# Patient Record
Sex: MALE | Race: WHITE | NOT HISPANIC OR LATINO | Employment: UNEMPLOYED | URBAN - NONMETROPOLITAN AREA
[De-identification: names, ages, dates, MRNs, and addresses within clinical notes are randomized per-mention and may not be internally consistent; named-entity substitution may affect disease eponyms.]

---

## 2017-02-13 DIAGNOSIS — R12 HEARTBURN: ICD-10-CM

## 2017-02-13 RX ORDER — PANTOPRAZOLE SODIUM 40 MG/1
TABLET, DELAYED RELEASE ORAL
Qty: 30 TABLET | Refills: 1 | Status: SHIPPED | OUTPATIENT
Start: 2017-02-13 | End: 2020-09-17 | Stop reason: HOSPADM

## 2019-09-25 ENCOUNTER — APPOINTMENT (OUTPATIENT)
Dept: CT IMAGING | Facility: HOSPITAL | Age: 44
End: 2019-09-25

## 2019-09-25 ENCOUNTER — HOSPITAL ENCOUNTER (EMERGENCY)
Facility: HOSPITAL | Age: 44
Discharge: HOME | End: 2019-09-25
Attending: EMERGENCY MEDICINE | Admitting: EMERGENCY MEDICINE

## 2019-09-25 VITALS
WEIGHT: 242.2 LBS | HEIGHT: 71 IN | RESPIRATION RATE: 18 BRPM | BODY MASS INDEX: 33.91 KG/M2 | TEMPERATURE: 98 F | OXYGEN SATURATION: 97 % | DIASTOLIC BLOOD PRESSURE: 86 MMHG | SYSTOLIC BLOOD PRESSURE: 137 MMHG | HEART RATE: 85 BPM

## 2019-09-25 DIAGNOSIS — R10.31 RIGHT LOWER QUADRANT ABDOMINAL PAIN: Primary | ICD-10-CM

## 2019-09-25 LAB
ALBUMIN SERPL-MCNC: 4.1 G/DL (ref 3.5–5.2)
ALBUMIN/GLOB SERPL: 1.5 G/DL
ALP SERPL-CCNC: 104 U/L (ref 39–117)
ALT SERPL W P-5'-P-CCNC: 27 U/L (ref 1–41)
ANION GAP SERPL CALCULATED.3IONS-SCNC: 15.6 MMOL/L (ref 5–15)
AST SERPL-CCNC: 19 U/L (ref 1–40)
BASOPHILS # BLD AUTO: 0.06 10*3/MM3 (ref 0–0.2)
BASOPHILS NFR BLD AUTO: 0.6 % (ref 0–1.5)
BILIRUB SERPL-MCNC: 0.2 MG/DL (ref 0.2–1.2)
BILIRUB UR QL STRIP: NEGATIVE
BUN BLD-MCNC: 12 MG/DL (ref 6–20)
BUN/CREAT SERPL: 12.2 (ref 7–25)
CALCIUM SPEC-SCNC: 8.9 MG/DL (ref 8.6–10.5)
CHLORIDE SERPL-SCNC: 100 MMOL/L (ref 98–107)
CLARITY UR: CLEAR
CO2 SERPL-SCNC: 22.4 MMOL/L (ref 22–29)
COLOR UR: YELLOW
CREAT BLD-MCNC: 0.98 MG/DL (ref 0.76–1.27)
DEPRECATED RDW RBC AUTO: 43.6 FL (ref 37–54)
EOSINOPHIL # BLD AUTO: 0.32 10*3/MM3 (ref 0–0.4)
EOSINOPHIL NFR BLD AUTO: 3.2 % (ref 0.3–6.2)
ERYTHROCYTE [DISTWIDTH] IN BLOOD BY AUTOMATED COUNT: 14 % (ref 12.3–15.4)
GFR SERPL CREATININE-BSD FRML MDRD: 83 ML/MIN/1.73
GLOBULIN UR ELPH-MCNC: 2.7 GM/DL
GLUCOSE BLD-MCNC: 104 MG/DL (ref 65–99)
GLUCOSE UR STRIP-MCNC: NEGATIVE MG/DL
HCT VFR BLD AUTO: 42.1 % (ref 37.5–51)
HGB BLD-MCNC: 13.8 G/DL (ref 13–17.7)
HGB UR QL STRIP.AUTO: NEGATIVE
IMM GRANULOCYTES # BLD AUTO: 0.06 10*3/MM3 (ref 0–0.05)
IMM GRANULOCYTES NFR BLD AUTO: 0.6 % (ref 0–0.5)
KETONES UR QL STRIP: NEGATIVE
LEUKOCYTE ESTERASE UR QL STRIP.AUTO: NEGATIVE
LIPASE SERPL-CCNC: 52 U/L (ref 13–60)
LYMPHOCYTES # BLD AUTO: 2.78 10*3/MM3 (ref 0.7–3.1)
LYMPHOCYTES NFR BLD AUTO: 28.2 % (ref 19.6–45.3)
MCH RBC QN AUTO: 28 PG (ref 26.6–33)
MCHC RBC AUTO-ENTMCNC: 32.8 G/DL (ref 31.5–35.7)
MCV RBC AUTO: 85.4 FL (ref 79–97)
MONOCYTES # BLD AUTO: 0.89 10*3/MM3 (ref 0.1–0.9)
MONOCYTES NFR BLD AUTO: 9 % (ref 5–12)
NEUTROPHILS # BLD AUTO: 5.74 10*3/MM3 (ref 1.7–7)
NEUTROPHILS NFR BLD AUTO: 58.4 % (ref 42.7–76)
NITRITE UR QL STRIP: NEGATIVE
NRBC BLD AUTO-RTO: 0 /100 WBC (ref 0–0.2)
PH UR STRIP.AUTO: <=5 [PH] (ref 5–8)
PLATELET # BLD AUTO: 301 10*3/MM3 (ref 140–450)
PMV BLD AUTO: 9.4 FL (ref 6–12)
POTASSIUM BLD-SCNC: 4.1 MMOL/L (ref 3.5–5.2)
PROT SERPL-MCNC: 6.8 G/DL (ref 6–8.5)
PROT UR QL STRIP: NEGATIVE
RBC # BLD AUTO: 4.93 10*6/MM3 (ref 4.14–5.8)
SODIUM BLD-SCNC: 138 MMOL/L (ref 136–145)
SP GR UR STRIP: 1.01 (ref 1–1.03)
UROBILINOGEN UR QL STRIP: NORMAL
WBC NRBC COR # BLD: 9.85 10*3/MM3 (ref 3.4–10.8)

## 2019-09-25 PROCEDURE — 85025 COMPLETE CBC W/AUTO DIFF WBC: CPT | Performed by: EMERGENCY MEDICINE

## 2019-09-25 PROCEDURE — 96375 TX/PRO/DX INJ NEW DRUG ADDON: CPT

## 2019-09-25 PROCEDURE — 96374 THER/PROPH/DIAG INJ IV PUSH: CPT

## 2019-09-25 PROCEDURE — 74177 CT ABD & PELVIS W/CONTRAST: CPT

## 2019-09-25 PROCEDURE — 25010000002 MORPHINE PER 10 MG: Performed by: EMERGENCY MEDICINE

## 2019-09-25 PROCEDURE — 25010000002 ONDANSETRON PER 1 MG: Performed by: EMERGENCY MEDICINE

## 2019-09-25 PROCEDURE — 25010000002 IOPAMIDOL 61 % SOLUTION: Performed by: EMERGENCY MEDICINE

## 2019-09-25 PROCEDURE — 83690 ASSAY OF LIPASE: CPT | Performed by: EMERGENCY MEDICINE

## 2019-09-25 PROCEDURE — 81003 URINALYSIS AUTO W/O SCOPE: CPT | Performed by: EMERGENCY MEDICINE

## 2019-09-25 PROCEDURE — 99283 EMERGENCY DEPT VISIT LOW MDM: CPT

## 2019-09-25 PROCEDURE — 80053 COMPREHEN METABOLIC PANEL: CPT | Performed by: EMERGENCY MEDICINE

## 2019-09-25 RX ORDER — MORPHINE SULFATE 4 MG/ML
4 INJECTION, SOLUTION INTRAMUSCULAR; INTRAVENOUS ONCE
Status: COMPLETED | OUTPATIENT
Start: 2019-09-25 | End: 2019-09-25

## 2019-09-25 RX ORDER — ONDANSETRON 2 MG/ML
4 INJECTION INTRAMUSCULAR; INTRAVENOUS ONCE
Status: COMPLETED | OUTPATIENT
Start: 2019-09-25 | End: 2019-09-25

## 2019-09-25 RX ADMIN — SODIUM CHLORIDE 1000 ML: 9 INJECTION, SOLUTION INTRAVENOUS at 01:02

## 2019-09-25 RX ADMIN — IOPAMIDOL 100 ML: 612 INJECTION, SOLUTION INTRAVENOUS at 01:14

## 2019-09-25 RX ADMIN — ONDANSETRON 4 MG: 2 INJECTION INTRAMUSCULAR; INTRAVENOUS at 01:02

## 2019-09-25 RX ADMIN — MORPHINE SULFATE 4 MG: 4 INJECTION INTRAVENOUS at 01:02

## 2019-09-25 NOTE — ED PROVIDER NOTES
Subjective   44-year-old male present with abdominal pain.  States her for last couple days he said a sharp right lower quadrant pain, today the pain is been more dull and more severe.  It is been associate with nausea, loss of appetite.  He denies any fevers, chills, vomiting, diarrhea, urinary complaints.  Has never had pain like this before.            Review of Systems   Constitutional: Positive for appetite change. Negative for fever.   HENT: Negative.    Eyes: Negative.    Respiratory: Negative.    Cardiovascular: Negative.    Gastrointestinal: Positive for abdominal pain and nausea. Negative for diarrhea and vomiting.   Genitourinary: Negative.    Musculoskeletal: Negative.    Skin: Negative.    Neurological: Negative.    Psychiatric/Behavioral: Negative.        Past Medical History:   Diagnosis Date   • High cholesterol    • Hypertension    • Migraine        No Known Allergies    Past Surgical History:   Procedure Laterality Date   • BUNIONECTOMY     • CHOLECYSTECTOMY     • COLONOSCOPY     • ENDOSCOPY     • HERNIA REPAIR     • KNEE OPEN LATERAL RELEASE      Right   • TONSILLECTOMY AND ADENOIDECTOMY         Family History   Problem Relation Age of Onset   • Hypertension Father    • Hypertension Mother    • Prostate cancer Paternal Uncle    • Heart disease Paternal Grandmother    • Prostate cancer Paternal Grandfather    • Prostate cancer Paternal Uncle        Social History     Socioeconomic History   • Marital status: Single     Spouse name: Not on file   • Number of children: Not on file   • Years of education: Not on file   • Highest education level: Not on file   Tobacco Use   • Smoking status: Current Every Day Smoker     Packs/day: 0.50     Types: Cigarettes   • Smokeless tobacco: Never Used   Substance and Sexual Activity   • Alcohol use: No   • Drug use: No           Objective   Physical Exam   Constitutional: He is oriented to person, place, and time. He appears well-developed and well-nourished. No  distress.   HENT:   Head: Normocephalic and atraumatic.   Right Ear: External ear normal.   Left Ear: External ear normal.   Nose: Nose normal.   Mouth/Throat: Oropharynx is clear and moist.   Eyes: Conjunctivae and EOM are normal. Pupils are equal, round, and reactive to light.   Neck: Normal range of motion. Neck supple.   Cardiovascular: Regular rhythm, normal heart sounds and intact distal pulses.   Tachycardic   Pulmonary/Chest: Effort normal and breath sounds normal. No respiratory distress.   Abdominal: Soft. Bowel sounds are normal. He exhibits no distension. There is no rebound and no guarding.   Diffuse tenderness, worse in the right lower quadrant, positive Rovsing, psoas   Musculoskeletal: Normal range of motion. He exhibits no edema, tenderness or deformity.   Neurological: He is alert and oriented to person, place, and time.   Skin: Skin is warm and dry. No rash noted.   Psychiatric: He has a normal mood and affect. His behavior is normal.   Nursing note and vitals reviewed.      Procedures           ED Course                  MDM  Number of Diagnoses or Management Options  Right lower quadrant abdominal pain:   Diagnosis management comments: 44-year-old male with abdominal pain.  Well-developed, well-nourished obese man in no distress with exam as above.  His history and exam are somewhat concerning for appendicitis.  Will check labs, urinalysis and CT scan.  We will give symptom medic treatment.  Disposition pending work-up.    DDX: Appendicitis, stone, UTI, constipation    Lab work is unremarkable.  CT scan shows small punctate right renal stone, no acute abnormality.  He states he is ready to go home.  Will discharge home with outpatient follow-up.       Amount and/or Complexity of Data Reviewed  Clinical lab tests: reviewed  Tests in the radiology section of CPT®: reviewed        Final diagnoses:   Right lower quadrant abdominal pain              Bao Paige MD  09/25/19 0353

## 2019-10-02 ENCOUNTER — OFFICE VISIT (OUTPATIENT)
Dept: SURGERY | Facility: CLINIC | Age: 44
End: 2019-10-02

## 2019-10-02 VITALS
WEIGHT: 234 LBS | BODY MASS INDEX: 32.76 KG/M2 | TEMPERATURE: 97.5 F | OXYGEN SATURATION: 97 % | SYSTOLIC BLOOD PRESSURE: 130 MMHG | DIASTOLIC BLOOD PRESSURE: 82 MMHG | HEIGHT: 71 IN | HEART RATE: 122 BPM

## 2019-10-02 DIAGNOSIS — R10.33 PERIUMBILICAL ABDOMINAL PAIN: Primary | ICD-10-CM

## 2019-10-02 DIAGNOSIS — K43.0 INCISIONAL HERNIA WITH OBSTRUCTION BUT NO GANGRENE: ICD-10-CM

## 2019-10-02 PROCEDURE — 99244 OFF/OP CNSLTJ NEW/EST MOD 40: CPT | Performed by: SURGERY

## 2019-10-02 RX ORDER — ALBUTEROL SULFATE 90 UG/1
AEROSOL, METERED RESPIRATORY (INHALATION)
COMMUNITY
Start: 2015-10-20 | End: 2020-09-17 | Stop reason: HOSPADM

## 2019-10-02 RX ORDER — IMIPRAMINE HYDROCHLORIDE 10 MG/1
TABLET, FILM COATED ORAL
COMMUNITY
Start: 2015-10-10 | End: 2020-09-17 | Stop reason: HOSPADM

## 2019-10-02 RX ORDER — SUMATRIPTAN 100 MG/1
TABLET, FILM COATED ORAL
COMMUNITY
Start: 2015-11-03 | End: 2020-09-17 | Stop reason: HOSPADM

## 2019-10-02 RX ORDER — CYCLOBENZAPRINE HCL 10 MG
10 TABLET ORAL
Refills: 0 | COMMUNITY
Start: 2019-09-26 | End: 2020-09-17 | Stop reason: HOSPADM

## 2019-10-02 RX ORDER — SULFAMETHOXAZOLE AND TRIMETHOPRIM 800; 160 MG/1; MG/1
2 TABLET ORAL 2 TIMES DAILY
Refills: 0 | COMMUNITY
Start: 2019-09-26 | End: 2019-10-22

## 2019-10-02 RX ORDER — HYDROXYZINE PAMOATE 25 MG/1
1 CAPSULE ORAL DAILY
COMMUNITY
Start: 2015-10-06 | End: 2020-09-17 | Stop reason: HOSPADM

## 2019-10-02 NOTE — PROGRESS NOTES
Patient: Gopi Johnson    YOB: 1975    Date: 10/02/2019    Primary Care Provider: Juvenal Ray PA    Reason for Consultation: Hernia    Chief Complaint   Patient presents with   • Mass     abdominal       SUBJECTIVE:    History of present illness:  I saw the patient in the office today as a consultation for evaluation and treatment of an abdominal mass on the left of his umbilicus.  It has been there for approximately 1 month.  He complains of abdominal pain.  He can't manually reduce it.  He had a ct scan that showed right inguinal and lower abdominal wall hernias containing fat.    He has had a previous laparoscopic cholecystectomy in the past.  He complains of a mass to the left side of his umbilicus, this is worse with heavy lifting, nonradiating in nature, causing sharp discomfort, present over the past several months, lying down seems to relieve it.  He does have evidence of an incisional hernia at the umbilicus on CT scan and a smaller right sided inguinal hernia.  He was seen in the emergency room recently.    The following portions of the patient's history were reviewed and updated as appropriate: allergies, current medications, past family history, past medical history, past social history, past surgical history and problem list.    Review of Systems   Constitutional: Negative for chills, fever and unexpected weight change.   HENT: Negative for trouble swallowing and voice change.    Eyes: Negative for visual disturbance.   Respiratory: Negative for apnea, cough, chest tightness, shortness of breath and wheezing.    Cardiovascular: Negative for chest pain, palpitations and leg swelling.   Gastrointestinal: Positive for abdominal pain. Negative for abdominal distention, anal bleeding, blood in stool, constipation, diarrhea, nausea, rectal pain and vomiting.   Endocrine: Negative for cold intolerance and heat intolerance.   Genitourinary: Negative for difficulty urinating, dysuria, flank  pain, scrotal swelling and testicular pain.   Musculoskeletal: Negative for back pain, gait problem and joint swelling.   Skin: Negative for color change, rash and wound.   Neurological: Negative for dizziness, syncope, speech difficulty, weakness, numbness and headaches.   Hematological: Negative for adenopathy. Does not bruise/bleed easily.   Psychiatric/Behavioral: Negative for confusion. The patient is not nervous/anxious.        History:  Past Medical History:   Diagnosis Date   • High cholesterol    • Hypertension    • Migraine        Past Surgical History:   Procedure Laterality Date   • BUNIONECTOMY     • CHOLECYSTECTOMY     • COLONOSCOPY     • ENDOSCOPY     • HERNIA REPAIR     • KNEE OPEN LATERAL RELEASE      Right   • TONSILLECTOMY AND ADENOIDECTOMY         Family History   Problem Relation Age of Onset   • Hypertension Father    • Hypertension Mother    • Prostate cancer Paternal Uncle    • Heart disease Paternal Grandmother    • Prostate cancer Paternal Grandfather    • Prostate cancer Paternal Uncle        Social History     Tobacco Use   • Smoking status: Current Every Day Smoker     Packs/day: 0.50     Types: Cigarettes   • Smokeless tobacco: Never Used   Substance Use Topics   • Alcohol use: No   • Drug use: No       Allergies:  No Known Allergies    Medications:    Current Outpatient Medications:   •  cyclobenzaprine (FLEXERIL) 10 MG tablet, Take 10 mg by mouth every night at bedtime., Disp: , Rfl: 0  •  hydrOXYzine pamoate (VISTARIL) 25 MG capsule, Take 1 capsule by mouth Daily., Disp: , Rfl:   •  pantoprazole (PROTONIX) 40 MG EC tablet, TAKE 1 TABLET IN THE MORNING 30 MINUTES BEFORE BREAKFAST, Disp: 30 tablet, Rfl: 1  •  sulfamethoxazole-trimethoprim (BACTRIM DS,SEPTRA DS) 800-160 MG per tablet, Take 2 tablets by mouth 2 (Two) Times a Day., Disp: , Rfl: 0  •  venlafaxine XR (EFFEXOR-XR) 150 MG 24 hr capsule, Take 150 mg by mouth Daily., Disp: , Rfl:   •  albuterol sulfate HFA (VENTOLIN HFA) 108  "(90 Base) MCG/ACT inhaler, Ventolin  (90 Base) MCG/ACT Inhalation Aerosol Solution; Patient Sig: Ventolin  (90 Base) MCG/ACT Inhalation Aerosol Solution ; 18; 0; 20-Oct-2015; Active, Disp: , Rfl:   •  atorvastatin (LIPITOR) 20 MG tablet, Take  by mouth., Disp: , Rfl:   •  hydrochlorothiazide (HYDRODIURIL) 25 MG tablet, Take  by mouth Daily., Disp: , Rfl:   •  imipramine (TOFRANIL) 10 MG tablet, Take  by mouth., Disp: , Rfl:   •  SUMAtriptan (IMITREX) 100 MG tablet, Take  by mouth., Disp: , Rfl:   •  topiramate (TOPAMAX) 50 MG tablet, Take  by mouth Daily., Disp: , Rfl:     OBJECTIVE:    Vital Signs:   Vitals:    10/02/19 1424   BP: 130/82   Pulse: (!) 122   Temp: 97.5 °F (36.4 °C)   TempSrc: Temporal   SpO2: 97%   Weight: 106 kg (234 lb)   Height: 180.3 cm (71\")       Physical Exam:   General Appearance:    Alert, cooperative, in no acute distress   Head:    Normocephalic, without obvious abnormality, atraumatic   Eyes:            Lids and lashes normal, conjunctivae and sclerae normal, no   icterus, no pallor, corneas clear, PERRLA   Ears:    Ears appear intact with no abnormalities noted   Throat:   No oral lesions, no thrush, oral mucosa moist   Neck:   No adenopathy, supple, trachea midline, no thyromegaly, no   carotid bruit, no JVD   Lungs:     Clear to auscultation,respirations regular, even and                  unlabored    Heart:    Regular rhythm and normal rate, normal S1 and S2, no            murmur   Abdomen:     no masses, no organomegaly, soft non-tender, non-distended, no guarding, there is evidence of a incarcerated incisional hernia located just to the left and inferior to the umbilicus, there is a smaller reducible right inguinal hernia present   Extremities:   Moves all extremities well, no edema, no cyanosis, no             redness   Pulses:   Pulses palpable and equal bilaterally   Skin:   No bleeding, bruising or rash   Lymph nodes:   No palpable adenopathy   Neurologic:   " Cranial nerves 2 - 12 grossly intact, sensation intact        Results Review:   I reviewed the patient's new clinical results.  I reviewed the patient's new imaging results and agree with the interpretation.  I reviewed the patient's other test results and agree with the interpretation    Review of Systems was reviewed and confirmed as accurate today.    ASSESSMENT/PLAN:    1. Periumbilical abdominal pain    2. Incisional hernia with obstruction but no gangrene        I had a detailed and extensive discussion with the patient in the office and they understand that they need to undergo incisional hernia repair with mesh.  Full risks and benefits of operative versus nonoperative intervention were discussed with the patient and these included things such as nonresolution of symptoms and possible worsening of symptoms without surgical intervention versus infection, bleeding, possible recurrent hernia, possible postoperative neuralgia from nerve damage or involvement with scar tissue, etc.  The patient understands, agrees, and had no questions for me at the end of the office visit.     I discussed the patients findings and my recommendations with patient.    He did have an ultrasound performed today in the office and this did show evidence of multiple defects inferior to the umbilicus corresponding to the area of previous laparoscopic cholecystectomy trocar incision.  He also had a very small right inguinal hernia.  I do not really know if this needs to be repaired at this time, I would start with the incisional hernia first.    Electronically signed by Royal Dover MD  10/02/19    Portions of this note have been scribed for Royal Dover MD by Michelle Milligan. 10/2/2019  3:33 PM

## 2019-10-17 ENCOUNTER — TELEPHONE (OUTPATIENT)
Dept: SURGERY | Facility: CLINIC | Age: 44
End: 2019-10-17

## 2019-10-18 ENCOUNTER — OUTSIDE FACILITY SERVICE (OUTPATIENT)
Dept: SURGERY | Facility: CLINIC | Age: 44
End: 2019-10-18

## 2019-10-18 PROCEDURE — 49561 PR REPAIR INCISIONAL HERNIA,STRANG: CPT | Performed by: SURGERY

## 2019-10-18 PROCEDURE — 49568 PR IMPLANT MESH HERNIA REPAIR/DEBRIDEMENT CLOSURE: CPT | Performed by: SURGERY

## 2019-10-21 ENCOUNTER — TELEPHONE (OUTPATIENT)
Dept: SURGERY | Facility: CLINIC | Age: 44
End: 2019-10-21

## 2019-10-22 ENCOUNTER — OFFICE VISIT (OUTPATIENT)
Dept: SURGERY | Facility: CLINIC | Age: 44
End: 2019-10-22

## 2019-10-22 VITALS
HEART RATE: 100 BPM | DIASTOLIC BLOOD PRESSURE: 82 MMHG | WEIGHT: 243 LBS | OXYGEN SATURATION: 97 % | SYSTOLIC BLOOD PRESSURE: 146 MMHG | TEMPERATURE: 98.4 F | HEIGHT: 71 IN | BODY MASS INDEX: 34.02 KG/M2

## 2019-10-22 DIAGNOSIS — Z48.89 POSTOPERATIVE VISIT: Primary | ICD-10-CM

## 2019-10-22 PROCEDURE — 99024 POSTOP FOLLOW-UP VISIT: CPT | Performed by: SURGERY

## 2019-10-22 RX ORDER — OXYCODONE AND ACETAMINOPHEN 7.5; 325 MG/1; MG/1
1 TABLET ORAL EVERY 4 HOURS PRN
Qty: 20 TABLET | Refills: 0 | Status: ON HOLD | OUTPATIENT
Start: 2019-10-22 | End: 2020-09-12

## 2019-10-22 NOTE — PROGRESS NOTES
"Patient: Gopi Johnson    YOB: 1975    Date: 10/22/2019    Primary Care Provider: Juvenal Ray PA    Reason for Consultation: Follow-up hernia    Chief Complaint   Patient presents with   • Post-op Hernia       History of present illness:  I saw the patient in the office today as a followup from their recent hernia repair.  He complains of severe abdominal pain, worse with movement. He states he is still having bleeding out of his incision.     The following portions of the patient's history were reviewed and updated as appropriate: allergies, current medications, past family history, past medical history, past social history, past surgical history and problem list.    Vital Signs:   Vitals:    10/22/19 1344   BP: 146/82   Pulse: 100   Temp: 98.4 °F (36.9 °C)   SpO2: 97%   Weight: 110 kg (243 lb)   Height: 180.3 cm (70.98\")       Physical Exam:   General Appearance:    Alert, cooperative, in no acute distress   Abdomen:     no masses, no organomegaly, soft non-tender, non-distended, no guarding, wounds are well healed, no evidence of recurrent hernia         Assessment / Plan:    1. Postoperative visit        I did discuss the situation with the patient today in the office and they have done well from their recent herniorraphy.  I will see the patient back in the office in 1 week for suture removal, I did give him another prescription for Percocet 7.5/325.    Electronically signed by Royal Dover MD  10/22/19    Portions of this note has been scribed for Royal Dover MD by Inga Bennett. 10/22/2019  2:57 PM            "

## 2019-10-30 ENCOUNTER — OFFICE VISIT (OUTPATIENT)
Dept: SURGERY | Facility: CLINIC | Age: 44
End: 2019-10-30

## 2019-10-30 VITALS
TEMPERATURE: 98.8 F | OXYGEN SATURATION: 98 % | SYSTOLIC BLOOD PRESSURE: 158 MMHG | HEART RATE: 90 BPM | BODY MASS INDEX: 34.13 KG/M2 | WEIGHT: 243.8 LBS | DIASTOLIC BLOOD PRESSURE: 90 MMHG | HEIGHT: 71 IN

## 2019-10-30 DIAGNOSIS — Z48.89 POSTOPERATIVE VISIT: Primary | ICD-10-CM

## 2019-10-30 PROCEDURE — 99024 POSTOP FOLLOW-UP VISIT: CPT | Performed by: SURGERY

## 2019-10-30 NOTE — PROGRESS NOTES
"Patient: Gopi Johnson    YOB: 1975    Date: 10/30/2019    Primary Care Provider: Juvenal Ray PA    Reason for Consultation: Follow-up hernia    Chief Complaint   Patient presents with   • Post-op       History of present illness:  I saw the patient in the office today as a followup from their recent hernia repair.  They state that they have done well and are having no complaints.    The following portions of the patient's history were reviewed and updated as appropriate: allergies, current medications, past family history, past medical history, past social history, past surgical history and problem list.    Vital Signs:   Vitals:    10/30/19 1327   BP: 158/90   Pulse: 90   Temp: 98.8 °F (37.1 °C)   SpO2: 98%   Weight: 111 kg (243 lb 12.8 oz)   Height: 180.3 cm (70.98\")       Physical Exam:   General Appearance:    Alert, cooperative, in no acute distress   Abdomen:     no masses, no organomegaly, soft non-tender, non-distended, no guarding, wounds are well healed, no evidence of recurrent hernia         Assessment / Plan:    1. Postoperative visit        I did discuss the situation with the patient today in the office and they have done well from their recent herniorraphy.  I have released the patient back to normal activity, they understand that they need to be careful about heavy lifting.  I need to see the patient back in the office only if they are having further problems, they know to call me if they are.    Electronically signed by Royal Dover MD  10/30/19      Portions of this note have been scribed for Royal Dover MD by Marivel Jimenez. 10/30/2019  1:50 PM          "

## 2020-09-10 ENCOUNTER — HOSPITAL ENCOUNTER (INPATIENT)
Facility: HOSPITAL | Age: 45
LOS: 7 days | Discharge: ANOTHER HOSPITAL | End: 2020-09-17
Attending: INTERNAL MEDICINE | Admitting: FAMILY MEDICINE

## 2020-09-10 DIAGNOSIS — A41.9 SEPSIS WITHOUT ACUTE ORGAN DYSFUNCTION, DUE TO UNSPECIFIED ORGANISM (HCC): Primary | ICD-10-CM

## 2020-09-10 DIAGNOSIS — M60.08 ABSCESS OF PARASPINAL MUSCLES: ICD-10-CM

## 2020-09-10 PROCEDURE — 99222 1ST HOSP IP/OBS MODERATE 55: CPT | Performed by: INTERNAL MEDICINE

## 2020-09-11 ENCOUNTER — APPOINTMENT (OUTPATIENT)
Dept: MRI IMAGING | Facility: HOSPITAL | Age: 45
End: 2020-09-11

## 2020-09-11 ENCOUNTER — APPOINTMENT (OUTPATIENT)
Dept: CARDIOLOGY | Facility: HOSPITAL | Age: 45
End: 2020-09-11

## 2020-09-11 PROBLEM — G89.29 CHRONIC MIDLINE LOW BACK PAIN WITHOUT SCIATICA: Status: ACTIVE | Noted: 2020-09-11

## 2020-09-11 PROBLEM — M60.08 ABSCESS OF PARASPINAL MUSCLES: Status: ACTIVE | Noted: 2020-09-11

## 2020-09-11 PROBLEM — M54.50 CHRONIC MIDLINE LOW BACK PAIN WITHOUT SCIATICA: Status: ACTIVE | Noted: 2020-09-11

## 2020-09-11 PROBLEM — L02.31 ABSCESS, GLUTEAL, LEFT: Status: ACTIVE | Noted: 2020-09-11

## 2020-09-11 PROBLEM — G93.41 METABOLIC ENCEPHALOPATHY: Status: RESOLVED | Noted: 2020-09-11 | Resolved: 2020-09-11

## 2020-09-11 PROBLEM — G93.41 METABOLIC ENCEPHALOPATHY: Status: ACTIVE | Noted: 2020-09-11

## 2020-09-11 PROBLEM — A41.9 SEPSIS: Status: ACTIVE | Noted: 2020-09-11

## 2020-09-11 PROBLEM — I10 ESSENTIAL HYPERTENSION: Status: ACTIVE | Noted: 2020-09-11

## 2020-09-11 PROBLEM — R78.81 BACTEREMIA DUE TO GRAM-POSITIVE BACTERIA: Status: ACTIVE | Noted: 2020-09-11

## 2020-09-11 LAB
ALBUMIN SERPL-MCNC: 2.7 G/DL (ref 3.5–5.2)
ALBUMIN/GLOB SERPL: 0.7 G/DL
ALP SERPL-CCNC: 203 U/L (ref 39–117)
ALT SERPL W P-5'-P-CCNC: 50 U/L (ref 1–41)
AMPHET+METHAMPHET UR QL: NEGATIVE
AMPHETAMINES UR QL: NEGATIVE
ANION GAP SERPL CALCULATED.3IONS-SCNC: 13.6 MMOL/L (ref 5–15)
AST SERPL-CCNC: 58 U/L (ref 1–40)
BACTERIA BLD CULT: ABNORMAL
BARBITURATES UR QL SCN: NEGATIVE
BASOPHILS # BLD AUTO: 0.04 10*3/MM3 (ref 0–0.2)
BASOPHILS NFR BLD AUTO: 0.2 % (ref 0–1.5)
BENZODIAZ UR QL SCN: NEGATIVE
BILIRUB SERPL-MCNC: 0.4 MG/DL (ref 0–1.2)
BUN SERPL-MCNC: 16 MG/DL (ref 6–20)
BUN/CREAT SERPL: 24.6 (ref 7–25)
BUPRENORPHINE SERPL-MCNC: NEGATIVE NG/ML
CALCIUM SPEC-SCNC: 8.6 MG/DL (ref 8.6–10.5)
CANNABINOIDS SERPL QL: NEGATIVE
CHLORIDE SERPL-SCNC: 105 MMOL/L (ref 98–107)
CO2 SERPL-SCNC: 21.4 MMOL/L (ref 22–29)
COCAINE UR QL: NEGATIVE
CREAT SERPL-MCNC: 0.65 MG/DL (ref 0.76–1.27)
DEPRECATED RDW RBC AUTO: 48.3 FL (ref 37–54)
EOSINOPHIL # BLD AUTO: 0 10*3/MM3 (ref 0–0.4)
EOSINOPHIL NFR BLD AUTO: 0 % (ref 0.3–6.2)
ERYTHROCYTE [DISTWIDTH] IN BLOOD BY AUTOMATED COUNT: 15.2 % (ref 12.3–15.4)
FERRITIN SERPL-MCNC: 588.4 NG/ML (ref 30–400)
GFR SERPL CREATININE-BSD FRML MDRD: 133 ML/MIN/1.73
GLOBULIN UR ELPH-MCNC: 3.9 GM/DL
GLUCOSE SERPL-MCNC: 197 MG/DL (ref 65–99)
HBA1C MFR BLD: 5.8 % (ref 4.8–5.6)
HCT VFR BLD AUTO: 35.7 % (ref 37.5–51)
HGB BLD-MCNC: 11.6 G/DL (ref 13–17.7)
IMM GRANULOCYTES # BLD AUTO: 0.49 10*3/MM3 (ref 0–0.05)
IMM GRANULOCYTES NFR BLD AUTO: 2.9 % (ref 0–0.5)
LYMPHOCYTES # BLD AUTO: 0.84 10*3/MM3 (ref 0.7–3.1)
LYMPHOCYTES NFR BLD AUTO: 5 % (ref 19.6–45.3)
MAXIMAL PREDICTED HEART RATE: 175 BPM
MCH RBC QN AUTO: 28.2 PG (ref 26.6–33)
MCHC RBC AUTO-ENTMCNC: 32.5 G/DL (ref 31.5–35.7)
MCV RBC AUTO: 86.7 FL (ref 79–97)
METHADONE UR QL SCN: NEGATIVE
MONOCYTES # BLD AUTO: 0.6 10*3/MM3 (ref 0.1–0.9)
MONOCYTES NFR BLD AUTO: 3.6 % (ref 5–12)
NEUTROPHILS NFR BLD AUTO: 14.69 10*3/MM3 (ref 1.7–7)
NEUTROPHILS NFR BLD AUTO: 88.3 % (ref 42.7–76)
NRBC BLD AUTO-RTO: 0 /100 WBC (ref 0–0.2)
OPIATES UR QL: POSITIVE
OXYCODONE UR QL SCN: NEGATIVE
PCP UR QL SCN: NEGATIVE
PLATELET # BLD AUTO: 334 10*3/MM3 (ref 140–450)
PMV BLD AUTO: 10.2 FL (ref 6–12)
POTASSIUM SERPL-SCNC: 4.3 MMOL/L (ref 3.5–5.2)
PROPOXYPH UR QL: NEGATIVE
PROT SERPL-MCNC: 6.6 G/DL (ref 6–8.5)
RBC # BLD AUTO: 4.12 10*6/MM3 (ref 4.14–5.8)
SARS-COV-2 RNA PNL SPEC NAA+PROBE: NOT DETECTED
SODIUM SERPL-SCNC: 140 MMOL/L (ref 136–145)
STRESS TARGET HR: 149 BPM
TRICYCLICS UR QL SCN: POSITIVE
TSH SERPL DL<=0.05 MIU/L-ACNC: 0.62 UIU/ML (ref 0.27–4.2)
WBC # BLD AUTO: 16.66 10*3/MM3 (ref 3.4–10.8)

## 2020-09-11 PROCEDURE — 87147 CULTURE TYPE IMMUNOLOGIC: CPT | Performed by: INTERNAL MEDICINE

## 2020-09-11 PROCEDURE — 87150 DNA/RNA AMPLIFIED PROBE: CPT | Performed by: INTERNAL MEDICINE

## 2020-09-11 PROCEDURE — 85025 COMPLETE CBC W/AUTO DIFF WBC: CPT | Performed by: INTERNAL MEDICINE

## 2020-09-11 PROCEDURE — 25010000002 CEFTRIAXONE SODIUM-DEXTROSE 2-2.22 GM-%(50ML) RECONSTITUTED SOLUTION: Performed by: INTERNAL MEDICINE

## 2020-09-11 PROCEDURE — 25010000002 ENOXAPARIN PER 10 MG: Performed by: INTERNAL MEDICINE

## 2020-09-11 PROCEDURE — 87186 SC STD MICRODIL/AGAR DIL: CPT | Performed by: INTERNAL MEDICINE

## 2020-09-11 PROCEDURE — 82728 ASSAY OF FERRITIN: CPT | Performed by: INTERNAL MEDICINE

## 2020-09-11 PROCEDURE — 25010000002 VANCOMYCIN 5 G RECONSTITUTED SOLUTION 5,000 MG VIAL: Performed by: INTERNAL MEDICINE

## 2020-09-11 PROCEDURE — 93306 TTE W/DOPPLER COMPLETE: CPT

## 2020-09-11 PROCEDURE — 87040 BLOOD CULTURE FOR BACTERIA: CPT | Performed by: INTERNAL MEDICINE

## 2020-09-11 PROCEDURE — 84443 ASSAY THYROID STIM HORMONE: CPT | Performed by: INTERNAL MEDICINE

## 2020-09-11 PROCEDURE — 93306 TTE W/DOPPLER COMPLETE: CPT | Performed by: INTERNAL MEDICINE

## 2020-09-11 PROCEDURE — 72141 MRI NECK SPINE W/O DYE: CPT

## 2020-09-11 PROCEDURE — 94799 UNLISTED PULMONARY SVC/PX: CPT

## 2020-09-11 PROCEDURE — 25010000002 MORPHINE PER 10 MG: Performed by: INTERNAL MEDICINE

## 2020-09-11 PROCEDURE — 0 GADOBENATE DIMEGLUMINE 529 MG/ML SOLUTION: Performed by: INTERNAL MEDICINE

## 2020-09-11 PROCEDURE — 25010000002 DEXAMETHASONE PER 1 MG: Performed by: INTERNAL MEDICINE

## 2020-09-11 PROCEDURE — 80053 COMPREHEN METABOLIC PANEL: CPT | Performed by: INTERNAL MEDICINE

## 2020-09-11 PROCEDURE — 83036 HEMOGLOBIN GLYCOSYLATED A1C: CPT | Performed by: INTERNAL MEDICINE

## 2020-09-11 PROCEDURE — 25010000002 ACYCLOVIR PER 5 MG: Performed by: INTERNAL MEDICINE

## 2020-09-11 PROCEDURE — 72146 MRI CHEST SPINE W/O DYE: CPT

## 2020-09-11 PROCEDURE — 94640 AIRWAY INHALATION TREATMENT: CPT

## 2020-09-11 PROCEDURE — 87635 SARS-COV-2 COVID-19 AMP PRB: CPT | Performed by: INTERNAL MEDICINE

## 2020-09-11 PROCEDURE — A9577 INJ MULTIHANCE: HCPCS | Performed by: INTERNAL MEDICINE

## 2020-09-11 PROCEDURE — 99233 SBSQ HOSP IP/OBS HIGH 50: CPT | Performed by: INTERNAL MEDICINE

## 2020-09-11 PROCEDURE — 72158 MRI LUMBAR SPINE W/O & W/DYE: CPT

## 2020-09-11 PROCEDURE — 80306 DRUG TEST PRSMV INSTRMNT: CPT | Performed by: INTERNAL MEDICINE

## 2020-09-11 PROCEDURE — 87641 MR-STAPH DNA AMP PROBE: CPT | Performed by: INTERNAL MEDICINE

## 2020-09-11 RX ORDER — ONDANSETRON 4 MG/1
4 TABLET, FILM COATED ORAL EVERY 6 HOURS PRN
Status: DISCONTINUED | OUTPATIENT
Start: 2020-09-11 | End: 2020-09-17 | Stop reason: HOSPADM

## 2020-09-11 RX ORDER — HYDROCHLOROTHIAZIDE 25 MG/1
25 TABLET ORAL DAILY
Status: DISCONTINUED | OUTPATIENT
Start: 2020-09-11 | End: 2020-09-12

## 2020-09-11 RX ORDER — PANTOPRAZOLE SODIUM 40 MG/1
40 TABLET, DELAYED RELEASE ORAL EVERY MORNING
Status: DISCONTINUED | OUTPATIENT
Start: 2020-09-11 | End: 2020-09-17 | Stop reason: HOSPADM

## 2020-09-11 RX ORDER — TOPIRAMATE 25 MG/1
50 TABLET ORAL DAILY
Status: DISCONTINUED | OUTPATIENT
Start: 2020-09-11 | End: 2020-09-17 | Stop reason: HOSPADM

## 2020-09-11 RX ORDER — CALCIUM CARBONATE 200(500)MG
2 TABLET,CHEWABLE ORAL 2 TIMES DAILY PRN
Status: DISCONTINUED | OUTPATIENT
Start: 2020-09-11 | End: 2020-09-17 | Stop reason: HOSPADM

## 2020-09-11 RX ORDER — MORPHINE SULFATE 4 MG/ML
4 INJECTION, SOLUTION INTRAMUSCULAR; INTRAVENOUS ONCE
Status: COMPLETED | OUTPATIENT
Start: 2020-09-11 | End: 2020-09-11

## 2020-09-11 RX ORDER — ONDANSETRON 2 MG/ML
4 INJECTION INTRAMUSCULAR; INTRAVENOUS EVERY 6 HOURS PRN
Status: DISCONTINUED | OUTPATIENT
Start: 2020-09-11 | End: 2020-09-17 | Stop reason: HOSPADM

## 2020-09-11 RX ORDER — IPRATROPIUM BROMIDE AND ALBUTEROL SULFATE 2.5; .5 MG/3ML; MG/3ML
3 SOLUTION RESPIRATORY (INHALATION)
Status: DISCONTINUED | OUTPATIENT
Start: 2020-09-11 | End: 2020-09-11

## 2020-09-11 RX ORDER — IPRATROPIUM BROMIDE AND ALBUTEROL SULFATE 2.5; .5 MG/3ML; MG/3ML
3 SOLUTION RESPIRATORY (INHALATION) EVERY 6 HOURS PRN
Status: DISCONTINUED | OUTPATIENT
Start: 2020-09-11 | End: 2020-09-17 | Stop reason: HOSPADM

## 2020-09-11 RX ORDER — ATORVASTATIN CALCIUM 20 MG/1
20 TABLET, FILM COATED ORAL NIGHTLY
Status: DISCONTINUED | OUTPATIENT
Start: 2020-09-11 | End: 2020-09-16

## 2020-09-11 RX ORDER — MORPHINE SULFATE 4 MG/ML
4 INJECTION, SOLUTION INTRAMUSCULAR; INTRAVENOUS EVERY 4 HOURS PRN
Status: DISCONTINUED | OUTPATIENT
Start: 2020-09-11 | End: 2020-09-16

## 2020-09-11 RX ORDER — SODIUM CHLORIDE 0.9 % (FLUSH) 0.9 %
10 SYRINGE (ML) INJECTION EVERY 12 HOURS SCHEDULED
Status: DISCONTINUED | OUTPATIENT
Start: 2020-09-11 | End: 2020-09-17 | Stop reason: HOSPADM

## 2020-09-11 RX ORDER — HYDRALAZINE HYDROCHLORIDE 20 MG/ML
10 INJECTION INTRAMUSCULAR; INTRAVENOUS EVERY 4 HOURS PRN
Status: DISCONTINUED | OUTPATIENT
Start: 2020-09-11 | End: 2020-09-17 | Stop reason: HOSPADM

## 2020-09-11 RX ORDER — ACETAMINOPHEN 650 MG/1
650 SUPPOSITORY RECTAL EVERY 4 HOURS PRN
Status: DISCONTINUED | OUTPATIENT
Start: 2020-09-11 | End: 2020-09-17 | Stop reason: HOSPADM

## 2020-09-11 RX ORDER — DEXAMETHASONE SODIUM PHOSPHATE 4 MG/ML
4 INJECTION, SOLUTION INTRA-ARTICULAR; INTRALESIONAL; INTRAMUSCULAR; INTRAVENOUS; SOFT TISSUE EVERY 6 HOURS
Status: DISCONTINUED | OUTPATIENT
Start: 2020-09-11 | End: 2020-09-15

## 2020-09-11 RX ORDER — SODIUM CHLORIDE 0.9 % (FLUSH) 0.9 %
10 SYRINGE (ML) INJECTION AS NEEDED
Status: DISCONTINUED | OUTPATIENT
Start: 2020-09-11 | End: 2020-09-17 | Stop reason: HOSPADM

## 2020-09-11 RX ORDER — SODIUM CHLORIDE 9 MG/ML
150 INJECTION, SOLUTION INTRAVENOUS CONTINUOUS
Status: DISCONTINUED | OUTPATIENT
Start: 2020-09-11 | End: 2020-09-12

## 2020-09-11 RX ORDER — ACETAMINOPHEN 325 MG/1
650 TABLET ORAL EVERY 4 HOURS PRN
Status: DISCONTINUED | OUTPATIENT
Start: 2020-09-11 | End: 2020-09-17 | Stop reason: HOSPADM

## 2020-09-11 RX ORDER — CEFTRIAXONE 2 G/50ML
2 INJECTION, SOLUTION INTRAVENOUS EVERY 24 HOURS
Status: DISCONTINUED | OUTPATIENT
Start: 2020-09-11 | End: 2020-09-12

## 2020-09-11 RX ORDER — HYDROCODONE BITARTRATE AND ACETAMINOPHEN 7.5; 325 MG/1; MG/1
1 TABLET ORAL EVERY 6 HOURS PRN
Status: DISCONTINUED | OUTPATIENT
Start: 2020-09-11 | End: 2020-09-16

## 2020-09-11 RX ORDER — L.ACID,PARA/B.BIFIDUM/S.THERM 8B CELL
1 CAPSULE ORAL 2 TIMES DAILY
Status: DISCONTINUED | OUTPATIENT
Start: 2020-09-11 | End: 2020-09-17 | Stop reason: HOSPADM

## 2020-09-11 RX ADMIN — IPRATROPIUM BROMIDE AND ALBUTEROL SULFATE 3 ML: .5; 3 SOLUTION RESPIRATORY (INHALATION) at 15:29

## 2020-09-11 RX ADMIN — MORPHINE SULFATE 4 MG: 4 INJECTION, SOLUTION INTRAMUSCULAR; INTRAVENOUS at 10:49

## 2020-09-11 RX ADMIN — VANCOMYCIN HYDROCHLORIDE 1250 MG: 500 INJECTION, POWDER, LYOPHILIZED, FOR SOLUTION INTRAVENOUS at 05:34

## 2020-09-11 RX ADMIN — HYDROCODONE BITARTRATE AND ACETAMINOPHEN 1 TABLET: 7.5; 325 TABLET ORAL at 21:11

## 2020-09-11 RX ADMIN — Medication 1 CAPSULE: at 21:11

## 2020-09-11 RX ADMIN — HYDROCODONE BITARTRATE AND ACETAMINOPHEN 1 TABLET: 7.5; 325 TABLET ORAL at 15:11

## 2020-09-11 RX ADMIN — SODIUM CHLORIDE, PRESERVATIVE FREE 10 ML: 5 INJECTION INTRAVENOUS at 03:11

## 2020-09-11 RX ADMIN — HYDROCHLOROTHIAZIDE 25 MG: 25 TABLET ORAL at 10:38

## 2020-09-11 RX ADMIN — SODIUM CHLORIDE 150 ML/HR: 9 INJECTION, SOLUTION INTRAVENOUS at 00:30

## 2020-09-11 RX ADMIN — ENOXAPARIN SODIUM 40 MG: 40 INJECTION SUBCUTANEOUS at 05:34

## 2020-09-11 RX ADMIN — IPRATROPIUM BROMIDE AND ALBUTEROL SULFATE 3 ML: .5; 3 SOLUTION RESPIRATORY (INHALATION) at 06:40

## 2020-09-11 RX ADMIN — SODIUM CHLORIDE 150 ML/HR: 9 INJECTION, SOLUTION INTRAVENOUS at 10:43

## 2020-09-11 RX ADMIN — IPRATROPIUM BROMIDE AND ALBUTEROL SULFATE 3 ML: .5; 3 SOLUTION RESPIRATORY (INHALATION) at 12:38

## 2020-09-11 RX ADMIN — Medication 1 CAPSULE: at 10:39

## 2020-09-11 RX ADMIN — DEXAMETHASONE SODIUM PHOSPHATE 4 MG: 4 INJECTION, SOLUTION INTRA-ARTICULAR; INTRALESIONAL; INTRAMUSCULAR; INTRAVENOUS; SOFT TISSUE at 11:00

## 2020-09-11 RX ADMIN — ATORVASTATIN CALCIUM 20 MG: 20 TABLET, FILM COATED ORAL at 21:11

## 2020-09-11 RX ADMIN — GADOBENATE DIMEGLUMINE 15 ML: 529 INJECTION, SOLUTION INTRAVENOUS at 11:00

## 2020-09-11 RX ADMIN — SODIUM CHLORIDE, PRESERVATIVE FREE 10 ML: 5 INJECTION INTRAVENOUS at 21:12

## 2020-09-11 RX ADMIN — VANCOMYCIN HYDROCHLORIDE 1250 MG: 500 INJECTION, POWDER, LYOPHILIZED, FOR SOLUTION INTRAVENOUS at 18:11

## 2020-09-11 RX ADMIN — IPRATROPIUM BROMIDE AND ALBUTEROL SULFATE 3 ML: .5; 3 SOLUTION RESPIRATORY (INHALATION) at 03:40

## 2020-09-11 RX ADMIN — CEFTRIAXONE 2 G: 2 INJECTION, SOLUTION INTRAVENOUS at 16:39

## 2020-09-11 RX ADMIN — ACYCLOVIR SODIUM 780 MG: 500 INJECTION, SOLUTION INTRAVENOUS at 05:34

## 2020-09-11 RX ADMIN — DEXAMETHASONE SODIUM PHOSPHATE 4 MG: 4 INJECTION, SOLUTION INTRA-ARTICULAR; INTRALESIONAL; INTRAMUSCULAR; INTRAVENOUS; SOFT TISSUE at 21:11

## 2020-09-11 RX ADMIN — DEXAMETHASONE SODIUM PHOSPHATE 4 MG: 4 INJECTION, SOLUTION INTRA-ARTICULAR; INTRALESIONAL; INTRAMUSCULAR; INTRAVENOUS; SOFT TISSUE at 15:11

## 2020-09-11 RX ADMIN — SODIUM CHLORIDE, PRESERVATIVE FREE 10 ML: 5 INJECTION INTRAVENOUS at 10:42

## 2020-09-11 RX ADMIN — MORPHINE SULFATE 4 MG: 4 INJECTION, SOLUTION INTRAMUSCULAR; INTRAVENOUS at 03:10

## 2020-09-11 RX ADMIN — PANTOPRAZOLE SODIUM 40 MG: 40 TABLET, DELAYED RELEASE ORAL at 10:39

## 2020-09-11 NOTE — PLAN OF CARE
Problem: Patient Care Overview  Goal: Plan of Care Review  Outcome: Ongoing (interventions implemented as appropriate)  Flowsheets (Taken 9/11/2020 0452)  Progress: improving  Plan of Care Reviewed With: patient  Outcome Summary: more alert.  vss.     Problem: Fall Risk (Adult)  Goal: Identify Related Risk Factors and Signs and Symptoms  Outcome: Ongoing (interventions implemented as appropriate)  Flowsheets (Taken 9/11/2020 0452)  Related Risk Factors (Fall Risk): confusion/agitation; fatigue/slow reaction; history of falls; sleep pattern alteration; environment unfamiliar  Signs and Symptoms (Fall Risk): presence of risk factors     Problem: Sepsis/Septic Shock (Adult)  Goal: Signs and Symptoms of Listed Potential Problems Will be Absent, Minimized or Managed (Sepsis/Septic Shock)  Outcome: Ongoing (interventions implemented as appropriate)  Flowsheets (Taken 9/11/2020 0452)  Problems Assessed (Sepsis): all

## 2020-09-11 NOTE — PROGRESS NOTES
Discharge Planning Assessment  [facility]     Patient Name: Gopi Montez  MRN: [id number]  Today's Date: 9/11/2020    Admit Date: 9/10/2020    Discharge Needs Assessment     Row Name 09/11/20 1031       Living Environment    Lives With  parent(s)    Name(s) of Who Lives With Patient  Sowmya montez mother Sowmya Montez mother    Current Living Arrangements  home/apartment/condo    Potentially Unsafe Housing Conditions  -- no issues    Family Caregiver if Needed  sibling(s);parent(s)       Resource/Environmental Concerns    Transportation Concerns  car, none       Transition Planning    Patient/Family Anticipates Transition to  home with family    Patient/Family Anticipated Services at Transition  none    Transportation Anticipated  family or friend will provide       Discharge Needs Assessment    Readmission Within the Last 30 Days  no previous admission in last 30 days    Equipment Currently Used at Home  none    Provided Post Acute Provider List?  N/A        Discharge Plan     Row Name 09/11/20 1032       Plan    Plan  home with family    Plan Comments  patient unable to answer questions  at time of assessment,  information obtained from patient mother Sowmya Montez who he lives with,   pt is independent of ADL's,   no oxygen, no home health,  is unemployed,    mother said he would be returning to her house at discharge , sister will provide transport        Destination      Coordination has not been started for this encounter.      Durable Medical Equipment      Coordination has not been started for this encounter.      Dialysis/Infusion      Coordination has not been started for this encounter.      Home Medical Care      Coordination has not been started for this encounter.      Therapy      Coordination has not been started for this encounter.      Community Resources      Coordination has not been started for this encounter.          Demographic Summary     Row Name 09/11/20 1031       General Information     Admission Type  inpatient    Referral Source  admission list    Reason for Consult  discharge planning    Preferred Language  English    General Information Comments  Sowmya arellano        Functional Status     Row Name 09/11/20 1031       Functional Status    Usual Activity Tolerance  good    Current Activity Tolerance  moderate       Functional Status, IADL    Medications  independent    Meal Preparation  independent    Housekeeping  independent    Laundry  independent    Shopping  independent       Mental Status    General Appearance WDL  WDL       Employment/    Employment Status  unemployed        Psychosocial    No documentation.       Abuse/Neglect    No documentation.       Legal     Row Name 09/11/20 1031       Financial/Legal    Financial/Environmental Concerns  -- no issues        Substance Abuse    No documentation.       Patient Forms    No documentation.           Ciarra Maria RN

## 2020-09-11 NOTE — PROGRESS NOTES
[facility] HOSPITALIST    PROGRESS NOTE    Name:  Gopi Johnson   Age:  45 y.o.  Sex:  male  :  1975  MRN:  [id number]   Visit Number:  [id number]  Admission Date:  9/10/2020  Date Of Service:  20  Primary Care Physician:  Juvenal Ray PA     LOS: 1 day :  Patient Care Team:  Juvenal Ray PA as PCP - General  Royal Dover MD as Consulting Physician (General Surgery):    Chief Complaint:      Back pain and fever.    Subjective / Interval History:     This is a 45-year-old male with history of hypertension, hyperlipidemia and IV drug abuse was transferred from [facility] emergency room with back pain and fever.  Patient did have CT scans without contrast of the cervical, thoracic and lumbar spines which did not show any evidence of acute disease.  Patient was initially started on broad-spectrum IV antibiotic therapy for suspected meningitis and was transferred to [facility] ICU for further evaluation and management.  Patient did not have any hypotension.  He received IV fluids and was placed on IV antibiotic therapy with Rocephin, vancomycin and acyclovir.  Patient currently denies any chest pain or shortness of breath but complains of lower back pain.  He states he lives with his mother and denies taking IV drugs in the recent past.  Previous physician documentation, laboratory and imaging data have been reviewed.    Review of Systems:     General ROS: Patient denies any fevers, chills or loss of consciousness.  Generalized weakness  Respiratory ROS: Denies cough or shortness of breath.  Cardiovascular ROS: Denies chest pain or palpitations. No history of exertional chest pain.  Gastrointestinal ROS: Denies nausea and vomiting. Denies any abdominal pain. No diarrhea.  Neurological ROS: Denies any focal weakness. No loss of consciousness. Denies any numbness.  Dermatological ROS: Denies any redness or pruritis.    Vital Signs:    Temp:  [97  °F (36.1 °C)-98.2 °F (36.8 °C)] 97.8 °F (36.6 °C)  Heart Rate:  [] 101  Resp:  [14-23] 16  BP: (128-145)/() 135/94    Intake and output:    I/O last 3 completed shifts:  In: 1367 [P.O.:600; I.V.:767]  Out: 1025 [Urine:1025]  I/O this shift:  In: 1005 [P.O.:1005]  Out: 1500 [Urine:1500]    Physical Examination:    General Appearance:  Alert and cooperative, not in any acute distress.   Head:  Atraumatic and normocephalic, without obvious abnormality.   Eyes:          PERRLA, conjunctivae and sclerae normal, no Icterus. No pallor. Extraocular movements are within normal limits.   Neck: Supple, trachea midline, no thyromegaly, no carotid bruit.   Lungs:   Chest shape is normal. Breath sounds heard bilaterally equally.  No crackles or wheezing. No pleural rub or bronchial breathing.   Heart:  Normal S1 and S2, no murmur, no gallop, no rub. No JVD   Abdomen:   Normal bowel sounds, no masses, no organomegaly. Soft, nontender, nondistended, no guarding, no rebound tenderness.   Extremities: Moves all extremities, no edema, no cyanosis, no clubbing.  Multiple healed skin ulcer scars noted.  No erythema noted on the skin.  Tenderness noted in the lower back as well as the paraspinal areas.  No redness or swelling noted in the back.   Skin: No bleeding.  Tattoos noted on the skin.   Neurologic: Awake, alert and oriented times 3. Moves all 4 extremities equally.     Laboratory results:    Results from last 7 days   Lab Units 09/11/20  0651   SODIUM mmol/L 140   POTASSIUM mmol/L 4.3   CHLORIDE mmol/L 105   CO2 mmol/L 21.4*   BUN mg/dL 16   CREATININE mg/dL 0.65*   CALCIUM mg/dL 8.6   BILIRUBIN mg/dL 0.4   ALK PHOS U/L 203*   ALT (SGPT) U/L 50*   AST (SGOT) U/L 58*   GLUCOSE mg/dL 197*     Results from last 7 days   Lab Units 09/11/20  0651   WBC 10*3/mm3 16.66*   HEMOGLOBIN g/dL 11.6*   HEMATOCRIT % 35.7*   PLATELETS 10*3/mm3 334             Results from last 7 days   Lab Units 09/11/20  0116 09/11/20  0110    BLOODCX  No growth at less than 24 hours No growth at less than 24 hours           I have reviewed the patient's laboratory results.    Radiology results:    Imaging Results (Last 24 Hours)     Procedure Component Value Units Date/Time    MRI Cervical Spine Without Contrast [[id number]] Collected:  09/11/20 1104     Updated:  09/11/20 1111    Narrative:       PROCEDURE: MRI CERVICAL SPINE WO CONTRAST-     HISTORY: Abnormal xray, cervical spine, bone destruction     COMPARISON: None.     TECHNIQUE: Limited multiplanar multisequence imaging of the cervical  spine was performed without intravenous contrast.     FINDINGS: There is motion artifact which limits the exam. Bone marrow  signal is homogeneous with no evidence of osteomyelitis/discitis. The  cervical portions of the spinal cord maintains a normal caliber and  signal intensity. The visualized posterior fossa is normal. On sagittal  images 13 through 15 there is paraspinal muscle edema at the C5/6 level.       Impression:       1. Limited exam due to motion artifact with no evidence of  osteomyelitis/discitis.  2. Soft tissue edema in the paraspinal soft tissues at the C5/6 level.  Repeat imaging with intravenous contrast is recommended when the patient  is able to tolerate the exam.     This report was finalized on 9/11/2020 11:09 AM by Jocelyne White M.D..    MRI Lumbar Spine With & Without Contrast [[id number]] Collected:  09/11/20 1055     Updated:  09/11/20 1109    Narrative:       PROCEDURE: MRI LUMBAR SPINE W WO CONTRAST-     HISTORY: Abnormal xray, lumbar spine, bone destruction     TECHNIQUE: Multiplanar multisequence imaging of the lumbar spine was  performed both before and following the administration of 15 mL  MultiHance intravenous contrast.     FINDINGS: The lumbar vertebral bodies maintain a normal height,  alignment and signal intensity. The posterior elements are intact  throughout. There is a focal disc protrusion at the L5/S1 level  without  a significant stenosis. The remainder of the intervertebral discs are  preserved. The spinal cord terminates at the T12/L1 level. No conus  lesions are present. Evaluation of the paraspinal soft tissues reveals  edema within the multifidus muscles in the paraspinal region. In the  left multifidus muscle at the L5/S1 level there is a 12 mm abscess.  There is also a partially imaged abscess in the left gluteus demetria  muscle with the visualized portions of the abscess measuring 3.9 x 1.0  cm. There is edema seen in the left sacral ala with patchy enhancement  which could reflect osteomyelitis.       Impression:       1. Small 12 mm paraspinal abscess involving the left multifidus muscle  at the L5/S1 level.  2. Partially imaged abscess in the left gluteus demetria muscle.  3. Findings suggesting osteomyelitis involving the left sacral ala.     This report was finalized on 9/11/2020 11:04 AM by Jocelyne White M.D..    MRI Thoracic Spine Without Contrast [[id number]] Collected:  09/11/20 1103     Updated:  09/11/20 1106    Narrative:       PROCEDURE: MRI THORACIC SPINE WO CONTRAST-     HISTORY: Abnormal xray, thoracic spine, bone destruction     COMPARISON: None.     TECHNIQUE: Limited multiplanar multisequence imaging of the thoracic  spine was performed without intravenous contrast.     FINDINGS: The thoracic vertebral bodies maintain a normal height,  alignment and signal intensity. The posterior elements are intact. The  intervertebral disc spaces are maintained. There is no spinal or neural  foraminal stenosis. The thoracic portions of the spinal cord maintains a  normal caliber and signal intensity. The paraspinal soft tissues are  unremarkable.       Impression:       No evidence of osteomyelitis/discitis.     This report was finalized on 9/11/2020 11:04 AM by Jocelyne White M.D..        I have reviewed the patient's radiology reports.    Medication Review:     I have reviewed the patient's  active and prn medications.       Sepsis (CMS/HCC)    Abscess of paraspinal muscles    Abscess, gluteal, left    Bacteremia due to Gram-positive bacteria    Chronic midline low back pain without sciatica    Essential hypertension    Assessment:    1.  Sepsis with gram-positive bacteremia, present on admission.  2.  Left paraspinal and left gluteus muscle abscess, present on admission.  3.  Suspected osteomyelitis of the left sacral ala, present on admission.  4.  Suspected infective endocarditis, present on admission.  5.  Suspected IV drug abuse.  6.  Essential hypertension.    Plan:    Mr. Johnson is currently admitted to the medical ICU due to his sepsis.  I anticipate more than 2 days of inpatient hospital stay.  At this time, I do not suspect meningitis or encephalitis.  We will discontinue the acyclovir.  I have discussed the MRI findings with the radiologist and he does have left paraspinal and left gluteus demetria muscle abscesses.  [facility] did call the ICU nurse and conveyed that his both blood cultures drawn at the emergency room have come back positive for gram-positive cocci.    In view of these findings, it is suspicious that the patient likely has infective endocarditis.  We will continue Rocephin and vancomycin.  He will be placed on lactobacillus supplements.  We will get a transthoracic echocardiogram.  I have discussed the patient's condition with Dr. Vilchis and he will be scheduled for transesophageal echocardiogram on Monday.    I have discussed the patient's condition and MRI of the lumbar spine findings with the neurosurgeon (Dr. Cali) as well as the orthopedic surgeon (Dr. Conner) and they both recommended no surgical intervention at this time.  They recommended continuation of IV antibiotic therapy for 6 weeks and repeat imaging depending upon clinical condition.  We recommend, if the abscesses get worse to have interventional radiology drain them.    I have discussed the  MRI findings with the patient as well as his mother Sowmya over the phone.  She tells me that patient has been staying with her for a long time.  He has never been  and he does not have any children.  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    Patient is hemodynamically stable and will be transferred to the medical floor with telemetry.  I will discuss with general surgery tomorrow regarding his left gluteus abscess.  Discussed with nursing staff and multidisciplinary team.        Madhu Mohamud MD  09/11/20  16:35    Dictated utilizing Dragon dictation.

## 2020-09-11 NOTE — PAYOR COMM NOTE
"TO:HUMANA MK  FROM:MANDY VILLATORO, RN PHONE [phone]  IN CLINICALS REF# [id number]    Gopi Salomon (45 y.o. Male)     Date of Birth Social Security Number Address Home Phone MRN    1975  [address] [place] [address] [phone] [id number]    Taoist Marital Status          Unknown Single       Admission Date Admission Type Admitting Provider Attending Provider Department, Room/Bed    9/10/20 Urgent Michael Mantilla DO Pais, Roshan, MD [facility] INTENSIVE CARE, I08/    Discharge Date Discharge Disposition Discharge Destination                       Attending Provider:  Madhu Mohamud MD    Allergies:  No Known Allergies    Isolation:  None   Infection:  None   Code Status:  CPR    Ht:  182.9 cm (72\")   Wt:  98 kg (216 lb 1.6 oz)    Admission Cmt:  None   Principal Problem:  Sepsis (CMS/[facility]) [A41.9]                 Active Insurance as of 9/10/2020     Primary Coverage     Payor Plan Insurance Group Employer/Plan Group    [place] [id number]     Payor Plan Address Payor Plan Phone Number Payor Plan Fax Number Effective Dates    [employer] Claims [employer] - [address] [phone]  2020 - None Entered    [facility] [id number]       Subscriber Name Subscriber Birth Date Member ID       GOPI SALOMON 1975 [id number]                 Emergency Contacts      (Rel.) Home Phone Work Phone Mobile Phone    Sowmya Salomon (Mother) [phone] -- --               History & Physical      Michael Mantilla DO at 09/10/20 [facility]4              [facility] HOSPITALIST   HISTORY AND PHYSICAL      Name:  Gopi Salomon   Age:  45 y.o.  Sex:  male  :  1975  MRN:  [id number]   Visit Number:  [id number]  Admission Date:  9/10/2020  Date Of Service:  09/10/20  Primary Care Physician:  Juvenal Ray PA    Chief Complaint:     Back pain     History Of Presenting Illness:      Patient is a 45-year-old male " with history of hypertension and hyperlipidemia who presented to [facility] ER with complaints of confusion and back pain.  Upon arrival, patient is slightly confused.  He will answer some questions appropriately but then begins to repeat his answers.  History has largely been obtained from his mother, Sowmya.  I personally called and spoke with her and she informed me that patient began using drugs approximately 4 months ago.  She is unclear what drug he had been using but apparently patient became very altered and police had to be called.  She did not believe that he was using currently.  Patient denied to me that he was injecting IV drugs.  He did admit that he injects testosterone and vitamin B12.  Patient has multiple tattoos.  He denied any recent tattoos.  She also told me that patient lives with her and about 1 week ago he started complaining of acute onset severe lumbar back pain.  There was no trauma to her knowledge.  He was seen in the ER at [facility] on Monday and given a pain medication and told that he had pulled a muscle.  Pain in his back worsened and patient then began to develop confusion for which his mother called EMS and he returned to the ER.  There is been no known sick contact or recent travel.  Mother is in her normal state of health.  No history of nausea/vomiting, diarrhea, abdominal pain.      Temperature on arrival to outside ER was a T-max 101.9 °F (reports of temperature at home at 105 °F).  Blood pressure was 155/92.  Heart rate was elevated at 127.  Patient was maintaining saturation at 96% on room air.  Labs from outside hospital include sodium 134, potassium 3.8, chloride 99, bicarb 21, BUN 18, creatinine 1.14 and glucose 124.  ALT AST alk phos and total bilirubin were within normal limits.  Lactic acid 0.6.  UDS was positive only for tricyclic antidepressant.  Troponin was negative.  CRP was greater than 18.  Sed rate greater than 130.  TSH within normal limits.  WBC 19,  hemoglobin 11, hematocrit 33, and platelet count 292.  Urinalysis was negative for infection.  Strep and influenza screening were negative.  COVID-19 testing was pending at the time of transfer.  Head CT without contrast showed no acute intracranial process.  CT of cervical spine without contrast showed no acute osseous abnormality, mild multilevel degenerative disc disease.  CT of thoracic spine without contrast showed no acute osseous abnormality.  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  Lumbar puncture was attempted x2 by ER physician at [place] and was unsuccessful.  Patient was started preemptively on meningeal coverage with Rocephin 2 g, Decadron 10 mg IV, vancomycin and acyclovir prior to transfer.      Review Of Systems:     A 10 point ROS was reviewed and negative unless otherwise stated in the HPI     Past Medical History:    Past Medical History:   Diagnosis Date   • High cholesterol    • Hypertension    • Migraine        Past Surgical history:    Past Surgical History:   Procedure Laterality Date   • BUNIONECTOMY     • CHOLECYSTECTOMY     • COLONOSCOPY     • ENDOSCOPY     • HERNIA REPAIR     • KNEE OPEN LATERAL RELEASE      Right   • TONSILLECTOMY AND ADENOIDECTOMY         Social History:    Social History     Socioeconomic History   • Marital status: Single     Spouse name: Not on file   • Number of children: Not on file   • Years of education: Not on file   • Highest education level: Not on file   Tobacco Use   • Smoking status: Current Every Day Smoker     Packs/day: 0.50     Types: Cigarettes   • Smokeless tobacco: Never Used   Substance and Sexual Activity   • Alcohol use: No   • Drug use: No       Family History:    Family History   Problem Relation Age of Onset   • Hypertension Father    • Hypertension Mother    • Prostate cancer Paternal Uncle    • Heart disease Paternal  Grandmother    • Prostate cancer Paternal Grandfather    • Prostate cancer Paternal Uncle        Allergies:      Patient has no known allergies.    Home Medications:    Prior to Admission Medications     Prescriptions Last Dose Informant Patient Reported? Taking?    albuterol sulfate HFA (VENTOLIN HFA) 108 (90 Base) MCG/ACT inhaler   Yes No    Ventolin  (90 Base) MCG/ACT Inhalation Aerosol Solution; Patient Sig: Ventolin  (90 Base) MCG/ACT Inhalation Aerosol Solution ; 18; 0; 20-Oct-2015; Active    atorvastatin (LIPITOR) 20 MG tablet   Yes No    Take  by mouth.    cyclobenzaprine (FLEXERIL) 10 MG tablet   Yes No    Take 10 mg by mouth every night at bedtime.    hydrochlorothiazide (HYDRODIURIL) 25 MG tablet   Yes No    Take  by mouth Daily.    hydrOXYzine pamoate (VISTARIL) 25 MG capsule   Yes No    Take 1 capsule by mouth Daily.    imipramine (TOFRANIL) 10 MG tablet   Yes No    Take  by mouth.    oxyCODONE-acetaminophen (PERCOCET) 7.5-325 MG per tablet   No No    Take 1 tablet by mouth Every 4 (Four) Hours As Needed for Severe Pain .    pantoprazole (PROTONIX) 40 MG EC tablet   No No    TAKE 1 TABLET IN THE MORNING 30 MINUTES BEFORE BREAKFAST    SUMAtriptan (IMITREX) 100 MG tablet   Yes No    Take  by mouth.    topiramate (TOPAMAX) 50 MG tablet   Yes No    Take  by mouth Daily.    venlafaxine XR (EFFEXOR-XR) 150 MG 24 hr capsule   Yes No    Take 150 mg by mouth Daily.             ED Medications:    Medications - No data to display    Vital Signs:         There were no vitals filed for this visit.    There is no height or weight on file to calculate BMI.    Physical Exam:    General Appearance:  Alert, cooperative, but appears septic   Head:  Atraumatic and normocephalic, without obvious abnormality.   Eyes:          PERRLA, conjunctivae and sclerae normal, no Icterus. No pallor. Extraocular movements are within normal limits.   Ears:  Ears appear intact with no abnormalities noted.   Throat: No oral  lesions, no thrush, oral mucosa dry   Neck: Supple, trachea midline,    Back:    Pain on palpation of the low back lumbar spine   Lungs:   Chest shape is normal. Breath sounds heard bilaterally equally.  No crackles or wheezing.    Heart:  Normal S1 and S2, no murmur,  No JVD.   Abdomen:   Normal bowel sounds,  Soft, nontender, nondistended, no guarding, no rebound tenderness.   Extremities: no edema, no cyanosis, no clubbing.   Pulses: Pulses palpable and equal bilaterally.   Skin: No bleeding, bruising or rash.  Multiple tattoos   Neurologic: Alert and oriented x 2 Moves all four limbs equally.      Laboratory data:    I have reviewed the labs done in the emergency room.          Invalid input(s): LABALBU, PROT                                    Invalid input(s): USDES,  BLOODU, NITRITITE, BACT, EP  Pain Management Panel     There is no flowsheet data to display.              EKG:      EKG personally reviewed revealed sinus tachycardia with rate of 126.  No signs of acute infarct or ischemia.    Radiology:    Imaging Results (Last 72 Hours)     ** No results found for the last 72 hours. **            * No active hospital problems. *      Assessment:    Sepsis with unidentified source, POA- differential includes meningitis vs spinal abscess  Metabolic encephalopathy related to above, POA  Anxiety/depression  GERD  Tobacco abuse  Illicit drug abuse     Plan:    Admit patient to the ICU.  We will continue with meningeal coverage with vancomycin, 2 g Rocephin, acyclovir and Decadron.  IV fluids normal saline.  Obtain MRI of the spine in the a.m. for possible spinal abscess.  Blood cultures.  Supportive care, morphine as needed for pain control.  Hold on oral medication until mentation improves.  Repeat rapid COVID-19 testing at our facility was negative.  Further orders as clinical course dictates.  Patient is critically ill and will require greater than 2 midnight stay.    Advance Care Planning   ACP discussion was  declined by the patient. Patient does not have an advance directive, declines further assistance.    Michael Mantilla DO  09/10/20  23:34    Dictated utilizing Dragon dictation.      Electronically signed by Michael Mantilla DO at 09/11/20 0631         Current Facility-Administered Medications   Medication Dose Route Frequency Provider Last Rate Last Dose   • acetaminophen (TYLENOL) tablet 650 mg  650 mg Oral Q4H PRN Michael Mantilla DO        Or   • acetaminophen (TYLENOL) suppository 650 mg  650 mg Rectal Q4H PRN Michael Mantilla DO       • atorvastatin (LIPITOR) tablet 20 mg  20 mg Oral Nightly Madhu Mohamud MD       • calcium carbonate (TUMS) chewable tablet 500 mg (200 mg elemental)  2 tablet Oral BID PRN Michael Mantilla DO       • cefTRIAXone (ROCEPHIN) IVPB 2 g/50ml dextrose (premix)  2 g Intravenous Q24H Michael Mantilla DO       • dexamethasone (DECADRON) injection 4 mg  4 mg Intravenous Q6H Michael Mantilla DO   4 mg at 09/11/20 1100   • enoxaparin (LOVENOX) syringe 40 mg  40 mg Subcutaneous Q24H Michael Mantilla DO   40 mg at 09/11/20 0534   • hydrALAZINE (APRESOLINE) injection 10 mg  10 mg Intravenous Q4H PRN Michael Mantilla DO       • hydroCHLOROthiazide (HYDRODIURIL) tablet 25 mg  25 mg Oral Daily Madhu Mohamud MD   25 mg at 09/11/20 1038   • HYDROcodone-acetaminophen (NORCO) 7.5-325 MG per tablet 1 tablet  1 tablet Oral Q6H PRN Madhu Mohamud MD       • ipratropium-albuterol (DUO-NEB) nebulizer solution 3 mL  3 mL Nebulization Q4H - RT Michael Mantilla DO   3 mL at 09/11/20 1238   • lactobacillus acidophilus (RISAQUAD) capsule 1 capsule  1 capsule Oral BID Madhu Mohamud MD   1 capsule at 09/11/20 1039   • Morphine sulfate (PF) injection 4 mg  4 mg Intravenous Q4H PRN Michael Mantilla DO   4 mg at 09/11/20 1049   • ondansetron (ZOFRAN) tablet 4 mg  4 mg Oral Q6H PRN Michael Mantilla, DO        Or   •  ondansetron (ZOFRAN) injection 4 mg  4 mg Intravenous Q6H PRN Michael Mantilla DO       • pantoprazole (PROTONIX) EC tablet 40 mg  40 mg Oral QAM Madhu Mohamud MD   40 mg at 09/11/20 1039   • Pharmacy to dose vancomycin   Does not apply Continuous PRN Michael Mantilla DO       • sodium chloride 0.9 % flush 10 mL  10 mL Intravenous Q12H Michael Mantilla DO   10 mL at 09/11/20 1042   • sodium chloride 0.9 % flush 10 mL  10 mL Intravenous PRN Michael Mantilla DO       • sodium chloride 0.9 % infusion  150 mL/hr Intravenous Continuous Michael Mantilla  mL/hr at 09/11/20 1043 150 mL/hr at 09/11/20 1043   • topiramate (TOPAMAX) tablet 50 mg  50 mg Oral Daily Madhu Mohamud MD       • vancomycin 1250 mg in sodium chloride 0.9% 250 mL IVPB  1,250 mg Intravenous Q12H Michael Mantilla DO   1,250 mg at 09/11/20 0534       Lab Results (last 24 hours)     Procedure Component Value Units Date/Time    Blood Culture With BERNIE - Blood, Hand, Right [[id number]] Collected:  09/11/20 0116    Specimen:  Blood from Hand, Right Updated:  09/11/20 1330     Blood Culture No growth at less than 24 hours    Blood Culture With BERNIE - Blood, Arm, Right [[id number]] Collected:  09/11/20 0110    Specimen:  Blood from Arm, Right Updated:  09/11/20 1330     Blood Culture No growth at less than 24 hours    TSH [[id number]]  (Normal) Collected:  09/11/20 0651    Specimen:  Blood Updated:  09/11/20 1004     TSH 0.622 uIU/mL     Hemoglobin A1c [[id number]]  (Abnormal) Collected:  09/11/20 0651    Specimen:  Blood Updated:  09/11/20 0955     Hemoglobin A1C 5.80 %     Narrative:       Hemoglobin A1C Ranges:    Increased Risk for Diabetes  5.7% to 6.4%  Diabetes                     >= 6.5%  Diabetic Goal                < 7.0%    Comprehensive Metabolic Panel [[id number]]  (Abnormal) Collected:  09/11/20 0651    Specimen:  Blood Updated:  09/11/20 0731     Glucose 197 mg/dL      Comment: Glucose >180,  Hemoglobin A1C recommended.        BUN 16 mg/dL      Creatinine 0.65 mg/dL      Sodium 140 mmol/L      Potassium 4.3 mmol/L      Chloride 105 mmol/L      CO2 21.4 mmol/L      Calcium 8.6 mg/dL      Total Protein 6.6 g/dL      Albumin 2.70 g/dL      ALT (SGPT) 50 U/L      AST (SGOT) 58 U/L      Alkaline Phosphatase 203 U/L      Total Bilirubin 0.4 mg/dL      eGFR Non African Amer 133 mL/min/1.73      Globulin 3.9 gm/dL      A/G Ratio 0.7 g/dL      BUN/Creatinine Ratio 24.6     Anion Gap 13.6 mmol/L     Narrative:       GFR Normal >60  Chronic Kidney Disease <60  Kidney Failure <15      CBC & Differential [[id number]] Collected:  09/11/20 0651    Specimen:  Blood Updated:  09/11/20 0710    Narrative:       The following orders were created for panel order CBC & Differential.  Procedure                               Abnormality         Status                     ---------                               -----------         ------                     CBC Auto Differential[[id number]]        Abnormal            Final result                 Please view results for these tests on the individual orders.    CBC Auto Differential [[id number]]  (Abnormal) Collected:  09/11/20 0651    Specimen:  Blood Updated:  09/11/20 0710     WBC 16.66 10*3/mm3      RBC 4.12 10*6/mm3      Hemoglobin 11.6 g/dL      Hematocrit 35.7 %      MCV 86.7 fL      MCH 28.2 pg      MCHC 32.5 g/dL      RDW 15.2 %      RDW-SD 48.3 fl      MPV 10.2 fL      Platelets 334 10*3/mm3      Neutrophil % 88.3 %      Lymphocyte % 5.0 %      Monocyte % 3.6 %      Eosinophil % 0.0 %      Basophil % 0.2 %      Immature Grans % 2.9 %      Neutrophils, Absolute 14.69 10*3/mm3      Lymphocytes, Absolute 0.84 10*3/mm3      Monocytes, Absolute 0.60 10*3/mm3      Eosinophils, Absolute 0.00 10*3/mm3      Basophils, Absolute 0.04 10*3/mm3      Immature Grans, Absolute 0.49 10*3/mm3      nRBC 0.0 /100 WBC     Ferritin [[id number]]  (Abnormal) Collected:  09/11/20 0041    Specimen:   Blood Updated:  09/11/20 0156     Ferritin 588.40 ng/mL     Narrative:       Results may be falsely decreased if patient taking Biotin.      COVID-19,[facility] IN-HOUSE,Nasal Swab No Transport Media 3-4 HR TAT - Swab, Nasal Cavity [[id number]]  (Normal) Collected:  09/11/20 0051    Specimen:  Swab from Nasal Cavity Updated:  09/11/20 0133     COVID19 Not Detected    Narrative:       Fact sheet for providers: https://www.fda.gov/media/250402/download     Fact sheet for patients: https://www.fda.gov/media/028918/download        Physician Progress Notes (last 24 hours) (Notes from 09/10/20 1451 through 09/11/20 1451)    No notes of this type exist for this encounter.         Consult Notes (last 24 hours) (Notes from 09/10/20 1451 through 09/11/20 1451)    No notes of this type exist for this encounter.

## 2020-09-11 NOTE — PROGRESS NOTES
Adult Nutrition  Assessment/PES    Patient Name:  Gopi Johnson  YOB: 1975  MRN: [id number]  Admit Date:  9/10/2020    Assessment Date:  9/11/2020    Comments:  Rec. #1: Advance diet once medically feasible. RD to follow pt. Consult RD PRN.     Reason for Assessment     Row Name 09/11/20 1348          Reason for Assessment    Reason For Assessment  diagnosis/disease state     Diagnosis  infection/sepsis;neurologic conditions;substance use/abuse Sepsis, Confusion, Tobacco/drug abuse hx.             Labs/Tests/Procedures/Meds     Row Name 09/11/20 1348          Labs/Procedures/Meds    Lab Results Reviewed  reviewed, pertinent     Lab Results Comments  High: Gluc, HgbA1c, ALT  Low: Cr, Albumin        Medications    Pertinent Medications Reviewed  reviewed, pertinent           Estimated/Assessed Needs     Row Name 09/11/20 1348          Calculation Measurements    Weight Used For Calculations  82.1 kg (180 lb 14.9 oz)        Estimated/Assessed Needs    Additional Documentation  [place]-St. Jeor Equation (Group);Calorie Requirements (Group);Fluid Requirements (Group);Protein Requirements (Group)        [place]-St. Jeor Equation    RMR ([place]-St. Jeor Equation)  1743.7        Protein Requirements    Weight Used For Protein Calculations  82.1 kg (180 lb 14.9 oz)     Est Protein Requirement Amount (gms/kg)  1.5 gm protein  gm/day     Estimated Protein Requirements (gms/day)  123.1        Fluid Requirements    Estimated Fluid Requirement Method  Radha-Segar Formula     Radha-Segar Method (over 20 kg)  3141.4         Nutrition Prescription Ordered     Row Name 09/11/20 1349          Nutrition Prescription PO    Current PO Diet  NPO         Evaluation of Received Nutrient/Fluid Intake     Row Name 09/11/20 1349          PO Evaluation    Number of Days PO Intake Evaluated  Insufficient Data               Problem/Interventions:  Problem 1     Row Name 09/11/20 1350          Nutrition Diagnoses  Problem 1    Problem 1  Inadequate Intake/Infusion     Inadequate Intake Type  Oral     Macronutrient  Kcal;Protein;Fluid;Carbohydrate;Fiber;Fat     Etiology (related to)  MNT for Treatment/Condition     Signs/Symptoms (evidenced by)  NPO               Intervention Goal     Row Name 09/11/20 1350          Intervention Goal    General  Meet nutritional needs for age/condition     PO  Advance diet     Weight  Maintain weight         Nutrition Intervention     Row Name 09/11/20 1350          Nutrition Intervention    RD/Tech Action  Follow Tx progress;Await begin PO         Nutrition Prescription     Row Name 09/11/20 1350          Nutrition Prescription PO    PO Prescription  Begin/change diet;Begin/change supplement     Begin/Change Diet to  Clear Liquid     Supplement  Boost Breeze     Supplement Frequency  2 times a day     New PO Prescription Ordered?  No, recommended         Education/Evaluation     Row Name 09/11/20 8268          Education    Education  Education not appropriate at this time     Please explain  Patient confusion        Monitor/Evaluation    Monitor  Per protocol;I&O;Pertinent labs;Weight;Skin status           Electronically signed by:  Tiny Ang RD  09/11/20 13:51

## 2020-09-11 NOTE — H&P
[facility]   HISTORY AND PHYSICAL      Name:  Gopi Johnson   Age:  45 y.o.  Sex:  male  :  1975  MRN:  [id number]   Visit Number:  [id number]  Admission Date:  9/10/2020  Date Of Service:  09/10/20  Primary Care Physician:  Juvenal Ray PA    Chief Complaint:     Back pain     History Of Presenting Illness:      Patient is a 45-year-old male with history of hypertension and hyperlipidemia who presented to [facility] ER with complaints of confusion and back pain.  Upon arrival, patient is slightly confused.  He will answer some questions appropriately but then begins to repeat his answers.  History has largely been obtained from his mother, Sowmya.  I personally called and spoke with her and she informed me that patient began using drugs approximately 4 months ago.  She is unclear what drug he had been using but apparently patient became very altered and police had to be called.  She did not believe that he was using currently.  Patient denied to me that he was injecting IV drugs.  He did admit that he injects testosterone and vitamin B12.  Patient has multiple tattoos.  He denied any recent tattoos.  She also told me that patient lives with her and about 1 week ago he started complaining of acute onset severe lumbar back pain.  There was no trauma to her knowledge.  He was seen in the ER at [facility] on Monday and given a pain medication and told that he had pulled a muscle.  Pain in his back worsened and patient then began to develop confusion for which his mother called EMS and he returned to the ER.  There is been no known sick contact or recent travel.  Mother is in her normal state of health.  No history of nausea/vomiting, diarrhea, abdominal pain.      Temperature on arrival to outside ER was a T-max 101.9 °F (reports of temperature at home at 105 °F).  Blood pressure was 155/92.  Heart rate was elevated at 127.  Patient was maintaining saturation at 96%  on room air.  Labs from outside hospital include sodium 134, potassium 3.8, chloride 99, bicarb 21, BUN 18, creatinine 1.14 and glucose 124.  ALT AST alk phos and total bilirubin were within normal limits.  Lactic acid 0.6.  UDS was positive only for tricyclic antidepressant.  Troponin was negative.  CRP was greater than 18.  Sed rate greater than 130.  TSH within normal limits.  WBC 19, hemoglobin 11, hematocrit 33, and platelet count 292.  Urinalysis was negative for infection.  Strep and influenza screening were negative.  COVID-19 testing was pending at the time of transfer.  Head CT without contrast showed no acute intracranial process.  CT of cervical spine without contrast showed no acute osseous abnormality, mild multilevel degenerative disc disease.  CT of thoracic spine without contrast showed no acute osseous abnormality.  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  Lumbar puncture was attempted x2 by ER physician at [facility] and was unsuccessful.  Patient was started preemptively on meningeal coverage with Rocephin 2 g, Decadron 10 mg IV, vancomycin and acyclovir prior to transfer.      Review Of Systems:     A 10 point ROS was reviewed and negative unless otherwise stated in the HPI     Past Medical History:    Past Medical History:   Diagnosis Date   • High cholesterol    • Hypertension    • Migraine        Past Surgical history:    Past Surgical History:   Procedure Laterality Date   • BUNIONECTOMY     • CHOLECYSTECTOMY     • COLONOSCOPY     • ENDOSCOPY     • HERNIA REPAIR     • KNEE OPEN LATERAL RELEASE      Right   • TONSILLECTOMY AND ADENOIDECTOMY         Social History:    Social History     Socioeconomic History   • Marital status: Single     Spouse name: Not on file   • Number of children: Not on file   • Years of education: Not on file   • Highest education level: Not on file    Tobacco Use   • Smoking status: Current Every Day Smoker     Packs/day: 0.50     Types: Cigarettes   • Smokeless tobacco: Never Used   Substance and Sexual Activity   • Alcohol use: No   • Drug use: No       Family History:    Family History   Problem Relation Age of Onset   • Hypertension Father    • Hypertension Mother    • Prostate cancer Paternal Uncle    • Heart disease Paternal Grandmother    • Prostate cancer Paternal Grandfather    • Prostate cancer Paternal Uncle        Allergies:      Patient has no known allergies.    Home Medications:    Prior to Admission Medications     Prescriptions Last Dose Informant Patient Reported? Taking?    albuterol sulfate HFA (VENTOLIN HFA) 108 (90 Base) MCG/ACT inhaler   Yes No    Ventolin  (90 Base) MCG/ACT Inhalation Aerosol Solution; Patient Sig: Ventolin  (90 Base) MCG/ACT Inhalation Aerosol Solution ; 18; 0; 20-Oct-2015; Active    atorvastatin (LIPITOR) 20 MG tablet   Yes No    Take  by mouth.    cyclobenzaprine (FLEXERIL) 10 MG tablet   Yes No    Take 10 mg by mouth every night at bedtime.    hydrochlorothiazide (HYDRODIURIL) 25 MG tablet   Yes No    Take  by mouth Daily.    hydrOXYzine pamoate (VISTARIL) 25 MG capsule   Yes No    Take 1 capsule by mouth Daily.    imipramine (TOFRANIL) 10 MG tablet   Yes No    Take  by mouth.    oxyCODONE-acetaminophen (PERCOCET) 7.5-325 MG per tablet   No No    Take 1 tablet by mouth Every 4 (Four) Hours As Needed for Severe Pain .    pantoprazole (PROTONIX) 40 MG EC tablet   No No    TAKE 1 TABLET IN THE MORNING 30 MINUTES BEFORE BREAKFAST    SUMAtriptan (IMITREX) 100 MG tablet   Yes No    Take  by mouth.    topiramate (TOPAMAX) 50 MG tablet   Yes No    Take  by mouth Daily.    venlafaxine XR (EFFEXOR-XR) 150 MG 24 hr capsule   Yes No    Take 150 mg by mouth Daily.             ED Medications:    Medications - No data to display    Vital Signs:         There were no vitals filed for this visit.    There is no height  or weight on file to calculate BMI.    Physical Exam:    General Appearance:  Alert, cooperative, but appears septic   Head:  Atraumatic and normocephalic, without obvious abnormality.   Eyes:          PERRLA, conjunctivae and sclerae normal, no Icterus. No pallor. Extraocular movements are within normal limits.   Ears:  Ears appear intact with no abnormalities noted.   Throat: No oral lesions, no thrush, oral mucosa dry   Neck: Supple, trachea midline,    Back:    Pain on palpation of the low back lumbar spine   Lungs:   Chest shape is normal. Breath sounds heard bilaterally equally.  No crackles or wheezing.    Heart:  Normal S1 and S2, no murmur,  No JVD.   Abdomen:   Normal bowel sounds,  Soft, nontender, nondistended, no guarding, no rebound tenderness.   Extremities: no edema, no cyanosis, no clubbing.   Pulses: Pulses palpable and equal bilaterally.   Skin: No bleeding, bruising or rash.  Multiple tattoos   Neurologic: Alert and oriented x 2 Moves all four limbs equally.      Laboratory data:    I have reviewed the labs done in the emergency room.          Invalid input(s): LABALBU, PROT                                    Invalid input(s): USDES,  BLOODU, NITRITITE, BACT, EP  Pain Management Panel     There is no flowsheet data to display.              EKG:      EKG personally reviewed revealed sinus tachycardia with rate of 126.  No signs of acute infarct or ischemia.    Radiology:    Imaging Results (Last 72 Hours)     ** No results found for the last 72 hours. **            * No active hospital problems. *      Assessment:    Sepsis with unidentified source, POA- differential includes meningitis vs spinal abscess  Metabolic encephalopathy related to above, POA  Anxiety/depression  GERD  Tobacco abuse  Illicit drug abuse     Plan:    Admit patient to the ICU.  We will continue with meningeal coverage with vancomycin, 2 g Rocephin, acyclovir and Decadron.  IV fluids normal saline.  Obtain MRI of the spine in  the a.m. for possible spinal abscess.  Blood cultures.  Supportive care, morphine as needed for pain control.  Hold on oral medication until mentation improves.  Repeat rapid COVID-19 testing at our facility was negative.  Further orders as clinical course dictates.  Patient is critically ill and will require greater than 2 midnight stay.    Advance Care Planning   ACP discussion was declined by the patient. Patient does not have an advance directive, declines further assistance.    Michael Mantilla,   09/10/20  23:34    Dictated utilizing Dragon dictation.

## 2020-09-12 LAB
ANION GAP SERPL CALCULATED.3IONS-SCNC: 11.4 MMOL/L (ref 5–15)
BUN SERPL-MCNC: 14 MG/DL (ref 6–20)
BUN/CREAT SERPL: 25.9 (ref 7–25)
CALCIUM SPEC-SCNC: 8.7 MG/DL (ref 8.6–10.5)
CHLORIDE SERPL-SCNC: 103 MMOL/L (ref 98–107)
CK SERPL-CCNC: 25 U/L (ref 20–200)
CO2 SERPL-SCNC: 22.6 MMOL/L (ref 22–29)
CREAT SERPL-MCNC: 0.54 MG/DL (ref 0.76–1.27)
DEPRECATED RDW RBC AUTO: 45.4 FL (ref 37–54)
ERYTHROCYTE [DISTWIDTH] IN BLOOD BY AUTOMATED COUNT: 14.7 % (ref 12.3–15.4)
GFR SERPL CREATININE-BSD FRML MDRD: >150 ML/MIN/1.73
GLUCOSE SERPL-MCNC: 141 MG/DL (ref 65–99)
HCT VFR BLD AUTO: 31.1 % (ref 37.5–51)
HGB BLD-MCNC: 10.3 G/DL (ref 13–17.7)
MCH RBC QN AUTO: 27.7 PG (ref 26.6–33)
MCHC RBC AUTO-ENTMCNC: 33.1 G/DL (ref 31.5–35.7)
MCV RBC AUTO: 83.6 FL (ref 79–97)
MRSA DNA SPEC QL NAA+PROBE: NORMAL
PLATELET # BLD AUTO: 321 10*3/MM3 (ref 140–450)
PMV BLD AUTO: 10.3 FL (ref 6–12)
POTASSIUM SERPL-SCNC: 4 MMOL/L (ref 3.5–5.2)
RBC # BLD AUTO: 3.72 10*6/MM3 (ref 4.14–5.8)
SODIUM SERPL-SCNC: 137 MMOL/L (ref 136–145)
VANCOMYCIN SERPL-MCNC: 6.1 MCG/ML (ref 5–40)
WBC # BLD AUTO: 14.23 10*3/MM3 (ref 3.4–10.8)

## 2020-09-12 PROCEDURE — 25010000002 VANCOMYCIN 5 G RECONSTITUTED SOLUTION 5,000 MG VIAL: Performed by: INTERNAL MEDICINE

## 2020-09-12 PROCEDURE — 82550 ASSAY OF CK (CPK): CPT | Performed by: INTERNAL MEDICINE

## 2020-09-12 PROCEDURE — 99233 SBSQ HOSP IP/OBS HIGH 50: CPT | Performed by: INTERNAL MEDICINE

## 2020-09-12 PROCEDURE — 80202 ASSAY OF VANCOMYCIN: CPT | Performed by: INTERNAL MEDICINE

## 2020-09-12 PROCEDURE — 25010000002 HYDRALAZINE PER 20 MG: Performed by: INTERNAL MEDICINE

## 2020-09-12 PROCEDURE — 25010000002 ENOXAPARIN PER 10 MG: Performed by: INTERNAL MEDICINE

## 2020-09-12 PROCEDURE — 80048 BASIC METABOLIC PNL TOTAL CA: CPT | Performed by: INTERNAL MEDICINE

## 2020-09-12 PROCEDURE — 25010000002 DEXAMETHASONE PER 1 MG: Performed by: INTERNAL MEDICINE

## 2020-09-12 PROCEDURE — 99251 PR INITL INPATIENT CONSULT NEW/ESTAB PT 20 MIN: CPT | Performed by: SURGERY

## 2020-09-12 PROCEDURE — 85027 COMPLETE CBC AUTOMATED: CPT | Performed by: INTERNAL MEDICINE

## 2020-09-12 RX ORDER — VENLAFAXINE HYDROCHLORIDE 150 MG/1
150 CAPSULE, EXTENDED RELEASE ORAL DAILY
Status: DISCONTINUED | OUTPATIENT
Start: 2020-09-12 | End: 2020-09-17 | Stop reason: HOSPADM

## 2020-09-12 RX ORDER — VENLAFAXINE HYDROCHLORIDE 150 MG/1
150 CAPSULE, EXTENDED RELEASE ORAL DAILY
Status: DISCONTINUED | OUTPATIENT
Start: 2020-09-12 | End: 2020-09-12

## 2020-09-12 RX ORDER — CETIRIZINE HYDROCHLORIDE 10 MG/1
10 TABLET ORAL DAILY
Status: DISCONTINUED | OUTPATIENT
Start: 2020-09-12 | End: 2020-09-17 | Stop reason: HOSPADM

## 2020-09-12 RX ORDER — NICOTINE 21 MG/24HR
1 PATCH, TRANSDERMAL 24 HOURS TRANSDERMAL
Status: DISCONTINUED | OUTPATIENT
Start: 2020-09-12 | End: 2020-09-17 | Stop reason: HOSPADM

## 2020-09-12 RX ADMIN — ENOXAPARIN SODIUM 40 MG: 40 INJECTION SUBCUTANEOUS at 01:45

## 2020-09-12 RX ADMIN — DEXAMETHASONE SODIUM PHOSPHATE 4 MG: 4 INJECTION, SOLUTION INTRA-ARTICULAR; INTRALESIONAL; INTRAMUSCULAR; INTRAVENOUS; SOFT TISSUE at 05:53

## 2020-09-12 RX ADMIN — VENLAFAXINE HYDROCHLORIDE 150 MG: 150 CAPSULE, EXTENDED RELEASE ORAL at 21:01

## 2020-09-12 RX ADMIN — DEXAMETHASONE SODIUM PHOSPHATE 4 MG: 4 INJECTION, SOLUTION INTRA-ARTICULAR; INTRALESIONAL; INTRAMUSCULAR; INTRAVENOUS; SOFT TISSUE at 15:33

## 2020-09-12 RX ADMIN — VANCOMYCIN HYDROCHLORIDE 1750 MG: 500 INJECTION, POWDER, LYOPHILIZED, FOR SOLUTION INTRAVENOUS at 09:12

## 2020-09-12 RX ADMIN — PANTOPRAZOLE SODIUM 40 MG: 40 TABLET, DELAYED RELEASE ORAL at 09:13

## 2020-09-12 RX ADMIN — HYDROCODONE BITARTRATE AND ACETAMINOPHEN 1 TABLET: 7.5; 325 TABLET ORAL at 21:02

## 2020-09-12 RX ADMIN — DEXAMETHASONE SODIUM PHOSPHATE 4 MG: 4 INJECTION, SOLUTION INTRA-ARTICULAR; INTRALESIONAL; INTRAMUSCULAR; INTRAVENOUS; SOFT TISSUE at 09:13

## 2020-09-12 RX ADMIN — TOPIRAMATE 50 MG: 25 TABLET, FILM COATED ORAL at 09:12

## 2020-09-12 RX ADMIN — Medication 1 CAPSULE: at 09:12

## 2020-09-12 RX ADMIN — DEXAMETHASONE SODIUM PHOSPHATE 4 MG: 4 INJECTION, SOLUTION INTRA-ARTICULAR; INTRALESIONAL; INTRAMUSCULAR; INTRAVENOUS; SOFT TISSUE at 21:02

## 2020-09-12 RX ADMIN — SODIUM CHLORIDE 150 ML/HR: 9 INJECTION, SOLUTION INTRAVENOUS at 09:12

## 2020-09-12 RX ADMIN — Medication 1 CAPSULE: at 21:02

## 2020-09-12 RX ADMIN — NICOTINE 1 PATCH: 21 PATCH TRANSDERMAL at 18:12

## 2020-09-12 RX ADMIN — CETIRIZINE HYDROCHLORIDE 10 MG: 10 TABLET, FILM COATED ORAL at 15:33

## 2020-09-12 RX ADMIN — HYDRALAZINE HYDROCHLORIDE 10 MG: 20 INJECTION, SOLUTION INTRAMUSCULAR; INTRAVENOUS at 17:15

## 2020-09-12 RX ADMIN — ATORVASTATIN CALCIUM 20 MG: 20 TABLET, FILM COATED ORAL at 21:02

## 2020-09-12 RX ADMIN — SODIUM CHLORIDE, PRESERVATIVE FREE 10 ML: 5 INJECTION INTRAVENOUS at 21:02

## 2020-09-12 RX ADMIN — VANCOMYCIN HYDROCHLORIDE 1250 MG: 500 INJECTION, POWDER, LYOPHILIZED, FOR SOLUTION INTRAVENOUS at 21:02

## 2020-09-12 NOTE — PHARMACY RECOMMENDATION
"Pharmacokinetic Follow-up Note - Vancomycin     Gopi Johnson is a 45 y.o. male  182.9 cm (72\") 98 kg (216 lb 1.6 oz)     Indication for use: Sepsis    Results from last 7 days   Lab Units 09/12/20  0621 09/11/20  0651   WBC 10*3/mm3 14.23* 16.66*   CREATININE mg/dL 0.54* 0.65*      Estimated Creatinine Clearance: 209.6 mL/min (A) (by C-G formula based on SCr of 0.54 mg/dL (L)).  Temp Readings from Last 1 Encounters:   09/11/20 97.8 °F (36.6 °C) (Oral)     Lab Results   Component Value Date    VANCORANDOM 6.10 09/12/2020       Microbiology:  Microbiology Results (last 10 days)       Procedure Component Value - Date/Time    MRSA Screen, PCR (Inpatient) - Swab, Nares [[id number]]  (Normal) Collected: 09/11/20 1515    Lab Status: Final result Specimen: Swab from Nares Updated: 09/12/20 0513     MRSA PCR No MRSA Detected    Blood Culture With BERNIE - Blood, Hand, Right [[id number]]  (Abnormal) Collected: 09/11/20 0116    Lab Status: Preliminary result Specimen: Blood from Hand, Right Updated: 09/12/20 0636     Blood Culture Staphylococcus aureus, MRSA     Comment: Methicillin resistant Staphylococcus aureus, Patient may be an isolation risk.        Isolated from Aerobic Bottle     Gram Stain Aerobic Bottle Gram positive cocci in clusters    Blood Culture ID, PCR - Blood, Hand, Right [[id number]]  (Abnormal) Collected: 09/11/20 0116    Lab Status: Final result Specimen: Blood from Hand, Right Updated: 09/11/20 1959     BCID, PCR Staphylococcus aureus. mecA (methicillin resistance gene) detected. Identification by BCID PCR.    Blood Culture With BERNIE - Blood, Arm, Right [[id number]]  (Abnormal) Collected: 09/11/20 0110    Lab Status: Preliminary result Specimen: Blood from Arm, Right Updated: 09/12/20 0637     Blood Culture Staphylococcus aureus, MRSA     Comment: Methicillin resistant Staphylococcus aureus, Patient may be an isolation risk.        Isolated from Aerobic Bottle     Gram Stain Aerobic Bottle Gram positive cocci in " clusters    COVID-19,[facility] IN-HOUSE,Nasal Swab No Transport Media 3-4 HR TAT - Swab, Nasal Cavity [[id number]]  (Normal) Collected: 09/11/20 0051    Lab Status: Final result Specimen: Swab from Nasal Cavity Updated: 09/11/20 0133     COVID19 Not Detected    Narrative:      Fact sheet for providers: https://www.fda.gov/media/729182/download     Fact sheet for patients: https://www.fda.gov/media/735698/download            Current Vancomycin Dose:  Vancomycin 1250 mg IV q 12 hrs, day 2 of therapy.    Other Antimicrobials: Ceftriaxone 2 gm IV q 24 hrs    Assessment/Plan:  For Vancomycin random (9/12 0621) = 6.1 mcg/mL, giving patient Vancomycin 1750 mg IV × 1 as loading dose, then resuming 1250 mg IV q 12 hrs. Pharmacy will continue to monitor renal function and adjust dose accordingly.    Thank you for the opportunity to consult on this patient.    Sergio Cazares, Pharm.D.  09/12/20  08:22 EDT

## 2020-09-12 NOTE — PROGRESS NOTES
[facility] HOSPITALIST    PROGRESS NOTE    Name:  Gopi Johnson   Age:  45 y.o.  Sex:  male  :  1975  MRN:  [id number]   Visit Number:  [id number]  Admission Date:  9/10/2020  Date Of Service:  20  Primary Care Physician:  Juvenal Ray PA     LOS: 2 days :  Patient Care Team:  Juvenal Ray PA as PCP - General  Royal Dover MD as Consulting Physician (General Surgery):    Chief Complaint:      Back pain and fever.    Subjective / Interval History:     Mr. Johnson is currently lying down on the bed and is comfortable at rest.  He is feeling much better today compared to yesterday.  He was transferred out of the ICU.  He is currently on IV antibiotic therapy with vancomycin.  His repeat blood cultures done on admission here have both been positive for MRSA.  He was seen by Dr. Hernandez this morning for his left gluteal abscess and he has recommended no surgical intervention at this time.    This is a 45-year-old male with history of hypertension, hyperlipidemia and IV drug abuse was transferred from [facility] emergency room on 9/10/2020 with back pain and fever.  Patient did have CT scans without contrast of the cervical, thoracic and lumbar spines which did not show any evidence of acute disease.  Patient was initially started on broad-spectrum IV antibiotic therapy for suspected meningitis and was transferred to [facility] ICU for further evaluation and management.  Patient did not have any hypotension.  He received IV fluids and was placed on IV antibiotic therapy with Rocephin, vancomycin and acyclovir.      MRIs of the cervical, thoracic and lumbar spine done here showed left paraspinal abscess 12 mm as well as left gluteal abscess 3.6 cm as well as possible osteomyelitis of the left sacral ala.  This was discussed with neurosurgery and orthopedic services at [facility] and they recommended no surgical intervention but continue IV  antibiotic therapy.  He states he lives with his mother and denies taking IV drugs in the recent past.  He was taking IV drugs about 4 months ago according to his mother.    Review of Systems:     General ROS: Patient denies any fevers, chills or loss of consciousness.  Generalized weakness  Respiratory ROS: Denies cough or shortness of breath.  Cardiovascular ROS: Denies chest pain or palpitations. No history of exertional chest pain.  Gastrointestinal ROS: Denies nausea and vomiting. Denies any abdominal pain. No diarrhea.  Neurological ROS: Denies any focal weakness. No loss of consciousness. Denies any numbness.  Dermatological ROS: Denies any redness or pruritis.    Vital Signs:    Temp:  [97.8 °F (36.6 °C)-98.1 °F (36.7 °C)] 98.1 °F (36.7 °C)  Heart Rate:  [] 83  Resp:  [16-20] 16  BP: (121-145)/(80-97) 121/80    Intake and output:    I/O last 3 completed shifts:  In: 2612 [P.O.:1845; I.V.:767]  Out: 4075 [Urine:4075]  I/O this shift:  In: 960 [P.O.:960]  Out: -     Physical Examination:    General Appearance:  Alert and cooperative, not in any acute distress.   Head:  Atraumatic and normocephalic, without obvious abnormality.   Eyes:          PERRLA, conjunctivae and sclerae normal, no Icterus. No pallor. Extraocular movements are within normal limits.   Neck: Supple, trachea midline, no thyromegaly, no carotid bruit.   Lungs:   Chest shape is normal. Breath sounds heard bilaterally equally.  No crackles or wheezing. No pleural rub or bronchial breathing.   Heart:  Normal S1 and S2, no murmur, no gallop, no rub. No JVD   Abdomen:   Normal bowel sounds, no masses, no organomegaly. Soft, nontender, nondistended, no guarding, no rebound tenderness.   Extremities: Moves all extremities, no edema, no cyanosis, no clubbing.  Multiple healed skin ulcer scars noted.  No erythema noted on the skin.  Tenderness noted in the lower back as well as the paraspinal areas.  Tenderness noted in the left gluteal region  without any swelling or redness.  No redness or swelling noted in the back.   Skin: No bleeding.  Tattoos noted on the skin.   Neurologic: Awake, alert and oriented times 3. Moves all 4 extremities equally.     Laboratory results:    Results from last 7 days   Lab Units 09/12/20  0621 09/11/20  0651   SODIUM mmol/L 137 140   POTASSIUM mmol/L 4.0 4.3   CHLORIDE mmol/L 103 105   CO2 mmol/L 22.6 21.4*   BUN mg/dL 14 16   CREATININE mg/dL 0.54* 0.65*   CALCIUM mg/dL 8.7 8.6   BILIRUBIN mg/dL  --  0.4   ALK PHOS U/L  --  203*   ALT (SGPT) U/L  --  50*   AST (SGOT) U/L  --  58*   GLUCOSE mg/dL 141* 197*     Results from last 7 days   Lab Units 09/12/20  0621 09/11/20  0651   WBC 10*3/mm3 14.23* 16.66*   HEMOGLOBIN g/dL 10.3* 11.6*   HEMATOCRIT % 31.1* 35.7*   PLATELETS 10*3/mm3 321 334         Results from last 7 days   Lab Units 09/12/20  0621   CK TOTAL U/L 25     Results from last 7 days   Lab Units 09/11/20  0116 09/11/20  0110   BLOODCX  Staphylococcus aureus, MRSA* Staphylococcus aureus, MRSA*           I have reviewed the patient's laboratory results.    Radiology results:    Imaging Results (Last 24 Hours)     ** No results found for the last 24 hours. **        I have reviewed the patient's radiology reports.    Medication Review:     I have reviewed the patient's active and prn medications.       Sepsis (CMS/HCC)    Abscess of paraspinal muscles    Abscess, gluteal, left    Bacteremia due to Gram-positive bacteria    Chronic midline low back pain without sciatica    Essential hypertension    Assessment:    1.  Sepsis with MRSA bacteremia, present on admission.  2.  Left paraspinal and left gluteus muscle abscess, present on admission.  3.  Suspected osteomyelitis of the left sacral ala, present on admission.  4.  Suspected infective endocarditis, present on admission.  5.  Suspected IV drug abuse.  6.  Essential hypertension.    Plan:    Mr. Johnson is currently feeling better and is hemodynamically stable.  He  states that he does not take any medications for blood pressure at home.  We will discontinue hydrochlorothiazide.  He is currently on IV antibiotic therapy with vancomycin and Rocephin.  His both blood cultures drawn here have been positive for MRSA.  Nasal swab for MRSA was negative.  We will discontinue Rocephin and continue him on vancomycin.  He will be continued on lactobacillus supplements.  I have discussed the patient's condition with the nurse at the bedside and we will discontinue IV fluids as well as a Rivera catheter.    I have discussed the patient's condition with Dr. Vilchis from cardiology and he has been scheduled for transesophageal echocardiogram on Monday to rule out infective endocarditis.  His transthoracic echocardiogram done yesterday did not show any significant evidence of valvular abnormalities.    Patient's condition and MRI findings of the lumbar spine were discussed with the neurosurgeon (Dr. Cali) as well as the orthopedic surgeon (Dr. Conner) on 9/11/2020 and they both recommended no surgical intervention at this time.  They recommended continuation of IV antibiotic therapy for 6 weeks and repeat imaging depending upon clinical condition.  They recommend, if the abscesses get worse to have interventional radiology drain them.    Patient will need several more days of inpatient hospital stay.  He will eventually need a PICC line placement once his surveillance blood cultures become negative.      Madhu Mohamud MD  09/12/20  14:59 EDT    Dictated utilizing Dragon dictation.

## 2020-09-12 NOTE — PHARMACY RECOMMENDATION
"Pharmacokinetic Initial Note - Vancomycin    Gopi Johnson is a 45 y.o. male  182.9 cm (72\") 98 kg (216 lb 1.6 oz)    Indication for use: sepsis         CrCl cannot be calculated (Patient's most recent lab result is older than the maximum 30 days allowed.).  Temp Readings from Last 1 Encounters:   09/11/20 97 °F (36.1 °C) (Rectal)       Culture results  Microbiology Results (last 10 days)       ** No results found for the last 240 hours. **            Other Antimicrobials  Ceftriaxone 2 grams q24h    Assessment/Plan  Initiated Vancomycin 1500 mg IVPB once given at outside hospital 09/10 1800, followed by 1250 mg Q12H. Will order Vancomycin trough on 09/12/20 with AM labs.  Pharmacy will monitor renal function and adjust dose accordingly.    Thank you,  John Varghese, [facility]  09/11/20 00:54      "

## 2020-09-12 NOTE — CONSULTS
Gopi Johnson    1975    Primary Care Provider: Juvenal Ray PA    No chief complaint on file.         SUBJECTIVE: Left gluteal abscess    History of present illness: 45-year-old male presents with having history of drug abuse presents with paraspinal abscess formation and osteomyelitis.  He is found to have a 3.9 cm left gluteal abscess that was concerning and I was asked to see him for evaluation.  Patient is quite well to IV antibiotics and per neurosurgery was told to be treated nonoperative at this time.  Patient has minimal pain in the left gluteal region.  No fever or chills.  Patient on the proper antibiotics.    Review of Systems:  Constitutional:  Negative for chills, fever, and unexpected weight change.  HENT: Negative for trouble swallowing and voice change.  Eyes:  Negative for visual disturbance.  Respiratory:  Negative for apnea, cough, chest tightness, shortness of breath, and wheezing.  Cardiovascular:  Negative for chest pain, palpitations, and leg swelling.  Gastrointestinal:  Negative for abdominal distention, abdominal pain, anal bleeding, blood in stool, constipation, diarrhea, nausea, rectal pain, and vomiting.  Musculoskeletal:  Negative for back pain, gait problem, and joint swelling.  Skin:  Negative for color change, rash, and wound  Neurological:  Negative for dizziness, syncope, speech difficulty, weakness, numbness, and headaches.  Hematological:  Negative for adenopathy.  Does not bruise/bleed easily.  Psychiatric/Behavioral:  Negative for confusion.  The patient is not nervous/anxious.        History:    Past Medical History:   Diagnosis Date   • High cholesterol    • Hypertension    • Migraine        Past Surgical History:   Procedure Laterality Date   • BUNIONECTOMY     • CHOLECYSTECTOMY     • COLONOSCOPY     • ENDOSCOPY     • HERNIA REPAIR     • KNEE OPEN LATERAL RELEASE      Right   • TONSILLECTOMY AND ADENOIDECTOMY         Family History   Problem Relation Age of Onset    • Hypertension Father    • Hypertension Mother    • Prostate cancer Paternal Uncle    • Heart disease Paternal Grandmother    • Prostate cancer Paternal Grandfather    • Prostate cancer Paternal Uncle        Social History     Socioeconomic History   • Marital status: Single     Spouse name: Not on file   • Number of children: Not on file   • Years of education: Not on file   • Highest education level: Not on file   Tobacco Use   • Smoking status: Current Every Day Smoker     Packs/day: 0.50     Types: Cigarettes   • Smokeless tobacco: Never Used   Substance and Sexual Activity   • Alcohol use: No   • Drug use: No       Allergies:  No Known Allergies    Medications:    Current Facility-Administered Medications:   •  acetaminophen (TYLENOL) tablet 650 mg, 650 mg, Oral, Q4H PRN **OR** acetaminophen (TYLENOL) suppository 650 mg, 650 mg, Rectal, Q4H PRN, Mdahu Mohamud MD  •  atorvastatin (LIPITOR) tablet 20 mg, 20 mg, Oral, Nightly, Madhu Mohamud MD, 20 mg at 09/11/20 2111  •  calcium carbonate (TUMS) chewable tablet 500 mg (200 mg elemental), 2 tablet, Oral, BID PRN, Madhu Mohamud MD  •  dexamethasone (DECADRON) injection 4 mg, 4 mg, Intravenous, Q6H, Madhu Mohamud MD, 4 mg at 09/12/20 0913  •  enoxaparin (LOVENOX) syringe 40 mg, 40 mg, Subcutaneous, Q24H, Madhu Mohamud MD, 40 mg at 09/12/20 0145  •  hydrALAZINE (APRESOLINE) injection 10 mg, 10 mg, Intravenous, Q4H PRN, Madhu Mohamud MD  •  hydroCHLOROthiazide (HYDRODIURIL) tablet 25 mg, 25 mg, Oral, Daily, Madhu Mohamud MD, 25 mg at 09/11/20 1038  •  HYDROcodone-acetaminophen (NORCO) 7.5-325 MG per tablet 1 tablet, 1 tablet, Oral, Q6H PRN, Madhu Mohamud MD, 1 tablet at 09/11/20 2111  •  ipratropium-albuterol (DUO-NEB) nebulizer solution 3 mL, 3 mL, Nebulization, Q6H PRN, Michael Mantilla,   •  lactobacillus acidophilus (RISAQUAD) capsule 1 capsule, 1 capsule, Oral, BID, Cade, Madhu, MD, 1 capsule at 09/12/20 0912  •  Morphine sulfate (PF) injection 4 mg, 4  mg, Intravenous, Q4H PRN, Madhu Mohamud MD, 4 mg at 09/11/20 1049  •  ondansetron (ZOFRAN) tablet 4 mg, 4 mg, Oral, Q6H PRN **OR** ondansetron (ZOFRAN) injection 4 mg, 4 mg, Intravenous, Q6H PRN, Mdahu Mohamud MD  •  pantoprazole (PROTONIX) EC tablet 40 mg, 40 mg, Oral, QAM, Madhu Mohamud MD, 40 mg at 09/12/20 0913  •  Pharmacy to dose vancomycin, , Does not apply, Continuous PRN, Madhu Mohamud MD  •  sodium chloride 0.9 % flush 10 mL, 10 mL, Intravenous, Q12H, Madhu Mohamud MD, 10 mL at 09/11/20 2112  •  sodium chloride 0.9 % flush 10 mL, 10 mL, Intravenous, PRN, Madhu Mohamud MD  •  sodium chloride 0.9 % infusion, 150 mL/hr, Intravenous, Continuous, Madhu Mohamud MD, Last Rate: 150 mL/hr at 09/12/20 0912, 150 mL/hr at 09/12/20 0912  •  topiramate (TOPAMAX) tablet 50 mg, 50 mg, Oral, Daily, Madhu Mohamud MD, 50 mg at 09/12/20 0912  •  vancomycin 1250 mg in sodium chloride 0.9% 250 mL IVPB, 1,250 mg, Intravenous, Q12H, Madhu Mohamud MD  •  vancomycin 1750 mg in sodium chloride 0.9% 500 mL IVPB, 1,750 mg, Intravenous, Once, Madhu Mohamud MD, Last Rate: 285.7 mL/hr at 09/12/20 0912, 1,750 mg at 09/12/20 0912    OBJECTIVE:    Vital Signs:   Vitals:    09/11/20 1600 09/11/20 1659 09/11/20 2058 09/12/20 0700   BP: 135/94 136/90 133/84 127/80   BP Location:  Right arm Left arm Left arm   Patient Position:  Sitting Lying Lying   Pulse: 101  92 80   Resp:  20 18 17   Temp:  97.9 °F (36.6 °C) 97.8 °F (36.6 °C) 98 °F (36.7 °C)   TempSrc:  Oral Oral Oral   SpO2: 100% 100% 96% 97%   Weight:       Height:           Physical Exam:   General Appearance:    Alert, cooperative, in no acute distress   Head:    Normocephalic, without obvious abnormality, atraumatic   Eyes:            Lids and lashes normal, conjunctivae and sclerae normal, no   icterus, no pallor, corneas clear, PERRLA   Throat:   No oral lesions, no thrush, oral mucosa moist   Neck:   No adenopathy, supple, trachea midline, no thyromegaly, no   carotid bruit, no JVD    Lungs:     Clear to auscultation,respirations regular, even and                  unlabored    Heart:    Regular rhythm and normal rate, normal S1 and S2, no            murmur, no gallop, no rub, no click   Chest Wall:    No abnormalities observed   Abdomen:     Normal bowel sounds, no masses, no organomegaly, soft        non-tender, non-distended, no guarding, no rebound                tenderness   Extremities:   Moves all extremities well, no edema, no cyanosis, no             Redness.  Left gluteal tenderness without a discrete masses palpated.  No redness or fluctuance.   Pulses:   Pulses palpable and equal bilaterally   Skin:   No bleeding, bruising or rash   Lymph nodes:   No palpable adenopathy   Neurologic:   Cranial nerves 2 - 12 grossly intact, sensation intact, DTR       present and equal bilaterally   Results Review:   I reviewed the patient's new clinical results.    ASSESSMENT PLAN:    Patient Active Problem List   Diagnosis   • Abdominal pain   • Anemia   • Constipation   • Diarrhea   • Abnormal liver enzymes   • Heartburn   • Weight loss   • BPH without urinary obstruction   • Kidney stone   • Sepsis (CMS/HCC)   • Abscess of paraspinal muscles   • Abscess, gluteal, left   • Bacteremia due to Gram-positive bacteria   • Chronic midline low back pain without sciatica   • Essential hypertension       Left gluteal abscess connected to paraspinous abscess and area of osteomyelitis.  On proper antibiotics.  No planned surgery intervention at this time.  Will follow closely.    I discussed the patients findings and my recommendations with patient    Isidra Hernandez MD  09/12/20  10:04 EDT

## 2020-09-13 LAB
BACTERIA SPEC AEROBE CULT: ABNORMAL
BACTERIA SPEC AEROBE CULT: ABNORMAL
GRAM STN SPEC: ABNORMAL
GRAM STN SPEC: ABNORMAL
ISOLATED FROM: ABNORMAL
ISOLATED FROM: ABNORMAL

## 2020-09-13 PROCEDURE — 25010000002 HYDRALAZINE PER 20 MG: Performed by: INTERNAL MEDICINE

## 2020-09-13 PROCEDURE — 25010000002 DEXAMETHASONE PER 1 MG: Performed by: INTERNAL MEDICINE

## 2020-09-13 PROCEDURE — 25010000002 MORPHINE PER 10 MG: Performed by: INTERNAL MEDICINE

## 2020-09-13 PROCEDURE — 99232 SBSQ HOSP IP/OBS MODERATE 35: CPT | Performed by: INTERNAL MEDICINE

## 2020-09-13 PROCEDURE — 25010000002 VANCOMYCIN 5 G RECONSTITUTED SOLUTION 5,000 MG VIAL: Performed by: INTERNAL MEDICINE

## 2020-09-13 PROCEDURE — 25010000002 ENOXAPARIN PER 10 MG: Performed by: INTERNAL MEDICINE

## 2020-09-13 PROCEDURE — 99231 SBSQ HOSP IP/OBS SF/LOW 25: CPT | Performed by: SURGERY

## 2020-09-13 RX ADMIN — SODIUM CHLORIDE, PRESERVATIVE FREE 10 ML: 5 INJECTION INTRAVENOUS at 20:26

## 2020-09-13 RX ADMIN — DEXAMETHASONE SODIUM PHOSPHATE 4 MG: 4 INJECTION, SOLUTION INTRA-ARTICULAR; INTRALESIONAL; INTRAMUSCULAR; INTRAVENOUS; SOFT TISSUE at 04:00

## 2020-09-13 RX ADMIN — Medication 1 CAPSULE: at 09:16

## 2020-09-13 RX ADMIN — DEXAMETHASONE SODIUM PHOSPHATE 4 MG: 4 INJECTION, SOLUTION INTRA-ARTICULAR; INTRALESIONAL; INTRAMUSCULAR; INTRAVENOUS; SOFT TISSUE at 09:16

## 2020-09-13 RX ADMIN — HYDROCODONE BITARTRATE AND ACETAMINOPHEN 1 TABLET: 7.5; 325 TABLET ORAL at 09:16

## 2020-09-13 RX ADMIN — VENLAFAXINE HYDROCHLORIDE 150 MG: 150 CAPSULE, EXTENDED RELEASE ORAL at 09:16

## 2020-09-13 RX ADMIN — DEXAMETHASONE SODIUM PHOSPHATE 4 MG: 4 INJECTION, SOLUTION INTRA-ARTICULAR; INTRALESIONAL; INTRAMUSCULAR; INTRAVENOUS; SOFT TISSUE at 20:24

## 2020-09-13 RX ADMIN — ENOXAPARIN SODIUM 40 MG: 40 INJECTION SUBCUTANEOUS at 03:00

## 2020-09-13 RX ADMIN — MORPHINE SULFATE 4 MG: 4 INJECTION, SOLUTION INTRAMUSCULAR; INTRAVENOUS at 20:23

## 2020-09-13 RX ADMIN — HYDRALAZINE HYDROCHLORIDE 10 MG: 20 INJECTION, SOLUTION INTRAMUSCULAR; INTRAVENOUS at 09:16

## 2020-09-13 RX ADMIN — HYDROCODONE BITARTRATE AND ACETAMINOPHEN 1 TABLET: 7.5; 325 TABLET ORAL at 17:36

## 2020-09-13 RX ADMIN — PANTOPRAZOLE SODIUM 40 MG: 40 TABLET, DELAYED RELEASE ORAL at 06:35

## 2020-09-13 RX ADMIN — HYDRALAZINE HYDROCHLORIDE 10 MG: 20 INJECTION, SOLUTION INTRAMUSCULAR; INTRAVENOUS at 20:20

## 2020-09-13 RX ADMIN — ATORVASTATIN CALCIUM 20 MG: 20 TABLET, FILM COATED ORAL at 20:19

## 2020-09-13 RX ADMIN — DEXAMETHASONE SODIUM PHOSPHATE 4 MG: 4 INJECTION, SOLUTION INTRA-ARTICULAR; INTRALESIONAL; INTRAMUSCULAR; INTRAVENOUS; SOFT TISSUE at 17:36

## 2020-09-13 RX ADMIN — CETIRIZINE HYDROCHLORIDE 10 MG: 10 TABLET, FILM COATED ORAL at 09:16

## 2020-09-13 RX ADMIN — VANCOMYCIN HYDROCHLORIDE 1250 MG: 500 INJECTION, POWDER, LYOPHILIZED, FOR SOLUTION INTRAVENOUS at 20:18

## 2020-09-13 RX ADMIN — NICOTINE 1 PATCH: 21 PATCH TRANSDERMAL at 09:17

## 2020-09-13 RX ADMIN — Medication 1 CAPSULE: at 20:19

## 2020-09-13 NOTE — PROGRESS NOTES
[facility] HOSPITALIST    PROGRESS NOTE    Name:  Gopi Johnson   Age:  45 y.o.  Sex:  male  :  1975  MRN:  [id number]   Visit Number:  [id number]  Admission Date:  9/10/2020  Date Of Service:  20  Primary Care Physician:  Juvenal Ray PA     LOS: 3 days :  Patient Care Team:  Juvenal Ray PA as PCP - General  Royal Dover MD as Consulting Physician (General Surgery):    Chief Complaint:      Follow-up of back pain and fever.    Subjective / Interval History:     Mr. Johnson is currently lying down on the bed and is comfortable at rest.  He states that his back pain has significantly improved.  He is tolerating vancomycin without any difficulty.  His repeat blood cultures done on admission here have both been positive for MRSA.  Both the blood cultures done at [facility] were also positive.  He was seen by Dr. Hernandez for his left gluteal abscess and he has recommended no surgical intervention at this time.    This is a 45-year-old male with history of hypertension, hyperlipidemia and IV drug abuse was transferred from [facility] emergency room on 9/10/2020 with back pain and fever.  Patient did have CT scans without contrast of the cervical, thoracic and lumbar spines which did not show any evidence of acute disease.  Patient was initially started on broad-spectrum IV antibiotic therapy for suspected meningitis and was transferred to [facility] ICU for further evaluation and management.  Patient did not have any hypotension.  He received IV fluids and was placed on IV antibiotic therapy with Rocephin, vancomycin and acyclovir.      MRIs of the cervical, thoracic and lumbar spine done here showed left paraspinal abscess 12 mm as well as left gluteal abscess 3.6 cm as well as possible osteomyelitis of the left sacral ala.  This was discussed with neurosurgery and orthopedic services at [facility] and they recommended no surgical  intervention but continue IV antibiotic therapy.  He states he lives with his mother and denies taking IV drugs in the recent past.  He was taking IV drugs about 4 months ago according to his mother.    Review of Systems:     General ROS: Patient denies any fevers, chills or loss of consciousness.  Generalized weakness  Respiratory ROS: Denies cough or shortness of breath.  Cardiovascular ROS: Denies chest pain or palpitations. No history of exertional chest pain.  Gastrointestinal ROS: Denies nausea and vomiting. Denies any abdominal pain. No diarrhea.  Neurological ROS: Denies any focal weakness. No loss of consciousness. Denies any numbness.  Dermatological ROS: Denies any redness or pruritis.    Vital Signs:    Temp:  [97.6 °F (36.4 °C)-98.2 °F (36.8 °C)] 98.1 °F (36.7 °C)  Heart Rate:  [70-83] 77  Resp:  [16-18] 16  BP: (143-166)/() 162/90    Intake and output:    I/O last 3 completed shifts:  In: 1560 [P.O.:1560]  Out: 2850 [Urine:2850]  I/O this shift:  In: 600 [P.O.:600]  Out: -     Physical Examination:    General Appearance:  Alert and cooperative, not in any acute distress.   Head:  Atraumatic and normocephalic, without obvious abnormality.   Eyes:          PERRLA, conjunctivae and sclerae normal, no Icterus. No pallor. Extraocular movements are within normal limits.   Neck: Supple, trachea midline, no thyromegaly, no carotid bruit.   Lungs:   Chest shape is normal. Breath sounds heard bilaterally equally.  No crackles or wheezing. No pleural rub or bronchial breathing.   Heart:  Normal S1 and S2, no murmur, no gallop, no rub. No JVD   Abdomen:   Normal bowel sounds, no masses, no organomegaly. Soft, nontender, nondistended, no guarding, no rebound tenderness.   Extremities: Moves all extremities, no edema, no cyanosis, no clubbing.  Multiple healed skin ulcer scars noted.  No erythema noted on the skin.  Tenderness noted in the lower back as well as the paraspinal areas.  Tenderness noted in the  left gluteal region without any swelling or redness.  No redness or swelling noted in the back.   Skin: No bleeding.  Tattoos noted on the skin.   Neurologic: Awake, alert and oriented times 3. Moves all 4 extremities equally.     Laboratory results:    Results from last 7 days   Lab Units 09/12/20  0621 09/11/20  0651   SODIUM mmol/L 137 140   POTASSIUM mmol/L 4.0 4.3   CHLORIDE mmol/L 103 105   CO2 mmol/L 22.6 21.4*   BUN mg/dL 14 16   CREATININE mg/dL 0.54* 0.65*   CALCIUM mg/dL 8.7 8.6   BILIRUBIN mg/dL  --  0.4   ALK PHOS U/L  --  203*   ALT (SGPT) U/L  --  50*   AST (SGOT) U/L  --  58*   GLUCOSE mg/dL 141* 197*     Results from last 7 days   Lab Units 09/12/20  0621 09/11/20  0651   WBC 10*3/mm3 14.23* 16.66*   HEMOGLOBIN g/dL 10.3* 11.6*   HEMATOCRIT % 31.1* 35.7*   PLATELETS 10*3/mm3 321 334         Results from last 7 days   Lab Units 09/12/20  0621   CK TOTAL U/L 25     Results from last 7 days   Lab Units 09/11/20  0116 09/11/20  0110   BLOODCX  Staphylococcus aureus, MRSA* Staphylococcus aureus, MRSA*           I have reviewed the patient's laboratory results.    Radiology results:    Imaging Results (Last 24 Hours)     ** No results found for the last 24 hours. **        I have reviewed the patient's radiology reports.    Medication Review:     I have reviewed the patient's active and prn medications.       Sepsis (CMS/HCC)    Abscess of paraspinal muscles    Abscess, gluteal, left    Bacteremia due to Gram-positive bacteria    Chronic midline low back pain without sciatica    Essential hypertension    Assessment:    1.  Sepsis with MRSA bacteremia, present on admission.  2.  Left paraspinal and left gluteus muscle abscess, present on admission.  3.  Suspected osteomyelitis of the left sacral ala, present on admission.  4.  Suspected infective endocarditis, present on admission.  5.  Suspected IV drug abuse.  6.  Essential hypertension.    Plan:    Mr. Johnson is currently feeling better and is  hemodynamically stable.  He is currently on IV antibiotic therapy with vancomycin.  We will order surveillance blood cultures tomorrow morning.  If these remain negative for 48 to 72 hours, a PICC line can be placed for outpatient antibiotic therapy for 6 weeks.  We will also keep him n.p.o. after midnight for transesophageal echocardiogram to be done by Dr. Vilchis in the morning.  His nasal swab was negative for MRSA.  He will be continued on lactobacillus supplements.    Patient will be continued on his home medications.  I have encouraged him to walk around in the room.    Patient's condition and MRI findings of the lumbar spine were discussed with the neurosurgeon (Dr. Cali) as well as the orthopedic surgeon (Dr. Conner) on 9/11/2020 and they both recommended no surgical intervention at this time.  They recommended continuation of IV antibiotic therapy for 6 weeks and repeat imaging depending upon clinical condition.  They recommend, if the abscesses get worse to have interventional radiology drain them.    Patient lives with his mother and may be able to go home in the next 3 to 4 days with outpatient antibiotic therapy for 6 weeks.  He may need repeat imaging of his back if he does not improve with regards to his back pain and infection.      Madhu Mohamud MD  09/13/20  16:00 EDT    Dictated utilizing Dragon dictation.

## 2020-09-13 NOTE — PLAN OF CARE
Problem: Patient Care Overview  Goal: Plan of Care Review  Recent Flowsheet Documentation  Taken 9/13/2020 9941 by Manish May RN  Progress: no change  Plan of Care Reviewed With: patient  Outcome Summary: Pt pain well tolerated with one dose of PO medications, blood pressure has become elevated throughout the day with IV hydralazine given. Plan for NPO at midnight to prepare for ROSE on 9/14/20.

## 2020-09-13 NOTE — PROGRESS NOTES
LOS: 3 days   Patient Care Team:  Juvenal Ray PA as PCP - General  Royal Dover MD as Consulting Physician (General Surgery)      Chief Complaint: Paraspinous abscess with osteomyelitis      Interval History: Patient appears anxious, has less lower back and gluteal pain.  No fever and slight elevation white blood cell count.  Patient on appropriate antibiotics and doing well otherwise.    Patient Complaints: None    History taken from: patient    Vital Signs  Temp:  [97.6 °F (36.4 °C)-98.2 °F (36.8 °C)] 98.2 °F (36.8 °C)  Heart Rate:  [74-83] 74  Resp:  [18] 18  BP: (143-167)/() 166/104    Physical Exam:     General Appearance:    Alert, cooperative, in no acute distress   Head:    Normocephalic, without obvious abnormality, atraumatic   Lungs:     Clear to auscultation,respirations regular, even and                  unlabored    Heart:    Regular rhythm and normal rate, normal S1 and S2, no            murmur, no gallop, no rub, no click   Abdomen:     Normal bowel sounds, no masses, no organomegaly, soft        non-tender, non-distended, no guarding, no rebound                tenderness   Extremities:   Moves all extremities well, no edema, no cyanosis, no             Redness.  Left gluteal region tender to deep palpation.  No fluctuance or redness.   Pulses:   Pulses palpable and equal bilaterally   Skin:   No bleeding, bruising or rash        Results Review:       Lab Results (last 24 hours)     Procedure Component Value Units Date/Time    Blood Culture With BERNIE - Blood, Arm, Right [[id number]]  (Abnormal) Collected: 09/11/20 0110    Specimen: Blood from Arm, Right Updated: 09/13/20 0622     Blood Culture Staphylococcus aureus, MRSA     Comment: Methicillin resistant Staphylococcus aureus, Patient may be an isolation risk.  Refer to blood culture collected 9/11/2020 0116 for ALEXANDRO's        Isolated from Aerobic Bottle     Gram Stain Aerobic Bottle Gram positive cocci in clusters    Blood  Culture With BERNIE - Blood, Hand, Right [[id number]]  (Abnormal)  (Susceptibility) Collected: 09/11/20 0116    Specimen: Blood from Hand, Right Updated: 09/13/20 0622     Blood Culture Staphylococcus aureus, MRSA     Comment: Methicillin resistant Staphylococcus aureus, Patient may be an isolation risk.  Infectious disease consultation is highly recommended to rule out distant foci of infection.        Isolated from Aerobic Bottle     Gram Stain Aerobic Bottle Gram positive cocci in clusters    Susceptibility      Staphylococcus aureus, MRSA     ALEXANDRO     Gentamicin Susceptible     Oxacillin Resistant     Rifampin Susceptible     Vancomycin Susceptible                Susceptibility Comments     Staphylococcus aureus, MRSA    This isolate does not demonstrate inducible clindamycin resistance in vitro.                         Assessment/Plan       Sepsis (CMS/HCC)    Abscess of paraspinal muscles    Abscess, gluteal, left    Bacteremia due to Gram-positive bacteria    Chronic midline low back pain without sciatica    Essential hypertension      Patient on appropriate biotics, continue treatment per recommendations from orthopedics and neurosurgery.  No plans for intervention at this time from my standpoint.      Isidra Hernandez MD  09/13/20  11:25 EDT

## 2020-09-13 NOTE — PLAN OF CARE
Goal Outcome Evaluation:  Plan of Care Reviewed With: patient  Progress: improving  Outcome Summary: No acute events overnight. VSS. Patient did not sleep that much during shift.

## 2020-09-14 ENCOUNTER — ANESTHESIA EVENT (OUTPATIENT)
Dept: CARDIOLOGY | Facility: HOSPITAL | Age: 45
End: 2020-09-14

## 2020-09-14 ENCOUNTER — APPOINTMENT (OUTPATIENT)
Dept: CARDIOLOGY | Facility: HOSPITAL | Age: 45
End: 2020-09-14

## 2020-09-14 ENCOUNTER — ANESTHESIA (OUTPATIENT)
Dept: CARDIOLOGY | Facility: HOSPITAL | Age: 45
End: 2020-09-14

## 2020-09-14 LAB
ALBUMIN SERPL-MCNC: 3.1 G/DL (ref 3.5–5.2)
ALBUMIN/GLOB SERPL: 0.8 G/DL
ALP SERPL-CCNC: 138 U/L (ref 39–117)
ALT SERPL W P-5'-P-CCNC: 35 U/L (ref 1–41)
ANION GAP SERPL CALCULATED.3IONS-SCNC: 11.7 MMOL/L (ref 5–15)
AST SERPL-CCNC: 18 U/L (ref 1–40)
BILIRUB SERPL-MCNC: 0.3 MG/DL (ref 0–1.2)
BUN SERPL-MCNC: 17 MG/DL (ref 6–20)
BUN/CREAT SERPL: 27 (ref 7–25)
CALCIUM SPEC-SCNC: 9.3 MG/DL (ref 8.6–10.5)
CHLORIDE SERPL-SCNC: 101 MMOL/L (ref 98–107)
CO2 SERPL-SCNC: 24.3 MMOL/L (ref 22–29)
CREAT SERPL-MCNC: 0.63 MG/DL (ref 0.76–1.27)
DEPRECATED RDW RBC AUTO: 46.9 FL (ref 37–54)
ERYTHROCYTE [DISTWIDTH] IN BLOOD BY AUTOMATED COUNT: 15.1 % (ref 12.3–15.4)
GFR SERPL CREATININE-BSD FRML MDRD: 138 ML/MIN/1.73
GLOBULIN UR ELPH-MCNC: 3.9 GM/DL
GLUCOSE SERPL-MCNC: 130 MG/DL (ref 65–99)
HCT VFR BLD AUTO: 37.6 % (ref 37.5–51)
HGB BLD-MCNC: 12.6 G/DL (ref 13–17.7)
MCH RBC QN AUTO: 28.5 PG (ref 26.6–33)
MCHC RBC AUTO-ENTMCNC: 33.5 G/DL (ref 31.5–35.7)
MCV RBC AUTO: 85.1 FL (ref 79–97)
PLATELET # BLD AUTO: 436 10*3/MM3 (ref 140–450)
PMV BLD AUTO: 9.5 FL (ref 6–12)
POTASSIUM SERPL-SCNC: 4.5 MMOL/L (ref 3.5–5.2)
PROT SERPL-MCNC: 7 G/DL (ref 6–8.5)
RBC # BLD AUTO: 4.42 10*6/MM3 (ref 4.14–5.8)
SODIUM SERPL-SCNC: 137 MMOL/L (ref 136–145)
VANCOMYCIN SERPL-MCNC: 7.4 MCG/ML (ref 5–40)
WBC # BLD AUTO: 14.46 10*3/MM3 (ref 3.4–10.8)

## 2020-09-14 PROCEDURE — 85027 COMPLETE CBC AUTOMATED: CPT | Performed by: INTERNAL MEDICINE

## 2020-09-14 PROCEDURE — 25010000002 DEXAMETHASONE PER 1 MG: Performed by: INTERNAL MEDICINE

## 2020-09-14 PROCEDURE — 80053 COMPREHEN METABOLIC PANEL: CPT | Performed by: INTERNAL MEDICINE

## 2020-09-14 PROCEDURE — 99232 SBSQ HOSP IP/OBS MODERATE 35: CPT | Performed by: FAMILY MEDICINE

## 2020-09-14 PROCEDURE — 93312 ECHO TRANSESOPHAGEAL: CPT | Performed by: INTERNAL MEDICINE

## 2020-09-14 PROCEDURE — 25010000002 VANCOMYCIN 5 G RECONSTITUTED SOLUTION 5,000 MG VIAL: Performed by: FAMILY MEDICINE

## 2020-09-14 PROCEDURE — 25010000002 PROPOFOL 10 MG/ML EMULSION: Performed by: NURSE ANESTHETIST, CERTIFIED REGISTERED

## 2020-09-14 PROCEDURE — 25010000002 DAPTOMYCIN PER 1 MG: Performed by: FAMILY MEDICINE

## 2020-09-14 PROCEDURE — 93325 DOPPLER ECHO COLOR FLOW MAPG: CPT

## 2020-09-14 PROCEDURE — 93320 DOPPLER ECHO COMPLETE: CPT

## 2020-09-14 PROCEDURE — 93325 DOPPLER ECHO COLOR FLOW MAPG: CPT | Performed by: INTERNAL MEDICINE

## 2020-09-14 PROCEDURE — 99233 SBSQ HOSP IP/OBS HIGH 50: CPT | Performed by: INTERNAL MEDICINE

## 2020-09-14 PROCEDURE — 25010000002 ENOXAPARIN PER 10 MG: Performed by: INTERNAL MEDICINE

## 2020-09-14 PROCEDURE — 80202 ASSAY OF VANCOMYCIN: CPT | Performed by: INTERNAL MEDICINE

## 2020-09-14 PROCEDURE — 87147 CULTURE TYPE IMMUNOLOGIC: CPT | Performed by: INTERNAL MEDICINE

## 2020-09-14 PROCEDURE — 93312 ECHO TRANSESOPHAGEAL: CPT

## 2020-09-14 PROCEDURE — 93320 DOPPLER ECHO COMPLETE: CPT | Performed by: INTERNAL MEDICINE

## 2020-09-14 PROCEDURE — 99231 SBSQ HOSP IP/OBS SF/LOW 25: CPT | Performed by: SURGERY

## 2020-09-14 PROCEDURE — 87040 BLOOD CULTURE FOR BACTERIA: CPT | Performed by: INTERNAL MEDICINE

## 2020-09-14 PROCEDURE — 25010000002 HYDRALAZINE PER 20 MG: Performed by: INTERNAL MEDICINE

## 2020-09-14 RX ORDER — PROPOFOL 10 MG/ML
VIAL (ML) INTRAVENOUS AS NEEDED
Status: DISCONTINUED | OUTPATIENT
Start: 2020-09-14 | End: 2020-09-14 | Stop reason: SURG

## 2020-09-14 RX ORDER — SODIUM CHLORIDE 9 MG/ML
INJECTION, SOLUTION INTRAVENOUS CONTINUOUS PRN
Status: DISCONTINUED | OUTPATIENT
Start: 2020-09-14 | End: 2020-09-14 | Stop reason: SURG

## 2020-09-14 RX ORDER — LIDOCAINE HYDROCHLORIDE 20 MG/ML
INJECTION, SOLUTION INTRAVENOUS AS NEEDED
Status: DISCONTINUED | OUTPATIENT
Start: 2020-09-14 | End: 2020-09-14 | Stop reason: SURG

## 2020-09-14 RX ADMIN — VENLAFAXINE HYDROCHLORIDE 150 MG: 150 CAPSULE, EXTENDED RELEASE ORAL at 08:27

## 2020-09-14 RX ADMIN — DEXAMETHASONE SODIUM PHOSPHATE 4 MG: 4 INJECTION, SOLUTION INTRA-ARTICULAR; INTRALESIONAL; INTRAMUSCULAR; INTRAVENOUS; SOFT TISSUE at 20:50

## 2020-09-14 RX ADMIN — PROPOFOL 50 MG: 10 INJECTION, EMULSION INTRAVENOUS at 12:26

## 2020-09-14 RX ADMIN — LIDOCAINE HYDROCHLORIDE 60 MG: 20 INJECTION, SOLUTION INTRAVENOUS at 12:20

## 2020-09-14 RX ADMIN — ACETAMINOPHEN 650 MG: 325 TABLET, FILM COATED ORAL at 02:08

## 2020-09-14 RX ADMIN — CETIRIZINE HYDROCHLORIDE 10 MG: 10 TABLET, FILM COATED ORAL at 08:29

## 2020-09-14 RX ADMIN — HYDRALAZINE HYDROCHLORIDE 10 MG: 20 INJECTION, SOLUTION INTRAMUSCULAR; INTRAVENOUS at 11:40

## 2020-09-14 RX ADMIN — ATORVASTATIN CALCIUM 20 MG: 20 TABLET, FILM COATED ORAL at 20:49

## 2020-09-14 RX ADMIN — TOPIRAMATE 50 MG: 25 TABLET, FILM COATED ORAL at 08:36

## 2020-09-14 RX ADMIN — DAPTOMYCIN 700 MG: 500 INJECTION, POWDER, LYOPHILIZED, FOR SOLUTION INTRAVENOUS at 21:36

## 2020-09-14 RX ADMIN — NICOTINE 1 PATCH: 21 PATCH TRANSDERMAL at 08:28

## 2020-09-14 RX ADMIN — HYDROCODONE BITARTRATE AND ACETAMINOPHEN 1 TABLET: 7.5; 325 TABLET ORAL at 14:51

## 2020-09-14 RX ADMIN — HYDROCODONE BITARTRATE AND ACETAMINOPHEN 1 TABLET: 7.5; 325 TABLET ORAL at 20:49

## 2020-09-14 RX ADMIN — SODIUM CHLORIDE, PRESERVATIVE FREE 10 ML: 5 INJECTION INTRAVENOUS at 08:40

## 2020-09-14 RX ADMIN — PROPOFOL 90 MG: 10 INJECTION, EMULSION INTRAVENOUS at 12:20

## 2020-09-14 RX ADMIN — Medication 1 CAPSULE: at 08:27

## 2020-09-14 RX ADMIN — PROPOFOL 50 MG: 10 INJECTION, EMULSION INTRAVENOUS at 12:23

## 2020-09-14 RX ADMIN — HYDROCODONE BITARTRATE AND ACETAMINOPHEN 1 TABLET: 7.5; 325 TABLET ORAL at 08:27

## 2020-09-14 RX ADMIN — ENOXAPARIN SODIUM 40 MG: 40 INJECTION SUBCUTANEOUS at 01:00

## 2020-09-14 RX ADMIN — PROPOFOL 50 MG: 10 INJECTION, EMULSION INTRAVENOUS at 12:28

## 2020-09-14 RX ADMIN — VANCOMYCIN HYDROCHLORIDE 1750 MG: 500 INJECTION, POWDER, LYOPHILIZED, FOR SOLUTION INTRAVENOUS at 08:28

## 2020-09-14 RX ADMIN — SODIUM CHLORIDE, PRESERVATIVE FREE 10 ML: 5 INJECTION INTRAVENOUS at 20:51

## 2020-09-14 RX ADMIN — PROPOFOL 30 MG: 10 INJECTION, EMULSION INTRAVENOUS at 12:30

## 2020-09-14 RX ADMIN — DEXAMETHASONE SODIUM PHOSPHATE 4 MG: 4 INJECTION, SOLUTION INTRA-ARTICULAR; INTRALESIONAL; INTRAMUSCULAR; INTRAVENOUS; SOFT TISSUE at 08:27

## 2020-09-14 RX ADMIN — SODIUM CHLORIDE: 9 INJECTION, SOLUTION INTRAVENOUS at 12:20

## 2020-09-14 RX ADMIN — DEXAMETHASONE SODIUM PHOSPHATE 4 MG: 4 INJECTION, SOLUTION INTRA-ARTICULAR; INTRALESIONAL; INTRAMUSCULAR; INTRAVENOUS; SOFT TISSUE at 02:12

## 2020-09-14 RX ADMIN — DEXAMETHASONE SODIUM PHOSPHATE 4 MG: 4 INJECTION, SOLUTION INTRA-ARTICULAR; INTRALESIONAL; INTRAMUSCULAR; INTRAVENOUS; SOFT TISSUE at 14:51

## 2020-09-14 RX ADMIN — Medication 1 CAPSULE: at 20:49

## 2020-09-14 NOTE — PROGRESS NOTES
Continued Stay Note   Nina     Patient Name: Gopi Johnson  MRN: [id number]  Today's Date: 9/14/2020    Admit Date: 9/10/2020    Discharge Plan     Row Name 09/14/20 1553       Plan    Plan Spoke to pt and his sister Shasta Wing  in room.  Permission  granted to speak in front of sister.  Understands he will need IV antibiotics at home, lives with his mother, does not feel she can help him.  Sister states she can help pt  And can come everyday if needs too .  Lives in [facility], address is [place].  Chose from list [facility] and [facility] infusion. Will get [facility] line when cultures neg.  Called to Tina at [facility] and will watch for DC. Called to Inga at [facility] and will wait for order. Cm will continue to follow.         Discharge Codes    No documentation.       Expected Discharge Date and Time     Expected Discharge Date Expected Discharge Time    Sep 14, 2020             Michelle Riley RN

## 2020-09-14 NOTE — ANESTHESIA PREPROCEDURE EVALUATION
Anesthesia Evaluation     Patient summary reviewed and Nursing notes reviewed   NPO Solid Status: > 8 hours  NPO Liquid Status: > 8 hours           Airway   Mallampati: II  TM distance: >3 FB  Neck ROM: full  Large neck circumference and Possible difficult intubation  Dental - normal exam     Pulmonary - negative pulmonary ROS and normal exam   Cardiovascular - normal exam    (+) hypertension poorly controlled, hyperlipidemia,       Neuro/Psych  (+) headaches,     GI/Hepatic/Renal/Endo    (+)   renal disease,     Musculoskeletal (-) negative ROS    Abdominal  - normal exam   Substance History - negative use     OB/GYN negative ob/gyn ROS         Other        ROS/Med Hx Other: Abdominal pain  Anemia  Constipation  Diarrhea  Abnormal liver enzymes  Heartburn  Weight loss  BPH without urinary obstruction  Kidney stone  Sepsis (CMS/HCC)  Abscess of paraspinal muscles  Abscess, gluteal, left  Bacteremia due to Gram-positive bacteria  Chronic midline low back pain without sciatica  Essential hypertension                      Anesthesia Plan    ASA 2     MAC   (Patient advised that intravenous sedation would be used as the primary anesthetic, along with local anesthesia if necessary. Every effort will be made to make sure the patient is comfortable.     The patient was told that they may experience recall of the procedure. The patient was further advised that if the MAC anesthetic was deemed ineffective that general anesthesia administered via LMA or ETT may be required.    Patient verbalized understanding and agreed to plan of care. )  intravenous induction     Anesthetic plan, all risks, benefits, and alternatives have been provided, discussed and informed consent has been obtained with: patient.    Plan discussed with CRNA.

## 2020-09-14 NOTE — PROGRESS NOTES
[facility] Cardiology Preliminary ROSE Note    Name: Gopi Johnson  YOB: 1975  MRN #: [id number]    Procedure Date: 9/14/2020    Procedure Performed: Transesophageal echocardiogram    Indication: MRSA bacteremia, concern for endocarditis    Procedure Details:  The risks and benefits of transesophageal echocardiogram were discussed with the patient, including the risk of esophageal injury, esophageal perforation, airway compromise, respiratory failure, and risks associated with sedation were discussed with the patient. All questions were answered, and the patient was agreeable to proceed.     The patient was brought to the procedure area in a fasting state. Sedation with propofol was administered by anesthesia. Once adequate sedation was achieved, the ROSE probe was passed into the mid esophagus without difficulty. Multiple pictures were obtained in the mid esophageal position. The probe was then advanced into the stomach and multiple transgastric views were obtained. Once all appropriate views were obtained, the ROSE probe was removed.     The patient tolerated the procedure well with no immediate jeremias-procedural complications.     Findings:  1.  Normal left ventricular systolic function  2.  Trace MR and TR  3.  No echocardiographic evidence of valvular endocarditis  4.  Full ROSE report to follow              Blayne Vilchis MD  09/14/20  12:51 EDT

## 2020-09-14 NOTE — PAYOR COMM NOTE
"TO:HUMANA MK  FROM:MANDY VILLATORO, KERI PHONE [phone]  UPDATED CLINICALS    Gopi Salomon (45 y.o. Male)     Date of Birth Social Security Number Address Home Phone MRN    1975  [address] [place] [address] [phone] [id number]    [employer] Marital Status          Unknown Single       Admission Date Admission Type Admitting Provider Attending Provider Department, Room/Bed    9/10/20 Urgent Valeria Eaton DO Gandee, Julie G, DO [facility]  4, 409/1    Discharge Date Discharge Disposition Discharge Destination                       Attending Provider: Valeria Eaton DO    Allergies: No Known Allergies    Isolation: Contact   Infection: MRSA (09/11/20)   Code Status: CPR    Ht: 182.9 cm (72\")   Wt: 98 kg (216 lb 1.6 oz)    Admission Cmt: None   Principal Problem: Sepsis (CMS/[facility]) [A41.9]                 Active Insurance as of 9/10/2020     Primary Coverage     Payor Plan Insurance Group Employer/Plan Group    [place] [id number]     Payor Plan Address Payor Plan Phone Number Payor Plan Fax Number Effective Dates    [employer] Claims Office - [address] [phone]  7/1/2020 - None Entered    [facility] [id number]       Subscriber Name Subscriber Birth Date Member ID       GOPI SALOMON 1975 [id number]                 Emergency Contacts      (Rel.) Home Phone Work Phone Mobile Phone    Sowmya Salomon (Mother) [phone] -- --            Vital Signs (last day)     Date/Time   Temp   Temp src   Pulse   Resp   BP   Patient Position   SpO2    09/14/20 1130   97.9 (36.6)   Oral   74   18   (!) 162/104   Lying   97    09/14/20 0820   97.8 (36.6)   Oral   91   18   147/96   Sitting   98    09/14/20 0357   97.5 (36.4)   Oral   68   18   154/96   Lying   99    09/13/20 2349   97.9 (36.6)   Oral   71   18   131/87   Lying   99    09/13/20 2001   97.8 (36.6)   Oral   71   18   161/100   Lying   99    09/13/20 1500   98.1 " (36.7)   Oral   77   16   162/90   Sitting   99    09/13/20 1159   98.1 (36.7)   Oral   70   16   150/88   Lying   98    09/13/20 0741   98.2 (36.8)   Oral   74   18   (!) 166/104   Lying   99    09/13/20 0407   97.6 (36.4)   Oral   83   18   157/94   Lying   98    09/13/20 0101   98 (36.7)   Oral   80   18   (!) 153/108   Lying   98              Current Facility-Administered Medications   Medication Dose Route Frequency Provider Last Rate Last Dose   • acetaminophen (TYLENOL) tablet 650 mg  650 mg Oral Q4H PRN Madhu Mohamud MD   650 mg at 09/14/20 0208    Or   • acetaminophen (TYLENOL) suppository 650 mg  650 mg Rectal Q4H PRN Madhu Mohamud MD       • atorvastatin (LIPITOR) tablet 20 mg  20 mg Oral Nightly Madhu Mohamud MD   20 mg at 09/13/20 2019   • calcium carbonate (TUMS) chewable tablet 500 mg (200 mg elemental)  2 tablet Oral BID PRN Madhu Mohamud MD       • cetirizine (zyrTEC) tablet 10 mg  10 mg Oral Daily Madhu Mohamud MD   10 mg at 09/14/20 0829   • dexamethasone (DECADRON) injection 4 mg  4 mg Intravenous Q6H Madhu Mohamud MD   4 mg at 09/14/20 0827   • enoxaparin (LOVENOX) syringe 40 mg  40 mg Subcutaneous Q24H Madhu Mohamud MD   40 mg at 09/14/20 0100   • hydrALAZINE (APRESOLINE) injection 10 mg  10 mg Intravenous Q4H PRN Madhu Mohamud MD   10 mg at 09/14/20 1140   • HYDROcodone-acetaminophen (NORCO) 7.5-325 MG per tablet 1 tablet  1 tablet Oral Q6H PRN Madhu Mohamud MD   1 tablet at 09/14/20 0827   • ipratropium-albuterol (DUO-NEB) nebulizer solution 3 mL  3 mL Nebulization Q6H PRN Michael Mantilla DO       • lactobacillus acidophilus (RISAQUAD) capsule 1 capsule  1 capsule Oral BID Madhu Mohamud MD   1 capsule at 09/14/20 0827   • Morphine sulfate (PF) injection 4 mg  4 mg Intravenous Q4H PRN Madhu Mohamud MD   4 mg at 09/13/20 2023   • nicotine (NICODERM CQ) 21 MG/24HR patch 1 patch  1 patch Transdermal Q24H Michael Mantilla DO   1 patch at 09/14/20 0828   • ondansetron (ZOFRAN)  tablet 4 mg  4 mg Oral Q6H PRN Madhu Mohamud MD        Or   • ondansetron (ZOFRAN) injection 4 mg  4 mg Intravenous Q6H PRN Madhu Mohamud MD       • pantoprazole (PROTONIX) EC tablet 40 mg  40 mg Oral QAM Madhu Mohamud MD   40 mg at 09/13/20 0635   • Pharmacy to dose vancomycin   Does not apply Continuous PRN Madhu Mohamud MD       • sodium chloride 0.9 % flush 10 mL  10 mL Intravenous Q12H Madhu Mohamud MD   10 mL at 09/14/20 0840   • sodium chloride 0.9 % flush 10 mL  10 mL Intravenous PRN Madhu Mohamud MD       • topiramate (TOPAMAX) tablet 50 mg  50 mg Oral Daily Madhu Mohamud MD   50 mg at 09/14/20 0836   • vancomycin 1750 mg in sodium chloride 0.9% 500 mL IVPB  1,750 mg Intravenous Q12H Valeria Eaton  mL/hr at 09/14/20 0828 1,750 mg at 09/14/20 0828   • venlafaxine XR (EFFEXOR-XR) 24 hr capsule 150 mg  150 mg Oral Daily Madhu Mohamud MD   150 mg at 09/14/20 0827     Lab Results (last 24 hours)     Procedure Component Value Units Date/Time    Comprehensive Metabolic Panel [[id number]]  (Abnormal) Collected: 09/14/20 0631    Specimen: Blood Updated: 09/14/20 0711     Glucose 130 mg/dL      BUN 17 mg/dL      Creatinine 0.63 mg/dL      Sodium 137 mmol/L      Potassium 4.5 mmol/L      Chloride 101 mmol/L      CO2 24.3 mmol/L      Calcium 9.3 mg/dL      Total Protein 7.0 g/dL      Albumin 3.10 g/dL      ALT (SGPT) 35 U/L      AST (SGOT) 18 U/L      Alkaline Phosphatase 138 U/L      Total Bilirubin 0.3 mg/dL      eGFR Non African Amer 138 mL/min/1.73      Globulin 3.9 gm/dL      A/G Ratio 0.8 g/dL      BUN/Creatinine Ratio 27.0     Anion Gap 11.7 mmol/L     Narrative:      GFR Normal >60  Chronic Kidney Disease <60  Kidney Failure <15      Vancomycin, Random [[id number]]  (Normal) Collected: 09/14/20 0631    Specimen: Blood Updated: 09/14/20 0710     Vancomycin Random 7.40 mcg/mL     CBC (No Diff) [[id number]]  (Abnormal) Collected: 09/14/20 0631    Specimen: Blood Updated: 09/14/20 0648     WBC 14.46 10*3/mm3       RBC 4.42 10*6/mm3      Hemoglobin 12.6 g/dL      Hematocrit 37.6 %      MCV 85.1 fL      MCH 28.5 pg      MCHC 33.5 g/dL      RDW 15.1 %      RDW-SD 46.9 fl      MPV 9.5 fL      Platelets 436 10*3/mm3     Blood Culture With BERNIE - Blood, Arm, Left [[id number]] Collected: 09/14/20 0637    Specimen: Blood from Arm, Left Updated: 09/14/20 0647    Blood Culture With BERNIE - Blood, Arm, Right [[id number]] Collected: 09/14/20 0631    Specimen: Blood from Arm, Right Updated: 09/14/20 0647           Physician Progress Notes (last 48 hours) (Notes from 09/12/20 1152 through 09/14/20 1152)      Isidra Hernandez MD at 09/14/20 0723               LOS: 4 days   Patient Care Team:  Juvenal Ray PA as PCP - General  Royal Dover MD as Consulting Physician (General Surgery)      Chief Complaint: Paraspinous abscess      Interval History: Patient with little change, undergoing ROSE today.  No further pain discomfort in the gluteal region.  No fever or chills.  On appropriate antibiotics.    Patient Complaints: None    History taken from: patient    Vital Signs  Temp:  [97.5 °F (36.4 °C)-98.2 °F (36.8 °C)] 97.5 °F (36.4 °C)  Heart Rate:  [68-77] 68  Resp:  [16-18] 18  BP: (131-166)/() 154/96    Physical Exam:     General Appearance:    Alert, cooperative, in no acute distress   Head:    Normocephalic, without obvious abnormality, atraumatic   Lungs:     Clear to auscultation,respirations regular, even and                  unlabored    Heart:    Regular rhythm and normal rate, normal S1 and S2, no            murmur, no gallop, no rub, no click   Abdomen:     Normal bowel sounds, no masses, no organomegaly, soft        non-tender, non-distended, no guarding, no rebound                tenderness   Extremities:   Moves all extremities well, no edema, no cyanosis, no             redness   Pulses:   Pulses palpable and equal bilaterally   Skin:   No bleeding, bruising or rash        Results Review:       Lab Results (last 24  hours)     Procedure Component Value Units Date/Time    Comprehensive Metabolic Panel [[id number]]  (Abnormal) Collected: 09/14/20 0631    Specimen: Blood Updated: 09/14/20 0711     Glucose 130 mg/dL      BUN 17 mg/dL      Creatinine 0.63 mg/dL      Sodium 137 mmol/L      Potassium 4.5 mmol/L      Chloride 101 mmol/L      CO2 24.3 mmol/L      Calcium 9.3 mg/dL      Total Protein 7.0 g/dL      Albumin 3.10 g/dL      ALT (SGPT) 35 U/L      AST (SGOT) 18 U/L      Alkaline Phosphatase 138 U/L      Total Bilirubin 0.3 mg/dL      eGFR Non African Amer 138 mL/min/1.73      Globulin 3.9 gm/dL      A/G Ratio 0.8 g/dL      BUN/Creatinine Ratio 27.0     Anion Gap 11.7 mmol/L     Narrative:      GFR Normal >60  Chronic Kidney Disease <60  Kidney Failure <15      Vancomycin, Random [[id number]]  (Normal) Collected: 09/14/20 0631    Specimen: Blood Updated: 09/14/20 0710     Vancomycin Random 7.40 mcg/mL     CBC (No Diff) [[id number]]  (Abnormal) Collected: 09/14/20 0631    Specimen: Blood Updated: 09/14/20 0648     WBC 14.46 10*3/mm3      RBC 4.42 10*6/mm3      Hemoglobin 12.6 g/dL      Hematocrit 37.6 %      MCV 85.1 fL      MCH 28.5 pg      MCHC 33.5 g/dL      RDW 15.1 %      RDW-SD 46.9 fl      MPV 9.5 fL      Platelets 436 10*3/mm3     Blood Culture With BENRIE - Blood, Arm, Left [[id number]] Collected: 09/14/20 0637    Specimen: Blood from Arm, Left Updated: 09/14/20 0647    Blood Culture With BERNIE - Blood, Arm, Right [[id number]] Collected: 09/14/20 0631    Specimen: Blood from Arm, Right Updated: 09/14/20 0647              Assessment/Plan       Sepsis (CMS/HCC)    Abscess of paraspinal muscles    Abscess, gluteal, left    Bacteremia due to Gram-positive bacteria    Chronic midline low back pain without sciatica    Essential hypertension      Paraspinous abscess extending to the gluteal region.  Recommendation by orthopedics and neurosurgery are for observation IV antibiotics.  Plan for PICC line and IV antibiotics for 6 weeks.  No  need for further surgical intervention at this time.  Will sign off care.      Isidra Hernandez MD  20  07:23 EDT      Electronically signed by Isidra Hernandez MD at 20 0724     Madhu Mohamud MD at 20 1600                [facility] HOSPITALIST    PROGRESS NOTE    Name:  Gopi Johnson   Age:  45 y.o.  Sex:  male  :  1975  MRN:  [id number]   Visit Number:  [id number]  Admission Date:  9/10/2020  Date Of Service:  20  Primary Care Physician:  Juvenal Ray PA     LOS: 3 days :  Patient Care Team:  Juvenal Ray PA as PCP - General  Royal Dover MD as Consulting Physician (General Surgery):    Chief Complaint:      Follow-up of back pain and fever.    Subjective / Interval History:     Mr. Johnson is currently lying down on the bed and is comfortable at rest.  He states that his back pain has significantly improved.  He is tolerating vancomycin without any difficulty.  His repeat blood cultures done on admission here have both been positive for MRSA.  Both the blood cultures done at [facility] were also positive.  He was seen by Dr. Hernandez for his left gluteal abscess and he has recommended no surgical intervention at this time.    This is a 45-year-old male with history of hypertension, hyperlipidemia and IV drug abuse was transferred from [facility] emergency room on 9/10/2020 with back pain and fever.  Patient did have CT scans without contrast of the cervical, thoracic and lumbar spines which did not show any evidence of acute disease.  Patient was initially started on broad-spectrum IV antibiotic therapy for suspected meningitis and was transferred to [facility] ICU for further evaluation and management.  Patient did not have any hypotension.  He received IV fluids and was placed on IV antibiotic therapy with Rocephin, vancomycin and acyclovir.      MRIs of the cervical, thoracic and lumbar spine done here showed left  paraspinal abscess 12 mm as well as left gluteal abscess 3.6 cm as well as possible osteomyelitis of the left sacral ala.  This was discussed with neurosurgery and orthopedic services at [facility] and they recommended no surgical intervention but continue IV antibiotic therapy.  He states he lives with his mother and denies taking IV drugs in the recent past.  He was taking IV drugs about 4 months ago according to his mother.    Review of Systems:     General ROS: Patient denies any fevers, chills or loss of consciousness.  Generalized weakness  Respiratory ROS: Denies cough or shortness of breath.  Cardiovascular ROS: Denies chest pain or palpitations. No history of exertional chest pain.  Gastrointestinal ROS: Denies nausea and vomiting. Denies any abdominal pain. No diarrhea.  Neurological ROS: Denies any focal weakness. No loss of consciousness. Denies any numbness.  Dermatological ROS: Denies any redness or pruritis.    Vital Signs:    Temp:  [97.6 °F (36.4 °C)-98.2 °F (36.8 °C)] 98.1 °F (36.7 °C)  Heart Rate:  [70-83] 77  Resp:  [16-18] 16  BP: (143-166)/() 162/90    Intake and output:    I/O last 3 completed shifts:  In: 1560 [P.O.:1560]  Out: 2850 [Urine:2850]  I/O this shift:  In: 600 [P.O.:600]  Out: -     Physical Examination:    General Appearance:  Alert and cooperative, not in any acute distress.   Head:  Atraumatic and normocephalic, without obvious abnormality.   Eyes:          PERRLA, conjunctivae and sclerae normal, no Icterus. No pallor. Extraocular movements are within normal limits.   Neck: Supple, trachea midline, no thyromegaly, no carotid bruit.   Lungs:   Chest shape is normal. Breath sounds heard bilaterally equally.  No crackles or wheezing. No pleural rub or bronchial breathing.   Heart:  Normal S1 and S2, no murmur, no gallop, no rub. No JVD   Abdomen:   Normal bowel sounds, no masses, no organomegaly. Soft, nontender, nondistended, no guarding, no rebound tenderness.    Extremities: Moves all extremities, no edema, no cyanosis, no clubbing.  Multiple healed skin ulcer scars noted.  No erythema noted on the skin.  Tenderness noted in the lower back as well as the paraspinal areas.  Tenderness noted in the left gluteal region without any swelling or redness.  No redness or swelling noted in the back.   Skin: No bleeding.  Tattoos noted on the skin.   Neurologic: Awake, alert and oriented times 3. Moves all 4 extremities equally.     Laboratory results:    Results from last 7 days   Lab Units 09/12/20  0621 09/11/20  0651   SODIUM mmol/L 137 140   POTASSIUM mmol/L 4.0 4.3   CHLORIDE mmol/L 103 105   CO2 mmol/L 22.6 21.4*   BUN mg/dL 14 16   CREATININE mg/dL 0.54* 0.65*   CALCIUM mg/dL 8.7 8.6   BILIRUBIN mg/dL  --  0.4   ALK PHOS U/L  --  203*   ALT (SGPT) U/L  --  50*   AST (SGOT) U/L  --  58*   GLUCOSE mg/dL 141* 197*     Results from last 7 days   Lab Units 09/12/20  0621 09/11/20  0651   WBC 10*3/mm3 14.23* 16.66*   HEMOGLOBIN g/dL 10.3* 11.6*   HEMATOCRIT % 31.1* 35.7*   PLATELETS 10*3/mm3 321 334         Results from last 7 days   Lab Units 09/12/20  0621   CK TOTAL U/L 25     Results from last 7 days   Lab Units 09/11/20  0116 09/11/20  0110   BLOODCX  Staphylococcus aureus, MRSA* Staphylococcus aureus, MRSA*           I have reviewed the patient's laboratory results.    Radiology results:    Imaging Results (Last 24 Hours)     ** No results found for the last 24 hours. **        I have reviewed the patient's radiology reports.    Medication Review:     I have reviewed the patient's active and prn medications.       Sepsis (CMS/HCC)    Abscess of paraspinal muscles    Abscess, gluteal, left    Bacteremia due to Gram-positive bacteria    Chronic midline low back pain without sciatica    Essential hypertension    Assessment:    1.  Sepsis with MRSA bacteremia, present on admission.  2.  Left paraspinal and left gluteus muscle abscess, present on admission.  3.  Suspected  osteomyelitis of the left sacral ala, present on admission.  4.  Suspected infective endocarditis, present on admission.  5.  Suspected IV drug abuse.  6.  Essential hypertension.    Plan:    Mr. Johnson is currently feeling better and is hemodynamically stable.  He is currently on IV antibiotic therapy with vancomycin.  We will order surveillance blood cultures tomorrow morning.  If these remain negative for 48 to 72 hours, a PICC line can be placed for outpatient antibiotic therapy for 6 weeks.  We will also keep him n.p.o. after midnight for transesophageal echocardiogram to be done by Dr. Vilchis in the morning.  His nasal swab was negative for MRSA.  He will be continued on lactobacillus supplements.    Patient will be continued on his home medications.  I have encouraged him to walk around in the room.    Patient's condition and MRI findings of the lumbar spine were discussed with the neurosurgeon (Dr. Cali) as well as the orthopedic surgeon (Dr. Conner) on 9/11/2020 and they both recommended no surgical intervention at this time.  They recommended continuation of IV antibiotic therapy for 6 weeks and repeat imaging depending upon clinical condition.  They recommend, if the abscesses get worse to have interventional radiology drain them.    Patient lives with his mother and may be able to go home in the next 3 to 4 days with outpatient antibiotic therapy for 6 weeks.  He may need repeat imaging of his back if he does not improve with regards to his back pain and infection.      Madhu Mohamud MD  09/13/20  16:00 EDT    Dictated utilizing Dragon dictation.      Electronically signed by Madhu Mohamud MD at 09/13/20 4762     Isidra Hernandez MD at 09/13/20 0921               LOS: 3 days   Patient Care Team:  Juvenal Ray PA as PCP - General  Royal Dover MD as Consulting Physician (General Surgery)      Chief Complaint: Paraspinous abscess with osteomyelitis      Interval History: Patient appears anxious,  has less lower back and gluteal pain.  No fever and slight elevation white blood cell count.  Patient on appropriate antibiotics and doing well otherwise.    Patient Complaints: None    History taken from: patient    Vital Signs  Temp:  [97.6 °F (36.4 °C)-98.2 °F (36.8 °C)] 98.2 °F (36.8 °C)  Heart Rate:  [74-83] 74  Resp:  [18] 18  BP: (143-167)/() 166/104    Physical Exam:     General Appearance:    Alert, cooperative, in no acute distress   Head:    Normocephalic, without obvious abnormality, atraumatic   Lungs:     Clear to auscultation,respirations regular, even and                  unlabored    Heart:    Regular rhythm and normal rate, normal S1 and S2, no            murmur, no gallop, no rub, no click   Abdomen:     Normal bowel sounds, no masses, no organomegaly, soft        non-tender, non-distended, no guarding, no rebound                tenderness   Extremities:   Moves all extremities well, no edema, no cyanosis, no             Redness.  Left gluteal region tender to deep palpation.  No fluctuance or redness.   Pulses:   Pulses palpable and equal bilaterally   Skin:   No bleeding, bruising or rash        Results Review:       Lab Results (last 24 hours)     Procedure Component Value Units Date/Time    Blood Culture With BERNIE - Blood, Arm, Right [[id number]]  (Abnormal) Collected: 09/11/20 0110    Specimen: Blood from Arm, Right Updated: 09/13/20 0622     Blood Culture Staphylococcus aureus, MRSA     Comment: Methicillin resistant Staphylococcus aureus, Patient may be an isolation risk.  Refer to blood culture collected 9/11/2020 0116 for ALEXANDRO's        Isolated from Aerobic Bottle     Gram Stain Aerobic Bottle Gram positive cocci in clusters    Blood Culture With BERNIE - Blood, Hand, Right [[id number]]  (Abnormal)  (Susceptibility) Collected: 09/11/20 0116    Specimen: Blood from Hand, Right Updated: 09/13/20 0622     Blood Culture Staphylococcus aureus, MRSA     Comment: Methicillin resistant  Staphylococcus aureus, Patient may be an isolation risk.  Infectious disease consultation is highly recommended to rule out distant foci of infection.        Isolated from Aerobic Bottle     Gram Stain Aerobic Bottle Gram positive cocci in clusters    Susceptibility      Staphylococcus aureus, MRSA     ALEXANDRO     Gentamicin Susceptible     Oxacillin Resistant     Rifampin Susceptible     Vancomycin Susceptible                Susceptibility Comments     Staphylococcus aureus, MRSA    This isolate does not demonstrate inducible clindamycin resistance in vitro.                         Assessment/Plan       Sepsis (CMS/HCC)    Abscess of paraspinal muscles    Abscess, gluteal, left    Bacteremia due to Gram-positive bacteria    Chronic midline low back pain without sciatica    Essential hypertension      Patient on appropriate biotics, continue treatment per recommendations from orthopedics and neurosurgery.  No plans for intervention at this time from my standpoint.      Isidra Hernandez MD  20  11:25 EDT      Electronically signed by Isidra Hernandez MD at 20 1126     Madhu Mohamud MD at 20 7033                [facility]    Name:  Gopi Johnson   Age:  45 y.o.  Sex:  male  :  1975  MRN:  [id number]   Visit Number:  [id number]  Admission Date:  9/10/2020  Date Of Service:  20  Primary Care Physician:  Juvenal Ray PA     LOS: 2 days :  Patient Care Team:  Juvenal Ray PA as PCP - General  Royal Dover MD as Consulting Physician (General Surgery):    Chief Complaint:      Back pain and fever.    Subjective / Interval History:     Mr. Johnson is currently lying down on the bed and is comfortable at rest.  He is feeling much better today compared to yesterday.  He was transferred out of the ICU.  He is currently on IV antibiotic therapy with vancomycin.  His repeat blood cultures done on admission here have both been positive for MRSA.   He was seen by Dr. Hernandez this morning for his left gluteal abscess and he has recommended no surgical intervention at this time.    This is a 45-year-old male with history of hypertension, hyperlipidemia and IV drug abuse was transferred from [facility] emergency room on 9/10/2020 with back pain and fever.  Patient did have CT scans without contrast of the cervical, thoracic and lumbar spines which did not show any evidence of acute disease.  Patient was initially started on broad-spectrum IV antibiotic therapy for suspected meningitis and was transferred to [facility] for further evaluation and management.  Patient did not have any hypotension.  He received IV fluids and was placed on IV antibiotic therapy with Rocephin, vancomycin and acyclovir.      MRIs of the cervical, thoracic and lumbar spine done here showed left paraspinal abscess 12 mm as well as left gluteal abscess 3.6 cm as well as possible osteomyelitis of the left sacral ala.  This was discussed with neurosurgery and orthopedic services at [facility] and they recommended no surgical intervention but continue IV antibiotic therapy.  He states he lives with his mother and denies taking IV drugs in the recent past.  He was taking IV drugs about 4 months ago according to his mother.    Review of Systems:     General ROS: Patient denies any fevers, chills or loss of consciousness.  Generalized weakness  Respiratory ROS: Denies cough or shortness of breath.  Cardiovascular ROS: Denies chest pain or palpitations. No history of exertional chest pain.  Gastrointestinal ROS: Denies nausea and vomiting. Denies any abdominal pain. No diarrhea.  Neurological ROS: Denies any focal weakness. No loss of consciousness. Denies any numbness.  Dermatological ROS: Denies any redness or pruritis.    Vital Signs:    Temp:  [97.8 °F (36.6 °C)-98.1 °F (36.7 °C)] 98.1 °F (36.7 °C)  Heart Rate:  [] 83  Resp:  [16-20] 16  BP:  (121-145)/(80-97) 121/80    Intake and output:    I/O last 3 completed shifts:  In: 2612 [P.O.:1845; I.V.:767]  Out: 4075 [Urine:4075]  I/O this shift:  In: 960 [P.O.:960]  Out: -     Physical Examination:    General Appearance:  Alert and cooperative, not in any acute distress.   Head:  Atraumatic and normocephalic, without obvious abnormality.   Eyes:          PERRLA, conjunctivae and sclerae normal, no Icterus. No pallor. Extraocular movements are within normal limits.   Neck: Supple, trachea midline, no thyromegaly, no carotid bruit.   Lungs:   Chest shape is normal. Breath sounds heard bilaterally equally.  No crackles or wheezing. No pleural rub or bronchial breathing.   Heart:  Normal S1 and S2, no murmur, no gallop, no rub. No JVD   Abdomen:   Normal bowel sounds, no masses, no organomegaly. Soft, nontender, nondistended, no guarding, no rebound tenderness.   Extremities: Moves all extremities, no edema, no cyanosis, no clubbing.  Multiple healed skin ulcer scars noted.  No erythema noted on the skin.  Tenderness noted in the lower back as well as the paraspinal areas.  Tenderness noted in the left gluteal region without any swelling or redness.  No redness or swelling noted in the back.   Skin: No bleeding.  Tattoos noted on the skin.   Neurologic: Awake, alert and oriented times 3. Moves all 4 extremities equally.     Laboratory results:    Results from last 7 days   Lab Units 09/12/20  0621 09/11/20  0651   SODIUM mmol/L 137 140   POTASSIUM mmol/L 4.0 4.3   CHLORIDE mmol/L 103 105   CO2 mmol/L 22.6 21.4*   BUN mg/dL 14 16   CREATININE mg/dL 0.54* 0.65*   CALCIUM mg/dL 8.7 8.6   BILIRUBIN mg/dL  --  0.4   ALK PHOS U/L  --  203*   ALT (SGPT) U/L  --  50*   AST (SGOT) U/L  --  58*   GLUCOSE mg/dL 141* 197*     Results from last 7 days   Lab Units 09/12/20  0621 09/11/20  0651   WBC 10*3/mm3 14.23* 16.66*   HEMOGLOBIN g/dL 10.3* 11.6*   HEMATOCRIT % 31.1* 35.7*   PLATELETS 10*3/mm3 321 334         Results  from last 7 days   Lab Units 09/12/20  0621   CK TOTAL U/L 25     Results from last 7 days   Lab Units 09/11/20  0116 09/11/20  0110   BLOODCX  Staphylococcus aureus, MRSA* Staphylococcus aureus, MRSA*           I have reviewed the patient's laboratory results.    Radiology results:    Imaging Results (Last 24 Hours)     ** No results found for the last 24 hours. **        I have reviewed the patient's radiology reports.    Medication Review:     I have reviewed the patient's active and prn medications.       Sepsis (CMS/HCC)    Abscess of paraspinal muscles    Abscess, gluteal, left    Bacteremia due to Gram-positive bacteria    Chronic midline low back pain without sciatica    Essential hypertension    Assessment:    1.  Sepsis with MRSA bacteremia, present on admission.  2.  Left paraspinal and left gluteus muscle abscess, present on admission.  3.  Suspected osteomyelitis of the left sacral ala, present on admission.  4.  Suspected infective endocarditis, present on admission.  5.  Suspected IV drug abuse.  6.  Essential hypertension.    Plan:    Mr. Johnson is currently feeling better and is hemodynamically stable.  He states that he does not take any medications for blood pressure at home.  We will discontinue hydrochlorothiazide.  He is currently on IV antibiotic therapy with vancomycin and Rocephin.  His both blood cultures drawn here have been positive for MRSA.  Nasal swab for MRSA was negative.  We will discontinue Rocephin and continue him on vancomycin.  He will be continued on lactobacillus supplements.  I have discussed the patient's condition with the nurse at the bedside and we will discontinue IV fluids as well as a Rivera catheter.    I have discussed the patient's condition with Dr. Vilchis from cardiology and he has been scheduled for transesophageal echocardiogram on Monday to rule out infective endocarditis.  His transthoracic echocardiogram done yesterday did not show any significant evidence of  valvular abnormalities.    Patient's condition and MRI findings of the lumbar spine were discussed with the neurosurgeon (Dr. Cali) as well as the orthopedic surgeon (Dr. Conner) on 9/11/2020 and they both recommended no surgical intervention at this time.  They recommended continuation of IV antibiotic therapy for 6 weeks and repeat imaging depending upon clinical condition.  They recommend, if the abscesses get worse to have interventional radiology drain them.    Patient will need several more days of inpatient hospital stay.  He will eventually need a PICC line placement once his surveillance blood cultures become negative.      Madhu Mohamud MD  09/12/20  14:59 EDT    Dictated utilizing Dragon dictation.      Electronically signed by Madhu Mohamud MD at 09/12/20 1506       Consult Notes (last 48 hours) (Notes from 09/12/20 1152 through 09/14/20 1152)    No notes of this type exist for this encounter.

## 2020-09-14 NOTE — PROGRESS NOTES
"Pharmacokinetic Follow-up Note - Vancomycin     Gopi Johnson is a 45 y.o. male  182.9 cm (72\") 98 kg (216 lb 1.6 oz)     Indication for use: bacteremia    Results from last 7 days   Lab Units 09/14/20  0631 09/12/20  0621 09/11/20  0651   WBC 10*3/mm3 14.46* 14.23* 16.66*   CREATININE mg/dL 0.63* 0.54* 0.65*      Estimated Creatinine Clearance: 179.7 mL/min (A) (by C-G formula based on SCr of 0.63 mg/dL (L)).  Temp Readings from Last 1 Encounters:   09/14/20 97.9 °F (36.6 °C) (Oral)     Lab Results   Component Value Date    VANCORANDOM 7.40 09/14/2020       Microbiology:  Microbiology Results (last 10 days)       Procedure Component Value - Date/Time    MRSA Screen, PCR (Inpatient) - Swab, Nares [[id number]]  (Normal) Collected: 09/11/20 1515    Lab Status: Final result Specimen: Swab from Nares Updated: 09/12/20 0513     MRSA PCR No MRSA Detected    Blood Culture With BERNIE - Blood, Hand, Right [[id number]]  (Abnormal)  (Susceptibility) Collected: 09/11/20 0116    Lab Status: Final result Specimen: Blood from Hand, Right Updated: 09/13/20 0622     Blood Culture Staphylococcus aureus, MRSA     Comment: Methicillin resistant Staphylococcus aureus, Patient may be an isolation risk.  Infectious disease consultation is highly recommended to rule out distant foci of infection.        Isolated from Aerobic Bottle     Gram Stain Aerobic Bottle Gram positive cocci in clusters    Susceptibility        Staphylococcus aureus, MRSA     ALEXANDRO     Gentamicin Susceptible     Oxacillin Resistant     Rifampin Susceptible     Vancomycin Susceptible                  Susceptibility Comments       Staphylococcus aureus, MRSA    This isolate does not demonstrate inducible clindamycin resistance in vitro.                 Blood Culture ID, PCR - Blood, Hand, Right [[id number]]  (Abnormal) Collected: 09/11/20 0116    Lab Status: Final result Specimen: Blood from Hand, Right Updated: 09/11/20 1959     BCID, PCR Staphylococcus aureus. mecA " (methicillin resistance gene) detected. Identification by BCID PCR.    Blood Culture With BERNIE - Blood, Arm, Right [[id number]]  (Abnormal) Collected: 09/11/20 0110    Lab Status: Final result Specimen: Blood from Arm, Right Updated: 09/13/20 0622     Blood Culture Staphylococcus aureus, MRSA     Comment: Methicillin resistant Staphylococcus aureus, Patient may be an isolation risk.  Refer to blood culture collected 9/11/2020 0116 for ALEXANDRO's        Isolated from Aerobic Bottle     Gram Stain Aerobic Bottle Gram positive cocci in clusters    COVID-19,[facility] IN-HOUSE,Nasal Swab No Transport Media 3-4 HR TAT - Swab, Nasal Cavity [[id number]]  (Normal) Collected: 09/11/20 0051    Lab Status: Final result Specimen: Swab from Nasal Cavity Updated: 09/11/20 0133     COVID19 Not Detected    Narrative:      Fact sheet for providers: https://www.fda.gov/media/031500/download     Fact sheet for patients: https://www.fda.gov/media/081399/download            Current Vancomycin Dose:  1250 mg IVPB every 12 hours    Assessment/Plan:  Will increase vancomycin to 1750 mg IV every 12 hours to target an AUC/ALEXANDRO of 400-600.  Pharmacy will continue to monitor renal function and adjust dose accordingly.    Sterling Joseph RPH   09/14/20 12:37 EDT

## 2020-09-14 NOTE — ANESTHESIA POSTPROCEDURE EVALUATION
Patient: Gopi Johnson    Procedure Summary     Date: 09/14/20 Room / Location: [facility] LAB    Anesthesia Start: 1210 Anesthesia Stop: 1238    Procedure: ADULT TRANSESOPHAGEAL ECHO (ROSE) W/ CONT IF NECESSARY PER PROTOCOL Diagnosis: (Endocarditis)    Scheduled Providers:  Provider: Jair Meza CRNA    Anesthesia Type: MAC ASA Status: 2          Anesthesia Type: MAC    Vitals  Vitals Value Taken Time   /83 09/14/20 1229   Temp     Pulse 83 09/14/20 1229   Resp 16 09/14/20 1225   SpO2 98 % 09/14/20 1229           Post Anesthesia Care and Evaluation    Patient location during evaluation: bedside  Patient participation: complete - patient participated  Level of consciousness: sleepy but conscious  Pain score: 0  Pain management: adequate  Airway patency: patent  Anesthetic complications: No anesthetic complications  PONV Status: none  Cardiovascular status: acceptable  Respiratory status: acceptable  Hydration status: acceptable    /83  HR 90   RR 12

## 2020-09-14 NOTE — PROGRESS NOTES
[facility] HOSPITALIST    PROGRESS NOTE    Name:  Gopi Johnson   Age:  45 y.o.  Sex:  male  :  1975  MRN:  [id number]   Visit Number:  [id number]  Admission Date:  9/10/2020  Date Of Service:  20  Primary Care Physician:  Juvenal Ray PA     LOS: 4 days :  Patient Care Team:  Juvenla Ray PA as PCP - General  Royal Dover MD as Consulting Physician (General Surgery):    Chief Complaint:      Follow-up of back pain and fever with MRSA Bacteremia and paraspinal abscess    Subjective / Interval History:       The patient was seen this morning.  I reviewed the prior physician notes.  The patient was awaiting blood culture results which unfortunately later today came back positive for MRSA still.  He has been unable to clear his blood cultures for a PICC line yet.  The patient denied chest pain, shortness of breath, abdominal pain.  He is mildly anxious.  No acute events occurred overnight.  A ROSE was performed today without signs of endocarditis.          per prior physician:  This is a 45-year-old male with history of hypertension, hyperlipidemia and IV drug abuse was transferred from [facility] emergency room on 9/10/2020 with back pain and fever.  Patient did have CT scans without contrast of the cervical, thoracic and lumbar spines which did not show any evidence of acute disease.  Patient was initially started on broad-spectrum IV antibiotic therapy for suspected meningitis and was transferred to [facility] ICU for further evaluation and management.  Patient did not have any hypotension.  He received IV fluids and was placed on IV antibiotic therapy with Rocephin, vancomycin and acyclovir.      MRIs of the cervical, thoracic and lumbar spine done here showed left paraspinal abscess 12 mm as well as left gluteal abscess 3.6 cm as well as possible osteomyelitis of the left sacral ala.  This was discussed with neurosurgery and orthopedic  services at [facility] and they recommended no surgical intervention but continue IV antibiotic therapy.  He states he lives with his mother and denies taking IV drugs in the recent past.  He was taking IV drugs about 4 months ago according to his mother.    The blood cultures from [facility] were also positive for MRSA bacteria.  Dr. Hernandez with general surgery had been following for his left gluteal abscess which he has recommended no surgical intervention at this time.    Review of Systems:       General ROS: Patient denies any fevers, chills or loss of consciousness. Complains of generalized weakness.  Psychological ROS: Denies any hallucinations and delusions.  Ophthalmic ROS: Denies any diplopia or transient loss of vision.  ENT ROS: Denies sore throat, nasal congestion or ear pain.   Allergy and Immunology ROS: Denies rash or itching.  Hematological and Lymphatic ROS: Denies neck swelling or easy bleeding.  Endocrine ROS: Denies any recent unintentional weight gain or loss.  Breast ROS: Denies any pain or swelling.  Respiratory ROS: Denies cough or shortness of breath.  Cardiovascular ROS: Denies chest pain or palpitations. No history of exertional chest pain.  Gastrointestinal ROS: Denies nausea and vomiting. Denies any abdominal pain. No diarrhea.  Genito-Urinary ROS: Denies dysuria or hematuria.  Musculoskeletal ROS: Improved left lateral back pain. No muscle pain. No calf pain.  Neurological ROS: Denies any focal weakness. No loss of consciousness. Denies any numbness. Denies neck pain.  Dermatological ROS: Denies any redness or pruritis.       Vital Signs:    Temp:  [97.5 °F (36.4 °C)-97.9 °F (36.6 °C)] 97.8 °F (36.6 °C)  Heart Rate:  [] 93  Resp:  [16-18] 16  BP: (120-162)/() 121/78    Intake and output:    I/O last 3 completed shifts:  In: 1450 [P.O.:1200; IV Piggyback:250]  Out: 2400 [Urine:2400]  I/O this shift:  In: 300 [I.V.:300]  Out: 300 [Urine:300]    Physical  Examination:    General Appearance:  Appears older than stated age. Sitting up in bed talking to family. Alert and cooperative, not in any acute distress.   Head:  Atraumatic and normocephalic, without obvious abnormality.   Eyes:          PERRLA, conjunctivae and sclerae normal, no Icterus. No pallor. Extraocular movements are within normal limits.   Neck: Supple, trachea midline, no thyromegaly.   Lungs:   Chest shape is normal. Breath sounds heard bilaterally equally.  No crackles or wheezing. No pleural rub or bronchial breathing.   Heart:  Normal S1 and S2, no murmur, no gallop, no rub   Abdomen:   Normal bowel sounds, no masses, no organomegaly. Soft, nontender, nondistended, no guarding, no rebound tenderness.   Extremities: Moves all extremities, no edema, no cyanosis, no clubbing.  No skin erythema or open lesions. Mild left lateral paraspinal tenderness lumbosacral without midline tenderness    Skin: No bleeding.  Tattoos noted on the skin.   Neurologic: Awake, alert and oriented times. Appropriate anxiety.     Laboratory results:    Results from last 7 days   Lab Units 09/14/20  0631 09/12/20 0621 09/11/20  0651   SODIUM mmol/L 137 137 140   POTASSIUM mmol/L 4.5 4.0 4.3   CHLORIDE mmol/L 101 103 105   CO2 mmol/L 24.3 22.6 21.4*   BUN mg/dL 17 14 16   CREATININE mg/dL 0.63* 0.54* 0.65*   CALCIUM mg/dL 9.3 8.7 8.6   BILIRUBIN mg/dL 0.3  --  0.4   ALK PHOS U/L 138*  --  203*   ALT (SGPT) U/L 35  --  50*   AST (SGOT) U/L 18  --  58*   GLUCOSE mg/dL 130* 141* 197*     Results from last 7 days   Lab Units 09/14/20  0631 09/12/20  0621 09/11/20  0651   WBC 10*3/mm3 14.46* 14.23* 16.66*   HEMOGLOBIN g/dL 12.6* 10.3* 11.6*   HEMATOCRIT % 37.6 31.1* 35.7*   PLATELETS 10*3/mm3 436 321 334         Results from last 7 days   Lab Units 09/12/20  0621   CK TOTAL U/L 25     Results from last 7 days   Lab Units 09/11/20  0116 09/11/20  0110   BLOODCX  Staphylococcus aureus, MRSA* Staphylococcus aureus, MRSA*           I  "have reviewed the patient's laboratory results.    Radiology results:    Imaging Results (Last 24 Hours)     ** No results found for the last 24 hours. **        I have reviewed the patient's radiology reports.    Medication Review:     I have reviewed the patient's active and prn medications.       Sepsis (CMS/HCC)    Abscess of paraspinal muscles    Abscess, gluteal, left    Bacteremia due to Gram-positive bacteria    Chronic midline low back pain without sciatica    Essential hypertension    Assessment:    1.  Sepsis with MRSA bacteremia, present on admission.  2.  Left paraspinal and left gluteus muscle abscess, present on admission.  3.  Suspected osteomyelitis of the left sacral ala, present on admission.  4.  Suspected infective endocarditis, present on admission.  5.  Suspected IV drug abuse.  6.  Essential hypertension.    Plan:    Repeat blood cultures were ordered again this morning. He may not yet have PICC line with recurrent positive blood cultures. I will need to consider phone ID consult likely tomorrow for further recommendations in his care. I will change antibiotic from Vancomycin to Daptomycin. Continue pain control for his back. ROSE negative for endocarditis.       Per previous physician:   \"Patient's condition and MRI findings of the lumbar spine were discussed with the neurosurgeon (Dr. Cali) as well as the orthopedic surgeon (Dr. Conner) on 9/11/2020 and they both recommended no surgical intervention at this time.  They recommended continuation of IV antibiotic therapy for 6 weeks and repeat imaging depending upon clinical condition.  They recommend, if the abscesses get worse to have interventional radiology drain them.    Patient lives with his mother and may be able to go home in the next 3 to 4 days with outpatient antibiotic therapy for 6 weeks.  He may need repeat imaging of his back if he does not improve with regards to his back pain and infection.\"    Further recommendations " depend on the clinical course.      Valeria Eaton DO  09/14/20  18:31 EDT    Dictated utilizing Dragon dictation.

## 2020-09-14 NOTE — PROGRESS NOTES
LOS: 4 days   Patient Care Team:  Juvenal Ray PA as PCP - General  Royal Dover MD as Consulting Physician (General Surgery)      Chief Complaint: Paraspinous abscess      Interval History: Patient with little change, undergoing ROSE today.  No further pain discomfort in the gluteal region.  No fever or chills.  On appropriate antibiotics.    Patient Complaints: None    History taken from: patient    Vital Signs  Temp:  [97.5 °F (36.4 °C)-98.2 °F (36.8 °C)] 97.5 °F (36.4 °C)  Heart Rate:  [68-77] 68  Resp:  [16-18] 18  BP: (131-166)/() 154/96    Physical Exam:     General Appearance:    Alert, cooperative, in no acute distress   Head:    Normocephalic, without obvious abnormality, atraumatic   Lungs:     Clear to auscultation,respirations regular, even and                  unlabored    Heart:    Regular rhythm and normal rate, normal S1 and S2, no            murmur, no gallop, no rub, no click   Abdomen:     Normal bowel sounds, no masses, no organomegaly, soft        non-tender, non-distended, no guarding, no rebound                tenderness   Extremities:   Moves all extremities well, no edema, no cyanosis, no             redness   Pulses:   Pulses palpable and equal bilaterally   Skin:   No bleeding, bruising or rash        Results Review:       Lab Results (last 24 hours)     Procedure Component Value Units Date/Time    Comprehensive Metabolic Panel [[id number]]  (Abnormal) Collected: 09/14/20 0631    Specimen: Blood Updated: 09/14/20 0711     Glucose 130 mg/dL      BUN 17 mg/dL      Creatinine 0.63 mg/dL      Sodium 137 mmol/L      Potassium 4.5 mmol/L      Chloride 101 mmol/L      CO2 24.3 mmol/L      Calcium 9.3 mg/dL      Total Protein 7.0 g/dL      Albumin 3.10 g/dL      ALT (SGPT) 35 U/L      AST (SGOT) 18 U/L      Alkaline Phosphatase 138 U/L      Total Bilirubin 0.3 mg/dL      eGFR Non African Amer 138 mL/min/1.73      Globulin 3.9 gm/dL      A/G Ratio 0.8 g/dL      BUN/Creatinine  Ratio 27.0     Anion Gap 11.7 mmol/L     Narrative:      GFR Normal >60  Chronic Kidney Disease <60  Kidney Failure <15      Vancomycin, Random [[id number]]  (Normal) Collected: 09/14/20 0631    Specimen: Blood Updated: 09/14/20 0710     Vancomycin Random 7.40 mcg/mL     CBC (No Diff) [[id number]]  (Abnormal) Collected: 09/14/20 0631    Specimen: Blood Updated: 09/14/20 0648     WBC 14.46 10*3/mm3      RBC 4.42 10*6/mm3      Hemoglobin 12.6 g/dL      Hematocrit 37.6 %      MCV 85.1 fL      MCH 28.5 pg      MCHC 33.5 g/dL      RDW 15.1 %      RDW-SD 46.9 fl      MPV 9.5 fL      Platelets 436 10*3/mm3     Blood Culture With BERNIE - Blood, Arm, Left [[id number]] Collected: 09/14/20 0637    Specimen: Blood from Arm, Left Updated: 09/14/20 0647    Blood Culture With BERNIE - Blood, Arm, Right [[id number]] Collected: 09/14/20 0631    Specimen: Blood from Arm, Right Updated: 09/14/20 0647              Assessment/Plan       Sepsis (CMS/HCC)    Abscess of paraspinal muscles    Abscess, gluteal, left    Bacteremia due to Gram-positive bacteria    Chronic midline low back pain without sciatica    Essential hypertension      Paraspinous abscess extending to the gluteal region.  Recommendation by orthopedics and neurosurgery are for observation IV antibiotics.  Plan for PICC line and IV antibiotics for 6 weeks.  No need for further surgical intervention at this time.  Will sign off care.      Isidra Hernandez MD  09/14/20  07:23 EDT

## 2020-09-14 NOTE — PLAN OF CARE
Goal Outcome Evaluation:  Plan of Care Reviewed With: patient  Progress: no change  VSS.  Back pain controlled w/pain medication.  Pt. Had ROSE done and tolerated procedure well.  Will monitor.

## 2020-09-15 LAB
ANION GAP SERPL CALCULATED.3IONS-SCNC: 11.5 MMOL/L (ref 5–15)
BASOPHILS # BLD AUTO: 0.14 10*3/MM3 (ref 0–0.2)
BASOPHILS NFR BLD AUTO: 0.9 % (ref 0–1.5)
BUN SERPL-MCNC: 18 MG/DL (ref 6–20)
BUN/CREAT SERPL: 28.6 (ref 7–25)
CALCIUM SPEC-SCNC: 9.2 MG/DL (ref 8.6–10.5)
CHLORIDE SERPL-SCNC: 100 MMOL/L (ref 98–107)
CK SERPL-CCNC: 12 U/L (ref 20–200)
CO2 SERPL-SCNC: 23.5 MMOL/L (ref 22–29)
CREAT SERPL-MCNC: 0.63 MG/DL (ref 0.76–1.27)
DEPRECATED RDW RBC AUTO: 46.5 FL (ref 37–54)
EOSINOPHIL # BLD AUTO: 0.01 10*3/MM3 (ref 0–0.4)
EOSINOPHIL NFR BLD AUTO: 0.1 % (ref 0.3–6.2)
ERYTHROCYTE [DISTWIDTH] IN BLOOD BY AUTOMATED COUNT: 15.2 % (ref 12.3–15.4)
GFR SERPL CREATININE-BSD FRML MDRD: 138 ML/MIN/1.73
GLUCOSE SERPL-MCNC: 141 MG/DL (ref 65–99)
HCT VFR BLD AUTO: 41.6 % (ref 37.5–51)
HGB BLD-MCNC: 13.4 G/DL (ref 13–17.7)
IMM GRANULOCYTES # BLD AUTO: 1.23 10*3/MM3 (ref 0–0.05)
IMM GRANULOCYTES NFR BLD AUTO: 7.5 % (ref 0–0.5)
LYMPHOCYTES # BLD AUTO: 1.48 10*3/MM3 (ref 0.7–3.1)
LYMPHOCYTES NFR BLD AUTO: 9 % (ref 19.6–45.3)
MCH RBC QN AUTO: 27.6 PG (ref 26.6–33)
MCHC RBC AUTO-ENTMCNC: 32.2 G/DL (ref 31.5–35.7)
MCV RBC AUTO: 85.8 FL (ref 79–97)
MONOCYTES # BLD AUTO: 0.67 10*3/MM3 (ref 0.1–0.9)
MONOCYTES NFR BLD AUTO: 4.1 % (ref 5–12)
NEUTROPHILS NFR BLD AUTO: 12.86 10*3/MM3 (ref 1.7–7)
NEUTROPHILS NFR BLD AUTO: 78.4 % (ref 42.7–76)
NRBC BLD AUTO-RTO: 0 /100 WBC (ref 0–0.2)
PLATELET # BLD AUTO: 490 10*3/MM3 (ref 140–450)
PMV BLD AUTO: 9.5 FL (ref 6–12)
POTASSIUM SERPL-SCNC: 4.7 MMOL/L (ref 3.5–5.2)
PROCALCITONIN SERPL-MCNC: 0.06 NG/ML (ref 0–0.25)
RBC # BLD AUTO: 4.85 10*6/MM3 (ref 4.14–5.8)
RBC MORPH BLD: NORMAL
SMALL PLATELETS BLD QL SMEAR: NORMAL
SODIUM SERPL-SCNC: 135 MMOL/L (ref 136–145)
WBC # BLD AUTO: 16.39 10*3/MM3 (ref 3.4–10.8)
WBC MORPH BLD: NORMAL

## 2020-09-15 PROCEDURE — 99232 SBSQ HOSP IP/OBS MODERATE 35: CPT | Performed by: FAMILY MEDICINE

## 2020-09-15 PROCEDURE — 25010000002 ENOXAPARIN PER 10 MG: Performed by: INTERNAL MEDICINE

## 2020-09-15 PROCEDURE — 82550 ASSAY OF CK (CPK): CPT | Performed by: FAMILY MEDICINE

## 2020-09-15 PROCEDURE — 25010000002 VANCOMYCIN 5 G RECONSTITUTED SOLUTION 5,000 MG VIAL: Performed by: FAMILY MEDICINE

## 2020-09-15 PROCEDURE — 25010000002 MORPHINE PER 10 MG: Performed by: INTERNAL MEDICINE

## 2020-09-15 PROCEDURE — 25010000002 DEXAMETHASONE PER 1 MG: Performed by: FAMILY MEDICINE

## 2020-09-15 PROCEDURE — 25010000002 DEXAMETHASONE PER 1 MG: Performed by: INTERNAL MEDICINE

## 2020-09-15 PROCEDURE — 85007 BL SMEAR W/DIFF WBC COUNT: CPT | Performed by: FAMILY MEDICINE

## 2020-09-15 PROCEDURE — 80048 BASIC METABOLIC PNL TOTAL CA: CPT | Performed by: FAMILY MEDICINE

## 2020-09-15 PROCEDURE — 84145 PROCALCITONIN (PCT): CPT | Performed by: FAMILY MEDICINE

## 2020-09-15 PROCEDURE — 85025 COMPLETE CBC W/AUTO DIFF WBC: CPT | Performed by: FAMILY MEDICINE

## 2020-09-15 RX ORDER — LIDOCAINE 50 MG/G
1 PATCH TOPICAL EVERY 24 HOURS
Status: DISCONTINUED | OUTPATIENT
Start: 2020-09-15 | End: 2020-09-17 | Stop reason: HOSPADM

## 2020-09-15 RX ORDER — DEXAMETHASONE SODIUM PHOSPHATE 4 MG/ML
4 INJECTION, SOLUTION INTRA-ARTICULAR; INTRALESIONAL; INTRAMUSCULAR; INTRAVENOUS; SOFT TISSUE EVERY 8 HOURS
Status: DISCONTINUED | OUTPATIENT
Start: 2020-09-15 | End: 2020-09-16

## 2020-09-15 RX ADMIN — NICOTINE 1 PATCH: 21 PATCH TRANSDERMAL at 09:26

## 2020-09-15 RX ADMIN — MORPHINE SULFATE 4 MG: 4 INJECTION, SOLUTION INTRAMUSCULAR; INTRAVENOUS at 13:38

## 2020-09-15 RX ADMIN — CETIRIZINE HYDROCHLORIDE 10 MG: 10 TABLET, FILM COATED ORAL at 09:25

## 2020-09-15 RX ADMIN — SODIUM CHLORIDE, PRESERVATIVE FREE 10 ML: 5 INJECTION INTRAVENOUS at 09:26

## 2020-09-15 RX ADMIN — DEXAMETHASONE SODIUM PHOSPHATE 4 MG: 4 INJECTION, SOLUTION INTRAMUSCULAR; INTRAVENOUS at 16:49

## 2020-09-15 RX ADMIN — DEXAMETHASONE SODIUM PHOSPHATE 4 MG: 4 INJECTION, SOLUTION INTRA-ARTICULAR; INTRALESIONAL; INTRAMUSCULAR; INTRAVENOUS; SOFT TISSUE at 09:25

## 2020-09-15 RX ADMIN — HYDROCODONE BITARTRATE AND ACETAMINOPHEN 1 TABLET: 7.5; 325 TABLET ORAL at 09:37

## 2020-09-15 RX ADMIN — MORPHINE SULFATE 4 MG: 4 INJECTION, SOLUTION INTRAMUSCULAR; INTRAVENOUS at 22:12

## 2020-09-15 RX ADMIN — HYDROCODONE BITARTRATE AND ACETAMINOPHEN 1 TABLET: 7.5; 325 TABLET ORAL at 16:49

## 2020-09-15 RX ADMIN — Medication 1 CAPSULE: at 09:26

## 2020-09-15 RX ADMIN — VENLAFAXINE HYDROCHLORIDE 150 MG: 150 CAPSULE, EXTENDED RELEASE ORAL at 09:25

## 2020-09-15 RX ADMIN — HYDROCODONE BITARTRATE AND ACETAMINOPHEN 1 TABLET: 7.5; 325 TABLET ORAL at 04:07

## 2020-09-15 RX ADMIN — SODIUM CHLORIDE, PRESERVATIVE FREE 10 ML: 5 INJECTION INTRAVENOUS at 20:34

## 2020-09-15 RX ADMIN — VANCOMYCIN HYDROCHLORIDE 1750 MG: 500 INJECTION, POWDER, LYOPHILIZED, FOR SOLUTION INTRAVENOUS at 19:29

## 2020-09-15 RX ADMIN — TOPIRAMATE 50 MG: 25 TABLET, FILM COATED ORAL at 09:25

## 2020-09-15 RX ADMIN — LIDOCAINE 1 PATCH: 50 PATCH CUTANEOUS at 15:12

## 2020-09-15 RX ADMIN — DEXAMETHASONE SODIUM PHOSPHATE 4 MG: 4 INJECTION, SOLUTION INTRA-ARTICULAR; INTRALESIONAL; INTRAMUSCULAR; INTRAVENOUS; SOFT TISSUE at 02:00

## 2020-09-15 RX ADMIN — Medication 1 CAPSULE: at 20:32

## 2020-09-15 RX ADMIN — ATORVASTATIN CALCIUM 20 MG: 20 TABLET, FILM COATED ORAL at 20:32

## 2020-09-15 RX ADMIN — ENOXAPARIN SODIUM 40 MG: 40 INJECTION SUBCUTANEOUS at 02:00

## 2020-09-15 RX ADMIN — PANTOPRAZOLE SODIUM 40 MG: 40 TABLET, DELAYED RELEASE ORAL at 06:50

## 2020-09-15 NOTE — PROGRESS NOTES
"Pharmacokinetic Initial Note - Vancomycin    Gopi Johnson is a 45 y.o. male  182.9 cm (72\") 98 kg (216 lb 1.6 oz)    Indication for use: Bacteremia, SSTI    Results from last 7 days   Lab Units 09/15/20  0526 09/14/20  0631 09/12/20  0621   WBC 10*3/mm3 16.39* 14.46* 14.23*   CREATININE mg/dL 0.63* 0.63* 0.54*      Estimated Creatinine Clearance: 179.7 mL/min (A) (by C-G formula based on SCr of 0.63 mg/dL (L)).  Temp Readings from Last 1 Encounters:   09/15/20 98.1 °F (36.7 °C) (Oral)       Culture results  Microbiology Results (last 10 days)       Procedure Component Value - Date/Time    Blood Culture With BERNIE - Blood, Arm, Left [[id number]]  (Abnormal) Collected: 09/14/20 0637    Lab Status: Preliminary result Specimen: Blood from Arm, Left Updated: 09/15/20 1107     Blood Culture Abnormal Stain     Gram Stain Anaerobic Bottle Gram positive cocci in clusters    Blood Culture With BERNIE - Blood, Arm, Right [[id number]] Collected: 09/14/20 0631    Lab Status: Preliminary result Specimen: Blood from Arm, Right Updated: 09/15/20 0700     Blood Culture No growth at 24 hours    MRSA Screen, PCR (Inpatient) - Swab, Nares [[id number]]  (Normal) Collected: 09/11/20 1515    Lab Status: Final result Specimen: Swab from Nares Updated: 09/12/20 0513     MRSA PCR No MRSA Detected    Blood Culture With BERNIE - Blood, Hand, Right [[id number]]  (Abnormal)  (Susceptibility) Collected: 09/11/20 0116    Lab Status: Final result Specimen: Blood from Hand, Right Updated: 09/13/20 0622     Blood Culture Staphylococcus aureus, MRSA     Comment: Methicillin resistant Staphylococcus aureus, Patient may be an isolation risk.  Infectious disease consultation is highly recommended to rule out distant foci of infection.        Isolated from Aerobic Bottle     Gram Stain Aerobic Bottle Gram positive cocci in clusters    Susceptibility        Staphylococcus aureus, MRSA     ALEXANDRO     Gentamicin Susceptible     Oxacillin Resistant     Rifampin Susceptible "     Vancomycin Susceptible                  Susceptibility Comments       Staphylococcus aureus, MRSA    This isolate does not demonstrate inducible clindamycin resistance in vitro.                 Blood Culture ID, PCR - Blood, Hand, Right [[id number]]  (Abnormal) Collected: 09/11/20 0116    Lab Status: Final result Specimen: Blood from Hand, Right Updated: 09/11/20 1959     BCID, PCR Staphylococcus aureus. mecA (methicillin resistance gene) detected. Identification by BCID PCR.    Blood Culture With BERNIE - Blood, Arm, Right [[id number]]  (Abnormal) Collected: 09/11/20 0110    Lab Status: Final result Specimen: Blood from Arm, Right Updated: 09/13/20 0622     Blood Culture Staphylococcus aureus, MRSA     Comment: Methicillin resistant Staphylococcus aureus, Patient may be an isolation risk.  Refer to blood culture collected 9/11/2020 0116 for ALEXANDRO's        Isolated from Aerobic Bottle     Gram Stain Aerobic Bottle Gram positive cocci in clusters    COVID-19,[facility] IN-HOUSE,Nasal Swab No Transport Media 3-4 HR TAT - Swab, Nasal Cavity [[id number]]  (Normal) Collected: 09/11/20 0051    Lab Status: Final result Specimen: Swab from Nasal Cavity Updated: 09/11/20 0133     COVID19 Not Detected    Narrative:      Fact sheet for providers: https://www.fda.gov/media/626129/download     Fact sheet for patients: https://www.fda.gov/media/258641/download            Assessment/Plan  Initiated Vancomycin IV 1750 mg Q12H for a target AUC of 400-600 per [employer] Rx calculator:  Loading dose: N/A  Regimen: 1750 mg every 12 hours for 14 doses.  Start time: 18:36 on 09/15/2020  Exposure target: AUC24 (range)400-600 mg/L.hr  AUC24,ss: 514 mg/L.hr  PAUC*: 92 %  Ctrough,ss: 13.5 mg/L  Pconc*: 5 %     Will order Vancomycin random level at 1400 on 9/16/20.  Pharmacy will monitor renal function and adjust dose accordingly.    Thank you,  Yu Camargo [facility]  09/15/20 17:46 EDT

## 2020-09-15 NOTE — PROGRESS NOTES
"      [facility] HOSPITALIST    PROGRESS NOTE    Name:  Gopi Johnson   Age:  45 y.o.  Sex:  male  :  1975  MRN:  [id number]   Visit Number:  [id number]  Admission Date:  9/10/2020  Date Of Service:  09/15/20  Primary Care Physician:  Juvenal Ray PA     LOS: 5 days :  Patient Care Team:  Juvenal Ray PA as PCP - General  Royal Dover MD as Consulting Physician (General Surgery):    Chief Complaint:      Follow-up of back pain and fever with MRSA Bacteremia and paraspinal abscess    Subjective / Interval History:       The patient was seen again today.  This morning, his blood culture result was still negative.  He cannot have PICC line placement until his cultures remain negative for 48 hours.  He has had recurrent persistent MRSA bacteremia with paraspinal abscess and osteomyelitis.  No acute events occurred overnight.  He denies chest pain, shortness of breath, abdominal pain.  His left gluteal pain has significantly improved.     per Dr Mohamud:  \"This is a 45-year-old male with history of hypertension, hyperlipidemia and IV drug abuse was transferred from [facility] emergency room on 9/10/2020 with back pain and fever.  Patient did have CT scans without contrast of the cervical, thoracic and lumbar spines which did not show any evidence of acute disease.  Patient was initially started on broad-spectrum IV antibiotic therapy for suspected meningitis and was transferred to [facility] ICU for further evaluation and management.  Patient did not have any hypotension.  He received IV fluids and was placed on IV antibiotic therapy with Rocephin, vancomycin and acyclovir.      MRIs of the cervical, thoracic and lumbar spine done here showed left paraspinal abscess 12 mm as well as left gluteal abscess 3.6 cm as well as possible osteomyelitis of the left sacral ala.  This was discussed with neurosurgery and orthopedic services at [facility] and they " recommended no surgical intervention but continue IV antibiotic therapy.  He states he lives with his mother and denies taking IV drugs in the recent past.  He was taking IV drugs about 4 months ago according to his mother.    The blood cultures from [facility] were also positive for MRSA bacteria.  Dr. Hernandez with general surgery had been following for his left gluteal abscess which he has recommended no surgical intervention at this time.    Review of Systems:       General ROS: Patient denies any fevers, chills or loss of consciousness. Improved generalized weakness.  Psychological ROS: Denies any hallucinations and delusions.  Ophthalmic ROS: Denies any diplopia or transient loss of vision.  ENT ROS: Denies sore throat, nasal congestion or ear pain.   Allergy and Immunology ROS: Denies rash or itching.  Hematological and Lymphatic ROS: Denies neck swelling or easy bleeding.  Endocrine ROS: Denies any recent unintentional weight gain or loss.  Breast ROS: Denies any pain or swelling.  Respiratory ROS: Denies cough or shortness of breath.  Cardiovascular ROS: Denies chest pain or palpitations. No history of exertional chest pain.  Gastrointestinal ROS: Denies nausea and vomiting. Denies any abdominal pain. No diarrhea.  Genito-Urinary ROS: Denies dysuria or hematuria.  Musculoskeletal ROS: Improving left lateral gluteal pain. No muscle pain. No calf pain.  Neurological ROS: Denies any focal weakness. No loss of consciousness. Denies any numbness. Denies neck pain.  Dermatological ROS: Denies any redness or pruritis.       Vital Signs:    Temp:  [97.8 °F (36.6 °C)-98.6 °F (37 °C)] 98.1 °F (36.7 °C)  Heart Rate:  [75-96] 83  Resp:  [16-18] 16  BP: (121-151)/(79-98) 121/79    Intake and output:    I/O last 3 completed shifts:  In: 910 [P.O.:360; I.V.:300; IV Piggyback:250]  Out: 2750 [Urine:2750]  I/O this shift:  In: 840 [P.O.:840]  Out: 500 [Urine:500]    Physical Examination: examined again  today    General Appearance:  Appears older than stated age. Sitting up in bed talking to family. Alert and cooperative, not in any acute distress.   Head:  Atraumatic and normocephalic, without obvious abnormality.   Eyes:          PERRLA, conjunctivae and sclerae normal, no Icterus. No pallor. Extraocular movements are within normal limits.   Neck: Supple, trachea midline, no thyromegaly.   Lungs:   Chest shape is normal. Breath sounds heard bilaterally equally.  No crackles or wheezing. No pleural rub or bronchial breathing.   Heart:  Normal S1 and S2, no murmur, no gallop, no rub   Abdomen:   Normal bowel sounds, no masses, no organomegaly. Soft, nontender, nondistended, no guarding, no rebound tenderness.   Extremities: Moves all extremities, no edema, no cyanosis, no clubbing.  No skin erythema or open lesions. Mild left lateral paraspinal tenderness lumbosacral without midline tenderness    Skin: No bleeding.  Tattoos noted on the skin.   Neurologic: Awake, alert and oriented times. Appropriate anxiety.     Laboratory results:    Results from last 7 days   Lab Units 09/15/20  0526 09/14/20  0631 09/12/20  0621 09/11/20  0651   SODIUM mmol/L 135* 137 137 140   POTASSIUM mmol/L 4.7 4.5 4.0 4.3   CHLORIDE mmol/L 100 101 103 105   CO2 mmol/L 23.5 24.3 22.6 21.4*   BUN mg/dL 18 17 14 16   CREATININE mg/dL 0.63* 0.63* 0.54* 0.65*   CALCIUM mg/dL 9.2 9.3 8.7 8.6   BILIRUBIN mg/dL  --  0.3  --  0.4   ALK PHOS U/L  --  138*  --  203*   ALT (SGPT) U/L  --  35  --  50*   AST (SGOT) U/L  --  18  --  58*   GLUCOSE mg/dL 141* 130* 141* 197*     Results from last 7 days   Lab Units 09/15/20  0526 09/14/20  0631 09/12/20  0621   WBC 10*3/mm3 16.39* 14.46* 14.23*   HEMOGLOBIN g/dL 13.4 12.6* 10.3*   HEMATOCRIT % 41.6 37.6 31.1*   PLATELETS 10*3/mm3 490* 436 321         Results from last 7 days   Lab Units 09/15/20  0526 09/12/20  0621   CK TOTAL U/L 12* 25     Results from last 7 days   Lab Units 09/14/20  0637 09/14/20  0631  "09/11/20  0116 09/11/20  0110   BLOODCX  Abnormal Stain* No growth at 24 hours Staphylococcus aureus, MRSA* Staphylococcus aureus, MRSA*           I have reviewed the patient's laboratory results.    Radiology results:    Imaging Results (Last 24 Hours)     ** No results found for the last 24 hours. **        I have reviewed the patient's radiology reports.    Medication Review:     I have reviewed the patient's active and prn medications.       Sepsis (CMS/HCC)    Abscess of paraspinal muscles    Abscess, gluteal, left    Bacteremia due to Gram-positive bacteria    Chronic midline low back pain without sciatica    Essential hypertension    Assessment:    1.  Sepsis with MRSA bacteremia, present on admission.  2.  Left paraspinal and left gluteus muscle abscess, present on admission.  3.  Suspected osteomyelitis of the left sacral ala, present on admission.  4.  Suspected infective endocarditis, present on admission.  5.  Suspected IV drug abuse.  6.  Essential hypertension.    Plan:    Follow cultures and he may have PICC line placed once blood cultures remain negative 48 hrs. He is continued on Daptomycin for now. ROSE was negative for endocarditis. He continues inpatient stay requiring iv antibiotic without long term iv access.     Per Dr Mohamud:   \"Patient's condition and MRI findings of the lumbar spine were discussed with the neurosurgeon (Dr. Cali) as well as the orthopedic surgeon (Dr. Conner) on 9/11/2020 and they both recommended no surgical intervention at this time.  They recommended continuation of IV antibiotic therapy for 6 weeks and repeat imaging depending upon clinical condition.  They recommend, if the abscesses get worse to have interventional radiology drain them.    Patient lives with his mother and may be able to go home in the next 3 to 4 days with outpatient antibiotic therapy for 6 weeks.  He may need repeat imaging of his back if he does not improve with regards to his back pain and " "infection.\"    Further recommendations depend on the clinical course. Repeat labs in CHRIS Eaton DO  09/15/20  17:23 EDT    Dictated utilizing Dragon dictation.    "

## 2020-09-15 NOTE — THERAPY EVALUATION
Attempted PT eval. Per patient, he is performing bed mobility, sit to stand, transfers and ambulation in his room without difficulty. No skilled PT needs identified for this patient so order will be D/C'ed.

## 2020-09-15 NOTE — PLAN OF CARE
Goal Outcome Evaluation:  Plan of Care Reviewed With: patient  Progress: no change  Outcome Summary: Patient received pain medication as needed. no overnight events to report. Vitals have remained stable and will continue to monitor. Waiting for blood cultures to be negative to proceed with a picc line.

## 2020-09-16 ENCOUNTER — APPOINTMENT (OUTPATIENT)
Dept: MRI IMAGING | Facility: HOSPITAL | Age: 45
End: 2020-09-16

## 2020-09-16 LAB
BACTERIA SPEC AEROBE CULT: ABNORMAL
GRAM STN SPEC: ABNORMAL
ISOLATED FROM: ABNORMAL

## 2020-09-16 PROCEDURE — 72197 MRI PELVIS W/O & W/DYE: CPT

## 2020-09-16 PROCEDURE — 25010000002 MORPHINE SULFATE (PF) 2 MG/ML SOLUTION: Performed by: FAMILY MEDICINE

## 2020-09-16 PROCEDURE — 25010000002 ENOXAPARIN PER 10 MG: Performed by: INTERNAL MEDICINE

## 2020-09-16 PROCEDURE — 25010000002 DAPTOMYCIN PER 1 MG: Performed by: FAMILY MEDICINE

## 2020-09-16 PROCEDURE — 25010000002 VANCOMYCIN 5 G RECONSTITUTED SOLUTION 5,000 MG VIAL: Performed by: FAMILY MEDICINE

## 2020-09-16 PROCEDURE — 25010000002 DEXAMETHASONE PER 1 MG: Performed by: FAMILY MEDICINE

## 2020-09-16 PROCEDURE — 63710000001 DEXAMETHASONE PER 0.25 MG: Performed by: FAMILY MEDICINE

## 2020-09-16 PROCEDURE — A9577 INJ MULTIHANCE: HCPCS | Performed by: FAMILY MEDICINE

## 2020-09-16 PROCEDURE — 0 GADOBENATE DIMEGLUMINE 529 MG/ML SOLUTION: Performed by: FAMILY MEDICINE

## 2020-09-16 PROCEDURE — 99232 SBSQ HOSP IP/OBS MODERATE 35: CPT | Performed by: FAMILY MEDICINE

## 2020-09-16 RX ORDER — TESTOSTERONE CYPIONATE 200 MG/ML
1 VIAL (ML) INTRAMUSCULAR
COMMUNITY
End: 2020-09-17 | Stop reason: HOSPADM

## 2020-09-16 RX ORDER — PREDNISONE 20 MG/1
60 TABLET ORAL DAILY
COMMUNITY
End: 2020-09-17 | Stop reason: HOSPADM

## 2020-09-16 RX ORDER — DEXAMETHASONE 4 MG/1
4 TABLET ORAL EVERY 12 HOURS SCHEDULED
Start: 2020-09-16 | End: 2020-10-01

## 2020-09-16 RX ORDER — VENLAFAXINE HYDROCHLORIDE 150 MG/1
150 CAPSULE, EXTENDED RELEASE ORAL DAILY
Start: 2020-09-17 | End: 2022-05-05

## 2020-09-16 RX ORDER — LIDOCAINE 50 MG/G
1 PATCH TOPICAL EVERY 24 HOURS
Start: 2020-09-17

## 2020-09-16 RX ORDER — TRAMADOL HYDROCHLORIDE 50 MG/1
50 TABLET ORAL EVERY 6 HOURS PRN
COMMUNITY
End: 2020-09-17 | Stop reason: HOSPADM

## 2020-09-16 RX ORDER — NICOTINE 21 MG/24HR
1 PATCH, TRANSDERMAL 24 HOURS TRANSDERMAL
Start: 2020-09-17 | End: 2021-11-19 | Stop reason: DRUGHIGH

## 2020-09-16 RX ORDER — ETODOLAC 400 MG/1
400 TABLET, FILM COATED ORAL 2 TIMES DAILY
COMMUNITY
End: 2020-09-17 | Stop reason: HOSPADM

## 2020-09-16 RX ORDER — BACLOFEN 20 MG/1
20 TABLET ORAL 3 TIMES DAILY
COMMUNITY
End: 2020-09-17 | Stop reason: HOSPADM

## 2020-09-16 RX ORDER — TOPIRAMATE 50 MG/1
50 TABLET, FILM COATED ORAL DAILY
Start: 2020-09-17 | End: 2021-06-28

## 2020-09-16 RX ORDER — PANTOPRAZOLE SODIUM 40 MG/1
40 TABLET, DELAYED RELEASE ORAL EVERY MORNING
Start: 2020-09-17

## 2020-09-16 RX ORDER — LORATADINE 10 MG/1
10 TABLET ORAL DAILY
COMMUNITY
End: 2020-09-17 | Stop reason: HOSPADM

## 2020-09-16 RX ORDER — ONDANSETRON 4 MG/1
4 TABLET, FILM COATED ORAL EVERY 6 HOURS PRN
Start: 2020-09-16 | End: 2022-06-01

## 2020-09-16 RX ORDER — HYDROCODONE BITARTRATE AND ACETAMINOPHEN 7.5; 325 MG/1; MG/1
1 TABLET ORAL EVERY 4 HOURS PRN
Status: ON HOLD
Start: 2020-09-16 | End: 2021-05-26

## 2020-09-16 RX ORDER — HYDRALAZINE HYDROCHLORIDE 20 MG/ML
10 INJECTION INTRAMUSCULAR; INTRAVENOUS EVERY 4 HOURS PRN
Start: 2020-09-16 | End: 2020-10-01

## 2020-09-16 RX ORDER — LAMOTRIGINE 25 MG/1
25 TABLET ORAL DAILY
COMMUNITY
End: 2020-09-17 | Stop reason: HOSPADM

## 2020-09-16 RX ORDER — HYDROCODONE BITARTRATE AND ACETAMINOPHEN 5; 325 MG/1; MG/1
1 TABLET ORAL EVERY 4 HOURS PRN
COMMUNITY
End: 2020-09-17 | Stop reason: HOSPADM

## 2020-09-16 RX ORDER — CETIRIZINE HYDROCHLORIDE 10 MG/1
10 TABLET ORAL DAILY
Start: 2020-09-17 | End: 2022-10-26

## 2020-09-16 RX ORDER — TRAZODONE HYDROCHLORIDE 50 MG/1
50 TABLET ORAL NIGHTLY
COMMUNITY
End: 2020-09-17 | Stop reason: HOSPADM

## 2020-09-16 RX ORDER — CALCIUM CARBONATE 200(500)MG
2 TABLET,CHEWABLE ORAL 2 TIMES DAILY PRN
Start: 2020-09-16 | End: 2020-10-01

## 2020-09-16 RX ORDER — ACETAMINOPHEN 325 MG/1
650 TABLET ORAL EVERY 4 HOURS PRN
Start: 2020-09-16 | End: 2020-10-01

## 2020-09-16 RX ORDER — IPRATROPIUM BROMIDE AND ALBUTEROL SULFATE 2.5; .5 MG/3ML; MG/3ML
3 SOLUTION RESPIRATORY (INHALATION) EVERY 6 HOURS PRN
Qty: 360 ML
Start: 2020-09-16 | End: 2021-11-19 | Stop reason: DRUGHIGH

## 2020-09-16 RX ORDER — L.ACID,PARA/B.BIFIDUM/S.THERM 8B CELL
1 CAPSULE ORAL 2 TIMES DAILY
Start: 2020-09-16 | End: 2021-11-19 | Stop reason: DRUGHIGH

## 2020-09-16 RX ORDER — DEXAMETHASONE 4 MG/1
4 TABLET ORAL EVERY 12 HOURS SCHEDULED
Status: DISCONTINUED | OUTPATIENT
Start: 2020-09-16 | End: 2020-09-17 | Stop reason: HOSPADM

## 2020-09-16 RX ORDER — HYDROCODONE BITARTRATE AND ACETAMINOPHEN 7.5; 325 MG/1; MG/1
1 TABLET ORAL EVERY 4 HOURS PRN
Status: DISCONTINUED | OUTPATIENT
Start: 2020-09-16 | End: 2020-09-17 | Stop reason: HOSPADM

## 2020-09-16 RX ORDER — MORPHINE SULFATE 2 MG/ML
2 INJECTION, SOLUTION INTRAMUSCULAR; INTRAVENOUS EVERY 4 HOURS PRN
Status: DISCONTINUED | OUTPATIENT
Start: 2020-09-16 | End: 2020-09-17 | Stop reason: HOSPADM

## 2020-09-16 RX ADMIN — HYDROCODONE BITARTRATE AND ACETAMINOPHEN 1 TABLET: 7.5; 325 TABLET ORAL at 17:51

## 2020-09-16 RX ADMIN — DEXAMETHASONE 4 MG: 4 TABLET ORAL at 20:11

## 2020-09-16 RX ADMIN — MORPHINE SULFATE 2 MG: 2 INJECTION, SOLUTION INTRAMUSCULAR; INTRAVENOUS at 20:12

## 2020-09-16 RX ADMIN — VANCOMYCIN HYDROCHLORIDE 1750 MG: 500 INJECTION, POWDER, LYOPHILIZED, FOR SOLUTION INTRAVENOUS at 05:38

## 2020-09-16 RX ADMIN — HYDROCODONE BITARTRATE AND ACETAMINOPHEN 1 TABLET: 7.5; 325 TABLET ORAL at 03:41

## 2020-09-16 RX ADMIN — CETIRIZINE HYDROCHLORIDE 10 MG: 10 TABLET, FILM COATED ORAL at 08:51

## 2020-09-16 RX ADMIN — HYDROCODONE BITARTRATE AND ACETAMINOPHEN 1 TABLET: 7.5; 325 TABLET ORAL at 22:48

## 2020-09-16 RX ADMIN — DAPTOMYCIN 700 MG: 500 INJECTION, POWDER, LYOPHILIZED, FOR SOLUTION INTRAVENOUS at 11:47

## 2020-09-16 RX ADMIN — HYDROCODONE BITARTRATE AND ACETAMINOPHEN 1 TABLET: 7.5; 325 TABLET ORAL at 13:34

## 2020-09-16 RX ADMIN — PANTOPRAZOLE SODIUM 40 MG: 40 TABLET, DELAYED RELEASE ORAL at 06:00

## 2020-09-16 RX ADMIN — DEXAMETHASONE 4 MG: 4 TABLET ORAL at 08:52

## 2020-09-16 RX ADMIN — TOPIRAMATE 50 MG: 25 TABLET, FILM COATED ORAL at 08:51

## 2020-09-16 RX ADMIN — VENLAFAXINE HYDROCHLORIDE 150 MG: 150 CAPSULE, EXTENDED RELEASE ORAL at 08:52

## 2020-09-16 RX ADMIN — Medication 1 CAPSULE: at 08:52

## 2020-09-16 RX ADMIN — Medication 1 CAPSULE: at 20:11

## 2020-09-16 RX ADMIN — HYDROCODONE BITARTRATE AND ACETAMINOPHEN 1 TABLET: 7.5; 325 TABLET ORAL at 09:41

## 2020-09-16 RX ADMIN — NICOTINE 1 PATCH: 21 PATCH TRANSDERMAL at 08:53

## 2020-09-16 RX ADMIN — ENOXAPARIN SODIUM 40 MG: 40 INJECTION SUBCUTANEOUS at 02:05

## 2020-09-16 RX ADMIN — GADOBENATE DIMEGLUMINE 15 ML: 529 INJECTION, SOLUTION INTRAVENOUS at 11:45

## 2020-09-16 RX ADMIN — LIDOCAINE 1 PATCH: 50 PATCH CUTANEOUS at 13:37

## 2020-09-16 RX ADMIN — SODIUM CHLORIDE, PRESERVATIVE FREE 10 ML: 5 INJECTION INTRAVENOUS at 08:52

## 2020-09-16 RX ADMIN — DEXAMETHASONE SODIUM PHOSPHATE 4 MG: 4 INJECTION, SOLUTION INTRAMUSCULAR; INTRAVENOUS at 02:04

## 2020-09-16 RX ADMIN — SODIUM CHLORIDE, PRESERVATIVE FREE 10 ML: 5 INJECTION INTRAVENOUS at 20:12

## 2020-09-16 NOTE — DISCHARGE SUMMARY
[facility]   DISCHARGE SUMMARY      Name:  Gopi Johnson   Age:  45 y.o.  Sex:  male  :  1975  MRN:  [id number]   Visit Number:  [id number]  Primary Care Physician:  Juvenal Ray PA  Date of Discharge:  2020  Admission Date:  9/10/2020    Discharge Diagnosis:     1.  Sepsis with MRSA bacteremia, with Left sacral septic arthritis, prior to admission, due to #2; seen on repeat MRI   2.  Left paraspinal and left gluteus muscle abscess, present on admission, worsened from admission  3.  Positive sacral osteomyelitis, prior to admission  4.  Ruled out infective endocarditis, present on admission  5.  History of  IV drug abuse  6.  Essential hypertension     History of Present Illness/Hospital Course:  The patient is a 45-year-old gentleman with past medical history of hypertension and hyperlipidemia who presented from [place] emergency room for complaints of confusion and back pain.  The patient had admitted to using IV drug in the past but date was unclear.  He had 1 week duration of acute severe lumbar back pain.  He had been seen previously at [facility] ER and sent home on pain medications for a pulled back muscle.  The patient presented back to their ER again when he developed confusion and fever.  Initially he had a temp of 101.9 with elevated blood pressure.  His urine drug screen was only positive for tricyclic antidepressant.  He had a negative troponin with elevated CRP and elevated sed rate.  Strep and influenza testing were negative.  His COVID-19 test was negative.  Blood cultures were obtained.  He was initiated on Vanco and Decadron and acyclovir and Rocephin.  Initially it was thought that he might have meningitis but a lumbar puncture was attempted without success.  MRI was not available at the time of admission.  CT scans of the cervical, thoracic, and lumbar spines did not show any acute disease.  MRI was performed of the cervical, thoracic, and  lumbar spine which did reveal left paraspinal abscess 12 mm as well as left gluteal abscess 3.6 cm as well as possible osteomyelitis of the left sacral ala.  This was discussed with neurosurgery and orthopedic services at [facility] and they recommended no surgical intervention but continue IV antibiotic therapy.      Blood cultures came back positive for MRSA and multiple bottles.  He is still unable to clear the infection.  A ROSE was performed which was negative for endocarditis. He no longer uses iv drugs and reports he never will again. He reports understanding he has life threatening illness at this time. His repeat MRI performed today, for persistent bacteremia and low back and left gluteal pain is much worse.    MRI Pelvis:      Findings consistent with osteomyelitis involving the left   sacrum and iliac wing with a left SI joint effusion consistent with   septic arthritis. Multiple abscesses are seen in the air spinal soft   tissue, left gluteus demetria, posterior to the left acetabulum and   within the left iliopsoas muscle.          With worsening findings on MRI, I feel patient will need surgery. Dr Hernandez here is unable to provide this surgery here and patient will require higher level of care for further treatment.      Consults:     Consults     Date and Time Order Name Status Description    9/12/2020 0918 Inpatient General Surgery Consult            Procedures Performed: none              Vital Signs:    Temp:  [97.5 °F (36.4 °C)-98.3 °F (36.8 °C)] 98.3 °F (36.8 °C)  Heart Rate:  [69-88] 88  Resp:  [16-20] 18  BP: (122-136)/(75-88) 136/87    Physical Exam:      General Appearance:    Alert, cooperative, in no acute distress. Diffuse tattooes. Shaved bald head. Wears glasses   Head:    Normocephalic, without obvious abnormality, atraumatic   Eyes:            Lids and lashes normal, conjunctivae and sclerae normal, no   icterus, no pallor, corneas clear, PERRLA   Ears:    Ears appear intact with  no abnormalities noted   Throat:   No oral lesions, no thrush, oral mucosa moist   Neck:   No adenopathy, supple, trachea midline, no thyromegaly   Back:     No kyphosis present, no scoliosis present, no skin lesions,      erythema or scars, no tenderness to percussion or                   palpation,   range of motion normal   Lungs:     Clear to auscultation,respirations regular, even and                  unlabored    Heart:    Regular rhythm and normal rate, normal S1 and S2, no            murmur, no gallop, no rub, no click   Chest Wall:    No abnormalities observed   Abdomen:     Normal bowel sounds, no masses, no organomegaly, soft        non-tender, non-distended, no guarding, no rebound                tenderness   Rectal:     Deferred   Extremities:   Moves all extremities slowly with increased pain to movement of left hip, no edema, no cyanosis, no             Redness; left gluteal and left sacral region pain to palpation with fluctuance   Pulses:   Pulses palpable and equal bilaterally   Skin:   No bleeding, bruising or rash   Lymph nodes:   No palpable adenopathy   Neurologic:  No tremor, sensation intact, DTR       present and equal bilaterally         Pertinent Lab Results:     Results from last 7 days   Lab Units 09/15/20  0526 09/14/20  0631 09/12/20  0621 09/11/20  0651   SODIUM mmol/L 135* 137 137 140   POTASSIUM mmol/L 4.7 4.5 4.0 4.3   CHLORIDE mmol/L 100 101 103 105   CO2 mmol/L 23.5 24.3 22.6 21.4*   BUN mg/dL 18 17 14 16   CREATININE mg/dL 0.63* 0.63* 0.54* 0.65*   CALCIUM mg/dL 9.2 9.3 8.7 8.6   BILIRUBIN mg/dL  --  0.3  --  0.4   ALK PHOS U/L  --  138*  --  203*   ALT (SGPT) U/L  --  35  --  50*   AST (SGOT) U/L  --  18  --  58*   GLUCOSE mg/dL 141* 130* 141* 197*     Results from last 7 days   Lab Units 09/15/20  0526 09/14/20  0631 09/12/20  0621   WBC 10*3/mm3 16.39* 14.46* 14.23*   HEMOGLOBIN g/dL 13.4 12.6* 10.3*   HEMATOCRIT % 41.6 37.6 31.1*   PLATELETS 10*3/mm3 490* 436 527          Results from last 7 days   Lab Units 09/15/20  0526 09/12/20  0621   CK TOTAL U/L 12* 25                           Invalid input(s): USDES,  BLOODU, NITRITITE, BACT, EP  Pain Management Panel     Pain Management Panel Latest Ref Rng & Units 9/11/2020    AMPHETAMINES SCREEN, URINE Negative Negative    BARBITURATES SCREEN Negative Negative    BENZODIAZEPINE SCREEN, URINE Negative Negative    BUPRENORPHINEUR Negative Negative    COCAINE SCREEN, URINE Negative Negative    METHADONE SCREEN, URINE Negative Negative    METHAMPHETAMINEUR Negative Negative        Results from last 7 days   Lab Units 09/14/20  0637 09/14/20  0631 09/11/20  0116 09/11/20  0110   BLOODCX  Staphylococcus aureus, MRSA* No growth at 2 days Staphylococcus aureus, MRSA* Staphylococcus aureus, MRSA*       Pertinent Radiology Results:    Imaging Results (All)     Procedure Component Value Units Date/Time    MRI Pelvis With & Without Contrast [[id number]] Collected: 09/16/20 1109     Updated: 09/16/20 1117    Narrative:      PROCEDURE: MRI PELVIS W WO CONTRAST-     HISTORY:  gluteal abscess with persistent bacteremia     COMPARISON:  None .     TECHNIQUE: Multiplanar multisequence imaging of the pelvis was performed  both before and following the administration of 15 mL of the hands the  use of intravenous contrast.     FINDINGS: Again noted is a small, 12 mm abscess in the left paraspinal  musculature which was discussed in more detail on the dedicated MRI of  the lumbar spine. There is marrow edema noted in the left sacrum and  left iliac wing posteriorly consistent with osteomyelitis. There is a  left SI joint effusion consistent with a septic joint. There is an  approximately 7.1 x 3.0 cm ring-enhancing fluid collection posterior to  the left acetabulum consistent with an abscess. There is also an  approximately 2.8 x 2.0 x 6.4 cm iliopsoas abscess which appears  contiguous with the left sacroiliac joint. There is an 8.2 x 3.1 x 6.7  cm abscess in  the left gluteus demetria muscle. No other discrete fluid  collections are identified.       Impression:      Findings consistent with osteomyelitis involving the left  sacrum and iliac wing with a left SI joint effusion consistent with  septic arthritis. Multiple abscesses are seen in the air spinal soft  tissue, left gluteus demetria, posterior to the left acetabulum and  within the left iliopsoas muscle.     This report was finalized on 9/16/2020 11:15 AM by Jocelyne White M.D..    MRI Cervical Spine Without Contrast [[id number]] Collected: 09/11/20 1104     Updated: 09/11/20 1111    Narrative:      PROCEDURE: MRI CERVICAL SPINE WO CONTRAST-     HISTORY: Abnormal xray, cervical spine, bone destruction     COMPARISON: None.     TECHNIQUE: Limited multiplanar multisequence imaging of the cervical  spine was performed without intravenous contrast.     FINDINGS: There is motion artifact which limits the exam. Bone marrow  signal is homogeneous with no evidence of osteomyelitis/discitis. The  cervical portions of the spinal cord maintains a normal caliber and  signal intensity. The visualized posterior fossa is normal. On sagittal  images 13 through 15 there is paraspinal muscle edema at the C5/6 level.       Impression:      1. Limited exam due to motion artifact with no evidence of  osteomyelitis/discitis.  2. Soft tissue edema in the paraspinal soft tissues at the C5/6 level.  Repeat imaging with intravenous contrast is recommended when the patient  is able to tolerate the exam.     This report was finalized on 9/11/2020 11:09 AM by Jocelyne White M.D..    MRI Lumbar Spine With & Without Contrast [[id number]] Collected: 09/11/20 1055     Updated: 09/11/20 1109    Narrative:      PROCEDURE: MRI LUMBAR SPINE W WO CONTRAST-     HISTORY: Abnormal xray, lumbar spine, bone destruction     TECHNIQUE: Multiplanar multisequence imaging of the lumbar spine was  performed both before and following the administration of  15 mL  MultiHance intravenous contrast.     FINDINGS: The lumbar vertebral bodies maintain a normal height,  alignment and signal intensity. The posterior elements are intact  throughout. There is a focal disc protrusion at the L5/S1 level without  a significant stenosis. The remainder of the intervertebral discs are  preserved. The spinal cord terminates at the T12/L1 level. No conus  lesions are present. Evaluation of the paraspinal soft tissues reveals  edema within the multifidus muscles in the paraspinal region. In the  left multifidus muscle at the L5/S1 level there is a 12 mm abscess.  There is also a partially imaged abscess in the left gluteus demetria  muscle with the visualized portions of the abscess measuring 3.9 x 1.0  cm. There is edema seen in the left sacral ala with patchy enhancement  which could reflect osteomyelitis.       Impression:      1. Small 12 mm paraspinal abscess involving the left multifidus muscle  at the L5/S1 level.  2. Partially imaged abscess in the left gluteus demetria muscle.  3. Findings suggesting osteomyelitis involving the left sacral ala.     This report was finalized on 9/11/2020 11:04 AM by Jocelyne White M.D..    MRI Thoracic Spine Without Contrast [[id number]] Collected: 09/11/20 1103     Updated: 09/11/20 1106    Narrative:      PROCEDURE: MRI THORACIC SPINE WO CONTRAST-     HISTORY: Abnormal xray, thoracic spine, bone destruction     COMPARISON: None.     TECHNIQUE: Limited multiplanar multisequence imaging of the thoracic  spine was performed without intravenous contrast.     FINDINGS: The thoracic vertebral bodies maintain a normal height,  alignment and signal intensity. The posterior elements are intact. The  intervertebral disc spaces are maintained. There is no spinal or neural  foraminal stenosis. The thoracic portions of the spinal cord maintains a  normal caliber and signal intensity. The paraspinal soft tissues are  unremarkable.       Impression:       No evidence of osteomyelitis/discitis.     This report was finalized on 9/11/2020 11:04 AM by Jocelyne White M.D..          ECHO:    Results for orders placed during the hospital encounter of 09/10/20   Adult Transesophageal Echo (ROSE) W/ Cont if Necessary Per Protocol (Cardiology Department)    Narrative 1.  Normal left ventricular size and systolic function.  2.  Trace MR and TR.  3.  No echocardiographic evidence of valvular endocarditis.   Adult Transthoracic Echo Complete W/ Cont if Necessary Per Protocol    Narrative 1.  Normal left ventricular size and hyperdynamic LV systolic function   with LVEF >70%  2.  Normal LV diastolic filling pattern.  3.  Normal right ventricular size and systolic function.  4.  Mild left atrial dilation.  5.  Trace posteriorly directed MR.       Condition on Discharge:  Stable        Code status during the hospital stay:  Full code       Discharge Disposition: Transfer to [facility] level of care    [facility] ([facility]) w/Planned Readmission    Discharge Medication:       Discharge Medications      New Medications      Instructions Start Date   acetaminophen 325 MG tablet  Commonly known as: TYLENOL   650 mg, Oral, Every 4 Hours PRN      calcium carbonate 500 MG chewable tablet  Commonly known as: TUMS   2 tablets, Oral, 2 Times Daily PRN      cetirizine 10 MG tablet  Commonly known as: zyrTEC   10 mg, Oral, Daily   Start Date: September 17, 2020     DAPTOmycin 700 mg in sodium chloride 0.9 % 50 mL   8 mg/kg (700 mg), Intravenous, Every 24 Hours   Start Date: September 17, 2020     dexamethasone 4 MG tablet  Commonly known as: DECADRON   4 mg, Oral, Every 12 Hours Scheduled      enoxaparin 40 MG/0.4ML solution syringe  Commonly known as: LOVENOX   40 mg, Subcutaneous, Every 24 Hours   Start Date: September 17, 2020     hydrALAZINE 20 MG/ML injection  Commonly known as: APRESOLINE   10 mg, Intravenous, Every 4 Hours PRN      HYDROcodone-acetaminophen 7.5-325 MG per  tablet  Commonly known as: NORCO  Replaces: HYDROcodone-acetaminophen 5-325 MG per tablet   1 tablet, Oral, Every 4 Hours PRN      ipratropium-albuterol 0.5-2.5 mg/3 ml nebulizer  Commonly known as: DUO-NEB   3 mL, Nebulization, Every 6 Hours PRN      lactobacillus acidophilus capsule capsule   1 capsule, Oral, 2 Times Daily      lidocaine 5 %  Commonly known as: LIDODERM   1 patch, Transdermal, Every 24 Hours, Remove & Discard patch within 12 hours or as directed by MD   Start Date: September 17, 2020     nicotine 21 MG/24HR patch  Commonly known as: NICODERM CQ   1 patch, Transdermal, Every 24 Hours Scheduled   Start Date: September 17, 2020     ondansetron 4 MG tablet  Commonly known as: ZOFRAN   4 mg, Oral, Every 6 Hours PRN         Changes to Medications      Instructions Start Date   pantoprazole 40 MG EC tablet  Commonly known as: PROTONIX  What changed: See the new instructions.   40 mg, Oral, Every Morning   Start Date: September 17, 2020     topiramate 50 MG tablet  Commonly known as: TOPAMAX  What changed: how much to take   50 mg, Oral, Daily   Start Date: September 17, 2020        Continue These Medications      Instructions Start Date   venlafaxine  MG 24 hr capsule  Commonly known as: EFFEXOR-XR   150 mg, Oral, Daily   Start Date: September 17, 2020        Stop These Medications    baclofen 20 MG tablet  Commonly known as: LIORESAL     Cyanocobalamin 1000 MCG/ML kit     cyclobenzaprine 10 MG tablet  Commonly known as: FLEXERIL     etodolac 400 MG tablet  Commonly known as: LODINE     HYDROcodone-acetaminophen 5-325 MG per tablet  Commonly known as: NORCO  Replaced by: HYDROcodone-acetaminophen 7.5-325 MG per tablet     hydrOXYzine pamoate 25 MG capsule  Commonly known as: VISTARIL     imipramine 10 MG tablet  Commonly known as: TOFRANIL     lamoTRIgine 25 MG tablet  Commonly known as: LaMICtal     loratadine 10 MG tablet  Commonly known as: CLARITIN     predniSONE 20 MG tablet  Commonly known  as: DELTASONE     SUMAtriptan 100 MG tablet  Commonly known as: IMITREX     Testosterone Cypionate 200 MG/ML solution     traMADol 50 MG tablet  Commonly known as: ULTRAM     traZODone 50 MG tablet  Commonly known as: DESYREL     Ventolin  (90 Base) MCG/ACT inhaler  Generic drug: albuterol sulfate HFA            Discharge Diet: low fat     Diet Instructions     Diet: Regular      Discharge Diet: Regular          Activity at Discharge: as tolerated    Activity Instructions     Up WIth Assist            Follow-up Appointments:    No future appointments.      Test Results Pending at Discharge:    Pending Labs     Order Current Status    Blood Culture With BERNIE - Blood, Arm, Right Preliminary result             Valeria Eaton DO  09/16/20  15:47 EDT      Medication risks and benefits were discussed in great detail. The patient reported being satisfied with the current treatment plan and the care delivered while hospitalized.     Time:  I spent 35 minutes preparing discharge counseling and teaching.

## 2020-09-16 NOTE — PROGRESS NOTES
Continued Stay Note   Nina     Patient Name: Gopi Johnson  MRN: [id number]  Today's Date: 9/16/2020    Admit Date: 9/10/2020    Discharge Plan     Row Name 09/16/20 1436       Plan    Plan Per Dr Eaton pt is not clearing cultures may need to have peripheral IV and come to Out Pt Infusion for antibioitics.  MRI Pending.  Cm will continue to follow.        Discharge Codes    No documentation.       Expected Discharge Date and Time     Expected Discharge Date Expected Discharge Time    Sep 16, 2020             Michelle Riley RN

## 2020-09-17 VITALS
BODY MASS INDEX: 26.84 KG/M2 | TEMPERATURE: 98 F | HEART RATE: 79 BPM | OXYGEN SATURATION: 99 % | HEIGHT: 72 IN | SYSTOLIC BLOOD PRESSURE: 120 MMHG | WEIGHT: 198.19 LBS | RESPIRATION RATE: 18 BRPM | DIASTOLIC BLOOD PRESSURE: 64 MMHG

## 2020-09-17 PROCEDURE — 25010000002 DAPTOMYCIN PER 1 MG: Performed by: FAMILY MEDICINE

## 2020-09-17 PROCEDURE — 25010000002 MORPHINE PER 10 MG: Performed by: INTERNAL MEDICINE

## 2020-09-17 PROCEDURE — 25010000002 MORPHINE SULFATE (PF) 2 MG/ML SOLUTION: Performed by: FAMILY MEDICINE

## 2020-09-17 PROCEDURE — 63710000001 DEXAMETHASONE PER 0.25 MG: Performed by: FAMILY MEDICINE

## 2020-09-17 PROCEDURE — 99239 HOSP IP/OBS DSCHRG MGMT >30: CPT | Performed by: FAMILY MEDICINE

## 2020-09-17 PROCEDURE — 25010000002 ENOXAPARIN PER 10 MG: Performed by: INTERNAL MEDICINE

## 2020-09-17 RX ORDER — MORPHINE SULFATE 4 MG/ML
4 INJECTION, SOLUTION INTRAMUSCULAR; INTRAVENOUS ONCE
Status: COMPLETED | OUTPATIENT
Start: 2020-09-17 | End: 2020-09-17

## 2020-09-17 RX ADMIN — MORPHINE SULFATE 2 MG: 2 INJECTION, SOLUTION INTRAMUSCULAR; INTRAVENOUS at 09:17

## 2020-09-17 RX ADMIN — MORPHINE SULFATE 2 MG: 2 INJECTION, SOLUTION INTRAMUSCULAR; INTRAVENOUS at 17:05

## 2020-09-17 RX ADMIN — CETIRIZINE HYDROCHLORIDE 10 MG: 10 TABLET, FILM COATED ORAL at 09:17

## 2020-09-17 RX ADMIN — HYDROCODONE BITARTRATE AND ACETAMINOPHEN 1 TABLET: 7.5; 325 TABLET ORAL at 15:25

## 2020-09-17 RX ADMIN — Medication 1 CAPSULE: at 09:17

## 2020-09-17 RX ADMIN — ENOXAPARIN SODIUM 40 MG: 40 INJECTION SUBCUTANEOUS at 00:12

## 2020-09-17 RX ADMIN — MORPHINE SULFATE 4 MG: 4 INJECTION, SOLUTION INTRAMUSCULAR; INTRAVENOUS at 19:00

## 2020-09-17 RX ADMIN — LIDOCAINE 1 PATCH: 50 PATCH CUTANEOUS at 15:21

## 2020-09-17 RX ADMIN — SODIUM CHLORIDE, PRESERVATIVE FREE 10 ML: 5 INJECTION INTRAVENOUS at 09:17

## 2020-09-17 RX ADMIN — DAPTOMYCIN 700 MG: 500 INJECTION, POWDER, LYOPHILIZED, FOR SOLUTION INTRAVENOUS at 10:56

## 2020-09-17 RX ADMIN — PANTOPRAZOLE SODIUM 40 MG: 40 TABLET, DELAYED RELEASE ORAL at 06:24

## 2020-09-17 RX ADMIN — VENLAFAXINE HYDROCHLORIDE 150 MG: 150 CAPSULE, EXTENDED RELEASE ORAL at 09:17

## 2020-09-17 RX ADMIN — TOPIRAMATE 50 MG: 25 TABLET, FILM COATED ORAL at 09:17

## 2020-09-17 RX ADMIN — DEXAMETHASONE 4 MG: 4 TABLET ORAL at 09:17

## 2020-09-17 RX ADMIN — HYDROCODONE BITARTRATE AND ACETAMINOPHEN 1 TABLET: 7.5; 325 TABLET ORAL at 10:56

## 2020-09-17 RX ADMIN — MORPHINE SULFATE 2 MG: 2 INJECTION, SOLUTION INTRAMUSCULAR; INTRAVENOUS at 00:12

## 2020-09-17 RX ADMIN — MORPHINE SULFATE 2 MG: 2 INJECTION, SOLUTION INTRAMUSCULAR; INTRAVENOUS at 05:01

## 2020-09-17 RX ADMIN — NICOTINE 1 PATCH: 21 PATCH TRANSDERMAL at 09:45

## 2020-09-17 RX ADMIN — HYDROCODONE BITARTRATE AND ACETAMINOPHEN 1 TABLET: 7.5; 325 TABLET ORAL at 02:49

## 2020-09-17 RX ADMIN — MORPHINE SULFATE 2 MG: 2 INJECTION, SOLUTION INTRAMUSCULAR; INTRAVENOUS at 13:18

## 2020-09-17 NOTE — PLAN OF CARE
Goal Outcome Evaluation:  Plan of Care Reviewed With: patient  Progress: no change  Outcome Summary: pt VS maintained as stable on room air.  plan of care reviewed with patient.  pt reports increased pain.  MD contacted per pt request.  pain managed per MAR.  pt refused non-medication interventions for pain management.  pain management education completed at bedside.  safety rounds completed per protocol.  will continue to monitor.

## 2020-09-17 NOTE — DISCHARGE SUMMARY
[facility]   DISCHARGE SUMMARY      Name:  Gopi Johnson   Age:  45 y.o.  Sex:  male  :  1975  MRN:  [id number]   Visit Number:  [id number]  Primary Care Physician:  Juvenal Ray PA  Date of Discharge:  2020  Admission Date:  9/10/2020    Discharge Diagnosis:     1.  Sepsis with MRSA bacteremia, with Left sacral septic arthritis, prior to admission, due to #2; seen on repeat MRI   2.  Left paraspinal and left gluteus muscle abscess, present on admission, worsened from admission  3.  Positive sacral osteomyelitis, prior to admission  4.  Ruled out infective endocarditis, present on admission  5.  History of  IV drug abuse  6.  Essential hypertension       Addendum: The patient was seen again today. He denies nausea, vomiting, abdominal pain. He has persistent left buttock pain. No acute events occurred overnight. He is still here awaiting transfer to [facility] where he needs drainage of gluteal and ileopsoas abscess.     History of Present Illness/Hospital Course:  The patient is a 45-year-old gentleman with past medical history of hypertension and hyperlipidemia who presented from [facility] emergency room for complaints of confusion and back pain.  The patient had admitted to using IV drug in the past but date was unclear.  He had 1 week duration of acute severe lumbar back pain.  He had been seen previously at [facility] and sent home on pain medications for a pulled back muscle.  The patient presented back to their ER again when he developed confusion and fever.  Initially he had a temp of 101.9 with elevated blood pressure.  His urine drug screen was only positive for tricyclic antidepressant.  He had a negative troponin with elevated CRP and elevated sed rate.  Strep and influenza testing were negative.  His COVID-19 test was negative.  Blood cultures were obtained.  He was initiated on Vanco and Decadron and acyclovir and Rocephin.   Initially it was thought that he might have meningitis but a lumbar puncture was attempted without success.  MRI was not available at the time of admission.  CT scans of the cervical, thoracic, and lumbar spines did not show any acute disease.  MRI was performed of the cervical, thoracic, and lumbar spine which did reveal left paraspinal abscess 12 mm as well as left gluteal abscess 3.6 cm as well as possible osteomyelitis of the left sacral ala.  This was discussed with neurosurgery and orthopedic services at [facility] and they recommended no surgical intervention but continue IV antibiotic therapy.      Blood cultures came back positive for MRSA and multiple bottles.  He is still unable to clear the infection.  A ROSE was performed which was negative for endocarditis. He no longer uses iv drugs and reports he never will again. He reports understanding he has life threatening illness at this time. His repeat MRI performed today, for persistent bacteremia and low back and left gluteal pain is much worse.    MRI Pelvis:      Findings consistent with osteomyelitis involving the left   sacrum and iliac wing with a left SI joint effusion consistent with   septic arthritis. Multiple abscesses are seen in the air spinal soft   tissue, left gluteus demetria, posterior to the left acetabulum and   within the left iliopsoas muscle.          With worsening findings on MRI, I feel patient will need surgery. Dr Hernandez here is unable to provide this surgery here and patient will require higher level of care for further treatment.      Consults:     Consults     Date and Time Order Name Status Description    9/12/2020 0918 Inpatient General Surgery Consult            Procedures Performed: none              Vital Signs:    Temp:  [97.6 °F (36.4 °C)-98.4 °F (36.9 °C)] 98 °F (36.7 °C)  Heart Rate:  [69-88] 83  Resp:  [18-20] 18  BP: (111-136)/(78-97) 116/79    Physical Exam: examined again today      General Appearance:     Alert, cooperative, in no acute distress. Diffuse tattooes. Shaved bald head. Wears glasses   Head:    Normocephalic, without obvious abnormality, atraumatic   Eyes:            Lids and lashes normal, conjunctivae and sclerae normal, no   icterus, no pallor, corneas clear, PERRLA   Ears:    Ears appear intact with no abnormalities noted   Throat:   No oral lesions, no thrush, oral mucosa moist   Neck:   No adenopathy, supple, trachea midline, no thyromegaly   Back:     No kyphosis present, no scoliosis present, no skin lesions,      erythema or scars, no tenderness to percussion or                   palpation,   range of motion normal   Lungs:     Clear to auscultation,respirations regular, even and                  unlabored    Heart:    Regular rhythm and normal rate, normal S1 and S2, no            murmur, no gallop, no rub, no click   Chest Wall:    No abnormalities observed   Abdomen:     Normal bowel sounds, no masses, no organomegaly, soft        non-tender, non-distended, no guarding, no rebound                tenderness   Rectal:     Deferred   Extremities:   Moves all extremities slowly with increased pain to movement of left hip, no edema, no cyanosis, no             Redness; left gluteal and left sacral region pain to palpation with fluctuance   Pulses:   Pulses palpable and equal bilaterally   Skin:   No bleeding, bruising or rash   Lymph nodes:   No palpable adenopathy   Neurologic:  No tremor, sensation intact, DTR       present and equal bilaterally         Pertinent Lab Results:     Results from last 7 days   Lab Units 09/15/20  0526 09/14/20  0631 09/12/20  0621 09/11/20  0651   SODIUM mmol/L 135* 137 137 140   POTASSIUM mmol/L 4.7 4.5 4.0 4.3   CHLORIDE mmol/L 100 101 103 105   CO2 mmol/L 23.5 24.3 22.6 21.4*   BUN mg/dL 18 17 14 16   CREATININE mg/dL 0.63* 0.63* 0.54* 0.65*   CALCIUM mg/dL 9.2 9.3 8.7 8.6   BILIRUBIN mg/dL  --  0.3  --  0.4   ALK PHOS U/L  --  138*  --  203*   ALT (SGPT) U/L  --   35  --  50*   AST (SGOT) U/L  --  18  --  58*   GLUCOSE mg/dL 141* 130* 141* 197*     Results from last 7 days   Lab Units 09/15/20  0526 09/14/20  0631 09/12/20  0621   WBC 10*3/mm3 16.39* 14.46* 14.23*   HEMOGLOBIN g/dL 13.4 12.6* 10.3*   HEMATOCRIT % 41.6 37.6 31.1*   PLATELETS 10*3/mm3 490* 436 321         Results from last 7 days   Lab Units 09/15/20  0526 09/12/20  0621   CK TOTAL U/L 12* 25                           Invalid input(s): USDES,  BLOODU, NITRITITE, BACT, EP  Pain Management Panel     Pain Management Panel Latest Ref Rng & Units 9/11/2020    AMPHETAMINES SCREEN, URINE Negative Negative    BARBITURATES SCREEN Negative Negative    BENZODIAZEPINE SCREEN, URINE Negative Negative    BUPRENORPHINEUR Negative Negative    COCAINE SCREEN, URINE Negative Negative    METHADONE SCREEN, URINE Negative Negative    METHAMPHETAMINEUR Negative Negative        Results from last 7 days   Lab Units 09/14/20  0637 09/14/20  0631 09/11/20  0116 09/11/20  0110   BLOODCX  Staphylococcus aureus, MRSA* No growth at 3 days Staphylococcus aureus, MRSA* Staphylococcus aureus, MRSA*       Pertinent Radiology Results:    Imaging Results (All)     Procedure Component Value Units Date/Time    MRI Pelvis With & Without Contrast [[id number]] Collected: 09/16/20 1109     Updated: 09/16/20 1117    Narrative:      PROCEDURE: MRI PELVIS W WO CONTRAST-     HISTORY:  gluteal abscess with persistent bacteremia     COMPARISON:  None .     TECHNIQUE: Multiplanar multisequence imaging of the pelvis was performed  both before and following the administration of 15 mL of the hands the  use of intravenous contrast.     FINDINGS: Again noted is a small, 12 mm abscess in the left paraspinal  musculature which was discussed in more detail on the dedicated MRI of  the lumbar spine. There is marrow edema noted in the left sacrum and  left iliac wing posteriorly consistent with osteomyelitis. There is a  left SI joint effusion consistent with a septic  joint. There is an  approximately 7.1 x 3.0 cm ring-enhancing fluid collection posterior to  the left acetabulum consistent with an abscess. There is also an  approximately 2.8 x 2.0 x 6.4 cm iliopsoas abscess which appears  contiguous with the left sacroiliac joint. There is an 8.2 x 3.1 x 6.7  cm abscess in the left gluteus demetria muscle. No other discrete fluid  collections are identified.       Impression:      Findings consistent with osteomyelitis involving the left  sacrum and iliac wing with a left SI joint effusion consistent with  septic arthritis. Multiple abscesses are seen in the air spinal soft  tissue, left gluteus demetria, posterior to the left acetabulum and  within the left iliopsoas muscle.     This report was finalized on 9/16/2020 11:15 AM by Jocelyne White M.D..    MRI Cervical Spine Without Contrast [[id number]] Collected: 09/11/20 1104     Updated: 09/11/20 1111    Narrative:      PROCEDURE: MRI CERVICAL SPINE WO CONTRAST-     HISTORY: Abnormal xray, cervical spine, bone destruction     COMPARISON: None.     TECHNIQUE: Limited multiplanar multisequence imaging of the cervical  spine was performed without intravenous contrast.     FINDINGS: There is motion artifact which limits the exam. Bone marrow  signal is homogeneous with no evidence of osteomyelitis/discitis. The  cervical portions of the spinal cord maintains a normal caliber and  signal intensity. The visualized posterior fossa is normal. On sagittal  images 13 through 15 there is paraspinal muscle edema at the C5/6 level.       Impression:      1. Limited exam due to motion artifact with no evidence of  osteomyelitis/discitis.  2. Soft tissue edema in the paraspinal soft tissues at the C5/6 level.  Repeat imaging with intravenous contrast is recommended when the patient  is able to tolerate the exam.     This report was finalized on 9/11/2020 11:09 AM by Jocelyne White M.D..    MRI Lumbar Spine With & Without Contrast  [[id number]] Collected: 09/11/20 1055     Updated: 09/11/20 1109    Narrative:      PROCEDURE: MRI LUMBAR SPINE W WO CONTRAST-     HISTORY: Abnormal xray, lumbar spine, bone destruction     TECHNIQUE: Multiplanar multisequence imaging of the lumbar spine was  performed both before and following the administration of 15 mL  MultiHance intravenous contrast.     FINDINGS: The lumbar vertebral bodies maintain a normal height,  alignment and signal intensity. The posterior elements are intact  throughout. There is a focal disc protrusion at the L5/S1 level without  a significant stenosis. The remainder of the intervertebral discs are  preserved. The spinal cord terminates at the T12/L1 level. No conus  lesions are present. Evaluation of the paraspinal soft tissues reveals  edema within the multifidus muscles in the paraspinal region. In the  left multifidus muscle at the L5/S1 level there is a 12 mm abscess.  There is also a partially imaged abscess in the left gluteus demetria  muscle with the visualized portions of the abscess measuring 3.9 x 1.0  cm. There is edema seen in the left sacral ala with patchy enhancement  which could reflect osteomyelitis.       Impression:      1. Small 12 mm paraspinal abscess involving the left multifidus muscle  at the L5/S1 level.  2. Partially imaged abscess in the left gluteus demetria muscle.  3. Findings suggesting osteomyelitis involving the left sacral ala.     This report was finalized on 9/11/2020 11:04 AM by Jocelyne White M.D..    MRI Thoracic Spine Without Contrast [[id number]] Collected: 09/11/20 1103     Updated: 09/11/20 1106    Narrative:      PROCEDURE: MRI THORACIC SPINE WO CONTRAST-     HISTORY: Abnormal xray, thoracic spine, bone destruction     COMPARISON: None.     TECHNIQUE: Limited multiplanar multisequence imaging of the thoracic  spine was performed without intravenous contrast.     FINDINGS: The thoracic vertebral bodies maintain a normal height,  alignment  and signal intensity. The posterior elements are intact. The  intervertebral disc spaces are maintained. There is no spinal or neural  foraminal stenosis. The thoracic portions of the spinal cord maintains a  normal caliber and signal intensity. The paraspinal soft tissues are  unremarkable.       Impression:      No evidence of osteomyelitis/discitis.     This report was finalized on 9/11/2020 11:04 AM by Jocelyne White M.D..          ECHO:    Results for orders placed during the hospital encounter of 09/10/20   Adult Transesophageal Echo (ROSE) W/ Cont if Necessary Per Protocol (Cardiology Department)    Narrative 1.  Normal left ventricular size and systolic function.  2.  Trace MR and TR.  3.  No echocardiographic evidence of valvular endocarditis.   Adult Transthoracic Echo Complete W/ Cont if Necessary Per Protocol    Narrative 1.  Normal left ventricular size and hyperdynamic LV systolic function   with LVEF >70%  2.  Normal LV diastolic filling pattern.  3.  Normal right ventricular size and systolic function.  4.  Mild left atrial dilation.  5.  Trace posteriorly directed MR.       Condition on Discharge:  Stable        Code status during the hospital stay:  Full code       Discharge Disposition: Transfer to higher level of care    [place] [facility] [place] - [facility]) w/Planned Readmission    Discharge Medication:       Discharge Medications      New Medications      Instructions Start Date   acetaminophen 325 MG tablet  Commonly known as: TYLENOL   650 mg, Oral, Every 4 Hours PRN      calcium carbonate 500 MG chewable tablet  Commonly known as: TUMS   2 tablets, Oral, 2 Times Daily PRN      cetirizine 10 MG tablet  Commonly known as: zyrTEC   10 mg, Oral, Daily      DAPTOmycin 700 mg in sodium chloride 0.9 % 50 mL   8 mg/kg (700 mg), Intravenous, Every 24 Hours      dexamethasone 4 MG tablet  Commonly known as: DECADRON   4 mg, Oral, Every 12 Hours Scheduled      enoxaparin 40 MG/0.4ML solution  syringe  Commonly known as: LOVENOX   40 mg, Subcutaneous, Every 24 Hours      hydrALAZINE 20 MG/ML injection  Commonly known as: APRESOLINE   10 mg, Intravenous, Every 4 Hours PRN      HYDROcodone-acetaminophen 7.5-325 MG per tablet  Commonly known as: NORCO  Replaces: HYDROcodone-acetaminophen 5-325 MG per tablet   1 tablet, Oral, Every 4 Hours PRN      ipratropium-albuterol 0.5-2.5 mg/3 ml nebulizer  Commonly known as: DUO-NEB   3 mL, Nebulization, Every 6 Hours PRN      lactobacillus acidophilus capsule capsule   1 capsule, Oral, 2 Times Daily      lidocaine 5 %  Commonly known as: LIDODERM   1 patch, Transdermal, Every 24 Hours, Remove & Discard patch within 12 hours or as directed by MD      nicotine 21 MG/24HR patch  Commonly known as: NICODERM CQ   1 patch, Transdermal, Every 24 Hours Scheduled      ondansetron 4 MG tablet  Commonly known as: ZOFRAN   4 mg, Oral, Every 6 Hours PRN         Changes to Medications      Instructions Start Date   pantoprazole 40 MG EC tablet  Commonly known as: PROTONIX  What changed: See the new instructions.   40 mg, Oral, Every Morning      topiramate 50 MG tablet  Commonly known as: TOPAMAX  What changed: how much to take   50 mg, Oral, Daily         Continue These Medications      Instructions Start Date   venlafaxine  MG 24 hr capsule  Commonly known as: EFFEXOR-XR   150 mg, Oral, Daily         Stop These Medications    baclofen 20 MG tablet  Commonly known as: LIORESAL     Cyanocobalamin 1000 MCG/ML kit     cyclobenzaprine 10 MG tablet  Commonly known as: FLEXERIL     etodolac 400 MG tablet  Commonly known as: LODINE     HYDROcodone-acetaminophen 5-325 MG per tablet  Commonly known as: NORCO  Replaced by: HYDROcodone-acetaminophen 7.5-325 MG per tablet     hydrOXYzine pamoate 25 MG capsule  Commonly known as: VISTARIL     imipramine 10 MG tablet  Commonly known as: TOFRANIL     lamoTRIgine 25 MG tablet  Commonly known as: LaMICtal     loratadine 10 MG  tablet  Commonly known as: CLARITIN     predniSONE 20 MG tablet  Commonly known as: DELTASONE     SUMAtriptan 100 MG tablet  Commonly known as: IMITREX     Testosterone Cypionate 200 MG/ML solution     traMADol 50 MG tablet  Commonly known as: ULTRAM     traZODone 50 MG tablet  Commonly known as: DESYREL     Ventolin  (90 Base) MCG/ACT inhaler  Generic drug: albuterol sulfate HFA            Discharge Diet: low fat     Diet Instructions     Diet: Regular      Discharge Diet: Regular          Activity at Discharge: as tolerated    Activity Instructions     Up WIth Assist            Follow-up Appointments:    No future appointments.      Test Results Pending at Discharge:    Pending Labs     Order Current Status    Blood Culture With BERNIE - Blood, Arm, Right Preliminary result             Valeria Eaton DO  09/17/20  10:30 EDT      Medication risks and benefits were discussed in great detail. The patient reported being satisfied with the current treatment plan and the care delivered while hospitalized.     Time:  I spent 35 minutes preparing discharge counseling and teaching.

## 2020-09-18 NOTE — PROGRESS NOTES
Case Management Discharge Note           Provided Post Acute Provider List?: N/A    Selected Continued Care - Discharged on 9/17/2020 Admission date: 9/10/2020 - Discharge disposition: Short Term Hospital ([place] - External) w/Planned Readmission    Destination Coordination complete    Service Provider Selected Services Address Phone Fax    [address] [phone] --          Durable Medical Equipment    No services have been selected for the patient.              Dialysis/Infusion    No services have been selected for the patient.              Home Medical Care    No services have been selected for the patient.              Therapy    No services have been selected for the patient.              Community Resources    No services have been selected for the patient.                       Final Discharge Disposition Code: 02 - short term hospital for [facility]

## 2020-09-19 LAB — BACTERIA SPEC AEROBE CULT: NORMAL

## 2020-10-01 ENCOUNTER — LAB (OUTPATIENT)
Dept: LAB | Facility: HOSPITAL | Age: 45
End: 2020-10-01

## 2020-10-01 ENCOUNTER — HOSPITAL ENCOUNTER (OUTPATIENT)
Dept: ONCOLOGY | Facility: HOSPITAL | Age: 45
Setting detail: INFUSION SERIES
Discharge: HOME | End: 2020-10-01

## 2020-10-01 VITALS
RESPIRATION RATE: 18 BRPM | WEIGHT: 201 LBS | SYSTOLIC BLOOD PRESSURE: 137 MMHG | TEMPERATURE: 97.7 F | HEART RATE: 98 BPM | HEIGHT: 72 IN | BODY MASS INDEX: 27.22 KG/M2 | DIASTOLIC BLOOD PRESSURE: 76 MMHG

## 2020-10-01 DIAGNOSIS — L02.31 ABSCESS, GLUTEAL, LEFT: ICD-10-CM

## 2020-10-01 DIAGNOSIS — R78.81 BACTEREMIA DUE TO GRAM-POSITIVE BACTERIA: ICD-10-CM

## 2020-10-01 DIAGNOSIS — A41.9 SEPSIS WITHOUT ACUTE ORGAN DYSFUNCTION, DUE TO UNSPECIFIED ORGANISM (HCC): Primary | ICD-10-CM

## 2020-10-01 LAB
ALBUMIN SERPL-MCNC: 3.5 G/DL (ref 3.5–5.2)
ALBUMIN/GLOB SERPL: 1 G/DL
ALP SERPL-CCNC: 247 U/L (ref 39–117)
ALT SERPL W P-5'-P-CCNC: 64 U/L (ref 1–41)
AMPHET+METHAMPHET UR QL: NEGATIVE
AMPHETAMINES UR QL: NEGATIVE
ANION GAP SERPL CALCULATED.3IONS-SCNC: 10 MMOL/L (ref 5–15)
AST SERPL-CCNC: 22 U/L (ref 1–40)
BARBITURATES UR QL SCN: NEGATIVE
BENZODIAZ UR QL SCN: NEGATIVE
BILIRUB SERPL-MCNC: 0.2 MG/DL (ref 0–1.2)
BUN SERPL-MCNC: 10 MG/DL (ref 6–20)
BUN/CREAT SERPL: 13.9 (ref 7–25)
BUPRENORPHINE SERPL-MCNC: NEGATIVE NG/ML
CALCIUM SPEC-SCNC: 9.1 MG/DL (ref 8.6–10.5)
CANNABINOIDS SERPL QL: NEGATIVE
CHLORIDE SERPL-SCNC: 105 MMOL/L (ref 98–107)
CK SERPL-CCNC: 45 U/L (ref 20–200)
CO2 SERPL-SCNC: 26 MMOL/L (ref 22–29)
COCAINE UR QL: NEGATIVE
CREAT SERPL-MCNC: 0.72 MG/DL (ref 0.76–1.27)
CRP SERPL-MCNC: 3.48 MG/DL (ref 0–0.5)
ERYTHROCYTE [DISTWIDTH] IN BLOOD BY AUTOMATED COUNT: 14.8 % (ref 12.3–15.4)
ERYTHROCYTE [SEDIMENTATION RATE] IN BLOOD: 83 MM/HR (ref 0–15)
GFR SERPL CREATININE-BSD FRML MDRD: 118 ML/MIN/1.73
GLOBULIN UR ELPH-MCNC: 3.4 GM/DL
GLUCOSE SERPL-MCNC: 80 MG/DL (ref 65–99)
HCT VFR BLD AUTO: 33.1 % (ref 37.5–51)
HGB BLD-MCNC: 10.7 G/DL (ref 13–17.7)
LYMPHOCYTES # BLD AUTO: 1.7 10*3/MM3 (ref 0.7–3.1)
LYMPHOCYTES NFR BLD AUTO: 26.8 % (ref 19.6–45.3)
MCH RBC QN AUTO: 27.4 PG (ref 26.6–33)
MCHC RBC AUTO-ENTMCNC: 32.3 G/DL (ref 31.5–35.7)
MCV RBC AUTO: 84.8 FL (ref 79–97)
METHADONE UR QL SCN: NEGATIVE
MONOCYTES # BLD AUTO: 0.6 10*3/MM3 (ref 0.1–0.9)
MONOCYTES NFR BLD AUTO: 9.3 % (ref 5–12)
NEUTROPHILS NFR BLD AUTO: 4 10*3/MM3 (ref 1.7–7)
NEUTROPHILS NFR BLD AUTO: 63.9 % (ref 42.7–76)
OPIATES UR QL: NEGATIVE
OXYCODONE UR QL SCN: NEGATIVE
PCP UR QL SCN: NEGATIVE
PLATELET # BLD AUTO: 302 10*3/MM3 (ref 140–450)
PMV BLD AUTO: 6.5 FL (ref 6–12)
POTASSIUM SERPL-SCNC: 3.9 MMOL/L (ref 3.5–5.2)
PROPOXYPH UR QL: NEGATIVE
PROT SERPL-MCNC: 6.9 G/DL (ref 6–8.5)
RBC # BLD AUTO: 3.9 10*6/MM3 (ref 4.14–5.8)
SODIUM SERPL-SCNC: 141 MMOL/L (ref 136–145)
TRICYCLICS UR QL SCN: NEGATIVE
WBC # BLD AUTO: 6.3 10*3/MM3 (ref 3.4–10.8)

## 2020-10-01 PROCEDURE — 85025 COMPLETE CBC W/AUTO DIFF WBC: CPT | Performed by: INTERNAL MEDICINE

## 2020-10-01 PROCEDURE — 86140 C-REACTIVE PROTEIN: CPT | Performed by: INTERNAL MEDICINE

## 2020-10-01 PROCEDURE — 85652 RBC SED RATE AUTOMATED: CPT | Performed by: INTERNAL MEDICINE

## 2020-10-01 PROCEDURE — 80306 DRUG TEST PRSMV INSTRMNT: CPT

## 2020-10-01 PROCEDURE — 80053 COMPREHEN METABOLIC PANEL: CPT | Performed by: INTERNAL MEDICINE

## 2020-10-01 PROCEDURE — G0463 HOSPITAL OUTPT CLINIC VISIT: HCPCS

## 2020-10-01 PROCEDURE — 36592 COLLECT BLOOD FROM PICC: CPT

## 2020-10-01 PROCEDURE — 82550 ASSAY OF CK (CPK): CPT | Performed by: INTERNAL MEDICINE

## 2020-10-01 RX ORDER — SODIUM CHLORIDE 0.9 % (FLUSH) 0.9 %
10 SYRINGE (ML) INJECTION AS NEEDED
Status: DISCONTINUED | OUTPATIENT
Start: 2020-10-01 | End: 2020-10-02 | Stop reason: HOSPADM

## 2020-10-01 RX ORDER — LORATADINE 10 MG/1
CAPSULE, LIQUID FILLED ORAL
COMMUNITY
End: 2022-04-26 | Stop reason: SDUPTHER

## 2020-10-01 RX ORDER — CYCLOBENZAPRINE HCL 10 MG
10 TABLET ORAL 3 TIMES DAILY PRN
COMMUNITY
End: 2021-06-28

## 2020-10-01 RX ORDER — SODIUM CHLORIDE 0.9 % (FLUSH) 0.9 %
10 SYRINGE (ML) INJECTION AS NEEDED
Status: CANCELLED | OUTPATIENT
Start: 2020-11-03

## 2020-10-01 RX ORDER — ETODOLAC 400 MG/1
400 TABLET, FILM COATED ORAL 2 TIMES DAILY
COMMUNITY
End: 2021-06-28

## 2020-10-01 RX ADMIN — SODIUM CHLORIDE, PRESERVATIVE FREE 10 ML: 5 INJECTION INTRAVENOUS at 13:30

## 2020-10-01 NOTE — ADDENDUM NOTE
Encounter addended by: Paradise Martinez RN on: 10/1/2020 3:33 PM   Actions taken: Order list changed, Diagnosis association updated

## 2020-10-08 ENCOUNTER — HOSPITAL ENCOUNTER (OUTPATIENT)
Dept: ONCOLOGY | Facility: HOSPITAL | Age: 45
Setting detail: INFUSION SERIES
Discharge: HOME | End: 2020-10-08

## 2020-10-08 VITALS
HEIGHT: 72 IN | BODY MASS INDEX: 27.22 KG/M2 | SYSTOLIC BLOOD PRESSURE: 140 MMHG | HEART RATE: 111 BPM | WEIGHT: 201 LBS | DIASTOLIC BLOOD PRESSURE: 98 MMHG | RESPIRATION RATE: 16 BRPM | TEMPERATURE: 98.3 F

## 2020-10-08 DIAGNOSIS — A41.9 SEPSIS WITHOUT ACUTE ORGAN DYSFUNCTION, DUE TO UNSPECIFIED ORGANISM (HCC): ICD-10-CM

## 2020-10-08 LAB
ALBUMIN SERPL-MCNC: 3.6 G/DL (ref 3.5–5.2)
ALBUMIN/GLOB SERPL: 1.1 G/DL
ALP SERPL-CCNC: 160 U/L (ref 39–117)
ALT SERPL W P-5'-P-CCNC: 13 U/L (ref 1–41)
ANION GAP SERPL CALCULATED.3IONS-SCNC: 10 MMOL/L (ref 5–15)
AST SERPL-CCNC: 12 U/L (ref 1–40)
BILIRUB SERPL-MCNC: 0.2 MG/DL (ref 0–1.2)
BUN SERPL-MCNC: 10 MG/DL (ref 6–20)
BUN/CREAT SERPL: 10.2 (ref 7–25)
CALCIUM SPEC-SCNC: 9 MG/DL (ref 8.6–10.5)
CHLORIDE SERPL-SCNC: 105 MMOL/L (ref 98–107)
CK SERPL-CCNC: 29 U/L (ref 20–200)
CO2 SERPL-SCNC: 27 MMOL/L (ref 22–29)
CREAT SERPL-MCNC: 0.98 MG/DL (ref 0.76–1.27)
CRP SERPL-MCNC: 1.06 MG/DL (ref 0–0.5)
ERYTHROCYTE [DISTWIDTH] IN BLOOD BY AUTOMATED COUNT: 14.9 % (ref 12.3–15.4)
ERYTHROCYTE [SEDIMENTATION RATE] IN BLOOD: 62 MM/HR (ref 0–15)
GFR SERPL CREATININE-BSD FRML MDRD: 83 ML/MIN/1.73
GLOBULIN UR ELPH-MCNC: 3.3 GM/DL
GLUCOSE SERPL-MCNC: 109 MG/DL (ref 65–99)
HCT VFR BLD AUTO: 37.7 % (ref 37.5–51)
HGB BLD-MCNC: 12.2 G/DL (ref 13–17.7)
LYMPHOCYTES # BLD AUTO: 2.3 10*3/MM3 (ref 0.7–3.1)
LYMPHOCYTES NFR BLD AUTO: 25.6 % (ref 19.6–45.3)
MCH RBC QN AUTO: 27.4 PG (ref 26.6–33)
MCHC RBC AUTO-ENTMCNC: 32.3 G/DL (ref 31.5–35.7)
MCV RBC AUTO: 85 FL (ref 79–97)
MONOCYTES # BLD AUTO: 0.5 10*3/MM3 (ref 0.1–0.9)
MONOCYTES NFR BLD AUTO: 5.2 % (ref 5–12)
NEUTROPHILS NFR BLD AUTO: 6.1 10*3/MM3 (ref 1.7–7)
NEUTROPHILS NFR BLD AUTO: 69.2 % (ref 42.7–76)
PLATELET # BLD AUTO: 474 10*3/MM3 (ref 140–450)
PMV BLD AUTO: 5.9 FL (ref 6–12)
POTASSIUM SERPL-SCNC: 4 MMOL/L (ref 3.5–5.2)
PROT SERPL-MCNC: 6.9 G/DL (ref 6–8.5)
RBC # BLD AUTO: 4.44 10*6/MM3 (ref 4.14–5.8)
SODIUM SERPL-SCNC: 142 MMOL/L (ref 136–145)
WBC # BLD AUTO: 8.8 10*3/MM3 (ref 3.4–10.8)

## 2020-10-08 PROCEDURE — 36591 DRAW BLOOD OFF VENOUS DEVICE: CPT

## 2020-10-08 PROCEDURE — 80053 COMPREHEN METABOLIC PANEL: CPT | Performed by: INTERNAL MEDICINE

## 2020-10-08 PROCEDURE — 36592 COLLECT BLOOD FROM PICC: CPT

## 2020-10-08 PROCEDURE — 85025 COMPLETE CBC W/AUTO DIFF WBC: CPT | Performed by: INTERNAL MEDICINE

## 2020-10-08 PROCEDURE — 82550 ASSAY OF CK (CPK): CPT | Performed by: INTERNAL MEDICINE

## 2020-10-08 PROCEDURE — 85652 RBC SED RATE AUTOMATED: CPT | Performed by: INTERNAL MEDICINE

## 2020-10-08 PROCEDURE — 86140 C-REACTIVE PROTEIN: CPT | Performed by: INTERNAL MEDICINE

## 2020-10-15 ENCOUNTER — HOSPITAL ENCOUNTER (OUTPATIENT)
Dept: ONCOLOGY | Facility: HOSPITAL | Age: 45
Setting detail: INFUSION SERIES
Discharge: HOME | End: 2020-10-15

## 2020-10-15 VITALS
BODY MASS INDEX: 27.22 KG/M2 | TEMPERATURE: 98.6 F | HEART RATE: 118 BPM | DIASTOLIC BLOOD PRESSURE: 92 MMHG | HEIGHT: 72 IN | SYSTOLIC BLOOD PRESSURE: 145 MMHG | WEIGHT: 201 LBS | RESPIRATION RATE: 16 BRPM

## 2020-10-15 DIAGNOSIS — A41.9 SEPSIS WITHOUT ACUTE ORGAN DYSFUNCTION, DUE TO UNSPECIFIED ORGANISM (HCC): ICD-10-CM

## 2020-10-15 LAB
ALBUMIN SERPL-MCNC: 4.1 G/DL (ref 3.5–5.2)
ALBUMIN/GLOB SERPL: 1.1 G/DL
ALP SERPL-CCNC: 133 U/L (ref 39–117)
ALT SERPL W P-5'-P-CCNC: 10 U/L (ref 1–41)
ANION GAP SERPL CALCULATED.3IONS-SCNC: 11 MMOL/L (ref 5–15)
AST SERPL-CCNC: 10 U/L (ref 1–40)
BILIRUB SERPL-MCNC: 0.3 MG/DL (ref 0–1.2)
BUN SERPL-MCNC: 13 MG/DL (ref 6–20)
BUN/CREAT SERPL: 14.6 (ref 7–25)
CALCIUM SPEC-SCNC: 9.5 MG/DL (ref 8.6–10.5)
CHLORIDE SERPL-SCNC: 102 MMOL/L (ref 98–107)
CK SERPL-CCNC: 31 U/L (ref 20–200)
CO2 SERPL-SCNC: 26 MMOL/L (ref 22–29)
CREAT SERPL-MCNC: 0.89 MG/DL (ref 0.76–1.27)
CRP SERPL-MCNC: 1.55 MG/DL (ref 0–0.5)
ERYTHROCYTE [DISTWIDTH] IN BLOOD BY AUTOMATED COUNT: 14.7 % (ref 12.3–15.4)
ERYTHROCYTE [SEDIMENTATION RATE] IN BLOOD: 71 MM/HR (ref 0–15)
GFR SERPL CREATININE-BSD FRML MDRD: 92 ML/MIN/1.73
GLOBULIN UR ELPH-MCNC: 3.6 GM/DL
GLUCOSE SERPL-MCNC: 128 MG/DL (ref 65–99)
HCT VFR BLD AUTO: 39.2 % (ref 37.5–51)
HGB BLD-MCNC: 13 G/DL (ref 13–17.7)
LYMPHOCYTES # BLD AUTO: 2.1 10*3/MM3 (ref 0.7–3.1)
LYMPHOCYTES NFR BLD AUTO: 21 % (ref 19.6–45.3)
MCH RBC QN AUTO: 28 PG (ref 26.6–33)
MCHC RBC AUTO-ENTMCNC: 33.2 G/DL (ref 31.5–35.7)
MCV RBC AUTO: 84.3 FL (ref 79–97)
MONOCYTES # BLD AUTO: 0.6 10*3/MM3 (ref 0.1–0.9)
MONOCYTES NFR BLD AUTO: 6.1 % (ref 5–12)
NEUTROPHILS NFR BLD AUTO: 7.2 10*3/MM3 (ref 1.7–7)
NEUTROPHILS NFR BLD AUTO: 72.9 % (ref 42.7–76)
PLATELET # BLD AUTO: 404 10*3/MM3 (ref 140–450)
PMV BLD AUTO: 6.2 FL (ref 6–12)
POTASSIUM SERPL-SCNC: 3.9 MMOL/L (ref 3.5–5.2)
PROT SERPL-MCNC: 7.7 G/DL (ref 6–8.5)
RBC # BLD AUTO: 4.65 10*6/MM3 (ref 4.14–5.8)
SODIUM SERPL-SCNC: 139 MMOL/L (ref 136–145)
WBC # BLD AUTO: 9.9 10*3/MM3 (ref 3.4–10.8)

## 2020-10-15 PROCEDURE — 82550 ASSAY OF CK (CPK): CPT | Performed by: INTERNAL MEDICINE

## 2020-10-15 PROCEDURE — 86140 C-REACTIVE PROTEIN: CPT | Performed by: INTERNAL MEDICINE

## 2020-10-15 PROCEDURE — 85652 RBC SED RATE AUTOMATED: CPT | Performed by: INTERNAL MEDICINE

## 2020-10-15 PROCEDURE — 80053 COMPREHEN METABOLIC PANEL: CPT | Performed by: INTERNAL MEDICINE

## 2020-10-15 PROCEDURE — 85025 COMPLETE CBC W/AUTO DIFF WBC: CPT | Performed by: INTERNAL MEDICINE

## 2020-10-15 PROCEDURE — 36592 COLLECT BLOOD FROM PICC: CPT

## 2020-10-22 ENCOUNTER — INFUSION (OUTPATIENT)
Dept: ONCOLOGY | Facility: HOSPITAL | Age: 45
End: 2020-10-22

## 2020-10-22 DIAGNOSIS — A41.9 SEPSIS WITHOUT ACUTE ORGAN DYSFUNCTION, DUE TO UNSPECIFIED ORGANISM (HCC): ICD-10-CM

## 2020-10-22 LAB
ALBUMIN SERPL-MCNC: 4.1 G/DL (ref 3.5–5.2)
ALBUMIN/GLOB SERPL: 1.2 G/DL
ALP SERPL-CCNC: 195 U/L (ref 39–117)
ALT SERPL W P-5'-P-CCNC: 165 U/L (ref 1–41)
ANION GAP SERPL CALCULATED.3IONS-SCNC: 11 MMOL/L (ref 5–15)
AST SERPL-CCNC: 104 U/L (ref 1–40)
BASOPHILS # BLD AUTO: 0.05 10*3/MM3 (ref 0–0.2)
BASOPHILS NFR BLD AUTO: 0.8 % (ref 0–1.5)
BILIRUB SERPL-MCNC: 0.3 MG/DL (ref 0–1.2)
BUN SERPL-MCNC: 12 MG/DL (ref 6–20)
BUN/CREAT SERPL: 12.5 (ref 7–25)
CALCIUM SPEC-SCNC: 9.3 MG/DL (ref 8.6–10.5)
CHLORIDE SERPL-SCNC: 103 MMOL/L (ref 98–107)
CK SERPL-CCNC: 36 U/L (ref 20–200)
CO2 SERPL-SCNC: 25 MMOL/L (ref 22–29)
CREAT SERPL-MCNC: 0.96 MG/DL (ref 0.76–1.27)
CRP SERPL-MCNC: 4.28 MG/DL (ref 0–0.5)
DEPRECATED RDW RBC AUTO: 42.8 FL (ref 37–54)
EOSINOPHIL # BLD AUTO: 0.15 10*3/MM3 (ref 0–0.4)
EOSINOPHIL NFR BLD AUTO: 2.4 % (ref 0.3–6.2)
ERYTHROCYTE [DISTWIDTH] IN BLOOD BY AUTOMATED COUNT: 14.1 % (ref 12.3–15.4)
ERYTHROCYTE [SEDIMENTATION RATE] IN BLOOD: 45 MM/HR (ref 0–15)
GFR SERPL CREATININE-BSD FRML MDRD: 85 ML/MIN/1.73
GLOBULIN UR ELPH-MCNC: 3.5 GM/DL
GLUCOSE SERPL-MCNC: 111 MG/DL (ref 65–99)
HCT VFR BLD AUTO: 37.5 % (ref 37.5–51)
HGB BLD-MCNC: 12.4 G/DL (ref 13–17.7)
IMM GRANULOCYTES # BLD AUTO: 0.03 10*3/MM3 (ref 0–0.05)
IMM GRANULOCYTES NFR BLD AUTO: 0.5 % (ref 0–0.5)
LYMPHOCYTES # BLD AUTO: 1.79 10*3/MM3 (ref 0.7–3.1)
LYMPHOCYTES NFR BLD AUTO: 29.1 % (ref 19.6–45.3)
MCH RBC QN AUTO: 27.9 PG (ref 26.6–33)
MCHC RBC AUTO-ENTMCNC: 33.1 G/DL (ref 31.5–35.7)
MCV RBC AUTO: 84.5 FL (ref 79–97)
MONOCYTES # BLD AUTO: 0.58 10*3/MM3 (ref 0.1–0.9)
MONOCYTES NFR BLD AUTO: 9.4 % (ref 5–12)
NEUTROPHILS NFR BLD AUTO: 3.55 10*3/MM3 (ref 1.7–7)
NEUTROPHILS NFR BLD AUTO: 57.8 % (ref 42.7–76)
NRBC BLD AUTO-RTO: 0 /100 WBC (ref 0–0.2)
PLATELET # BLD AUTO: 248 10*3/MM3 (ref 140–450)
PMV BLD AUTO: 8.5 FL (ref 6–12)
POTASSIUM SERPL-SCNC: 4.3 MMOL/L (ref 3.5–5.2)
PROT SERPL-MCNC: 7.6 G/DL (ref 6–8.5)
RBC # BLD AUTO: 4.44 10*6/MM3 (ref 4.14–5.8)
SODIUM SERPL-SCNC: 139 MMOL/L (ref 136–145)
WBC # BLD AUTO: 6.15 10*3/MM3 (ref 3.4–10.8)

## 2020-10-22 PROCEDURE — 80053 COMPREHEN METABOLIC PANEL: CPT

## 2020-10-22 PROCEDURE — G0463 HOSPITAL OUTPT CLINIC VISIT: HCPCS

## 2020-10-22 PROCEDURE — 86140 C-REACTIVE PROTEIN: CPT

## 2020-10-22 PROCEDURE — 82550 ASSAY OF CK (CPK): CPT

## 2020-10-22 PROCEDURE — 85025 COMPLETE CBC W/AUTO DIFF WBC: CPT

## 2020-10-22 PROCEDURE — 85651 RBC SED RATE NONAUTOMATED: CPT

## 2020-10-26 ENCOUNTER — LAB REQUISITION (OUTPATIENT)
Dept: LAB | Facility: HOSPITAL | Age: 45
End: 2020-10-26

## 2020-10-26 DIAGNOSIS — Z00.00 ROUTINE GENERAL MEDICAL EXAMINATION AT A HEALTH CARE FACILITY: ICD-10-CM

## 2020-10-26 LAB
ALBUMIN SERPL-MCNC: 4.6 G/DL (ref 3.5–5.2)
ALBUMIN/GLOB SERPL: 1.5 G/DL
ALP SERPL-CCNC: 195 U/L (ref 39–117)
ALT SERPL W P-5'-P-CCNC: 123 U/L (ref 1–41)
ANION GAP SERPL CALCULATED.3IONS-SCNC: 12 MMOL/L (ref 5–15)
AST SERPL-CCNC: 40 U/L (ref 1–40)
BILIRUB SERPL-MCNC: 0.2 MG/DL (ref 0–1.2)
BUN SERPL-MCNC: 10 MG/DL (ref 6–20)
BUN/CREAT SERPL: 11 (ref 7–25)
CALCIUM SPEC-SCNC: 9.8 MG/DL (ref 8.6–10.5)
CHLORIDE SERPL-SCNC: 103 MMOL/L (ref 98–107)
CK SERPL-CCNC: 40 U/L (ref 20–200)
CO2 SERPL-SCNC: 25 MMOL/L (ref 22–29)
CREAT SERPL-MCNC: 0.91 MG/DL (ref 0.76–1.27)
CRP SERPL-MCNC: 0.81 MG/DL (ref 0–0.5)
GFR SERPL CREATININE-BSD FRML MDRD: 90 ML/MIN/1.73
GLOBULIN UR ELPH-MCNC: 3.1 GM/DL
GLUCOSE SERPL-MCNC: 126 MG/DL (ref 65–99)
POTASSIUM SERPL-SCNC: 4.3 MMOL/L (ref 3.5–5.2)
PROT SERPL-MCNC: 7.7 G/DL (ref 6–8.5)
SODIUM SERPL-SCNC: 140 MMOL/L (ref 136–145)

## 2020-10-26 PROCEDURE — 80053 COMPREHEN METABOLIC PANEL: CPT | Performed by: INTERNAL MEDICINE

## 2020-10-26 PROCEDURE — 82550 ASSAY OF CK (CPK): CPT | Performed by: INTERNAL MEDICINE

## 2020-10-26 PROCEDURE — 86140 C-REACTIVE PROTEIN: CPT | Performed by: INTERNAL MEDICINE

## 2020-10-29 ENCOUNTER — HOSPITAL ENCOUNTER (OUTPATIENT)
Dept: ONCOLOGY | Facility: HOSPITAL | Age: 45
Setting detail: INFUSION SERIES
Discharge: HOME | End: 2020-10-29

## 2020-10-29 VITALS
RESPIRATION RATE: 18 BRPM | DIASTOLIC BLOOD PRESSURE: 90 MMHG | WEIGHT: 209 LBS | SYSTOLIC BLOOD PRESSURE: 135 MMHG | BODY MASS INDEX: 28.31 KG/M2 | HEART RATE: 109 BPM | HEIGHT: 72 IN | TEMPERATURE: 97.7 F

## 2020-10-29 DIAGNOSIS — A41.9 SEPSIS WITHOUT ACUTE ORGAN DYSFUNCTION, DUE TO UNSPECIFIED ORGANISM (HCC): ICD-10-CM

## 2020-10-29 LAB
ALBUMIN SERPL-MCNC: 4.5 G/DL (ref 3.5–5.2)
ALBUMIN/GLOB SERPL: 1.3 G/DL
ALP SERPL-CCNC: 215 U/L (ref 39–117)
ALT SERPL W P-5'-P-CCNC: 216 U/L (ref 1–41)
ANION GAP SERPL CALCULATED.3IONS-SCNC: 10 MMOL/L (ref 5–15)
AST SERPL-CCNC: 61 U/L (ref 1–40)
BILIRUB SERPL-MCNC: 0.2 MG/DL (ref 0–1.2)
BUN SERPL-MCNC: 11 MG/DL (ref 6–20)
BUN/CREAT SERPL: 12.9 (ref 7–25)
CALCIUM SPEC-SCNC: 9.9 MG/DL (ref 8.6–10.5)
CHLORIDE SERPL-SCNC: 102 MMOL/L (ref 98–107)
CK SERPL-CCNC: 37 U/L (ref 20–200)
CO2 SERPL-SCNC: 25 MMOL/L (ref 22–29)
CREAT SERPL-MCNC: 0.85 MG/DL (ref 0.76–1.27)
CRP SERPL-MCNC: 1.16 MG/DL (ref 0–0.5)
ERYTHROCYTE [DISTWIDTH] IN BLOOD BY AUTOMATED COUNT: 15.3 % (ref 12.3–15.4)
ERYTHROCYTE [SEDIMENTATION RATE] IN BLOOD: 56 MM/HR (ref 0–15)
GFR SERPL CREATININE-BSD FRML MDRD: 97 ML/MIN/1.73
GLOBULIN UR ELPH-MCNC: 3.4 GM/DL
GLUCOSE SERPL-MCNC: 102 MG/DL (ref 65–99)
HCT VFR BLD AUTO: 40.5 % (ref 37.5–51)
HGB BLD-MCNC: 13.4 G/DL (ref 13–17.7)
LYMPHOCYTES # BLD AUTO: 2.2 10*3/MM3 (ref 0.7–3.1)
LYMPHOCYTES NFR BLD AUTO: 31.2 % (ref 19.6–45.3)
MCH RBC QN AUTO: 27.9 PG (ref 26.6–33)
MCHC RBC AUTO-ENTMCNC: 33.1 G/DL (ref 31.5–35.7)
MCV RBC AUTO: 84.1 FL (ref 79–97)
MONOCYTES # BLD AUTO: 0.4 10*3/MM3 (ref 0.1–0.9)
MONOCYTES NFR BLD AUTO: 6 % (ref 5–12)
NEUTROPHILS NFR BLD AUTO: 4.3 10*3/MM3 (ref 1.7–7)
NEUTROPHILS NFR BLD AUTO: 62.8 % (ref 42.7–76)
PLATELET # BLD AUTO: 306 10*3/MM3 (ref 140–450)
PMV BLD AUTO: 6.6 FL (ref 6–12)
POTASSIUM SERPL-SCNC: 4.5 MMOL/L (ref 3.5–5.2)
PROT SERPL-MCNC: 7.9 G/DL (ref 6–8.5)
RBC # BLD AUTO: 4.82 10*6/MM3 (ref 4.14–5.8)
SODIUM SERPL-SCNC: 137 MMOL/L (ref 136–145)
WBC # BLD AUTO: 6.9 10*3/MM3 (ref 3.4–10.8)

## 2020-10-29 PROCEDURE — 82550 ASSAY OF CK (CPK): CPT | Performed by: INTERNAL MEDICINE

## 2020-10-29 PROCEDURE — 80053 COMPREHEN METABOLIC PANEL: CPT | Performed by: INTERNAL MEDICINE

## 2020-10-29 PROCEDURE — 36592 COLLECT BLOOD FROM PICC: CPT

## 2020-10-29 PROCEDURE — 86140 C-REACTIVE PROTEIN: CPT | Performed by: INTERNAL MEDICINE

## 2020-10-29 PROCEDURE — 85652 RBC SED RATE AUTOMATED: CPT | Performed by: INTERNAL MEDICINE

## 2020-10-29 PROCEDURE — 85025 COMPLETE CBC W/AUTO DIFF WBC: CPT | Performed by: INTERNAL MEDICINE

## 2020-11-02 PROBLEM — M46.28 OSTEOMYELITIS OF SACRUM (HCC): Status: ACTIVE | Noted: 2020-11-02

## 2020-11-03 ENCOUNTER — HOSPITAL ENCOUNTER (OUTPATIENT)
Dept: INFUSION THERAPY | Facility: HOSPITAL | Age: 45
Discharge: HOME | End: 2020-11-03
Admitting: INTERNAL MEDICINE

## 2020-11-03 VITALS
RESPIRATION RATE: 18 BRPM | HEIGHT: 71 IN | HEART RATE: 91 BPM | SYSTOLIC BLOOD PRESSURE: 145 MMHG | DIASTOLIC BLOOD PRESSURE: 99 MMHG | OXYGEN SATURATION: 100 % | TEMPERATURE: 97.8 F | WEIGHT: 202 LBS | BODY MASS INDEX: 28.28 KG/M2

## 2020-11-03 DIAGNOSIS — M60.08 ABSCESS OF PARASPINAL MUSCLES: ICD-10-CM

## 2020-11-03 DIAGNOSIS — M46.28 OSTEOMYELITIS OF SACRUM (HCC): Primary | ICD-10-CM

## 2020-11-03 PROCEDURE — 96365 THER/PROPH/DIAG IV INF INIT: CPT

## 2020-11-03 RX ORDER — SODIUM CHLORIDE 0.9 % (FLUSH) 0.9 %
10 SYRINGE (ML) INJECTION AS NEEDED
Status: CANCELLED | OUTPATIENT
Start: 2020-11-03

## 2020-11-03 RX ADMIN — CEFTAROLINE FOSAMIL 600 MG: 600 POWDER, FOR SOLUTION INTRAVENOUS at 11:03

## 2020-11-05 ENCOUNTER — HOSPITAL ENCOUNTER (OUTPATIENT)
Dept: ONCOLOGY | Facility: HOSPITAL | Age: 45
Setting detail: INFUSION SERIES
Discharge: HOME | End: 2020-11-05

## 2020-11-05 VITALS
HEART RATE: 95 BPM | RESPIRATION RATE: 16 BRPM | TEMPERATURE: 98.1 F | SYSTOLIC BLOOD PRESSURE: 133 MMHG | BODY MASS INDEX: 28.42 KG/M2 | HEIGHT: 71 IN | WEIGHT: 203 LBS | DIASTOLIC BLOOD PRESSURE: 92 MMHG

## 2020-11-05 DIAGNOSIS — A41.9 SEPSIS WITHOUT ACUTE ORGAN DYSFUNCTION, DUE TO UNSPECIFIED ORGANISM (HCC): ICD-10-CM

## 2020-11-05 LAB
ALBUMIN SERPL-MCNC: 4.5 G/DL (ref 3.5–5.2)
ALBUMIN/GLOB SERPL: 1.5 G/DL
ALP SERPL-CCNC: 130 U/L (ref 39–117)
ALT SERPL W P-5'-P-CCNC: 27 U/L (ref 1–41)
ANION GAP SERPL CALCULATED.3IONS-SCNC: 11 MMOL/L (ref 5–15)
AST SERPL-CCNC: 13 U/L (ref 1–40)
BILIRUB SERPL-MCNC: 0.2 MG/DL (ref 0–1.2)
BUN SERPL-MCNC: 11 MG/DL (ref 6–20)
BUN/CREAT SERPL: 12 (ref 7–25)
CALCIUM SPEC-SCNC: 9.5 MG/DL (ref 8.6–10.5)
CHLORIDE SERPL-SCNC: 104 MMOL/L (ref 98–107)
CK SERPL-CCNC: 39 U/L (ref 20–200)
CO2 SERPL-SCNC: 24 MMOL/L (ref 22–29)
CREAT SERPL-MCNC: 0.92 MG/DL (ref 0.76–1.27)
CRP SERPL-MCNC: 0.45 MG/DL (ref 0–0.5)
ERYTHROCYTE [DISTWIDTH] IN BLOOD BY AUTOMATED COUNT: 15.3 % (ref 12.3–15.4)
ERYTHROCYTE [SEDIMENTATION RATE] IN BLOOD: 32 MM/HR (ref 0–15)
GFR SERPL CREATININE-BSD FRML MDRD: 89 ML/MIN/1.73
GLOBULIN UR ELPH-MCNC: 3 GM/DL
GLUCOSE SERPL-MCNC: 93 MG/DL (ref 65–99)
HCT VFR BLD AUTO: 39.6 % (ref 37.5–51)
HGB BLD-MCNC: 12.8 G/DL (ref 13–17.7)
LYMPHOCYTES # BLD AUTO: 2.1 10*3/MM3 (ref 0.7–3.1)
LYMPHOCYTES NFR BLD AUTO: 30.8 % (ref 19.6–45.3)
MCH RBC QN AUTO: 27.3 PG (ref 26.6–33)
MCHC RBC AUTO-ENTMCNC: 32.3 G/DL (ref 31.5–35.7)
MCV RBC AUTO: 84.5 FL (ref 79–97)
MONOCYTES # BLD AUTO: 0.4 10*3/MM3 (ref 0.1–0.9)
MONOCYTES NFR BLD AUTO: 5.2 % (ref 5–12)
NEUTROPHILS NFR BLD AUTO: 4.4 10*3/MM3 (ref 1.7–7)
NEUTROPHILS NFR BLD AUTO: 64 % (ref 42.7–76)
PLATELET # BLD AUTO: 306 10*3/MM3 (ref 140–450)
PMV BLD AUTO: 6.7 FL (ref 6–12)
POTASSIUM SERPL-SCNC: 4.1 MMOL/L (ref 3.5–5.2)
PROT SERPL-MCNC: 7.5 G/DL (ref 6–8.5)
RBC # BLD AUTO: 4.69 10*6/MM3 (ref 4.14–5.8)
SODIUM SERPL-SCNC: 139 MMOL/L (ref 136–145)
WBC # BLD AUTO: 6.8 10*3/MM3 (ref 3.4–10.8)

## 2020-11-05 PROCEDURE — 82550 ASSAY OF CK (CPK): CPT | Performed by: INTERNAL MEDICINE

## 2020-11-05 PROCEDURE — 80053 COMPREHEN METABOLIC PANEL: CPT | Performed by: INTERNAL MEDICINE

## 2020-11-05 PROCEDURE — 36592 COLLECT BLOOD FROM PICC: CPT

## 2020-11-05 PROCEDURE — 86140 C-REACTIVE PROTEIN: CPT | Performed by: INTERNAL MEDICINE

## 2020-11-05 PROCEDURE — 85652 RBC SED RATE AUTOMATED: CPT | Performed by: INTERNAL MEDICINE

## 2020-11-05 PROCEDURE — 85025 COMPLETE CBC W/AUTO DIFF WBC: CPT | Performed by: INTERNAL MEDICINE

## 2020-11-05 RX ORDER — CEFTAROLINE FOSAMIL 600 MG/20ML
600 POWDER, FOR SOLUTION INTRAVENOUS EVERY 12 HOURS SCHEDULED
COMMUNITY
Start: 2020-11-03 | End: 2021-05-11

## 2020-11-12 ENCOUNTER — HOSPITAL ENCOUNTER (OUTPATIENT)
Dept: ONCOLOGY | Facility: HOSPITAL | Age: 45
Setting detail: INFUSION SERIES
Discharge: HOME | End: 2020-11-12

## 2020-11-12 VITALS
TEMPERATURE: 97.9 F | DIASTOLIC BLOOD PRESSURE: 88 MMHG | HEART RATE: 91 BPM | SYSTOLIC BLOOD PRESSURE: 135 MMHG | RESPIRATION RATE: 18 BRPM | WEIGHT: 203 LBS | BODY MASS INDEX: 28.31 KG/M2

## 2020-11-12 DIAGNOSIS — M46.28 OSTEOMYELITIS OF SACRUM (HCC): Primary | ICD-10-CM

## 2020-11-12 LAB
ALBUMIN SERPL-MCNC: 4.4 G/DL (ref 3.5–5.2)
ALBUMIN/GLOB SERPL: 1.5 G/DL
ALP SERPL-CCNC: 109 U/L (ref 39–117)
ALT SERPL W P-5'-P-CCNC: 13 U/L (ref 1–41)
ANION GAP SERPL CALCULATED.3IONS-SCNC: 12 MMOL/L (ref 5–15)
AST SERPL-CCNC: 12 U/L (ref 1–40)
BILIRUB SERPL-MCNC: 0.2 MG/DL (ref 0–1.2)
BUN SERPL-MCNC: 11 MG/DL (ref 6–20)
BUN/CREAT SERPL: 12.1 (ref 7–25)
CALCIUM SPEC-SCNC: 9 MG/DL (ref 8.6–10.5)
CHLORIDE SERPL-SCNC: 103 MMOL/L (ref 98–107)
CK SERPL-CCNC: 41 U/L (ref 20–200)
CO2 SERPL-SCNC: 22 MMOL/L (ref 22–29)
CREAT SERPL-MCNC: 0.91 MG/DL (ref 0.76–1.27)
CRP SERPL-MCNC: 0.42 MG/DL (ref 0–0.5)
ERYTHROCYTE [DISTWIDTH] IN BLOOD BY AUTOMATED COUNT: 15.5 % (ref 12.3–15.4)
ERYTHROCYTE [SEDIMENTATION RATE] IN BLOOD: 24 MM/HR (ref 0–15)
GFR SERPL CREATININE-BSD FRML MDRD: 90 ML/MIN/1.73
GLOBULIN UR ELPH-MCNC: 2.9 GM/DL
GLUCOSE SERPL-MCNC: 131 MG/DL (ref 65–99)
HCT VFR BLD AUTO: 39.6 % (ref 37.5–51)
HGB BLD-MCNC: 13.5 G/DL (ref 13–17.7)
LYMPHOCYTES # BLD AUTO: 1.9 10*3/MM3 (ref 0.7–3.1)
LYMPHOCYTES NFR BLD AUTO: 26.9 % (ref 19.6–45.3)
MCH RBC QN AUTO: 29.2 PG (ref 26.6–33)
MCHC RBC AUTO-ENTMCNC: 34.2 G/DL (ref 31.5–35.7)
MCV RBC AUTO: 85.3 FL (ref 79–97)
MONOCYTES # BLD AUTO: 0.4 10*3/MM3 (ref 0.1–0.9)
MONOCYTES NFR BLD AUTO: 5.8 % (ref 5–12)
NEUTROPHILS NFR BLD AUTO: 4.6 10*3/MM3 (ref 1.7–7)
NEUTROPHILS NFR BLD AUTO: 67.3 % (ref 42.7–76)
PLATELET # BLD AUTO: 301 10*3/MM3 (ref 140–450)
PMV BLD AUTO: 6.7 FL (ref 6–12)
POTASSIUM SERPL-SCNC: 3.6 MMOL/L (ref 3.5–5.2)
PROT SERPL-MCNC: 7.3 G/DL (ref 6–8.5)
RBC # BLD AUTO: 4.64 10*6/MM3 (ref 4.14–5.8)
SODIUM SERPL-SCNC: 137 MMOL/L (ref 136–145)
WBC # BLD AUTO: 6.9 10*3/MM3 (ref 3.4–10.8)

## 2020-11-12 PROCEDURE — 86140 C-REACTIVE PROTEIN: CPT | Performed by: INTERNAL MEDICINE

## 2020-11-12 PROCEDURE — 85652 RBC SED RATE AUTOMATED: CPT | Performed by: INTERNAL MEDICINE

## 2020-11-12 PROCEDURE — 80053 COMPREHEN METABOLIC PANEL: CPT | Performed by: INTERNAL MEDICINE

## 2020-11-12 PROCEDURE — 82550 ASSAY OF CK (CPK): CPT | Performed by: INTERNAL MEDICINE

## 2020-11-12 PROCEDURE — 85025 COMPLETE CBC W/AUTO DIFF WBC: CPT | Performed by: INTERNAL MEDICINE

## 2020-11-12 PROCEDURE — 36592 COLLECT BLOOD FROM PICC: CPT

## 2020-11-12 RX ORDER — SODIUM CHLORIDE 0.9 % (FLUSH) 0.9 %
10 SYRINGE (ML) INJECTION AS NEEDED
Status: CANCELLED | OUTPATIENT
Start: 2020-11-12

## 2020-11-19 ENCOUNTER — APPOINTMENT (OUTPATIENT)
Dept: ONCOLOGY | Facility: HOSPITAL | Age: 45
End: 2020-11-19

## 2020-11-19 ENCOUNTER — INFUSION (OUTPATIENT)
Dept: ONCOLOGY | Facility: HOSPITAL | Age: 45
End: 2020-11-19

## 2020-11-19 VITALS
RESPIRATION RATE: 16 BRPM | DIASTOLIC BLOOD PRESSURE: 84 MMHG | SYSTOLIC BLOOD PRESSURE: 137 MMHG | TEMPERATURE: 96.9 F | HEART RATE: 94 BPM

## 2020-11-19 DIAGNOSIS — M46.28 OSTEOMYELITIS OF SACRUM (HCC): ICD-10-CM

## 2020-11-19 LAB
ALBUMIN SERPL-MCNC: 4.3 G/DL (ref 3.5–5.2)
ALBUMIN/GLOB SERPL: 1.3 G/DL
ALP SERPL-CCNC: 111 U/L (ref 39–117)
ALT SERPL W P-5'-P-CCNC: 11 U/L (ref 1–41)
ANION GAP SERPL CALCULATED.3IONS-SCNC: 10.5 MMOL/L (ref 5–15)
AST SERPL-CCNC: 13 U/L (ref 1–40)
BASOPHILS # BLD AUTO: 0.03 10*3/MM3 (ref 0–0.2)
BASOPHILS NFR BLD AUTO: 0.5 % (ref 0–1.5)
BILIRUB SERPL-MCNC: 0.3 MG/DL (ref 0–1.2)
BUN SERPL-MCNC: 19 MG/DL (ref 6–20)
BUN/CREAT SERPL: 20.9 (ref 7–25)
CALCIUM SPEC-SCNC: 9.8 MG/DL (ref 8.6–10.5)
CHLORIDE SERPL-SCNC: 102 MMOL/L (ref 98–107)
CK SERPL-CCNC: 48 U/L (ref 20–200)
CO2 SERPL-SCNC: 24.5 MMOL/L (ref 22–29)
CREAT SERPL-MCNC: 0.91 MG/DL (ref 0.76–1.27)
CRP SERPL-MCNC: 0.45 MG/DL (ref 0–0.5)
DEPRECATED RDW RBC AUTO: 42.8 FL (ref 37–54)
EOSINOPHIL # BLD AUTO: 0.16 10*3/MM3 (ref 0–0.4)
EOSINOPHIL NFR BLD AUTO: 2.5 % (ref 0.3–6.2)
ERYTHROCYTE [DISTWIDTH] IN BLOOD BY AUTOMATED COUNT: 14.2 % (ref 12.3–15.4)
ERYTHROCYTE [SEDIMENTATION RATE] IN BLOOD: 17 MM/HR (ref 0–15)
GFR SERPL CREATININE-BSD FRML MDRD: 90 ML/MIN/1.73
GLOBULIN UR ELPH-MCNC: 3.2 GM/DL
GLUCOSE SERPL-MCNC: 135 MG/DL (ref 65–99)
HCT VFR BLD AUTO: 43.4 % (ref 37.5–51)
HGB BLD-MCNC: 14.5 G/DL (ref 13–17.7)
IMM GRANULOCYTES # BLD AUTO: 0.02 10*3/MM3 (ref 0–0.05)
IMM GRANULOCYTES NFR BLD AUTO: 0.3 % (ref 0–0.5)
LYMPHOCYTES # BLD AUTO: 1.93 10*3/MM3 (ref 0.7–3.1)
LYMPHOCYTES NFR BLD AUTO: 30 % (ref 19.6–45.3)
MCH RBC QN AUTO: 27.9 PG (ref 26.6–33)
MCHC RBC AUTO-ENTMCNC: 33.4 G/DL (ref 31.5–35.7)
MCV RBC AUTO: 83.6 FL (ref 79–97)
MONOCYTES # BLD AUTO: 0.56 10*3/MM3 (ref 0.1–0.9)
MONOCYTES NFR BLD AUTO: 8.7 % (ref 5–12)
NEUTROPHILS NFR BLD AUTO: 3.74 10*3/MM3 (ref 1.7–7)
NEUTROPHILS NFR BLD AUTO: 58 % (ref 42.7–76)
NRBC BLD AUTO-RTO: 0 /100 WBC (ref 0–0.2)
PLATELET # BLD AUTO: 265 10*3/MM3 (ref 140–450)
PMV BLD AUTO: 8.9 FL (ref 6–12)
POTASSIUM SERPL-SCNC: 4.5 MMOL/L (ref 3.5–5.2)
PROT SERPL-MCNC: 7.5 G/DL (ref 6–8.5)
RBC # BLD AUTO: 5.19 10*6/MM3 (ref 4.14–5.8)
SODIUM SERPL-SCNC: 137 MMOL/L (ref 136–145)
WBC # BLD AUTO: 6.44 10*3/MM3 (ref 3.4–10.8)

## 2020-11-19 PROCEDURE — 86140 C-REACTIVE PROTEIN: CPT

## 2020-11-19 PROCEDURE — 96523 IRRIG DRUG DELIVERY DEVICE: CPT

## 2020-11-19 PROCEDURE — 36592 COLLECT BLOOD FROM PICC: CPT

## 2020-11-19 PROCEDURE — 85025 COMPLETE CBC W/AUTO DIFF WBC: CPT

## 2020-11-19 PROCEDURE — 82550 ASSAY OF CK (CPK): CPT

## 2020-11-19 PROCEDURE — 80053 COMPREHEN METABOLIC PANEL: CPT

## 2020-11-19 PROCEDURE — 85651 RBC SED RATE NONAUTOMATED: CPT

## 2020-11-25 ENCOUNTER — APPOINTMENT (OUTPATIENT)
Dept: ONCOLOGY | Facility: HOSPITAL | Age: 45
End: 2020-11-25

## 2020-11-26 ENCOUNTER — HOSPITAL ENCOUNTER (OUTPATIENT)
Dept: INFUSION THERAPY | Facility: HOSPITAL | Age: 45
Setting detail: INFUSION SERIES
Discharge: HOME | End: 2020-11-26

## 2020-11-26 VITALS
BODY MASS INDEX: 29.4 KG/M2 | OXYGEN SATURATION: 100 % | RESPIRATION RATE: 16 BRPM | SYSTOLIC BLOOD PRESSURE: 140 MMHG | WEIGHT: 210 LBS | TEMPERATURE: 98.2 F | HEIGHT: 71 IN | HEART RATE: 84 BPM | DIASTOLIC BLOOD PRESSURE: 98 MMHG

## 2020-11-26 DIAGNOSIS — R78.81 BACTEREMIA DUE TO GRAM-POSITIVE BACTERIA: ICD-10-CM

## 2020-11-26 DIAGNOSIS — L02.31 ABSCESS, GLUTEAL, LEFT: ICD-10-CM

## 2020-11-26 DIAGNOSIS — A41.9 SEPSIS WITHOUT ACUTE ORGAN DYSFUNCTION, DUE TO UNSPECIFIED ORGANISM (HCC): ICD-10-CM

## 2020-11-26 DIAGNOSIS — M46.28 OSTEOMYELITIS OF SACRUM (HCC): Primary | ICD-10-CM

## 2020-11-26 LAB
ALBUMIN SERPL-MCNC: 4.3 G/DL (ref 3.5–5.2)
ALBUMIN/GLOB SERPL: 1.4 G/DL
ALP SERPL-CCNC: 174 U/L (ref 39–117)
ALT SERPL W P-5'-P-CCNC: 89 U/L (ref 1–41)
ANION GAP SERPL CALCULATED.3IONS-SCNC: 9.4 MMOL/L (ref 5–15)
AST SERPL-CCNC: 18 U/L (ref 1–40)
BASOPHILS # BLD AUTO: 0.05 10*3/MM3 (ref 0–0.2)
BASOPHILS NFR BLD AUTO: 0.8 % (ref 0–1.5)
BILIRUB SERPL-MCNC: 0.2 MG/DL (ref 0–1.2)
BUN SERPL-MCNC: 8 MG/DL (ref 6–20)
BUN/CREAT SERPL: 9.2 (ref 7–25)
CALCIUM SPEC-SCNC: 9.6 MG/DL (ref 8.6–10.5)
CHLORIDE SERPL-SCNC: 100 MMOL/L (ref 98–107)
CK SERPL-CCNC: 39 U/L (ref 20–200)
CO2 SERPL-SCNC: 26.6 MMOL/L (ref 22–29)
CREAT SERPL-MCNC: 0.87 MG/DL (ref 0.76–1.27)
CRP SERPL-MCNC: 0.75 MG/DL (ref 0–0.5)
DEPRECATED RDW RBC AUTO: 43.7 FL (ref 37–54)
EOSINOPHIL # BLD AUTO: 0.19 10*3/MM3 (ref 0–0.4)
EOSINOPHIL NFR BLD AUTO: 3.1 % (ref 0.3–6.2)
ERYTHROCYTE [DISTWIDTH] IN BLOOD BY AUTOMATED COUNT: 14.1 % (ref 12.3–15.4)
ERYTHROCYTE [SEDIMENTATION RATE] IN BLOOD: 18 MM/HR (ref 0–15)
GFR SERPL CREATININE-BSD FRML MDRD: 95 ML/MIN/1.73
GLOBULIN UR ELPH-MCNC: 3 GM/DL
GLUCOSE SERPL-MCNC: 111 MG/DL (ref 65–99)
HCT VFR BLD AUTO: 41.7 % (ref 37.5–51)
HGB BLD-MCNC: 13.9 G/DL (ref 13–17.7)
IMM GRANULOCYTES # BLD AUTO: 0.03 10*3/MM3 (ref 0–0.05)
IMM GRANULOCYTES NFR BLD AUTO: 0.5 % (ref 0–0.5)
LYMPHOCYTES # BLD AUTO: 1.63 10*3/MM3 (ref 0.7–3.1)
LYMPHOCYTES NFR BLD AUTO: 27 % (ref 19.6–45.3)
MCH RBC QN AUTO: 28.2 PG (ref 26.6–33)
MCHC RBC AUTO-ENTMCNC: 33.3 G/DL (ref 31.5–35.7)
MCV RBC AUTO: 84.6 FL (ref 79–97)
MONOCYTES # BLD AUTO: 0.56 10*3/MM3 (ref 0.1–0.9)
MONOCYTES NFR BLD AUTO: 9.3 % (ref 5–12)
NEUTROPHILS NFR BLD AUTO: 3.58 10*3/MM3 (ref 1.7–7)
NEUTROPHILS NFR BLD AUTO: 59.3 % (ref 42.7–76)
NRBC BLD AUTO-RTO: 0 /100 WBC (ref 0–0.2)
PLATELET # BLD AUTO: 247 10*3/MM3 (ref 140–450)
PMV BLD AUTO: 8.8 FL (ref 6–12)
POTASSIUM SERPL-SCNC: 4.2 MMOL/L (ref 3.5–5.2)
PROT SERPL-MCNC: 7.3 G/DL (ref 6–8.5)
RBC # BLD AUTO: 4.93 10*6/MM3 (ref 4.14–5.8)
SODIUM SERPL-SCNC: 136 MMOL/L (ref 136–145)
WBC # BLD AUTO: 6.04 10*3/MM3 (ref 3.4–10.8)

## 2020-11-26 PROCEDURE — 86140 C-REACTIVE PROTEIN: CPT | Performed by: INTERNAL MEDICINE

## 2020-11-26 PROCEDURE — 36592 COLLECT BLOOD FROM PICC: CPT

## 2020-11-26 PROCEDURE — 85651 RBC SED RATE NONAUTOMATED: CPT | Performed by: INTERNAL MEDICINE

## 2020-11-26 PROCEDURE — 82550 ASSAY OF CK (CPK): CPT | Performed by: INTERNAL MEDICINE

## 2020-11-26 PROCEDURE — 85025 COMPLETE CBC W/AUTO DIFF WBC: CPT | Performed by: INTERNAL MEDICINE

## 2020-11-26 PROCEDURE — 80053 COMPREHEN METABOLIC PANEL: CPT | Performed by: INTERNAL MEDICINE

## 2020-11-26 RX ORDER — SODIUM CHLORIDE 0.9 % (FLUSH) 0.9 %
10 SYRINGE (ML) INJECTION AS NEEDED
Status: DISCONTINUED | OUTPATIENT
Start: 2020-11-26 | End: 2020-11-28 | Stop reason: HOSPADM

## 2020-11-26 RX ORDER — SODIUM CHLORIDE 0.9 % (FLUSH) 0.9 %
10 SYRINGE (ML) INJECTION AS NEEDED
Status: CANCELLED | OUTPATIENT
Start: 2020-11-26

## 2020-11-26 RX ADMIN — SODIUM CHLORIDE, PRESERVATIVE FREE 10 ML: 5 INJECTION INTRAVENOUS at 09:16

## 2020-11-30 ENCOUNTER — HOSPITAL ENCOUNTER (OUTPATIENT)
Dept: ONCOLOGY | Facility: HOSPITAL | Age: 45
Setting detail: INFUSION SERIES
Discharge: HOME | End: 2020-11-30

## 2020-11-30 VITALS
WEIGHT: 210 LBS | HEART RATE: 86 BPM | DIASTOLIC BLOOD PRESSURE: 9 MMHG | HEIGHT: 71 IN | TEMPERATURE: 97.3 F | RESPIRATION RATE: 18 BRPM | BODY MASS INDEX: 29.4 KG/M2 | SYSTOLIC BLOOD PRESSURE: 144 MMHG

## 2020-11-30 DIAGNOSIS — L02.31 ABSCESS, GLUTEAL, LEFT: ICD-10-CM

## 2020-11-30 DIAGNOSIS — A41.9 SEPSIS WITHOUT ACUTE ORGAN DYSFUNCTION, DUE TO UNSPECIFIED ORGANISM (HCC): Primary | ICD-10-CM

## 2020-11-30 DIAGNOSIS — M46.28 OSTEOMYELITIS OF SACRUM (HCC): ICD-10-CM

## 2020-11-30 DIAGNOSIS — R78.81 BACTEREMIA DUE TO GRAM-POSITIVE BACTERIA: ICD-10-CM

## 2020-11-30 LAB
ALBUMIN SERPL-MCNC: 4.7 G/DL (ref 3.5–5.2)
ALBUMIN/GLOB SERPL: 1.5 G/DL
ALP SERPL-CCNC: 167 U/L (ref 39–117)
ALT SERPL W P-5'-P-CCNC: 36 U/L (ref 1–41)
ANION GAP SERPL CALCULATED.3IONS-SCNC: 13 MMOL/L (ref 5–15)
AST SERPL-CCNC: 17 U/L (ref 1–40)
BILIRUB SERPL-MCNC: 0.2 MG/DL (ref 0–1.2)
BUN SERPL-MCNC: 12 MG/DL (ref 6–20)
BUN/CREAT SERPL: 12.8 (ref 7–25)
CALCIUM SPEC-SCNC: 10 MG/DL (ref 8.6–10.5)
CHLORIDE SERPL-SCNC: 99 MMOL/L (ref 98–107)
CK SERPL-CCNC: 43 U/L (ref 20–200)
CO2 SERPL-SCNC: 25 MMOL/L (ref 22–29)
CREAT SERPL-MCNC: 0.94 MG/DL (ref 0.76–1.27)
CRP SERPL-MCNC: 0.27 MG/DL (ref 0–0.5)
ERYTHROCYTE [DISTWIDTH] IN BLOOD BY AUTOMATED COUNT: 14.6 % (ref 12.3–15.4)
ERYTHROCYTE [SEDIMENTATION RATE] IN BLOOD: 28 MM/HR (ref 0–15)
GFR SERPL CREATININE-BSD FRML MDRD: 87 ML/MIN/1.73
GLOBULIN UR ELPH-MCNC: 3.2 GM/DL
GLUCOSE SERPL-MCNC: 89 MG/DL (ref 65–99)
HCT VFR BLD AUTO: 47.3 % (ref 37.5–51)
HGB BLD-MCNC: 15.5 G/DL (ref 13–17.7)
LYMPHOCYTES # BLD AUTO: 2.7 10*3/MM3 (ref 0.7–3.1)
LYMPHOCYTES NFR BLD AUTO: 31.2 % (ref 19.6–45.3)
MCH RBC QN AUTO: 27.6 PG (ref 26.6–33)
MCHC RBC AUTO-ENTMCNC: 32.7 G/DL (ref 31.5–35.7)
MCV RBC AUTO: 84.4 FL (ref 79–97)
MONOCYTES # BLD AUTO: 0.5 10*3/MM3 (ref 0.1–0.9)
MONOCYTES NFR BLD AUTO: 6.2 % (ref 5–12)
NEUTROPHILS NFR BLD AUTO: 5.5 10*3/MM3 (ref 1.7–7)
NEUTROPHILS NFR BLD AUTO: 62.6 % (ref 42.7–76)
PLATELET # BLD AUTO: 336 10*3/MM3 (ref 140–450)
PMV BLD AUTO: 7.3 FL (ref 6–12)
POTASSIUM SERPL-SCNC: 4.6 MMOL/L (ref 3.5–5.2)
PROT SERPL-MCNC: 7.9 G/DL (ref 6–8.5)
RBC # BLD AUTO: 5.6 10*6/MM3 (ref 4.14–5.8)
SODIUM SERPL-SCNC: 137 MMOL/L (ref 136–145)
WBC # BLD AUTO: 8.8 10*3/MM3 (ref 3.4–10.8)

## 2020-11-30 PROCEDURE — 86140 C-REACTIVE PROTEIN: CPT | Performed by: INTERNAL MEDICINE

## 2020-11-30 PROCEDURE — 80053 COMPREHEN METABOLIC PANEL: CPT | Performed by: INTERNAL MEDICINE

## 2020-11-30 PROCEDURE — 85652 RBC SED RATE AUTOMATED: CPT | Performed by: INTERNAL MEDICINE

## 2020-11-30 PROCEDURE — 85025 COMPLETE CBC W/AUTO DIFF WBC: CPT | Performed by: INTERNAL MEDICINE

## 2020-11-30 PROCEDURE — 36592 COLLECT BLOOD FROM PICC: CPT

## 2020-11-30 PROCEDURE — 82550 ASSAY OF CK (CPK): CPT | Performed by: INTERNAL MEDICINE

## 2020-11-30 RX ORDER — SODIUM CHLORIDE 0.9 % (FLUSH) 0.9 %
10 SYRINGE (ML) INJECTION AS NEEDED
Status: CANCELLED | OUTPATIENT
Start: 2020-11-30

## 2021-01-07 ENCOUNTER — HOSPITAL ENCOUNTER (EMERGENCY)
Facility: HOSPITAL | Age: 46
Discharge: HOME | End: 2021-01-07
Attending: EMERGENCY MEDICINE | Admitting: EMERGENCY MEDICINE

## 2021-01-07 ENCOUNTER — APPOINTMENT (OUTPATIENT)
Dept: MRI IMAGING | Facility: HOSPITAL | Age: 46
End: 2021-01-07

## 2021-01-07 VITALS
OXYGEN SATURATION: 99 % | WEIGHT: 236 LBS | TEMPERATURE: 99 F | HEART RATE: 97 BPM | SYSTOLIC BLOOD PRESSURE: 145 MMHG | DIASTOLIC BLOOD PRESSURE: 108 MMHG | RESPIRATION RATE: 18 BRPM | HEIGHT: 69 IN | BODY MASS INDEX: 34.96 KG/M2

## 2021-01-07 DIAGNOSIS — M86.9 OSTEOMYELITIS OF OTHER SITE, UNSPECIFIED TYPE (HCC): Primary | ICD-10-CM

## 2021-01-07 LAB
ALBUMIN SERPL-MCNC: 4.5 G/DL (ref 3.5–5.2)
ALBUMIN/GLOB SERPL: 1.3 G/DL
ALP SERPL-CCNC: 132 U/L (ref 39–117)
ALT SERPL W P-5'-P-CCNC: 24 U/L (ref 1–41)
ANION GAP SERPL CALCULATED.3IONS-SCNC: 10.7 MMOL/L (ref 5–15)
AST SERPL-CCNC: 18 U/L (ref 1–40)
BASOPHILS # BLD AUTO: 0.04 10*3/MM3 (ref 0–0.2)
BASOPHILS NFR BLD AUTO: 0.5 % (ref 0–1.5)
BILIRUB SERPL-MCNC: <0.2 MG/DL (ref 0–1.2)
BILIRUB UR QL STRIP: NEGATIVE
BUN SERPL-MCNC: 11 MG/DL (ref 6–20)
BUN/CREAT SERPL: 11.7 (ref 7–25)
CALCIUM SPEC-SCNC: 9.5 MG/DL (ref 8.6–10.5)
CHLORIDE SERPL-SCNC: 102 MMOL/L (ref 98–107)
CLARITY UR: CLEAR
CO2 SERPL-SCNC: 23.3 MMOL/L (ref 22–29)
COLOR UR: YELLOW
CREAT SERPL-MCNC: 0.94 MG/DL (ref 0.76–1.27)
CRP SERPL-MCNC: 0.49 MG/DL (ref 0–0.5)
DEPRECATED RDW RBC AUTO: 41 FL (ref 37–54)
EOSINOPHIL # BLD AUTO: 0.1 10*3/MM3 (ref 0–0.4)
EOSINOPHIL NFR BLD AUTO: 1.2 % (ref 0.3–6.2)
ERYTHROCYTE [DISTWIDTH] IN BLOOD BY AUTOMATED COUNT: 13.4 % (ref 12.3–15.4)
ERYTHROCYTE [SEDIMENTATION RATE] IN BLOOD: 6 MM/HR (ref 0–15)
GFR SERPL CREATININE-BSD FRML MDRD: 87 ML/MIN/1.73
GLOBULIN UR ELPH-MCNC: 3.5 GM/DL
GLUCOSE SERPL-MCNC: 100 MG/DL (ref 65–99)
GLUCOSE UR STRIP-MCNC: NEGATIVE MG/DL
HCT VFR BLD AUTO: 49.1 % (ref 37.5–51)
HGB BLD-MCNC: 16.5 G/DL (ref 13–17.7)
HGB UR QL STRIP.AUTO: NEGATIVE
IMM GRANULOCYTES # BLD AUTO: 0.04 10*3/MM3 (ref 0–0.05)
IMM GRANULOCYTES NFR BLD AUTO: 0.5 % (ref 0–0.5)
KETONES UR QL STRIP: NEGATIVE
LEUKOCYTE ESTERASE UR QL STRIP.AUTO: NEGATIVE
LYMPHOCYTES # BLD AUTO: 2.14 10*3/MM3 (ref 0.7–3.1)
LYMPHOCYTES NFR BLD AUTO: 25.8 % (ref 19.6–45.3)
MCH RBC QN AUTO: 28.1 PG (ref 26.6–33)
MCHC RBC AUTO-ENTMCNC: 33.6 G/DL (ref 31.5–35.7)
MCV RBC AUTO: 83.6 FL (ref 79–97)
MONOCYTES # BLD AUTO: 0.72 10*3/MM3 (ref 0.1–0.9)
MONOCYTES NFR BLD AUTO: 8.7 % (ref 5–12)
NEUTROPHILS NFR BLD AUTO: 5.26 10*3/MM3 (ref 1.7–7)
NEUTROPHILS NFR BLD AUTO: 63.3 % (ref 42.7–76)
NITRITE UR QL STRIP: NEGATIVE
NRBC BLD AUTO-RTO: 0 /100 WBC (ref 0–0.2)
PH UR STRIP.AUTO: 6 [PH] (ref 5–8)
PLATELET # BLD AUTO: 312 10*3/MM3 (ref 140–450)
PMV BLD AUTO: 8.6 FL (ref 6–12)
POTASSIUM SERPL-SCNC: 4.4 MMOL/L (ref 3.5–5.2)
PROT SERPL-MCNC: 8 G/DL (ref 6–8.5)
PROT UR QL STRIP: NEGATIVE
RBC # BLD AUTO: 5.87 10*6/MM3 (ref 4.14–5.8)
SODIUM SERPL-SCNC: 136 MMOL/L (ref 136–145)
SP GR UR STRIP: 1.02 (ref 1–1.03)
UROBILINOGEN UR QL STRIP: NORMAL
WBC # BLD AUTO: 8.3 10*3/MM3 (ref 3.4–10.8)

## 2021-01-07 PROCEDURE — 72148 MRI LUMBAR SPINE W/O DYE: CPT

## 2021-01-07 PROCEDURE — 99283 EMERGENCY DEPT VISIT LOW MDM: CPT

## 2021-01-07 PROCEDURE — 72197 MRI PELVIS W/O & W/DYE: CPT

## 2021-01-07 PROCEDURE — A9577 INJ MULTIHANCE: HCPCS | Performed by: EMERGENCY MEDICINE

## 2021-01-07 PROCEDURE — 85651 RBC SED RATE NONAUTOMATED: CPT | Performed by: NURSE PRACTITIONER

## 2021-01-07 PROCEDURE — 0 GADOBENATE DIMEGLUMINE 529 MG/ML SOLUTION: Performed by: EMERGENCY MEDICINE

## 2021-01-07 PROCEDURE — 80053 COMPREHEN METABOLIC PANEL: CPT | Performed by: NURSE PRACTITIONER

## 2021-01-07 PROCEDURE — 85025 COMPLETE CBC W/AUTO DIFF WBC: CPT | Performed by: NURSE PRACTITIONER

## 2021-01-07 PROCEDURE — 81003 URINALYSIS AUTO W/O SCOPE: CPT | Performed by: NURSE PRACTITIONER

## 2021-01-07 PROCEDURE — 86140 C-REACTIVE PROTEIN: CPT | Performed by: NURSE PRACTITIONER

## 2021-01-07 RX ORDER — SODIUM CHLORIDE 0.9 % (FLUSH) 0.9 %
10 SYRINGE (ML) INJECTION AS NEEDED
Status: DISCONTINUED | OUTPATIENT
Start: 2021-01-07 | End: 2021-01-07 | Stop reason: HOSPADM

## 2021-01-07 RX ORDER — OXYCODONE AND ACETAMINOPHEN 10; 325 MG/1; MG/1
1 TABLET ORAL ONCE
Status: COMPLETED | OUTPATIENT
Start: 2021-01-07 | End: 2021-01-07

## 2021-01-07 RX ADMIN — GADOBENATE DIMEGLUMINE 15 ML: 529 INJECTION, SOLUTION INTRAVENOUS at 16:11

## 2021-01-07 RX ADMIN — SODIUM CHLORIDE 1000 ML: 9 INJECTION, SOLUTION INTRAVENOUS at 14:57

## 2021-01-07 RX ADMIN — OXYCODONE HYDROCHLORIDE AND ACETAMINOPHEN 1 TABLET: 10; 325 TABLET ORAL at 17:02

## 2021-01-07 RX ADMIN — GADOBENATE DIMEGLUMINE 6 ML: 529 INJECTION, SOLUTION INTRAVENOUS at 16:10

## 2021-01-07 NOTE — ED PROVIDER NOTES
Subjective   History of Present Illness  This is a 45-year-old gentleman who comes in today complaining of lower back pain.  He reports a recent history of osteomyelitis of his lumbar spine.  He states he was admitted to the hospital 5 months ago and has been on IV antibiotics since that time.  He had his PICC line removed and he has been on oral antibiotics since then for the last week he has experienced increase in lower back pain.  He states his mom felt a knot just lateral to his spine.  He denies any fever chills nausea or vomiting.  Review of Systems   Constitutional: Negative.    HENT: Negative.    Eyes: Negative.    Respiratory: Negative.    Cardiovascular: Negative.    Gastrointestinal: Negative.    Genitourinary: Negative.    Musculoskeletal: Positive for back pain.   Skin: Negative.    Neurological: Negative.    Psychiatric/Behavioral: Negative.        Past Medical History:   Diagnosis Date   • High cholesterol    • Hypertension    • Migraine    • Osteomyelitis (CMS/HCC)        No Known Allergies    Past Surgical History:   Procedure Laterality Date   • BUNIONECTOMY     • CHOLECYSTECTOMY     • COLONOSCOPY     • ENDOSCOPY     • HERNIA REPAIR     • KNEE OPEN LATERAL RELEASE      Right   • TONSILLECTOMY AND ADENOIDECTOMY         Family History   Problem Relation Age of Onset   • Hypertension Father    • Hypertension Mother    • Prostate cancer Paternal Uncle    • Heart disease Paternal Grandmother    • Prostate cancer Paternal Grandfather    • Prostate cancer Paternal Uncle    • Malig Hyperthermia Sister        Social History     Socioeconomic History   • Marital status: Single     Spouse name: Not on file   • Number of children: Not on file   • Years of education: Not on file   • Highest education level: Not on file   Tobacco Use   • Smoking status: Current Every Day Smoker     Packs/day: 0.50     Types: Cigarettes   • Smokeless tobacco: Never Used   Substance and Sexual Activity   • Alcohol use: Not  Currently   • Drug use: Not Currently           Objective   Physical Exam  Vitals signs and nursing note reviewed.   Constitutional:       Appearance: Normal appearance. He is normal weight.   Neurological:      Mental Status: He is alert.     GEN: No acute distress  Head: Normocephalic, atraumatic  Eyes: Pupils equal round reactive to light  ENT: Posterior pharynx normal in appearance, oral mucosa is moist  Chest: Nontender to palpation  Cardiovascular: Regular rate  Lungs: Clear to auscultation bilaterally  Abdomen: Soft, nontender, nondistended, no peritoneal signs  Extremities: No edema, normal appearance  Neuro: GCS 15  Psych: Mood and affect are appropriate  Lumbar: Tender to lateral lumbar spine.  Negative straight leg raise.  Procedures           ED Course  ED Course as of Jan 07 1646   Thu Jan 07, 2021   1642 Comparing the MRI from September to now it looks much improved there are no abscesses.  He still has inflammatory changes but he has a normal white blood count and a sed rate and CRP is normal.  I have explained the findings to the patient I have advised him to follow-up with his infectious disease provider Dr. Cruz.  I have given him strict return to care instructions and he is agreeable to this plan of care.    [TW]      ED Course User Index  [TW] Sara Daniels, APRN                                           MDM  Number of Diagnoses or Management Options     Amount and/or Complexity of Data Reviewed  Clinical lab tests: ordered and reviewed  Tests in the radiology section of CPT®: ordered and reviewed  Review and summarize past medical records: yes  Discuss the patient with other providers: yes    Risk of Complications, Morbidity, and/or Mortality  Presenting problems: moderate  Diagnostic procedures: moderate  Management options: moderate        Final diagnoses:   Osteomyelitis of other site, unspecified type (CMS/[facility])            Sara Daniels APRN  01/07/21 1646

## 2021-03-12 ENCOUNTER — OFFICE VISIT (OUTPATIENT)
Dept: UROLOGY | Facility: CLINIC | Age: 46
End: 2021-03-12

## 2021-03-12 VITALS
WEIGHT: 236 LBS | TEMPERATURE: 97.3 F | RESPIRATION RATE: 17 BRPM | SYSTOLIC BLOOD PRESSURE: 132 MMHG | DIASTOLIC BLOOD PRESSURE: 85 MMHG | HEIGHT: 71 IN | BODY MASS INDEX: 33.04 KG/M2

## 2021-03-12 DIAGNOSIS — R39.9 LOWER URINARY TRACT SYMPTOMS (LUTS): ICD-10-CM

## 2021-03-12 DIAGNOSIS — R79.89 LOW TESTOSTERONE: Primary | ICD-10-CM

## 2021-03-12 LAB
BILIRUB BLD-MCNC: ABNORMAL MG/DL
CLARITY, POC: CLEAR
COLOR UR: YELLOW
GLUCOSE UR STRIP-MCNC: NEGATIVE MG/DL
KETONES UR QL: NEGATIVE
LEUKOCYTE EST, POC: NEGATIVE
NITRITE UR-MCNC: NEGATIVE MG/ML
PH UR: 6 [PH] (ref 5–8)
PROT UR STRIP-MCNC: NEGATIVE MG/DL
RBC # UR STRIP: NEGATIVE /UL
SP GR UR: 1.01 (ref 1–1.03)
UROBILINOGEN UR QL: NORMAL

## 2021-03-12 PROCEDURE — 81003 URINALYSIS AUTO W/O SCOPE: CPT | Performed by: UROLOGY

## 2021-03-12 PROCEDURE — 99244 OFF/OP CNSLTJ NEW/EST MOD 40: CPT | Performed by: UROLOGY

## 2021-03-12 RX ORDER — CYANOCOBALAMIN 1000 UG/ML
INJECTION, SOLUTION INTRAMUSCULAR; SUBCUTANEOUS
COMMUNITY
Start: 2021-02-18 | End: 2022-02-24

## 2021-03-12 RX ORDER — LAMOTRIGINE 25 MG/1
TABLET ORAL
COMMUNITY
End: 2021-11-19 | Stop reason: DRUGHIGH

## 2021-03-12 RX ORDER — LISINOPRIL 30 MG/1
TABLET ORAL
COMMUNITY
Start: 2021-02-03

## 2021-03-12 RX ORDER — CLONIDINE HYDROCHLORIDE 0.1 MG/1
TABLET ORAL AS NEEDED
COMMUNITY
Start: 2021-03-03 | End: 2022-10-26

## 2021-03-12 NOTE — PROGRESS NOTES
Chief Complaint  Low testosterone    Referring Provider  Pretty Rodriguez APRN HPI  Mr. Johnson is a 45 y.o. male with history of osteomyelitis who presents with low testosterone.  The serum test was collected due to the following complaints: low libido, low energy, weight gain, poor sleep, trouble concentrating.    Low libido   yes  Low energy   yes  Poor sleep   yes  Mental clarity issues  yes    History of depression? yes  Erectile dysfunction?  no    Any potential interest in conception?  no    The patient has been seeing Franchesca Rodriguez NP. He asked her to check his testosterone due to being on testosterone therapy in the past. He tried testosterone injections, 2 mL bi-weekly into the thigh. He has not taken the injections in over a year. He notes that he did not notice much difference with the injections. He has a history of kidney stones, BPH and some urinary symptoms. He has seen a urologist in the past who advised him to just keep an eye on it. He denies a prostate biopsy.     The patient notes some leakage but it is not frequent. He is just getting over osteomyelitis of the sacrum. He notes that he was told it is from the use of IV drugs in the past. He was tested for HIV and Hep C and were both negative. He has had to use incontinence pads in the past but is not currently. He denies any sexually transmitted diseases.     IPSS Questionnaire (AUA-7):  Over the past month…    1)  Incomplete Emptying  How often have you had a sensation of not emptying your bladder?  5 - Almost always   2)  Frequency  How often have you had to urinate less than every two hours? 4 - More than half the time   3)  Intermittency  How often have you found you stopped and started again several times when you urinated?  5 - Almost always   4) Urgency  How often have you found it difficult to postpone urination?  3 - About half the time   5) Weak Stream  How often have you had a weak urinary stream?  4 - More than half the time   6)  Straining  How often have you had to push or strain to begin urination?  4 - More than half the time   7) Nocturia  How many times did you typically get up at night to urinate?  4 - 4 times   Total Score:  29       Quality of life due to urinary symptom the s:  If you were to spend the rest of your life with your urinary condition the way it is now, how would you feel about that? 4-Mostly Dissatisfied   Urine Leakage (Incontinence) 0-No Leakage       Past Medical History  Past Medical History:   Diagnosis Date   • High cholesterol    • Hypertension    • Migraine    • Osteomyelitis (CMS/HCC)        Past Surgical History  Past Surgical History:   Procedure Laterality Date   • BUNIONECTOMY     • CHOLECYSTECTOMY     • COLONOSCOPY     • ENDOSCOPY     • HERNIA REPAIR     • KNEE OPEN LATERAL RELEASE      Right   • TONSILLECTOMY AND ADENOIDECTOMY         Medications    Current Outpatient Medications:   •  ceftaroline (Teflaro) 600 MG injection, Infuse 600 mg into a venous catheter Every 12 (Twelve) Hours., Disp: , Rfl:   •  cetirizine (zyrTEC) 10 MG tablet, Take 1 tablet by mouth Daily., Disp:  , Rfl:   •  cyclobenzaprine (FLEXERIL) 10 MG tablet, Take 10 mg by mouth 3 (Three) Times a Day As Needed for Muscle Spasms., Disp: , Rfl:   •  etodolac (LODINE) 400 MG tablet, Take 400 mg by mouth 2 (Two) Times a Day., Disp: , Rfl:   •  ipratropium-albuterol (DUO-NEB) 0.5-2.5 mg/3 ml nebulizer, Take 3 mL by nebulization Every 6 (Six) Hours As Needed for Shortness of Air., Disp: 360 mL, Rfl:   •  lactobacillus acidophilus (RISAQUAD) capsule capsule, Take 1 capsule by mouth 2 (Two) Times a Day., Disp:  , Rfl:   •  lidocaine (LIDODERM) 5 %, Place 1 patch on the skin as directed by provider Daily. Remove & Discard patch within 12 hours or as directed by MD, Disp:  , Rfl:   •  Loratadine (Claritin) 10 MG capsule, Take  by mouth., Disp: , Rfl:   •  nicotine (NICODERM CQ) 21 MG/24HR patch, Place 1 patch on the skin as directed by provider  "Daily., Disp:  , Rfl:   •  pantoprazole (PROTONIX) 40 MG EC tablet, Take 1 tablet by mouth Every Morning., Disp:  , Rfl:   •  topiramate (TOPAMAX) 50 MG tablet, Take 1 tablet by mouth Daily., Disp:  , Rfl:   •  venlafaxine XR (EFFEXOR-XR) 150 MG 24 hr capsule, Take 1 capsule by mouth Daily., Disp:  , Rfl:   •  cloNIDine (CATAPRES) 0.1 MG tablet, , Disp: , Rfl:   •  cyanocobalamin 1000 MCG/ML injection, , Disp: , Rfl:   •  HYDROcodone-acetaminophen (NORCO) 7.5-325 MG per tablet, Take 1 tablet by mouth Every 4 (Four) Hours As Needed for Moderate Pain  or Severe Pain ., Disp:  , Rfl:   •  lamoTRIgine (LaMICtal) 25 MG tablet, lamotrigine 25 mg tablet, Disp: , Rfl:   •  lisinopril (PRINIVIL,ZESTRIL) 30 MG tablet, , Disp: , Rfl:   •  ondansetron (ZOFRAN) 4 MG tablet, Take 1 tablet by mouth Every 6 (Six) Hours As Needed for Nausea or Vomiting., Disp:  , Rfl:     Allergies  No Known Allergies    Social History  Social History     Socioeconomic History   • Marital status: Single     Spouse name: Not on file   • Number of children: Not on file   • Years of education: Not on file   • Highest education level: Not on file   Tobacco Use   • Smoking status: Current Every Day Smoker     Packs/day: 0.50     Types: Cigarettes   • Smokeless tobacco: Never Used   Substance and Sexual Activity   • Alcohol use: Not Currently   • Drug use: Not Currently   • Sexual activity: Defer       Family History  He has + family history of prostate cancer (paternal uncles, half brother).    Review of Systems  A full ROS was performed and notable for Hx of IVDA.    Physical Exam  Visit Vitals  /85   Temp 97.3 °F (36.3 °C)   Resp 17   Ht 180.3 cm (71\")   Wt 107 kg (236 lb)   BMI 32.92 kg/m²     A PE was performed and notable for obese.    Genitourinary - Deferred    Labs  No results found for: TESTOSTERONE    Lab Results   Component Value Date    PSA 1.210 10/26/2016       Lab Results   Component Value Date    WBC 8.30 01/07/2021    HGB 16.5 " 01/07/2021    HCT 49.1 01/07/2021    MCV 83.6 01/07/2021     01/07/2021     Lab Results   Component Value Date    GLUCOSE 100 (H) 01/07/2021    CALCIUM 9.5 01/07/2021     01/07/2021    K 4.4 01/07/2021    CO2 23.3 01/07/2021     01/07/2021    BUN 11 01/07/2021    CREATININE 0.94 01/07/2021    EGFRIFNONA 87 01/07/2021    BCR 11.7 01/07/2021    ANIONGAP 10.7 01/07/2021       Labs were checked at Ms. Rodriguez's office on 10/28/2020:  Free testosterone was 6.6 picograms per mL.   Total serum testosterone was 197 nanograms per dL.   TSH was normal at 2.2 microliters per mL.   HIV and Hepatitis C were checked as well and found negative.       Assessment  Mr. Johnson is a 45 y.o. male with acute worsening of chronic low testosterone. He also has acute worsening of lower urinary tract symptoms.     Today we discussed the role testosterone plays for a male patient. I have indicated that low testosterone can be associated with metabolic syndrome, erectile dysfunction, coronary artery disease, diabetes, depression, osteoporosis, fatigue, low sex drive, poor sleep, high cholesterol, increased abdominal fat, muscle loss, irritability, hot flashes, and inability to concentrate.     Treatment options were discussed including SERMs, aromatase inhibitors, topical gel or patch, oral troches, nasal spray, testosterone injections every 2-3 weeks, long-acting injections every 10 weeks, and testosterone pellets placed in clinic every 4-6 months.    I explained the risks, benefits, and alternatives to testosterone therapies. I informed him of the potential SEs of chest and leg swelling, increased acne, change in mood, polycythemia, and worsening of undiagnosed prostate cancer.    He has not done well with IM injections in the past and he has a history if IV drug use abuse, both reasons why I think it is contraindicated to proceed with at home testosterone injections. We therefore discussed Testopel as probably the best  option for him to take the usage of needles and medication out of his hands. Additionally we need to work-up his lower urinary tract symptoms and perform prostate cancer screenings given his significant family history.        Plan  1.  He wishes to proceed with a trial of Testopel. We will do this when he is off of his oral antibiotics for past history of osteomyelitis.   2.  PSA today and repeat lab testing today.   3.  Follow-up in approximately 3 weeks for cystoscopy, MIEK, GE and Uroflow.      Markel Centeno MD     Scribed for Markel Centeno MD by Michael Livingston.  03/12/21   13:03 EST    I have personally performed the services described in this document as scribed by the above individual, and it is both accurate and complete.  Markel Centeno MD  3/12/2021  15:34 EST

## 2021-03-13 LAB — PSA SERPL-MCNC: 1.46 NG/ML (ref 0–4)

## 2021-04-01 ENCOUNTER — PROCEDURE VISIT (OUTPATIENT)
Dept: UROLOGY | Facility: CLINIC | Age: 46
End: 2021-04-01

## 2021-04-01 DIAGNOSIS — R39.9 LOWER URINARY TRACT SYMPTOMS (LUTS): Primary | ICD-10-CM

## 2021-04-01 PROCEDURE — 99213 OFFICE O/P EST LOW 20 MIN: CPT | Performed by: UROLOGY

## 2021-04-01 PROCEDURE — 52000 CYSTOURETHROSCOPY: CPT | Performed by: UROLOGY

## 2021-04-01 PROCEDURE — 76872 US TRANSRECTAL: CPT | Performed by: UROLOGY

## 2021-04-01 PROCEDURE — 51741 ELECTRO-UROFLOWMETRY FIRST: CPT | Performed by: UROLOGY

## 2021-04-01 RX ORDER — SODIUM CHLORIDE 9 MG/ML
100 INJECTION, SOLUTION INTRAVENOUS CONTINUOUS
Status: CANCELLED | OUTPATIENT
Start: 2021-04-01

## 2021-04-01 NOTE — PROGRESS NOTES
Chief Complaint  Low testosterone  Lower urinary tract symptoms    HPI  Mr. Johnson is a 45 y.o. male with history of osteomyelitis who presents with low testosterone.  The serum test was collected due to the following complaints: low libido, low energy, weight gain, poor sleep, trouble concentrating.    Low libido   yes  Low energy   yes  Poor sleep   yes  Mental clarity issues  yes    History of depression? yes  Erectile dysfunction?  no    Any potential interest in conception?  no    The patient has been seeing Franchesca Rodriguez NP. He asked her to check his testosterone due to being on testosterone therapy in the past. He tried testosterone injections, 2 mL bi-weekly into the thigh. He has not taken the injections in over a year. He notes that he did not notice much difference with the injections. He has a history of kidney stones, BPH and some urinary symptoms. He has seen a urologist in the past who advised him to just keep an eye on it. He denies a prostate biopsy.     The patient notes some leakage but it is not frequent. He is just getting over osteomyelitis of the sacrum. He notes that he was told it is from the use of IV drugs in the past. He was tested for HIV and Hep C and were both negative. He has had to use incontinence pads in the past but is not currently. He denies any sexually transmitted diseases.     IPSS Questionnaire (AUA-7):  Over the past month…    1)  Incomplete Emptying  How often have you had a sensation of not emptying your bladder?  5 - Almost always   2)  Frequency  How often have you had to urinate less than every two hours? 4 - More than half the time   3)  Intermittency  How often have you found you stopped and started again several times when you urinated?  5 - Almost always   4) Urgency  How often have you found it difficult to postpone urination?  3 - About half the time   5) Weak Stream  How often have you had a weak urinary stream?  4 - More than half the time   6) Straining  How  often have you had to push or strain to begin urination?  4 - More than half the time   7) Nocturia  How many times did you typically get up at night to urinate?  4 - 4 times   Total Score:  29       Quality of life due to urinary symptom the s:  If you were to spend the rest of your life with your urinary condition the way it is now, how would you feel about that? 4-Mostly Dissatisfied   Urine Leakage (Incontinence) 0-No Leakage       Past Medical History  Past Medical History:   Diagnosis Date   • High cholesterol    • Hypertension    • Migraine    • Osteomyelitis (CMS/HCC)        Past Surgical History  Past Surgical History:   Procedure Laterality Date   • BUNIONECTOMY     • CHOLECYSTECTOMY     • COLONOSCOPY     • ENDOSCOPY     • HERNIA REPAIR     • KNEE OPEN LATERAL RELEASE      Right   • TONSILLECTOMY AND ADENOIDECTOMY         Medications    Current Outpatient Medications:   •  ceftaroline (Teflaro) 600 MG injection, Infuse 600 mg into a venous catheter Every 12 (Twelve) Hours., Disp: , Rfl:   •  cetirizine (zyrTEC) 10 MG tablet, Take 1 tablet by mouth Daily., Disp:  , Rfl:   •  cloNIDine (CATAPRES) 0.1 MG tablet, , Disp: , Rfl:   •  cyanocobalamin 1000 MCG/ML injection, , Disp: , Rfl:   •  cyclobenzaprine (FLEXERIL) 10 MG tablet, Take 10 mg by mouth 3 (Three) Times a Day As Needed for Muscle Spasms., Disp: , Rfl:   •  etodolac (LODINE) 400 MG tablet, Take 400 mg by mouth 2 (Two) Times a Day., Disp: , Rfl:   •  HYDROcodone-acetaminophen (NORCO) 7.5-325 MG per tablet, Take 1 tablet by mouth Every 4 (Four) Hours As Needed for Moderate Pain  or Severe Pain ., Disp:  , Rfl:   •  ipratropium-albuterol (DUO-NEB) 0.5-2.5 mg/3 ml nebulizer, Take 3 mL by nebulization Every 6 (Six) Hours As Needed for Shortness of Air., Disp: 360 mL, Rfl:   •  lactobacillus acidophilus (RISAQUAD) capsule capsule, Take 1 capsule by mouth 2 (Two) Times a Day., Disp:  , Rfl:   •  lamoTRIgine (LaMICtal) 25 MG tablet, lamotrigine 25 mg  tablet, Disp: , Rfl:   •  lidocaine (LIDODERM) 5 %, Place 1 patch on the skin as directed by provider Daily. Remove & Discard patch within 12 hours or as directed by MD, Disp:  , Rfl:   •  lisinopril (PRINIVIL,ZESTRIL) 30 MG tablet, , Disp: , Rfl:   •  Loratadine (Claritin) 10 MG capsule, Take  by mouth., Disp: , Rfl:   •  nicotine (NICODERM CQ) 21 MG/24HR patch, Place 1 patch on the skin as directed by provider Daily., Disp:  , Rfl:   •  ondansetron (ZOFRAN) 4 MG tablet, Take 1 tablet by mouth Every 6 (Six) Hours As Needed for Nausea or Vomiting., Disp:  , Rfl:   •  pantoprazole (PROTONIX) 40 MG EC tablet, Take 1 tablet by mouth Every Morning., Disp:  , Rfl:   •  topiramate (TOPAMAX) 50 MG tablet, Take 1 tablet by mouth Daily., Disp:  , Rfl:   •  venlafaxine XR (EFFEXOR-XR) 150 MG 24 hr capsule, Take 1 capsule by mouth Daily., Disp:  , Rfl:     Allergies  No Known Allergies    Social History  Social History     Socioeconomic History   • Marital status: Single     Spouse name: Not on file   • Number of children: Not on file   • Years of education: Not on file   • Highest education level: Not on file   Tobacco Use   • Smoking status: Current Every Day Smoker     Packs/day: 0.50     Types: Cigarettes   • Smokeless tobacco: Never Used   Substance and Sexual Activity   • Alcohol use: Not Currently   • Drug use: Not Currently   • Sexual activity: Defer       Family History  He has + family history of prostate cancer (paternal uncles, half brother).    Review of Systems  A full ROS was performed and notable for Hx of IVDA.    Physical Exam  There were no vitals taken for this visit.  A PE was performed and notable for obese.    Genitourinary - Deferred    Labs  No results found for: TESTOSTERONE    Lab Results   Component Value Date    PSA 1.460 03/12/2021    PSA 1.210 10/26/2016       Lab Results   Component Value Date    WBC 8.30 01/07/2021    HGB 16.5 01/07/2021    HCT 49.1 01/07/2021    MCV 83.6 01/07/2021      01/07/2021     Lab Results   Component Value Date    GLUCOSE 100 (H) 01/07/2021    CALCIUM 9.5 01/07/2021     01/07/2021    K 4.4 01/07/2021    CO2 23.3 01/07/2021     01/07/2021    BUN 11 01/07/2021    CREATININE 0.94 01/07/2021    EGFRIFNONA 87 01/07/2021    BCR 11.7 01/07/2021    ANIONGAP 10.7 01/07/2021       Labs were checked at MsSerjio Michael's office on 10/28/2020:  Free testosterone was 6.6 picograms per mL.   Total serum testosterone was 197 nanograms per dL.   TSH was normal at 2.2 microliters per mL.   HIV and Hepatitis C were checked as well and found negative.     Preprocedure diagnosis  Lower urinary tract symptoms    Postprocedure diagnosis  Same    Procedure  Flexible Cystourethroscopy    Attending surgeon  Markel Centeno MD    Anesthesia  2% lidocaine jelly intraurethrally    Complications  None    Indications  45 y.o. male undergoing a flexible cystoscopy for the above mentioned indications.  Informed consent was obtained.      Findings  Cystoscopic findings included one right and left ureteral orifice in the normal anatomic position with normal bladder mucosa and no tumors, masses or stones. The urethral urothelium was within normal limits with no strictures.  There was not a prominent median lobe.  The lateral lobes were short but obstructive in appearance.  High bladder neck    Procedure  The patient was placed in supine position and prepped and draped in sterile fashion with lidocaine jelly per urethra for anesthesia.  A timeout was performed.  The 14F flexible cystoscope was lubricated and gently placed through the penile urethra and into the bladder.  The bladder was completely visualized.  The cystoscope was retroflexed and the bladder neck and prostate visualized.  The cystoscope was slowly withdrawn while visualizing the urethra and the procedure terminated.  The patient tolerated the procedure well.      Uroflow:  Peak flow rate -8.8 mL/sec  Average flow rate -3.7  mL/sec  Flow curve -low staccato and jagged  Voided volume -171 mL    I personally reviewed  and interpreted this study.     TRUS OF PROSTATE    Preoperative diagnosis  LUTS    Postoperative diagnosis  Same    Procedure  1.  Transrectal ultrasound of the prostate    Attending Surgeon  Markel Centeno MD    Anesthesia  2% lidocaine jelly, intrarectal instillation, 10mL    Complications  None    Specimen  None    Indications  Mr. Johnson is a 45 y.o. male with LUTS.  He presents for prostate ultrasound to evaluate prostate size.    Procedure  The patient was positioned and prepped in a left lateral position with lower extremities flexed.  Lidocaine jelly, 2%, was injected per rectum. A digital rectal exam was performed which demonstrated a smooth prostate without nodules or induration. The [employer] E8CS rectal ultrasound probe was slowly introduced into the rectum without difficulty.  The prostate and seminal vesicles were inspected systematically using cross and sagittal views with the ultrasound. A median lobe was not seen.  The dimensions of the prostate were measured, for a calculated volume of 15.92 mL.  The seminal vesicles appeared normal.  The rectal ultrasound probe was removed.  The patient tolerated the procedure well.      Assessment  Mr. Johnson is a 45 y.o. male with acute worsening of chronic low testosterone. He also has acute worsening of lower urinary tract symptoms.     In a separate room and separate encounter we discussed the different options to treat his lower urinary tract symptoms.  He desired a procedure to definitively treat them to try and keep off of medication.  We discussed the risks, benefits, and alternatives to transurethral incision/resection of the prostate.  He voices understanding wish to proceed.    Plan  1.  He wishes to proceed with a trial of Testopel.   2.  Schedule for TURP in May

## 2021-04-06 ENCOUNTER — TRANSCRIBE ORDERS (OUTPATIENT)
Dept: ADMINISTRATIVE | Facility: HOSPITAL | Age: 46
End: 2021-04-06

## 2021-04-06 DIAGNOSIS — M46.28 OSTEOMYELITIS OF VERTEBRA, SACRAL AND SACROCOCCYGEAL REGION (HCC): Primary | ICD-10-CM

## 2021-04-15 ENCOUNTER — HOSPITAL ENCOUNTER (OUTPATIENT)
Dept: MRI IMAGING | Facility: HOSPITAL | Age: 46
Discharge: HOME | End: 2021-04-15
Admitting: INTERNAL MEDICINE

## 2021-04-15 DIAGNOSIS — M46.28 OSTEOMYELITIS OF VERTEBRA, SACRAL AND SACROCOCCYGEAL REGION (HCC): ICD-10-CM

## 2021-04-15 PROCEDURE — 72195 MRI PELVIS W/O DYE: CPT

## 2021-04-19 ENCOUNTER — LAB (OUTPATIENT)
Dept: LAB | Facility: HOSPITAL | Age: 46
End: 2021-04-19

## 2021-04-19 ENCOUNTER — TRANSCRIBE ORDERS (OUTPATIENT)
Dept: LAB | Facility: HOSPITAL | Age: 46
End: 2021-04-19

## 2021-04-19 DIAGNOSIS — M54.50 LOW BACK PAIN, UNSPECIFIED BACK PAIN LATERALITY, UNSPECIFIED CHRONICITY, UNSPECIFIED WHETHER SCIATICA PRESENT: Primary | ICD-10-CM

## 2021-04-26 ENCOUNTER — TELEPHONE (OUTPATIENT)
Dept: UROLOGY | Facility: CLINIC | Age: 46
End: 2021-04-26

## 2021-04-28 ENCOUNTER — HOSPITAL ENCOUNTER (OUTPATIENT)
Dept: MRI IMAGING | Facility: HOSPITAL | Age: 46
End: 2021-04-28

## 2021-05-11 ENCOUNTER — PRE-ADMISSION TESTING (OUTPATIENT)
Dept: PREADMISSION TESTING | Facility: HOSPITAL | Age: 46
End: 2021-05-11

## 2021-05-11 VITALS — WEIGHT: 266.8 LBS | HEIGHT: 71 IN | BODY MASS INDEX: 37.35 KG/M2

## 2021-05-11 DIAGNOSIS — R39.9 LOWER URINARY TRACT SYMPTOMS (LUTS): ICD-10-CM

## 2021-05-11 DIAGNOSIS — M54.50 LOW BACK PAIN, UNSPECIFIED BACK PAIN LATERALITY, UNSPECIFIED CHRONICITY, UNSPECIFIED WHETHER SCIATICA PRESENT: ICD-10-CM

## 2021-05-11 LAB
ANION GAP SERPL CALCULATED.3IONS-SCNC: 7.9 MMOL/L (ref 5–15)
BASOPHILS # BLD AUTO: 0.04 10*3/MM3 (ref 0–0.2)
BASOPHILS NFR BLD AUTO: 0.5 % (ref 0–1.5)
BILIRUB UR QL STRIP: NEGATIVE
BUN SERPL-MCNC: 9 MG/DL (ref 6–20)
BUN/CREAT SERPL: 12.3 (ref 7–25)
CALCIUM SPEC-SCNC: 9.2 MG/DL (ref 8.6–10.5)
CHLORIDE SERPL-SCNC: 100 MMOL/L (ref 98–107)
CLARITY UR: CLEAR
CO2 SERPL-SCNC: 29.1 MMOL/L (ref 22–29)
COLOR UR: YELLOW
CREAT SERPL-MCNC: 0.73 MG/DL (ref 0.76–1.27)
CRP SERPL-MCNC: 0.85 MG/DL (ref 0–0.5)
DEPRECATED RDW RBC AUTO: 44.2 FL (ref 37–54)
EOSINOPHIL # BLD AUTO: 0.24 10*3/MM3 (ref 0–0.4)
EOSINOPHIL NFR BLD AUTO: 3 % (ref 0.3–6.2)
ERYTHROCYTE [DISTWIDTH] IN BLOOD BY AUTOMATED COUNT: 14.1 % (ref 12.3–15.4)
ERYTHROCYTE [SEDIMENTATION RATE] IN BLOOD: 40 MM/HR (ref 0–15)
ESTRADIOL SERPL HS-MCNC: 12.9 PG/ML
GFR SERPL CREATININE-BSD FRML MDRD: 116 ML/MIN/1.73
GLUCOSE SERPL-MCNC: 127 MG/DL (ref 65–99)
GLUCOSE UR STRIP-MCNC: NEGATIVE MG/DL
HCT VFR BLD AUTO: 37.5 % (ref 37.5–51)
HGB BLD-MCNC: 12.6 G/DL (ref 13–17.7)
HGB UR QL STRIP.AUTO: NEGATIVE
IMM GRANULOCYTES # BLD AUTO: 0.05 10*3/MM3 (ref 0–0.05)
IMM GRANULOCYTES NFR BLD AUTO: 0.6 % (ref 0–0.5)
KETONES UR QL STRIP: NEGATIVE
LEUKOCYTE ESTERASE UR QL STRIP.AUTO: NEGATIVE
LYMPHOCYTES # BLD AUTO: 1.98 10*3/MM3 (ref 0.7–3.1)
LYMPHOCYTES NFR BLD AUTO: 24.9 % (ref 19.6–45.3)
MCH RBC QN AUTO: 29 PG (ref 26.6–33)
MCHC RBC AUTO-ENTMCNC: 33.6 G/DL (ref 31.5–35.7)
MCV RBC AUTO: 86.4 FL (ref 79–97)
MONOCYTES # BLD AUTO: 0.8 10*3/MM3 (ref 0.1–0.9)
MONOCYTES NFR BLD AUTO: 10.1 % (ref 5–12)
NEUTROPHILS NFR BLD AUTO: 4.83 10*3/MM3 (ref 1.7–7)
NEUTROPHILS NFR BLD AUTO: 60.9 % (ref 42.7–76)
NITRITE UR QL STRIP: NEGATIVE
NRBC BLD AUTO-RTO: 0 /100 WBC (ref 0–0.2)
PH UR STRIP.AUTO: 5.5 [PH] (ref 5–8)
PLATELET # BLD AUTO: 272 10*3/MM3 (ref 140–450)
PMV BLD AUTO: 9.3 FL (ref 6–12)
POTASSIUM SERPL-SCNC: 3.9 MMOL/L (ref 3.5–5.2)
PROT UR QL STRIP: NEGATIVE
RBC # BLD AUTO: 4.34 10*6/MM3 (ref 4.14–5.8)
SODIUM SERPL-SCNC: 137 MMOL/L (ref 136–145)
SP GR UR STRIP: 1.01 (ref 1–1.03)
UROBILINOGEN UR QL STRIP: NORMAL
WBC # BLD AUTO: 7.94 10*3/MM3 (ref 3.4–10.8)

## 2021-05-11 PROCEDURE — 85651 RBC SED RATE NONAUTOMATED: CPT

## 2021-05-11 PROCEDURE — 80048 BASIC METABOLIC PNL TOTAL CA: CPT

## 2021-05-11 PROCEDURE — 81003 URINALYSIS AUTO W/O SCOPE: CPT

## 2021-05-11 PROCEDURE — 84403 ASSAY OF TOTAL TESTOSTERONE: CPT | Performed by: UROLOGY

## 2021-05-11 PROCEDURE — 86140 C-REACTIVE PROTEIN: CPT | Performed by: INTERNAL MEDICINE

## 2021-05-11 PROCEDURE — 85025 COMPLETE CBC W/AUTO DIFF WBC: CPT

## 2021-05-11 PROCEDURE — 93005 ELECTROCARDIOGRAM TRACING: CPT

## 2021-05-11 PROCEDURE — 84270 ASSAY OF SEX HORMONE GLOBUL: CPT | Performed by: UROLOGY

## 2021-05-11 PROCEDURE — 82670 ASSAY OF TOTAL ESTRADIOL: CPT | Performed by: UROLOGY

## 2021-05-11 PROCEDURE — 84402 ASSAY OF FREE TESTOSTERONE: CPT | Performed by: UROLOGY

## 2021-05-11 PROCEDURE — 36415 COLL VENOUS BLD VENIPUNCTURE: CPT

## 2021-05-11 RX ORDER — TRAZODONE HYDROCHLORIDE 50 MG/1
50 TABLET ORAL DAILY PRN
COMMUNITY
End: 2022-05-05 | Stop reason: ALTCHOICE

## 2021-05-11 RX ORDER — AMLODIPINE BESYLATE 10 MG/1
10 TABLET ORAL DAILY
COMMUNITY
End: 2021-11-19 | Stop reason: DRUGHIGH

## 2021-05-11 RX ORDER — SULFAMETHOXAZOLE AND TRIMETHOPRIM 800; 160 MG/1; MG/1
1 TABLET ORAL DAILY
COMMUNITY
End: 2022-01-21

## 2021-05-11 RX ORDER — VENLAFAXINE 75 MG/1
75 TABLET ORAL DAILY
COMMUNITY
End: 2022-05-05

## 2021-05-11 RX ORDER — BUPRENORPHINE 2 MG/1
12 TABLET SUBLINGUAL DAILY
COMMUNITY
End: 2021-06-28

## 2021-05-11 NOTE — PAT
Spoke to Juvenal Drew CRNA regarding family history of hyperthermia.  Juvenal will consult with the team to formulate a plan for this patient before the DOS.

## 2021-05-12 LAB
QT INTERVAL: 374 MS
QTC INTERVAL: 423 MS

## 2021-05-14 ENCOUNTER — TELEPHONE (OUTPATIENT)
Dept: UROLOGY | Facility: CLINIC | Age: 46
End: 2021-05-14

## 2021-05-16 LAB
SHBG SERPL-SCNC: 26.4 NMOL/L (ref 16.5–55.9)
TESTOST FREE SERPL-MCNC: 2 PG/ML (ref 6.8–21.5)
TESTOST SERPL-MCNC: 109 NG/DL (ref 264–916)

## 2021-05-16 NOTE — TELEPHONE ENCOUNTER
He needs to discuss this with his PCP and anesthesia. Please have him make appt with PAT for guidance.

## 2021-05-21 ENCOUNTER — APPOINTMENT (OUTPATIENT)
Dept: LAB | Facility: HOSPITAL | Age: 46
End: 2021-05-21

## 2021-05-24 ENCOUNTER — LAB (OUTPATIENT)
Dept: LAB | Facility: HOSPITAL | Age: 46
End: 2021-05-24

## 2021-05-24 DIAGNOSIS — Z01.818 PRE-OP TESTING: Primary | ICD-10-CM

## 2021-05-24 LAB — SARS-COV-2 RNA NOSE QL NAA+PROBE: NOT DETECTED

## 2021-05-24 PROCEDURE — C9803 HOPD COVID-19 SPEC COLLECT: HCPCS

## 2021-05-24 PROCEDURE — U0004 COV-19 TEST NON-CDC HGH THRU: HCPCS

## 2021-05-25 ENCOUNTER — ANESTHESIA EVENT (OUTPATIENT)
Dept: PERIOP | Facility: HOSPITAL | Age: 46
End: 2021-05-25

## 2021-05-26 ENCOUNTER — HOSPITAL ENCOUNTER (OUTPATIENT)
Facility: HOSPITAL | Age: 46
Setting detail: HOSPITAL OUTPATIENT SURGERY
Discharge: HOME | End: 2021-05-26
Attending: UROLOGY | Admitting: UROLOGY

## 2021-05-26 ENCOUNTER — ANESTHESIA (OUTPATIENT)
Dept: PERIOP | Facility: HOSPITAL | Age: 46
End: 2021-05-26

## 2021-05-26 VITALS
OXYGEN SATURATION: 94 % | RESPIRATION RATE: 20 BRPM | SYSTOLIC BLOOD PRESSURE: 149 MMHG | DIASTOLIC BLOOD PRESSURE: 84 MMHG | TEMPERATURE: 97.8 F | HEART RATE: 91 BPM

## 2021-05-26 DIAGNOSIS — R39.9 LOWER URINARY TRACT SYMPTOMS (LUTS): Primary | ICD-10-CM

## 2021-05-26 PROCEDURE — 25010000002 PROPOFOL 1000 MG/100ML EMULSION: Performed by: NURSE ANESTHETIST, CERTIFIED REGISTERED

## 2021-05-26 PROCEDURE — 25010000002 FENTANYL CITRATE (PF) 100 MCG/2ML SOLUTION: Performed by: NURSE ANESTHETIST, CERTIFIED REGISTERED

## 2021-05-26 PROCEDURE — 25010000003 CEFAZOLIN SODIUM-DEXTROSE 2-3 GM-%(50ML) RECONSTITUTED SOLUTION: Performed by: UROLOGY

## 2021-05-26 PROCEDURE — 52601 PROSTATECTOMY (TURP): CPT | Performed by: UROLOGY

## 2021-05-26 PROCEDURE — 94799 UNLISTED PULMONARY SVC/PX: CPT

## 2021-05-26 PROCEDURE — 25010000002 MIDAZOLAM PER 1MG: Performed by: NURSE ANESTHETIST, CERTIFIED REGISTERED

## 2021-05-26 RX ORDER — DOCUSATE SODIUM 100 MG/1
100 CAPSULE, LIQUID FILLED ORAL 2 TIMES DAILY
Qty: 15 CAPSULE | Refills: 1 | Status: SHIPPED | OUTPATIENT
Start: 2021-05-26 | End: 2021-11-19 | Stop reason: DRUGHIGH

## 2021-05-26 RX ORDER — MIDAZOLAM HYDROCHLORIDE 2 MG/2ML
INJECTION, SOLUTION INTRAMUSCULAR; INTRAVENOUS AS NEEDED
Status: DISCONTINUED | OUTPATIENT
Start: 2021-05-26 | End: 2021-05-26 | Stop reason: SURG

## 2021-05-26 RX ORDER — LEVOFLOXACIN 500 MG/1
500 TABLET, FILM COATED ORAL DAILY
Qty: 5 TABLET | Refills: 0 | Status: SHIPPED | OUTPATIENT
Start: 2021-05-26 | End: 2021-05-31

## 2021-05-26 RX ORDER — SODIUM CHLORIDE 9 MG/ML
100 INJECTION, SOLUTION INTRAVENOUS CONTINUOUS
Status: DISCONTINUED | OUTPATIENT
Start: 2021-05-26 | End: 2021-05-26 | Stop reason: HOSPADM

## 2021-05-26 RX ORDER — ACETAMINOPHEN 325 MG/1
650 TABLET ORAL EVERY 6 HOURS
Qty: 30 TABLET | Refills: 0 | Status: SHIPPED | OUTPATIENT
Start: 2021-05-26 | End: 2021-05-29

## 2021-05-26 RX ORDER — PROPOFOL 10 MG/ML
INJECTION, EMULSION INTRAVENOUS CONTINUOUS PRN
Status: DISCONTINUED | OUTPATIENT
Start: 2021-05-26 | End: 2021-05-26 | Stop reason: SURG

## 2021-05-26 RX ORDER — CEFAZOLIN SODIUM 2 G/50ML
2 SOLUTION INTRAVENOUS ONCE
Status: COMPLETED | OUTPATIENT
Start: 2021-05-26 | End: 2021-05-26

## 2021-05-26 RX ORDER — FENTANYL CITRATE 50 UG/ML
INJECTION, SOLUTION INTRAMUSCULAR; INTRAVENOUS AS NEEDED
Status: DISCONTINUED | OUTPATIENT
Start: 2021-05-26 | End: 2021-05-26 | Stop reason: SURG

## 2021-05-26 RX ORDER — BUPIVACAINE HYDROCHLORIDE 7.5 MG/ML
INJECTION, SOLUTION EPIDURAL; RETROBULBAR
Status: COMPLETED | OUTPATIENT
Start: 2021-05-26 | End: 2021-05-26

## 2021-05-26 RX ORDER — PHENAZOPYRIDINE HYDROCHLORIDE 100 MG/1
100 TABLET, FILM COATED ORAL 3 TIMES DAILY PRN
Qty: 21 TABLET | Refills: 0 | Status: SHIPPED | OUTPATIENT
Start: 2021-05-26 | End: 2021-11-19 | Stop reason: DRUGHIGH

## 2021-05-26 RX ORDER — SODIUM CHLORIDE 0.9 % (FLUSH) 0.9 %
10 SYRINGE (ML) INJECTION AS NEEDED
Status: DISCONTINUED | OUTPATIENT
Start: 2021-05-26 | End: 2021-05-26 | Stop reason: HOSPADM

## 2021-05-26 RX ORDER — ATROPA BELLADONNA AND OPIUM 16.2; 3 MG/1; MG/1
SUPPOSITORY RECTAL AS NEEDED
Status: DISCONTINUED | OUTPATIENT
Start: 2021-05-26 | End: 2021-05-26 | Stop reason: HOSPADM

## 2021-05-26 RX ADMIN — FENTANYL CITRATE 100 MCG: 50 INJECTION INTRAMUSCULAR; INTRAVENOUS at 12:43

## 2021-05-26 RX ADMIN — CEFAZOLIN SODIUM 2 G: 2 SOLUTION INTRAVENOUS at 12:43

## 2021-05-26 RX ADMIN — MIDAZOLAM HYDROCHLORIDE 2 MG: 1 INJECTION, SOLUTION INTRAMUSCULAR; INTRAVENOUS at 12:43

## 2021-05-26 RX ADMIN — BUPIVACAINE HYDROCHLORIDE 1 ML: 7.5 INJECTION, SOLUTION EPIDURAL; RETROBULBAR at 12:50

## 2021-05-26 RX ADMIN — SODIUM CHLORIDE 100 ML/HR: 9 INJECTION, SOLUTION INTRAVENOUS at 12:19

## 2021-05-26 RX ADMIN — PROPOFOL 100 MCG/KG/MIN: 10 INJECTION, EMULSION INTRAVENOUS at 12:50

## 2021-05-26 NOTE — ANESTHESIA PREPROCEDURE EVALUATION
Anesthesia Evaluation     Patient summary reviewed and Nursing notes reviewed   history of anesthetic complications (family history of malignant hyperthermia , pt has had a negative muscle biopsy ): malignant hyperthermia  NPO Solid Status: > 8 hours  NPO Liquid Status: > 8 hours           Airway   Mallampati: II  TM distance: >3 FB  Neck ROM: full  Large neck circumference and Possible difficult intubation  Dental - normal exam     Pulmonary - normal exam   (+) a smoker Current,   Cardiovascular - normal exam  Exercise tolerance: good (4-7 METS)    (+) hypertension poorly controlled, hyperlipidemia,     ROS comment: ROSE: .  Normal left ventricular size and systolic function.  2.  Trace MR and TR.  3.  No echocardiographic evidence of valvular endocarditis.    Transthoracic echo: 1.  Normal left ventricular size and hyperdynamic LV systolic function with LVEF >70%  2.  Normal LV diastolic filling pattern.  3.  Normal right ventricular size and systolic function.  4.  Mild left atrial dilation.  5.  Trace posteriorly directed MR.    Neuro/Psych  (+) headaches (migraines ),     GI/Hepatic/Renal/Endo    (+)  GERD well controlled,  liver disease history of elevated LFT, renal disease,     Musculoskeletal (-) negative ROS    Abdominal  - normal exam   Substance History   (+) alcohol use, drug use     OB/GYN negative ob/gyn ROS         Other        ROS/Med Hx Other: Kidney stone  Sepsis (CMS/HCC)  Abscess of paraspinal muscles  Abscess, gluteal, left  Bacteremia due to Gram-positive bacteria  Chronic midline low back pain without sciatica  Essential hypertension    Abdominal pain  Anemia  Constipation  Diarrhea  Abnormal liver enzymes  Heartburn  Weight loss1.  Normal left ventricular size and hyperdynamic LV systolic function with LVEF >70%  2.  Normal LV diastolic filling pattern.  3.  Normal right ventricular size and systolic function.  4.  Mild left atrial dilation.  5.  Trace posteriorly directed MR.    H/H:  12.6/37.5  K 3.9                        Anesthesia Plan    ASA 2     general and spinal   (Patient advised that intravenous sedation would be used as the primary anesthetic, along with local anesthesia if necessary. Every effort will be made to make sure the patient is comfortable.     The patient was told that they may experience recall of the procedure. The patient was further advised that if the MAC anesthetic was deemed ineffective that general anesthesia administered via LMA or ETT may be required.    Patient verbalized understanding and agreed to plan of care.     Pt requested spinal anesthesia with GA backup)  intravenous induction     Anesthetic plan, all risks, benefits, and alternatives have been provided, discussed and informed consent has been obtained with: patient.    Plan discussed with CRNA.

## 2021-05-26 NOTE — ANESTHESIA POSTPROCEDURE EVALUATION
Patient: Gopi Johnson    Procedure Summary     Date: 05/26/21 Room / Location: [facility] /  [facility]    Anesthesia Start: 1246 Anesthesia Stop: 1350    Procedure: TRANSURETHRAL RESECTION OF PROSTATE (N/A ) Diagnosis:       Lower urinary tract symptoms (LUTS)      (Lower urinary tract symptoms (LUTS) [R39.9])    Surgeons: Markel Centeno MD Provider: Travis Graff CRNA    Anesthesia Type: general ASA Status: 2          Anesthesia Type: general    Vitals  Vitals Value Taken Time   /73 05/26/21 1349   Temp     Pulse 75 05/26/21 1350   Resp     SpO2 100 % 05/26/21 1350   Vitals shown include unvalidated device data.        Post Anesthesia Care and Evaluation    Patient location during evaluation: bedside  Patient participation: complete - patient participated  Level of consciousness: awake and alert  Pain score: 0  Pain management: adequate  Airway patency: patent  Anesthetic complications: No anesthetic complications  PONV Status: none  Cardiovascular status: acceptable  Respiratory status: acceptable  Hydration status: acceptable

## 2021-05-26 NOTE — ANESTHESIA PROCEDURE NOTES
Spinal Block      Patient location during procedure: OR  Start Time: 5/26/2021 12:50 PM  Indication:procedure for pain  Performed By  CRNA: Travis Graff CRNA  Preanesthetic Checklist  Completed: patient identified, IV checked, site marked, risks and benefits discussed, surgical consent, monitors and equipment checked, pre-op evaluation and timeout performed  Spinal Block Prep:  Patient Position:sitting  Sterile Tech:cap, gloves, gown, mask and sterile barriers  Prep:Chloraprep  Patient Monitoring:blood pressure monitoring, continuous pulse oximetry and EKG  Spinal Block Procedure  Approach:midline  Guidance:landmark technique and palpation technique  Location:L3-L4  Needle Type:Quincke  Needle Gauge:25 G  Placement of Spinal needle event:cerebrospinal fluid aspirated    Fluid Appearance:clear  Medications: bupivacaine PF (MARCAINE) injection 0.75%, 1 mL   Post Assessment  Patient Tolerance:patient tolerated the procedure well with no apparent complications  Complications no  Additional Notes  Risks discussed , including but not limited to:  Headache, itching, n&v, infection, failure, decreased bp. Permanent chronic back pain, nerve damage, paralysis, etc.

## 2021-06-02 LAB
LAB AP CASE REPORT: NORMAL
PATH REPORT.FINAL DX SPEC: NORMAL

## 2021-06-10 ENCOUNTER — PROCEDURE VISIT (OUTPATIENT)
Dept: UROLOGY | Facility: CLINIC | Age: 46
End: 2021-06-10

## 2021-06-10 DIAGNOSIS — R79.89 LOW TESTOSTERONE: Primary | ICD-10-CM

## 2021-06-10 PROCEDURE — 11980 IMPLANT HORMONE PELLET(S): CPT | Performed by: UROLOGY

## 2021-06-10 PROCEDURE — S0189 TESTOSTERONE PELLET 75 MG: HCPCS | Performed by: UROLOGY

## 2021-06-10 NOTE — PROGRESS NOTES
OFFICE PROCEDURE NOTE      PREPROCEDURE DIAGNOSIS:  Hypogonadism.      POSTPROCEDURE DIAGNOSIS:  Hypogonadism.      PROCEDURE:  Testopel (NDC# 14503-010-81) insertion/implant in Right flank, 10 pellets  implanted.      SURGEON:    Markel Centeno MD    ANESTHESIA:  Local.      DRAINS:  None.          The risks/benefits of the procedure were discussed with the patient.     DESCRIPTION OF PROCEDURE: The patient was placed in the lateral decubitus position and his skin was prepped with chlorhexadine solution. 10 ml of 1% lidocaine w/epinephrine were instilled subcutaneously in a straight fashion. A 3mm skin puncture was made with an 11 blade. The trochar was placed subcutaneously along the line with ease and without patient discomfort. The sharp stylet was removed and the pellets were placed along the track and positioned using the blunt stylet. Following this,  a steri strip was used to close the puncture wound. A 4/4 gauze pad was placed over the wound and a Tegaderm bandage was applied and the patient was repositioned on his opposite side for compression purposes with a roll.      PLAN:  The patient was discharged in stable condition to be followed up with serial serum testosterone levels. Follow up for next visit in 4 months.

## 2021-06-28 ENCOUNTER — OFFICE VISIT (OUTPATIENT)
Dept: UROLOGY | Facility: CLINIC | Age: 46
End: 2021-06-28

## 2021-06-28 ENCOUNTER — TELEPHONE (OUTPATIENT)
Dept: UROLOGY | Facility: CLINIC | Age: 46
End: 2021-06-28

## 2021-06-28 VITALS
WEIGHT: 266 LBS | SYSTOLIC BLOOD PRESSURE: 124 MMHG | BODY MASS INDEX: 37.24 KG/M2 | OXYGEN SATURATION: 93 % | HEART RATE: 121 BPM | DIASTOLIC BLOOD PRESSURE: 70 MMHG | HEIGHT: 71 IN

## 2021-06-28 DIAGNOSIS — R39.9 LOWER URINARY TRACT SYMPTOMS (LUTS): Primary | ICD-10-CM

## 2021-06-28 DIAGNOSIS — R79.89 LOW TESTOSTERONE: ICD-10-CM

## 2021-06-28 PROCEDURE — 99024 POSTOP FOLLOW-UP VISIT: CPT | Performed by: UROLOGY

## 2021-06-28 RX ORDER — TOPIRAMATE 25 MG/1
TABLET ORAL
COMMUNITY
End: 2021-11-19 | Stop reason: SDUPTHER

## 2021-06-28 RX ORDER — KETOROLAC TROMETHAMINE 10 MG/1
TABLET, FILM COATED ORAL
COMMUNITY
Start: 2021-06-08 | End: 2021-11-19 | Stop reason: DRUGHIGH

## 2021-06-28 RX ORDER — BUPRENORPHINE HYDROCHLORIDE 8 MG/1
TABLET SUBLINGUAL
COMMUNITY
Start: 2021-06-25 | End: 2021-06-28

## 2021-06-28 RX ORDER — CYCLOBENZAPRINE HCL 5 MG
5 TABLET ORAL 2 TIMES DAILY PRN
COMMUNITY
Start: 2021-06-25 | End: 2021-11-19 | Stop reason: DRUGHIGH

## 2021-06-28 NOTE — PROGRESS NOTES
Chief Complaint  Low testosterone  Lower urinary tract symptoms    HPI  Mr. Johnson is a 45 y.o. male with history of osteomyelitis who presents with low testosterone.  The serum test was collected due to the following complaints: low libido, low energy, weight gain, poor sleep, trouble concentrating.    He is 1 month postop status post TURP.  He continues to complain of urgency and frequency and feels like he does not have much force with stream, which has been a longstanding issue.  He does as well complain of retrograde ejaculation.  He says that his energy levels are just okay after the pellets and are improving, but he does not get the quick jolts or rushes that he used to get with the injections.    Past Medical History  Past Medical History:   Diagnosis Date   • GERD (gastroesophageal reflux disease)    • High cholesterol    • Hypertension    • Malignant hyperthermia due to anesthesia     [facility], sister has malignant hyperthermia and a cousin   • Migraine    • Osteomyelitis (CMS/HCC)        Past Surgical History  Past Surgical History:   Procedure Laterality Date   • BUNIONECTOMY     • CHOLECYSTECTOMY     • COLONOSCOPY     • CYSTOSCOPY TRANSURETHRAL RESECTION OF PROSTATE N/A 5/26/2021    Procedure: TRANSURETHRAL RESECTION OF PROSTATE;  Surgeon: Markel Centeno MD;  Location: [facility];  Service: Urology;  Laterality: N/A;   • ENDOSCOPY     • HERNIA REPAIR     • KNEE OPEN LATERAL RELEASE      Right   • TONSILLECTOMY AND ADENOIDECTOMY         Medications    Current Outpatient Medications:   •  amLODIPine (NORVASC) 10 MG tablet, Take 10 mg by mouth Daily., Disp: , Rfl:   •  cetirizine (zyrTEC) 10 MG tablet, Take 1 tablet by mouth Daily., Disp:  , Rfl:   •  cloNIDine (CATAPRES) 0.1 MG tablet, , Disp: , Rfl:   •  cyanocobalamin 1000 MCG/ML injection, , Disp: , Rfl:   •  cyclobenzaprine (FLEXERIL) 5 MG tablet, Take 5 mg by mouth 2 (Two) Times a Day As Needed.,  Disp: , Rfl:   •  ipratropium-albuterol (DUO-NEB) 0.5-2.5 mg/3 ml nebulizer, Take 3 mL by nebulization Every 6 (Six) Hours As Needed for Shortness of Air., Disp: 360 mL, Rfl:   •  ketorolac (TORADOL) 10 MG tablet, TAKE 1 TABLET BY MOUTH EVERY 8 HOURS WITH FOOD OR MILK, Disp: , Rfl:   •  lidocaine (LIDODERM) 5 %, Place 1 patch on the skin as directed by provider Daily. Remove & Discard patch within 12 hours or as directed by MD, Disp:  , Rfl:   •  lisinopril (PRINIVIL,ZESTRIL) 30 MG tablet, , Disp: , Rfl:   •  nicotine (NICODERM CQ) 21 MG/24HR patch, Place 1 patch on the skin as directed by provider Daily., Disp:  , Rfl:   •  pantoprazole (PROTONIX) 40 MG EC tablet, Take 1 tablet by mouth Every Morning., Disp:  , Rfl:   •  topiramate (TOPAMAX) 25 MG tablet, topiramate 25 mg tablet, Disp: , Rfl:   •  traZODone (DESYREL) 50 MG tablet, Take 50 mg by mouth Daily As Needed for Sleep., Disp: , Rfl:   •  docusate sodium (Colace) 100 MG capsule, Take 1 capsule by mouth 2 (Two) Times a Day. If taking percocet, Disp: 15 capsule, Rfl: 1  •  lactobacillus acidophilus (RISAQUAD) capsule capsule, Take 1 capsule by mouth 2 (Two) Times a Day., Disp:  , Rfl:   •  lamoTRIgine (LaMICtal) 25 MG tablet, lamotrigine 25 mg tablet, Disp: , Rfl:   •  Loratadine (Claritin) 10 MG capsule, Take  by mouth., Disp: , Rfl:   •  ondansetron (ZOFRAN) 4 MG tablet, Take 1 tablet by mouth Every 6 (Six) Hours As Needed for Nausea or Vomiting., Disp:  , Rfl:   •  phenazopyridine (PYRIDIUM) 100 MG tablet, Take 1 tablet by mouth 3 (Three) Times a Day As Needed (urinary burning)., Disp: 21 tablet, Rfl: 0  •  sulfamethoxazole-trimethoprim (BACTRIM DS,SEPTRA DS) 800-160 MG per tablet, Take 1 tablet by mouth Daily., Disp: , Rfl:   •  venlafaxine (EFFEXOR) 75 MG tablet, Take 75 mg by mouth Daily. At night, Disp: , Rfl:   •  venlafaxine XR (EFFEXOR-XR) 150 MG 24 hr capsule, Take 1 capsule by mouth Daily., Disp:  , Rfl:     Allergies  Allergies   Allergen  "Reactions   • Naloxone Swelling     Throat swelling, itching, rash   • Hydrocodone-Acetaminophen Itching       Social History  Social History     Socioeconomic History   • Marital status: Single     Spouse name: Not on file   • Number of children: Not on file   • Years of education: Not on file   • Highest education level: Not on file   Tobacco Use   • Smoking status: Current Every Day Smoker     Packs/day: 0.50     Years: 30.00     Pack years: 15.00     Types: Cigarettes   • Smokeless tobacco: Never Used   Vaping Use   • Vaping Use: Every day   • Substances: Nicotine   • Devices: Pre-filled or refillable cartridge   Substance and Sexual Activity   • Alcohol use: Not Currently   • Drug use: Not Currently     Types: Methamphetamines, Cocaine(coke), Ketamine, LSD, Marijuana, 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    Review of Systems  A full ROS was performed and notable for Hx of IVDA.    Physical Exam  Visit Vitals  /70   Pulse (!) 121   Ht 180.3 cm (71\")   Wt 121 kg (266 lb)   SpO2 93%   BMI 37.10 kg/m²     A PE was performed and notable for obese.    Genitourinary - Deferred    Labs  No results found for: TESTOSTERONE    Lab Results   Component Value Date    PSA 1.460 03/12/2021    PSA 1.210 10/26/2016       Lab Results   Component Value Date    WBC 8.08 06/24/2021    HGB 12.7 (L) 06/24/2021    HCT 38.8 06/24/2021    MCV 89.2 06/24/2021     06/24/2021     Lab Results   Component Value Date    GLUCOSE 142 (H) 06/24/2021    CALCIUM 8.8 06/24/2021     06/24/2021    K 4.0 06/24/2021    CO2 27.0 06/24/2021     06/24/2021    BUN 10 06/24/2021    CREATININE 0.92 06/24/2021    EGFRIFNONA 89 06/24/2021    BCR 10.9 06/24/2021    ANIONGAP 9.0 06/24/2021       Labs were checked at Ms. Rodriguez's office on 10/28/2020:  Free testosterone was 6.6 picograms per " mL.   Total serum testosterone was 197 nanograms per dL.   TSH was normal at 2.2 microliters per mL.   HIV and Hepatitis C were checked as well and found negative.         Assessment  Mr. Johnson is a 45 y.o. male with acute worsening of chronic low testosterone. He also has acute worsening of lower urinary tract symptoms.  He is status post TURP.  He presents for his 4-week follow-up.  He continues to be bothered by urgency and frequency and weak stream.    We had a lengthy discussion about management of continued urinary symptoms after TURP and I recommended urodynamics.  I also tried to reassure him that overactivity symptoms can take some time to improve after TURP.    Plan  1.  Schedule UDS in 6 weeks  2.  T labs 2 mo from testopel; we may need to increase him to 11 pellets    I spent a total of 30 minutes with the patient and the chart engaging in data gathering and interpretation, patient interaction, as well as counseling on the risks, benefits, and alternatives of the therapy and coordinating care.

## 2021-07-01 ENCOUNTER — TELEPHONE (OUTPATIENT)
Dept: UROLOGY | Facility: CLINIC | Age: 46
End: 2021-07-01

## 2021-07-01 NOTE — TELEPHONE ENCOUNTER
Pt called and stated he was voiding and felt as if he passed a blood clot. His mother pulled it out of the toilet and it appears to be a clump of semen. Please advise.

## 2021-07-06 ENCOUNTER — LAB (OUTPATIENT)
Dept: UROLOGY | Facility: CLINIC | Age: 46
End: 2021-07-06

## 2021-07-06 ENCOUNTER — TELEPHONE (OUTPATIENT)
Dept: UROLOGY | Facility: CLINIC | Age: 46
End: 2021-07-06

## 2021-07-06 DIAGNOSIS — R30.0 DIFFICULT OR PAINFUL URINATION: Primary | ICD-10-CM

## 2021-07-06 LAB
BILIRUB BLD-MCNC: NEGATIVE MG/DL
CLARITY, POC: CLEAR
COLOR UR: YELLOW
GLUCOSE UR STRIP-MCNC: NEGATIVE MG/DL
KETONES UR QL: NEGATIVE
LEUKOCYTE EST, POC: ABNORMAL
NITRITE UR-MCNC: NEGATIVE MG/ML
PH UR: 6 [PH] (ref 5–8)
PROT UR STRIP-MCNC: ABNORMAL MG/DL
RBC # UR STRIP: ABNORMAL /UL
SP GR UR: 1.02 (ref 1–1.03)
UROBILINOGEN UR QL: NORMAL

## 2021-07-06 PROCEDURE — 51798 US URINE CAPACITY MEASURE: CPT | Performed by: UROLOGY

## 2021-07-06 PROCEDURE — 81003 URINALYSIS AUTO W/O SCOPE: CPT | Performed by: UROLOGY

## 2021-07-06 RX ORDER — CIPROFLOXACIN 500 MG/1
500 TABLET, FILM COATED ORAL 2 TIMES DAILY
Qty: 14 TABLET | Refills: 0 | Status: SHIPPED | OUTPATIENT
Start: 2021-07-06 | End: 2021-07-13

## 2021-07-06 NOTE — TELEPHONE ENCOUNTER
Pt called and states he is having issues voiding. Stating he is only dribbling and he is having to force urine out. States it also burns when he voids. Please advise.

## 2021-07-06 NOTE — TELEPHONE ENCOUNTER
Please have him come by for nursing visit either with us or with his PCP to get a dip and culture and post void residual.  If he wants to be seen by provider actually we could schedule him with Inga.

## 2021-07-08 LAB
BACTERIA UR CULT: NO GROWTH
BACTERIA UR CULT: NORMAL

## 2021-08-02 ENCOUNTER — TRANSCRIBE ORDERS (OUTPATIENT)
Dept: LAB | Facility: HOSPITAL | Age: 46
End: 2021-08-02

## 2021-08-02 ENCOUNTER — HOSPITAL ENCOUNTER (EMERGENCY)
Facility: HOSPITAL | Age: 46
Discharge: HOME | End: 2021-08-02
Attending: EMERGENCY MEDICINE | Admitting: EMERGENCY MEDICINE

## 2021-08-02 ENCOUNTER — APPOINTMENT (OUTPATIENT)
Dept: MRI IMAGING | Facility: HOSPITAL | Age: 46
End: 2021-08-02

## 2021-08-02 VITALS
RESPIRATION RATE: 16 BRPM | WEIGHT: 284 LBS | BODY MASS INDEX: 39.76 KG/M2 | HEART RATE: 80 BPM | DIASTOLIC BLOOD PRESSURE: 117 MMHG | HEIGHT: 71 IN | OXYGEN SATURATION: 97 % | TEMPERATURE: 98.6 F | SYSTOLIC BLOOD PRESSURE: 163 MMHG

## 2021-08-02 DIAGNOSIS — L02.212 ABSCESS OF BACK, EXCEPT BUTTOCK: Primary | ICD-10-CM

## 2021-08-02 DIAGNOSIS — Z87.39 HISTORY OF OSTEOMYELITIS: ICD-10-CM

## 2021-08-02 DIAGNOSIS — M54.50 MIDLINE LOW BACK PAIN WITHOUT SCIATICA, UNSPECIFIED CHRONICITY: Primary | ICD-10-CM

## 2021-08-02 LAB
ALBUMIN SERPL-MCNC: 4.4 G/DL (ref 3.5–5.2)
ALBUMIN/GLOB SERPL: 1.6 G/DL
ALP SERPL-CCNC: 173 U/L (ref 39–117)
ALT SERPL W P-5'-P-CCNC: 51 U/L (ref 1–41)
ANION GAP SERPL CALCULATED.3IONS-SCNC: 12 MMOL/L (ref 5–15)
AST SERPL-CCNC: 32 U/L (ref 1–40)
BASOPHILS # BLD AUTO: 0.04 10*3/MM3 (ref 0–0.2)
BASOPHILS NFR BLD AUTO: 0.4 % (ref 0–1.5)
BILIRUB SERPL-MCNC: 0.2 MG/DL (ref 0–1.2)
BUN SERPL-MCNC: 8 MG/DL (ref 6–20)
BUN/CREAT SERPL: 8.8 (ref 7–25)
CALCIUM SPEC-SCNC: 9.2 MG/DL (ref 8.6–10.5)
CHLORIDE SERPL-SCNC: 96 MMOL/L (ref 98–107)
CO2 SERPL-SCNC: 29 MMOL/L (ref 22–29)
CREAT SERPL-MCNC: 0.91 MG/DL (ref 0.76–1.27)
CRP SERPL-MCNC: 1.92 MG/DL (ref 0–0.5)
D-LACTATE SERPL-SCNC: 2.5 MMOL/L (ref 0.5–2)
DEPRECATED RDW RBC AUTO: 42.8 FL (ref 37–54)
EOSINOPHIL # BLD AUTO: 0.4 10*3/MM3 (ref 0–0.4)
EOSINOPHIL NFR BLD AUTO: 4.1 % (ref 0.3–6.2)
ERYTHROCYTE [DISTWIDTH] IN BLOOD BY AUTOMATED COUNT: 13.8 % (ref 12.3–15.4)
ERYTHROCYTE [SEDIMENTATION RATE] IN BLOOD: 29 MM/HR (ref 0–15)
GFR SERPL CREATININE-BSD FRML MDRD: 90 ML/MIN/1.73
GLOBULIN UR ELPH-MCNC: 2.8 GM/DL
GLUCOSE SERPL-MCNC: 94 MG/DL (ref 65–99)
HCT VFR BLD AUTO: 39.2 % (ref 37.5–51)
HGB BLD-MCNC: 12.9 G/DL (ref 13–17.7)
IMM GRANULOCYTES # BLD AUTO: 0.06 10*3/MM3 (ref 0–0.05)
IMM GRANULOCYTES NFR BLD AUTO: 0.6 % (ref 0–0.5)
LYMPHOCYTES # BLD AUTO: 1.73 10*3/MM3 (ref 0.7–3.1)
LYMPHOCYTES NFR BLD AUTO: 17.7 % (ref 19.6–45.3)
MCH RBC QN AUTO: 28.1 PG (ref 26.6–33)
MCHC RBC AUTO-ENTMCNC: 32.9 G/DL (ref 31.5–35.7)
MCV RBC AUTO: 85.4 FL (ref 79–97)
MONOCYTES # BLD AUTO: 0.98 10*3/MM3 (ref 0.1–0.9)
MONOCYTES NFR BLD AUTO: 10 % (ref 5–12)
NEUTROPHILS NFR BLD AUTO: 6.56 10*3/MM3 (ref 1.7–7)
NEUTROPHILS NFR BLD AUTO: 67.2 % (ref 42.7–76)
NRBC BLD AUTO-RTO: 0 /100 WBC (ref 0–0.2)
NT-PROBNP SERPL-MCNC: 13.9 PG/ML (ref 0–450)
PLATELET # BLD AUTO: 315 10*3/MM3 (ref 140–450)
PMV BLD AUTO: 9.2 FL (ref 6–12)
POTASSIUM SERPL-SCNC: 3.1 MMOL/L (ref 3.5–5.2)
PROCALCITONIN SERPL-MCNC: 0.08 NG/ML (ref 0–0.25)
PROT SERPL-MCNC: 7.2 G/DL (ref 6–8.5)
RBC # BLD AUTO: 4.59 10*6/MM3 (ref 4.14–5.8)
SODIUM SERPL-SCNC: 137 MMOL/L (ref 136–145)
WBC # BLD AUTO: 9.77 10*3/MM3 (ref 3.4–10.8)

## 2021-08-02 PROCEDURE — 85025 COMPLETE CBC W/AUTO DIFF WBC: CPT | Performed by: EMERGENCY MEDICINE

## 2021-08-02 PROCEDURE — 72158 MRI LUMBAR SPINE W/O & W/DYE: CPT

## 2021-08-02 PROCEDURE — 87040 BLOOD CULTURE FOR BACTERIA: CPT | Performed by: EMERGENCY MEDICINE

## 2021-08-02 PROCEDURE — 0 GADOBENATE DIMEGLUMINE 529 MG/ML SOLUTION: Performed by: EMERGENCY MEDICINE

## 2021-08-02 PROCEDURE — 85652 RBC SED RATE AUTOMATED: CPT | Performed by: EMERGENCY MEDICINE

## 2021-08-02 PROCEDURE — 86140 C-REACTIVE PROTEIN: CPT | Performed by: EMERGENCY MEDICINE

## 2021-08-02 PROCEDURE — 84145 PROCALCITONIN (PCT): CPT | Performed by: EMERGENCY MEDICINE

## 2021-08-02 PROCEDURE — 83880 ASSAY OF NATRIURETIC PEPTIDE: CPT | Performed by: EMERGENCY MEDICINE

## 2021-08-02 PROCEDURE — A9577 INJ MULTIHANCE: HCPCS | Performed by: EMERGENCY MEDICINE

## 2021-08-02 PROCEDURE — 99283 EMERGENCY DEPT VISIT LOW MDM: CPT

## 2021-08-02 PROCEDURE — 83605 ASSAY OF LACTIC ACID: CPT | Performed by: EMERGENCY MEDICINE

## 2021-08-02 PROCEDURE — 80053 COMPREHEN METABOLIC PANEL: CPT | Performed by: EMERGENCY MEDICINE

## 2021-08-02 RX ORDER — LIDOCAINE 50 MG/G
1 PATCH TOPICAL
Status: DISCONTINUED | OUTPATIENT
Start: 2021-08-02 | End: 2021-08-02 | Stop reason: HOSPADM

## 2021-08-02 RX ORDER — SODIUM CHLORIDE 0.9 % (FLUSH) 0.9 %
10 SYRINGE (ML) INJECTION AS NEEDED
Status: DISCONTINUED | OUTPATIENT
Start: 2021-08-02 | End: 2021-08-02 | Stop reason: HOSPADM

## 2021-08-02 RX ADMIN — GADOBENATE DIMEGLUMINE 20 ML: 529 INJECTION, SOLUTION INTRAVENOUS at 19:28

## 2021-08-02 NOTE — ED PROVIDER NOTES
EMERGENCY DEPARTMENT ENCOUNTER    Pt Name: Gopi Johnson  MRN: [id number]  Pt :   1975  Room Number:    Date of encounter:  2021  PCP: Provider, No Known  ED Provider: Juvenal Parsons MD    Historian: Patient and infectious disease physician      HPI:  Chief Complaint: Midline low back pain, concern for worsening spinal osteomyelitis        Context: Gopi Johnson is a 45 y.o. male who presents to the ED c/o midline low back pain ongoing for the last month but gradually worsening.  The patient reports a history of spinal osteomyelitis starting roughly 1 year ago.  He is 2 years status post IV drug use.  It was thought that this is the underlying reason for his osteomyelitis.  The patient saw Dr. Cruz, infectious disease today and was referred to our emergency department to rule out spinal osteomyelitis worsening.  The patient has had no loss of bowel or bladder control.  He rates his discomfort moderate in severity.  He has been taking his usually prescribed medications without much relief.        PAST MEDICAL HISTORY  Past Medical History:   Diagnosis Date   • GERD (gastroesophageal reflux disease)    • High cholesterol    • Hypertension    • Malignant hyperthermia due to anesthesia     patient had muscle biopsy at [facility] and was negative, sister has malignant hyperthermia and a cousin   • Migraine    • Osteomyelitis (CMS/HCC)          PAST SURGICAL HISTORY  Past Surgical History:   Procedure Laterality Date   • BUNIONECTOMY     • CHOLECYSTECTOMY     • COLONOSCOPY     • CYSTOSCOPY TRANSURETHRAL RESECTION OF PROSTATE N/A 2021    Procedure: TRANSURETHRAL RESECTION OF PROSTATE;  Surgeon: Markel Centeno MD;  Location: [facility];  Service: Urology;  Laterality: N/A;   • ENDOSCOPY     • HERNIA REPAIR     • KNEE OPEN LATERAL RELEASE      Right   • TONSILLECTOMY AND ADENOIDECTOMY           FAMILY HISTORY  Family History   Problem Relation Age of Onset   • Hypertension Father     • Hypertension Mother    • Prostate cancer Paternal Uncle    • Heart disease Paternal Grandmother    • Prostate cancer Paternal Grandfather    • Prostate cancer Paternal Uncle    • Malig Hyperthermia Sister          SOCIAL HISTORY  Social History     Socioeconomic History   • Marital status: Single     Spouse name: Not on file   • Number of children: Not on file   • Years of education: Not on file   • Highest education level: Not on file   Tobacco Use   • Smoking status: Current Every Day Smoker     Packs/day: 0.50     Years: 30.00     Pack years: 15.00     Types: Cigarettes   • Smokeless tobacco: Never Used   Vaping Use   • Vaping Use: Every day   • Substances: Nicotine   • Devices: Pre-filled or refillable cartridge   Substance and Sexual Activity   • Alcohol use: Not Currently   • Drug use: Not Currently     Types: Methamphetamines, Cocaine(coke), Ketamine, LSD, Marijuana, MDMA (ecstacy)     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       PHYSICAL EXAM    I have reviewed the triage vital signs and nursing notes.    ED Triage Vitals   Temp Pulse Resp BP SpO2   -- -- -- -- --      Temp src Heart Rate Source Patient Position BP Location FiO2 (%)   -- -- -- -- --       Physical Exam  GENERAL:   Appears pleasant, nontoxic  HENT: Nares patent.  EYES: No scleral icterus.  CV: Regular rhythm, regular rate.  No murmurs gallops rubs  RESPIRATORY: Normal effort.  No audible wheezes, rales or rhonchi.  Clear to auscultation  ABDOMEN: Soft, nontender to deep palpation  MUSCULOSKELETAL: No deformities.  Tenderness to palpation in the mid L3-L5 region.  Palpation of this area causes significant tenderness.  There is no warmth or erythema at this location.  No crepitus or obvious deformity.  NEURO: Alert, moves all  extremities, follows commands.  Fine touch and motor intact in bilateral lower extremities without positive leg raising bilaterally.  SKIN: Warm, dry, no rash visualized.        LAB RESULTS  Recent Results (from the past 24 hour(s))   Basic Metabolic Panel    Collection Time: 08/02/21  2:07 PM    Specimen: Blood   Result Value Ref Range    Glucose 104 (H) 65 - 99 mg/dL    BUN 8 6 - 20 mg/dL    Creatinine 0.94 0.76 - 1.27 mg/dL    Sodium 138 136 - 145 mmol/L    Potassium 3.4 (L) 3.5 - 5.2 mmol/L    Chloride 98 98 - 107 mmol/L    CO2 28.0 22.0 - 29.0 mmol/L    Calcium 9.6 8.6 - 10.5 mg/dL    eGFR Non African Amer 87 >60 mL/min/1.73    BUN/Creatinine Ratio 8.5 7.0 - 25.0    Anion Gap 12.0 5.0 - 15.0 mmol/L   Sedimentation Rate    Collection Time: 08/02/21  2:07 PM    Specimen: Blood   Result Value Ref Range    Sed Rate 35 (H) 0 - 15 mm/hr   C-reactive Protein    Collection Time: 08/02/21  2:07 PM    Specimen: Blood   Result Value Ref Range    C-Reactive Protein 2.22 (H) 0.00 - 0.50 mg/dL   CBC Auto Differential    Collection Time: 08/02/21  2:07 PM    Specimen: Blood   Result Value Ref Range    WBC 10.55 3.40 - 10.80 10*3/mm3    RBC 5.06 4.14 - 5.80 10*6/mm3    Hemoglobin 13.9 13.0 - 17.7 g/dL    Hematocrit 43.5 37.5 - 51.0 %    MCV 86.0 79.0 - 97.0 fL    MCH 27.5 26.6 - 33.0 pg    MCHC 32.0 31.5 - 35.7 g/dL    RDW 13.8 12.3 - 15.4 %    RDW-SD 42.9 37.0 - 54.0 fl    MPV 9.1 6.0 - 12.0 fL    Platelets 316 140 - 450 10*3/mm3    Neutrophil % 67.3 42.7 - 76.0 %    Lymphocyte % 18.5 (L) 19.6 - 45.3 %    Monocyte % 8.7 5.0 - 12.0 %    Eosinophil % 4.5 0.3 - 6.2 %    Basophil % 0.5 0.0 - 1.5 %    Immature Grans % 0.5 0.0 - 0.5 %    Neutrophils, Absolute 7.11 (H) 1.70 - 7.00 10*3/mm3    Lymphocytes, Absolute 1.95 0.70 - 3.10 10*3/mm3    Monocytes, Absolute 0.92 (H) 0.10 - 0.90 10*3/mm3    Eosinophils, Absolute 0.47 (H) 0.00 - 0.40 10*3/mm3    Basophils, Absolute 0.05 0.00 - 0.20 10*3/mm3    Immature Grans, Absolute 0.05 0.00 -  0.05 10*3/mm3    nRBC 0.0 0.0 - 0.2 /100 WBC   Comprehensive Metabolic Panel    Collection Time: 08/02/21  3:58 PM    Specimen: Blood   Result Value Ref Range    Glucose 94 65 - 99 mg/dL    BUN 8 6 - 20 mg/dL    Creatinine 0.91 0.76 - 1.27 mg/dL    Sodium 137 136 - 145 mmol/L    Potassium 3.1 (L) 3.5 - 5.2 mmol/L    Chloride 96 (L) 98 - 107 mmol/L    CO2 29.0 22.0 - 29.0 mmol/L    Calcium 9.2 8.6 - 10.5 mg/dL    Total Protein 7.2 6.0 - 8.5 g/dL    Albumin 4.40 3.50 - 5.20 g/dL    ALT (SGPT) 51 (H) 1 - 41 U/L    AST (SGOT) 32 1 - 40 U/L    Alkaline Phosphatase 173 (H) 39 - 117 U/L    Total Bilirubin 0.2 0.0 - 1.2 mg/dL    eGFR Non African Amer 90 >60 mL/min/1.73    Globulin 2.8 gm/dL    A/G Ratio 1.6 g/dL    BUN/Creatinine Ratio 8.8 7.0 - 25.0    Anion Gap 12.0 5.0 - 15.0 mmol/L   Lactic Acid, Plasma    Collection Time: 08/02/21  3:58 PM    Specimen: Blood   Result Value Ref Range    Lactate 2.5 (C) 0.5 - 2.0 mmol/L   Procalcitonin    Collection Time: 08/02/21  3:58 PM    Specimen: Blood   Result Value Ref Range    Procalcitonin 0.08 0.00 - 0.25 ng/mL   Sedimentation Rate    Collection Time: 08/02/21  3:58 PM    Specimen: Blood   Result Value Ref Range    Sed Rate 29 (H) 0 - 15 mm/hr   C-reactive Protein    Collection Time: 08/02/21  3:58 PM    Specimen: Blood   Result Value Ref Range    C-Reactive Protein 1.92 (H) 0.00 - 0.50 mg/dL   CBC Auto Differential    Collection Time: 08/02/21  3:58 PM    Specimen: Blood   Result Value Ref Range    WBC 9.77 3.40 - 10.80 10*3/mm3    RBC 4.59 4.14 - 5.80 10*6/mm3    Hemoglobin 12.9 (L) 13.0 - 17.7 g/dL    Hematocrit 39.2 37.5 - 51.0 %    MCV 85.4 79.0 - 97.0 fL    MCH 28.1 26.6 - 33.0 pg    MCHC 32.9 31.5 - 35.7 g/dL    RDW 13.8 12.3 - 15.4 %    RDW-SD 42.8 37.0 - 54.0 fl    MPV 9.2 6.0 - 12.0 fL    Platelets 315 140 - 450 10*3/mm3    Neutrophil % 67.2 42.7 - 76.0 %    Lymphocyte % 17.7 (L) 19.6 - 45.3 %    Monocyte % 10.0 5.0 - 12.0 %    Eosinophil % 4.1 0.3 - 6.2 %     Basophil % 0.4 0.0 - 1.5 %    Immature Grans % 0.6 (H) 0.0 - 0.5 %    Neutrophils, Absolute 6.56 1.70 - 7.00 10*3/mm3    Lymphocytes, Absolute 1.73 0.70 - 3.10 10*3/mm3    Monocytes, Absolute 0.98 (H) 0.10 - 0.90 10*3/mm3    Eosinophils, Absolute 0.40 0.00 - 0.40 10*3/mm3    Basophils, Absolute 0.04 0.00 - 0.20 10*3/mm3    Immature Grans, Absolute 0.06 (H) 0.00 - 0.05 10*3/mm3    nRBC 0.0 0.0 - 0.2 /100 WBC   BNP    Collection Time: 08/02/21  3:58 PM    Specimen: Blood   Result Value Ref Range    proBNP 13.9 0.0 - 450.0 pg/mL       If labs were ordered, I independently reviewed the results.        RADIOLOGY  MRI Lumbar Spine With & Without Contrast    Result Date: 8/2/2021  EXAMINATION: MRI LUMBAR SPINE W WO CONTRAST-  INDICATION: low back pain with h/o osteo, ? worsening  TECHNIQUE: Multiplanar multisequence MRI of the lumbar spine performed with and without IV contrast  COMPARISON: NONE  FINDINGS: Vertebral body heights are maintained without evidence of acute fracture and alignment is anatomic without evidence of listhesis or subluxation. There is no evidence of suspicious marrow replacing lesion or abnormal marrow signal to suggest osteomyelitis. Partial imaging of the left SI joint demonstrates abnormal T1 hypointensity, edema and enhancement along the left sacrum concerning for possible sacral insufficiency fracture, incompletely characterized. The paraspinal soft tissues are unremarkable. The conus medullaris and cauda equina nerve roots are satisfactory in appearance. There is multilevel lumbar spondylosis, somewhat localized to the L4-5 and L5-S1 levels with disc desiccation and facet arthropathy with circumferential disc bulge. There is mild associated spinal canal narrowing and bilateral neural foraminal stenosis, greater on the left at L4-5. At L5-S1 there is no significant spinal canal stenosis and there is mild bilateral neuroforaminal narrowing.      Incompletely imaged signal abnormality involving  the left pelvis concerning for possible sacral insufficiency fracture. Recommend clinical correlation and possible further evaluation with dedicated MRI of the bony pelvis.  Otherwise there is no evidence of spinal osteomyelitis. Mild spondylosis changes are present at L4-5 and L5-S1, with mild-to-moderate narrowing of the neural foramina bilaterally at L5-S1.  This report was finalized on 8/2/2021 6:15 PM by Delbert Domingo.        I ordered and reviewed the above noted radiographic studies.        See radiologist's dictation for official interpretation.        PROCEDURES    Procedures    No orders to display       MEDICATIONS GIVEN IN ER    Medications   sodium chloride 0.9 % flush 10 mL (has no administration in time range)   gadobenate dimeglumine (MULTIHANCE) injection 20 mL (has no administration in time range)   lidocaine (LIDODERM) 5 % 1 patch (has no administration in time range)         PROGRESS, DATA ANALYSIS, CONSULTS, AND MEDICAL DECISION MAKING    All labs have been independently reviewed by me.  All radiology studies have been reviewed by me and the radiologist dictating the report.   EKG's have been independently viewed and interpreted by me.      Differential diagnoses: Worsening spinal osteomyelitis versus chronic back pain exacerbation, etc.      ED Course as of Aug 02 1918   Mon Aug 02, 2021   1838 I spoke with Dr. Drew about the patient's MRI findings.  He recommends outpatient follow-up.    [MS]      ED Course User Index  [MS] Juvenal Parsons MD             AS OF 19:18 EDT VITALS:    BP - 180/90  HR - 80  TEMP - 98.6 °F (37 °C)  O2 SATS - 96%        DIAGNOSIS  Final diagnoses:   Midline low back pain without sciatica, unspecified chronicity   History of osteomyelitis         DISPOSITION  DISCHARGE    FOLLOW-UP  Lonnie Drew MD  [address]  [place] [id number] [phone]      NEXT AVAILABLE APPOINTMENT - RECHECK OF CONDITION    [facility]  Emergency Department  [facility] [facility] [address] [phone]    IF YOU HAVE ANY CONCERN OF WORSENING CONDITION         Medication List      No changes were made to your prescriptions during this visit.                  Juvenal Parsons MD  08/02/21 [id number]

## 2021-08-02 NOTE — DISCHARGE INSTRUCTIONS
In addition to medications prescribed by your doctors already I would recommend Salonpas patches to be used as directed.  These are over-the-counter and sometimes are very helpful in relieving discomfort.    If you have any concern of worsening condition please return to the emergency department.    Please review the medications you are supposed to be taking according to prior physician recommendations. I have not changed your home medications during this visit. If your discharge instructions indicate that I have changed your home medications, this is not the case, and you should disregard. If you have any questions about the medication you should be taking at home, please call your physician.

## 2021-08-07 LAB
BACTERIA SPEC AEROBE CULT: NORMAL
BACTERIA SPEC AEROBE CULT: NORMAL

## 2021-08-11 ENCOUNTER — TELEPHONE (OUTPATIENT)
Dept: UROLOGY | Facility: CLINIC | Age: 46
End: 2021-08-11

## 2021-08-11 NOTE — TELEPHONE ENCOUNTER
Pt thinks he's having reaction to testopel. Swelling, redness, itching, in groin area. Pt is having urinary weakness, burning, and rentention. Pt states problems after ejaculation concerning UTI symptoms for days afterwards. Pt wants appt as soon as possible. Nothing available until Oct. Please call patient and ask for overbook.

## 2021-08-12 ENCOUNTER — OFFICE VISIT (OUTPATIENT)
Dept: UROLOGY | Facility: CLINIC | Age: 46
End: 2021-08-12

## 2021-08-12 DIAGNOSIS — R79.89 LOW TESTOSTERONE: Primary | ICD-10-CM

## 2021-08-12 PROCEDURE — 99024 POSTOP FOLLOW-UP VISIT: CPT | Performed by: UROLOGY

## 2021-08-12 NOTE — PROGRESS NOTES
Chief Complaint  Low testosterone  Lower urinary tract symptoms    HPI  Mr. Johnson is a 45 y.o. male with history of osteomyelitis who presents with low testosterone.    He continues to complain of leg swelling, hives throughout his body with ulcerations on his right leg, weight gain.  He says he has been to infectious disease and they were not able to find anything.    Past Medical History  Past Medical History:   Diagnosis Date   • GERD (gastroesophageal reflux disease)    • High cholesterol    • Hypertension    • Malignant hyperthermia due to anesthesia     patient had muscle biopsy at [facility] and was negative, sister has malignant hyperthermia and a cousin   • Migraine    • Osteomyelitis (CMS/HCC)        Past Surgical History  Past Surgical History:   Procedure Laterality Date   • BUNIONECTOMY     • CHOLECYSTECTOMY     • COLONOSCOPY     • CYSTOSCOPY TRANSURETHRAL RESECTION OF PROSTATE N/A 5/26/2021    Procedure: TRANSURETHRAL RESECTION OF PROSTATE;  Surgeon: Markel Centeno MD;  Location: [facility];  Service: Urology;  Laterality: N/A;   • ENDOSCOPY     • HERNIA REPAIR     • KNEE OPEN LATERAL RELEASE      Right   • TONSILLECTOMY AND ADENOIDECTOMY         Medications    Current Outpatient Medications:   •  amLODIPine (NORVASC) 10 MG tablet, Take 10 mg by mouth Daily., Disp: , Rfl:   •  cetirizine (zyrTEC) 10 MG tablet, Take 1 tablet by mouth Daily., Disp:  , Rfl:   •  cloNIDine (CATAPRES) 0.1 MG tablet, , Disp: , Rfl:   •  cyanocobalamin 1000 MCG/ML injection, , Disp: , Rfl:   •  cyclobenzaprine (FLEXERIL) 5 MG tablet, Take 5 mg by mouth 2 (Two) Times a Day As Needed., Disp: , Rfl:   •  docusate sodium (Colace) 100 MG capsule, Take 1 capsule by mouth 2 (Two) Times a Day. If taking percocet, Disp: 15 capsule, Rfl: 1  •  ipratropium-albuterol (DUO-NEB) 0.5-2.5 mg/3 ml nebulizer, Take 3 mL by nebulization Every 6 (Six) Hours As Needed for Shortness of Air., Disp: 360 mL, Rfl:   •  ketorolac (TORADOL) 10 MG  tablet, TAKE 1 TABLET BY MOUTH EVERY 8 HOURS WITH FOOD OR MILK, Disp: , Rfl:   •  lactobacillus acidophilus (RISAQUAD) capsule capsule, Take 1 capsule by mouth 2 (Two) Times a Day., Disp:  , Rfl:   •  lamoTRIgine (LaMICtal) 25 MG tablet, lamotrigine 25 mg tablet, Disp: , Rfl:   •  lidocaine (LIDODERM) 5 %, Place 1 patch on the skin as directed by provider Daily. Remove & Discard patch within 12 hours or as directed by MD, Disp:  , Rfl:   •  lisinopril (PRINIVIL,ZESTRIL) 30 MG tablet, , Disp: , Rfl:   •  Loratadine (Claritin) 10 MG capsule, Take  by mouth., Disp: , Rfl:   •  nicotine (NICODERM CQ) 21 MG/24HR patch, Place 1 patch on the skin as directed by provider Daily., Disp:  , Rfl:   •  ondansetron (ZOFRAN) 4 MG tablet, Take 1 tablet by mouth Every 6 (Six) Hours As Needed for Nausea or Vomiting., Disp:  , Rfl:   •  pantoprazole (PROTONIX) 40 MG EC tablet, Take 1 tablet by mouth Every Morning., Disp:  , Rfl:   •  phenazopyridine (PYRIDIUM) 100 MG tablet, Take 1 tablet by mouth 3 (Three) Times a Day As Needed (urinary burning)., Disp: 21 tablet, Rfl: 0  •  sulfamethoxazole-trimethoprim (BACTRIM DS,SEPTRA DS) 800-160 MG per tablet, Take 1 tablet by mouth Daily., Disp: , Rfl:   •  topiramate (TOPAMAX) 25 MG tablet, topiramate 25 mg tablet, Disp: , Rfl:   •  traZODone (DESYREL) 50 MG tablet, Take 50 mg by mouth Daily As Needed for Sleep., Disp: , Rfl:   •  venlafaxine (EFFEXOR) 75 MG tablet, Take 75 mg by mouth Daily. At night, Disp: , Rfl:   •  venlafaxine XR (EFFEXOR-XR) 150 MG 24 hr capsule, Take 1 capsule by mouth Daily., Disp:  , Rfl:     Allergies  Allergies   Allergen Reactions   • Naloxone Swelling     Throat swelling, itching, rash   • Hydrocodone-Acetaminophen Itching       Social History  Social History     Socioeconomic History   • Marital status: Single     Spouse name: Not on file   • Number of children: Not on file   • Years of education: Not on file   • Highest education level: Not on file   Tobacco  Use   • Smoking status: Current Every Day Smoker     Packs/day: 0.50     Years: 30.00     Pack years: 15.00     Types: Cigarettes   • Smokeless tobacco: Never Used   Vaping Use   • Vaping Use: Every day   • Substances: Nicotine   • Devices: Pre-filled or refillable cartridge   Substance and Sexual Activity   • Alcohol use: Not Currently   • Drug use: Not Currently     Types: Methamphetamines, Cocaine(coke), Ketamine, LSD, Marijuana, 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    Review of Systems  A full ROS was performed and notable for Hx of IVDA.    Physical Exam  There were no vitals taken for this visit.  A PE was performed and notable for obese.    Genitourinary - Deferred    Labs  No results found for: TESTOSTERONE    Lab Results   Component Value Date    PSA 1.460 03/12/2021    PSA 1.210 10/26/2016       Lab Results   Component Value Date    WBC 9.77 08/02/2021    HGB 12.9 (L) 08/02/2021    HCT 39.2 08/02/2021    MCV 85.4 08/02/2021     08/02/2021     Lab Results   Component Value Date    GLUCOSE 94 08/02/2021    CALCIUM 9.2 08/02/2021     08/02/2021    K 3.1 (L) 08/02/2021    CO2 29.0 08/02/2021    CL 96 (L) 08/02/2021    BUN 8 08/02/2021    CREATININE 0.91 08/02/2021    EGFRIFNONA 90 08/02/2021    BCR 8.8 08/02/2021    ANIONGAP 12.0 08/02/2021     Lab Results   Component Value Date    HGBA1C 5.80 (H) 09/11/2020     Labs were checked at Ms. Rodriguez's office on 10/28/2020:  Free testosterone was 6.6 picograms per mL.   Total serum testosterone was 197 nanograms per dL.   TSH was normal at 2.2 microliters per mL.   HIV and Hepatitis C were checked as well and found negative.       Assessment  Mr. Johnson is a 45 y.o. male with acute worsening of chronic low testosterone. He also has acute worsening of lower urinary tract symptoms.  He is status post TURP.  He  has been receiving Testopel in the past for his testosterone replacement.    He is concerned that Testopel has caused him to gain weight, retain fluid, and have leg swelling.  We discussed that leg and chest swelling are some potential side effects of any testosterone replacement therapy.  Testosterone replacement therapy does not usually cause weight gain, usually the opposite.      Plan  1.  T labs -total and free testosterone, SHBG, estradiol, hematocrit  2.  Recommend thyroid testing

## 2021-08-13 ENCOUNTER — LAB (OUTPATIENT)
Dept: LAB | Facility: HOSPITAL | Age: 46
End: 2021-08-13

## 2021-08-13 DIAGNOSIS — R79.89 LOW TESTOSTERONE: ICD-10-CM

## 2021-08-13 DIAGNOSIS — M46.28 OSTEOMYELITIS OF SACRUM (HCC): ICD-10-CM

## 2021-08-13 LAB
ALBUMIN SERPL-MCNC: 3.7 G/DL (ref 3.5–5.2)
ALBUMIN/GLOB SERPL: 1 G/DL
ALP SERPL-CCNC: 241 U/L (ref 39–117)
ALT SERPL W P-5'-P-CCNC: 96 U/L (ref 1–41)
ANION GAP SERPL CALCULATED.3IONS-SCNC: 11.8 MMOL/L (ref 5–15)
AST SERPL-CCNC: 33 U/L (ref 1–40)
BASOPHILS # BLD AUTO: 0.05 10*3/MM3 (ref 0–0.2)
BASOPHILS NFR BLD AUTO: 0.5 % (ref 0–1.5)
BILIRUB SERPL-MCNC: 0.2 MG/DL (ref 0–1.2)
BUN SERPL-MCNC: 9 MG/DL (ref 6–20)
BUN/CREAT SERPL: 10.3 (ref 7–25)
CALCIUM SPEC-SCNC: 8.6 MG/DL (ref 8.6–10.5)
CHLORIDE SERPL-SCNC: 96 MMOL/L (ref 98–107)
CK SERPL-CCNC: 70 U/L (ref 20–200)
CO2 SERPL-SCNC: 28.2 MMOL/L (ref 22–29)
CREAT SERPL-MCNC: 0.87 MG/DL (ref 0.76–1.27)
CRP SERPL-MCNC: 4.84 MG/DL (ref 0–0.5)
DEPRECATED RDW RBC AUTO: 43.2 FL (ref 37–54)
EOSINOPHIL # BLD AUTO: 0.44 10*3/MM3 (ref 0–0.4)
EOSINOPHIL NFR BLD AUTO: 4.3 % (ref 0.3–6.2)
ERYTHROCYTE [DISTWIDTH] IN BLOOD BY AUTOMATED COUNT: 14.2 % (ref 12.3–15.4)
ERYTHROCYTE [SEDIMENTATION RATE] IN BLOOD: 39 MM/HR (ref 0–15)
ESTRADIOL SERPL HS-MCNC: 28.3 PG/ML
GFR SERPL CREATININE-BSD FRML MDRD: 95 ML/MIN/1.73
GLOBULIN UR ELPH-MCNC: 3.6 GM/DL
GLUCOSE SERPL-MCNC: 115 MG/DL (ref 65–99)
HCT VFR BLD AUTO: 38.9 % (ref 37.5–51)
HGB BLD-MCNC: 12.8 G/DL (ref 13–17.7)
IMM GRANULOCYTES # BLD AUTO: 0.05 10*3/MM3 (ref 0–0.05)
IMM GRANULOCYTES NFR BLD AUTO: 0.5 % (ref 0–0.5)
LYMPHOCYTES # BLD AUTO: 1.42 10*3/MM3 (ref 0.7–3.1)
LYMPHOCYTES NFR BLD AUTO: 13.9 % (ref 19.6–45.3)
MCH RBC QN AUTO: 27.6 PG (ref 26.6–33)
MCHC RBC AUTO-ENTMCNC: 32.9 G/DL (ref 31.5–35.7)
MCV RBC AUTO: 83.8 FL (ref 79–97)
MONOCYTES # BLD AUTO: 1 10*3/MM3 (ref 0.1–0.9)
MONOCYTES NFR BLD AUTO: 9.8 % (ref 5–12)
NEUTROPHILS NFR BLD AUTO: 7.27 10*3/MM3 (ref 1.7–7)
NEUTROPHILS NFR BLD AUTO: 71 % (ref 42.7–76)
NRBC BLD AUTO-RTO: 0 /100 WBC (ref 0–0.2)
PLATELET # BLD AUTO: 280 10*3/MM3 (ref 140–450)
PMV BLD AUTO: 9.4 FL (ref 6–12)
POTASSIUM SERPL-SCNC: 3.2 MMOL/L (ref 3.5–5.2)
PROT SERPL-MCNC: 7.3 G/DL (ref 6–8.5)
RBC # BLD AUTO: 4.64 10*6/MM3 (ref 4.14–5.8)
SODIUM SERPL-SCNC: 136 MMOL/L (ref 136–145)
WBC # BLD AUTO: 10.23 10*3/MM3 (ref 3.4–10.8)

## 2021-08-13 PROCEDURE — 82550 ASSAY OF CK (CPK): CPT

## 2021-08-13 PROCEDURE — 80053 COMPREHEN METABOLIC PANEL: CPT

## 2021-08-13 PROCEDURE — 85025 COMPLETE CBC W/AUTO DIFF WBC: CPT

## 2021-08-13 PROCEDURE — 86140 C-REACTIVE PROTEIN: CPT

## 2021-08-13 PROCEDURE — 36415 COLL VENOUS BLD VENIPUNCTURE: CPT

## 2021-08-13 PROCEDURE — 84402 ASSAY OF FREE TESTOSTERONE: CPT

## 2021-08-13 PROCEDURE — 82670 ASSAY OF TOTAL ESTRADIOL: CPT

## 2021-08-13 PROCEDURE — 84403 ASSAY OF TOTAL TESTOSTERONE: CPT

## 2021-08-13 PROCEDURE — 84270 ASSAY OF SEX HORMONE GLOBUL: CPT

## 2021-08-13 PROCEDURE — 85651 RBC SED RATE NONAUTOMATED: CPT

## 2021-08-18 ENCOUNTER — TRANSCRIBE ORDERS (OUTPATIENT)
Dept: ADMINISTRATIVE | Facility: HOSPITAL | Age: 46
End: 2021-08-18

## 2021-08-18 DIAGNOSIS — M46.28 OSTEOMYELITIS OF SACROILIAC REGION (HCC): Primary | ICD-10-CM

## 2021-08-18 DIAGNOSIS — M46.26 OSTEOMYELITIS OF LUMBAR VERTEBRA (HCC): Primary | ICD-10-CM

## 2021-08-19 LAB
SHBG SERPL-SCNC: 13.8 NMOL/L (ref 16.5–55.9)
TESTOST FREE SERPL-MCNC: 5.3 PG/ML (ref 6.8–21.5)
TESTOST SERPL-MCNC: 247 NG/DL (ref 264–916)

## 2021-08-23 ENCOUNTER — HOSPITAL ENCOUNTER (OUTPATIENT)
Dept: MRI IMAGING | Facility: HOSPITAL | Age: 46
Discharge: HOME | End: 2021-08-23

## 2021-08-23 DIAGNOSIS — M46.28 OSTEOMYELITIS OF SACROILIAC REGION (HCC): ICD-10-CM

## 2021-08-23 DIAGNOSIS — M46.26 OSTEOMYELITIS OF LUMBAR VERTEBRA (HCC): ICD-10-CM

## 2021-08-23 PROCEDURE — 0 GADOBENATE DIMEGLUMINE 529 MG/ML SOLUTION: Performed by: INTERNAL MEDICINE

## 2021-08-23 PROCEDURE — 72197 MRI PELVIS W/O & W/DYE: CPT

## 2021-08-23 PROCEDURE — 72158 MRI LUMBAR SPINE W/O & W/DYE: CPT

## 2021-08-23 PROCEDURE — A9577 INJ MULTIHANCE: HCPCS | Performed by: INTERNAL MEDICINE

## 2021-08-23 RX ADMIN — GADOBENATE DIMEGLUMINE 20 ML: 529 INJECTION, SOLUTION INTRAVENOUS at 09:49

## 2021-09-02 ENCOUNTER — OFFICE VISIT (OUTPATIENT)
Dept: UROLOGY | Facility: CLINIC | Age: 46
End: 2021-09-02

## 2021-09-02 DIAGNOSIS — E29.1 HYPOGONADISM IN MALE: Primary | ICD-10-CM

## 2021-09-02 DIAGNOSIS — N52.9 ERECTILE DYSFUNCTION, UNSPECIFIED ERECTILE DYSFUNCTION TYPE: ICD-10-CM

## 2021-09-02 PROCEDURE — 99442 PR PHYS/QHP TELEPHONE EVALUATION 11-20 MIN: CPT | Performed by: UROLOGY

## 2021-09-02 RX ORDER — BLOOD PRESSURE TEST KIT
KIT MISCELLANEOUS
Qty: 100 EACH | Refills: 11 | Status: SHIPPED | OUTPATIENT
Start: 2021-09-02

## 2021-09-02 RX ORDER — TESTOSTERONE CYPIONATE 100 MG/ML
100 INJECTION, SOLUTION INTRAMUSCULAR WEEKLY
Qty: 10 ML | Refills: 1 | Status: SHIPPED | OUTPATIENT
Start: 2021-09-02 | End: 2022-02-08 | Stop reason: SDUPTHER

## 2021-09-02 RX ORDER — SILDENAFIL CITRATE 20 MG/1
100 TABLET ORAL DAILY PRN
Qty: 60 TABLET | Refills: 11 | Status: SHIPPED | OUTPATIENT
Start: 2021-09-02 | End: 2021-12-06
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
## 2021-09-13 ENCOUNTER — LAB (OUTPATIENT)
Dept: LAB | Facility: HOSPITAL | Age: 46
End: 2021-09-13

## 2021-09-13 ENCOUNTER — TRANSCRIBE ORDERS (OUTPATIENT)
Dept: LAB | Facility: HOSPITAL | Age: 46
End: 2021-09-13

## 2021-09-13 DIAGNOSIS — M46.28 ABSCESS OF SACRUM (HCC): ICD-10-CM

## 2021-09-13 DIAGNOSIS — M46.28 ABSCESS OF SACRUM (HCC): Primary | ICD-10-CM

## 2021-09-13 LAB
ALBUMIN SERPL-MCNC: 4.1 G/DL (ref 3.5–5.2)
ALBUMIN/GLOB SERPL: 1.4 G/DL
ALP SERPL-CCNC: 193 U/L (ref 39–117)
ALT SERPL W P-5'-P-CCNC: 64 U/L (ref 1–41)
ANION GAP SERPL CALCULATED.3IONS-SCNC: 12 MMOL/L (ref 5–15)
AST SERPL-CCNC: 39 U/L (ref 1–40)
BASOPHILS # BLD AUTO: 0.05 10*3/MM3 (ref 0–0.2)
BASOPHILS NFR BLD AUTO: 0.6 % (ref 0–1.5)
BILIRUB SERPL-MCNC: 0.2 MG/DL (ref 0–1.2)
BUN SERPL-MCNC: 8 MG/DL (ref 6–20)
BUN/CREAT SERPL: 8.5 (ref 7–25)
CALCIUM SPEC-SCNC: 9.2 MG/DL (ref 8.6–10.5)
CHLORIDE SERPL-SCNC: 100 MMOL/L (ref 98–107)
CO2 SERPL-SCNC: 27 MMOL/L (ref 22–29)
CREAT SERPL-MCNC: 0.94 MG/DL (ref 0.76–1.27)
CRP SERPL-MCNC: 1.85 MG/DL (ref 0–0.5)
DEPRECATED RDW RBC AUTO: 44.1 FL (ref 37–54)
EOSINOPHIL # BLD AUTO: 0.39 10*3/MM3 (ref 0–0.4)
EOSINOPHIL NFR BLD AUTO: 4.4 % (ref 0.3–6.2)
ERYTHROCYTE [DISTWIDTH] IN BLOOD BY AUTOMATED COUNT: 14.6 % (ref 12.3–15.4)
ERYTHROCYTE [SEDIMENTATION RATE] IN BLOOD: 39 MM/HR (ref 0–15)
GFR SERPL CREATININE-BSD FRML MDRD: 86 ML/MIN/1.73
GLOBULIN UR ELPH-MCNC: 2.9 GM/DL
GLUCOSE SERPL-MCNC: 144 MG/DL (ref 65–99)
HCT VFR BLD AUTO: 39.7 % (ref 37.5–51)
HGB BLD-MCNC: 12.7 G/DL (ref 13–17.7)
IMM GRANULOCYTES # BLD AUTO: 0.13 10*3/MM3 (ref 0–0.05)
IMM GRANULOCYTES NFR BLD AUTO: 1.5 % (ref 0–0.5)
LYMPHOCYTES # BLD AUTO: 1.98 10*3/MM3 (ref 0.7–3.1)
LYMPHOCYTES NFR BLD AUTO: 22.3 % (ref 19.6–45.3)
MCH RBC QN AUTO: 26.7 PG (ref 26.6–33)
MCHC RBC AUTO-ENTMCNC: 32 G/DL (ref 31.5–35.7)
MCV RBC AUTO: 83.4 FL (ref 79–97)
MONOCYTES # BLD AUTO: 0.67 10*3/MM3 (ref 0.1–0.9)
MONOCYTES NFR BLD AUTO: 7.6 % (ref 5–12)
NEUTROPHILS NFR BLD AUTO: 5.64 10*3/MM3 (ref 1.7–7)
NEUTROPHILS NFR BLD AUTO: 63.6 % (ref 42.7–76)
NRBC BLD AUTO-RTO: 0 /100 WBC (ref 0–0.2)
PLATELET # BLD AUTO: 297 10*3/MM3 (ref 140–450)
PMV BLD AUTO: 9.2 FL (ref 6–12)
POTASSIUM SERPL-SCNC: 3.5 MMOL/L (ref 3.5–5.2)
PROT SERPL-MCNC: 7 G/DL (ref 6–8.5)
RBC # BLD AUTO: 4.76 10*6/MM3 (ref 4.14–5.8)
SODIUM SERPL-SCNC: 139 MMOL/L (ref 136–145)
WBC # BLD AUTO: 8.86 10*3/MM3 (ref 3.4–10.8)

## 2021-09-13 PROCEDURE — 86140 C-REACTIVE PROTEIN: CPT

## 2021-09-13 PROCEDURE — 36415 COLL VENOUS BLD VENIPUNCTURE: CPT

## 2021-09-13 PROCEDURE — 85652 RBC SED RATE AUTOMATED: CPT

## 2021-09-13 PROCEDURE — 85025 COMPLETE CBC W/AUTO DIFF WBC: CPT

## 2021-09-13 PROCEDURE — 80053 COMPREHEN METABOLIC PANEL: CPT

## 2021-10-06 ENCOUNTER — LAB (OUTPATIENT)
Dept: LAB | Facility: HOSPITAL | Age: 46
End: 2021-10-06

## 2021-10-06 DIAGNOSIS — M46.28 OSTEOMYELITIS OF SACRUM (HCC): ICD-10-CM

## 2021-10-06 LAB
ALBUMIN SERPL-MCNC: 4.4 G/DL (ref 3.5–5.2)
ALBUMIN/GLOB SERPL: 1.5 G/DL
ALP SERPL-CCNC: 179 U/L (ref 39–117)
ALT SERPL W P-5'-P-CCNC: 64 U/L (ref 1–41)
ANION GAP SERPL CALCULATED.3IONS-SCNC: 12 MMOL/L (ref 5–15)
AST SERPL-CCNC: 21 U/L (ref 1–40)
BASOPHILS # BLD AUTO: 0.07 10*3/MM3 (ref 0–0.2)
BASOPHILS NFR BLD AUTO: 0.6 % (ref 0–1.5)
BILIRUB SERPL-MCNC: 0.2 MG/DL (ref 0–1.2)
BUN SERPL-MCNC: 11 MG/DL (ref 6–20)
BUN/CREAT SERPL: 11 (ref 7–25)
CALCIUM SPEC-SCNC: 9.3 MG/DL (ref 8.6–10.5)
CHLORIDE SERPL-SCNC: 98 MMOL/L (ref 98–107)
CK SERPL-CCNC: 113 U/L (ref 20–200)
CO2 SERPL-SCNC: 27 MMOL/L (ref 22–29)
CREAT SERPL-MCNC: 1 MG/DL (ref 0.76–1.27)
CRP SERPL-MCNC: 1.83 MG/DL (ref 0–0.5)
DEPRECATED RDW RBC AUTO: 47.4 FL (ref 37–54)
EOSINOPHIL # BLD AUTO: 0.3 10*3/MM3 (ref 0–0.4)
EOSINOPHIL NFR BLD AUTO: 2.7 % (ref 0.3–6.2)
ERYTHROCYTE [DISTWIDTH] IN BLOOD BY AUTOMATED COUNT: 15.9 % (ref 12.3–15.4)
ERYTHROCYTE [SEDIMENTATION RATE] IN BLOOD: 23 MM/HR (ref 0–15)
GFR SERPL CREATININE-BSD FRML MDRD: 80 ML/MIN/1.73
GLOBULIN UR ELPH-MCNC: 2.9 GM/DL
GLUCOSE SERPL-MCNC: 145 MG/DL (ref 65–99)
HCT VFR BLD AUTO: 40.8 % (ref 37.5–51)
HGB BLD-MCNC: 13.2 G/DL (ref 13–17.7)
IMM GRANULOCYTES # BLD AUTO: 0.09 10*3/MM3 (ref 0–0.05)
IMM GRANULOCYTES NFR BLD AUTO: 0.8 % (ref 0–0.5)
LYMPHOCYTES # BLD AUTO: 2.12 10*3/MM3 (ref 0.7–3.1)
LYMPHOCYTES NFR BLD AUTO: 18.9 % (ref 19.6–45.3)
MCH RBC QN AUTO: 26.6 PG (ref 26.6–33)
MCHC RBC AUTO-ENTMCNC: 32.4 G/DL (ref 31.5–35.7)
MCV RBC AUTO: 82.3 FL (ref 79–97)
MONOCYTES # BLD AUTO: 0.93 10*3/MM3 (ref 0.1–0.9)
MONOCYTES NFR BLD AUTO: 8.3 % (ref 5–12)
NEUTROPHILS NFR BLD AUTO: 68.7 % (ref 42.7–76)
NEUTROPHILS NFR BLD AUTO: 7.71 10*3/MM3 (ref 1.7–7)
NRBC BLD AUTO-RTO: 0 /100 WBC (ref 0–0.2)
PLATELET # BLD AUTO: 289 10*3/MM3 (ref 140–450)
PMV BLD AUTO: 9.1 FL (ref 6–12)
POTASSIUM SERPL-SCNC: 3.2 MMOL/L (ref 3.5–5.2)
PROT SERPL-MCNC: 7.3 G/DL (ref 6–8.5)
RBC # BLD AUTO: 4.96 10*6/MM3 (ref 4.14–5.8)
SODIUM SERPL-SCNC: 137 MMOL/L (ref 136–145)
WBC # BLD AUTO: 11.22 10*3/MM3 (ref 3.4–10.8)

## 2021-10-06 PROCEDURE — 86140 C-REACTIVE PROTEIN: CPT

## 2021-10-06 PROCEDURE — 85651 RBC SED RATE NONAUTOMATED: CPT

## 2021-10-06 PROCEDURE — 85025 COMPLETE CBC W/AUTO DIFF WBC: CPT

## 2021-10-06 PROCEDURE — 80053 COMPREHEN METABOLIC PANEL: CPT

## 2021-10-06 PROCEDURE — 82550 ASSAY OF CK (CPK): CPT

## 2021-10-07 ENCOUNTER — TRANSCRIBE ORDERS (OUTPATIENT)
Dept: LAB | Facility: HOSPITAL | Age: 46
End: 2021-10-07

## 2021-10-07 DIAGNOSIS — R50.9 FEVER OF UNKNOWN ORIGIN (FUO): Primary | ICD-10-CM

## 2021-11-19 ENCOUNTER — OFFICE VISIT (OUTPATIENT)
Dept: NEUROLOGY | Facility: CLINIC | Age: 46
End: 2021-11-19

## 2021-11-19 ENCOUNTER — LAB (OUTPATIENT)
Dept: LAB | Facility: HOSPITAL | Age: 46
End: 2021-11-19

## 2021-11-19 VITALS
HEIGHT: 71 IN | TEMPERATURE: 97.1 F | SYSTOLIC BLOOD PRESSURE: 132 MMHG | OXYGEN SATURATION: 95 % | HEART RATE: 103 BPM | DIASTOLIC BLOOD PRESSURE: 84 MMHG | BODY MASS INDEX: 40.32 KG/M2 | WEIGHT: 288 LBS

## 2021-11-19 DIAGNOSIS — G43.C0 PERIODIC HEADACHE SYNDROME, NOT INTRACTABLE: Primary | ICD-10-CM

## 2021-11-19 DIAGNOSIS — G25.0 TREMOR, ESSENTIAL: ICD-10-CM

## 2021-11-19 DIAGNOSIS — G43.C0 PERIODIC HEADACHE SYNDROME, NOT INTRACTABLE: ICD-10-CM

## 2021-11-19 LAB
ALBUMIN SERPL-MCNC: 4.5 G/DL (ref 3.5–5.2)
ALBUMIN/GLOB SERPL: 1.6 G/DL
ALP SERPL-CCNC: 223 U/L (ref 39–117)
ALT SERPL W P-5'-P-CCNC: 239 U/L (ref 1–41)
AMMONIA BLD-SCNC: 88 UMOL/L (ref 16–60)
ANION GAP SERPL CALCULATED.3IONS-SCNC: 9 MMOL/L (ref 5–15)
AST SERPL-CCNC: 66 U/L (ref 1–40)
BASOPHILS # BLD AUTO: 0.04 10*3/MM3 (ref 0–0.2)
BASOPHILS NFR BLD AUTO: 0.4 % (ref 0–1.5)
BILIRUB SERPL-MCNC: 0.2 MG/DL (ref 0–1.2)
BUN SERPL-MCNC: 12 MG/DL (ref 6–20)
BUN/CREAT SERPL: 12.6 (ref 7–25)
CALCIUM SPEC-SCNC: 9.4 MG/DL (ref 8.6–10.5)
CHLORIDE SERPL-SCNC: 99 MMOL/L (ref 98–107)
CO2 SERPL-SCNC: 29 MMOL/L (ref 22–29)
CREAT SERPL-MCNC: 0.95 MG/DL (ref 0.76–1.27)
DEPRECATED RDW RBC AUTO: 45.8 FL (ref 37–54)
EOSINOPHIL # BLD AUTO: 0.18 10*3/MM3 (ref 0–0.4)
EOSINOPHIL NFR BLD AUTO: 2 % (ref 0.3–6.2)
ERYTHROCYTE [DISTWIDTH] IN BLOOD BY AUTOMATED COUNT: 16.1 % (ref 12.3–15.4)
GFR SERPL CREATININE-BSD FRML MDRD: 85 ML/MIN/1.73
GLOBULIN UR ELPH-MCNC: 2.8 GM/DL
GLUCOSE SERPL-MCNC: 114 MG/DL (ref 65–99)
HCT VFR BLD AUTO: 40.8 % (ref 37.5–51)
HGB BLD-MCNC: 13.3 G/DL (ref 13–17.7)
IMM GRANULOCYTES # BLD AUTO: 0.08 10*3/MM3 (ref 0–0.05)
IMM GRANULOCYTES NFR BLD AUTO: 0.9 % (ref 0–0.5)
LYMPHOCYTES # BLD AUTO: 1.55 10*3/MM3 (ref 0.7–3.1)
LYMPHOCYTES NFR BLD AUTO: 17.3 % (ref 19.6–45.3)
MCH RBC QN AUTO: 26.2 PG (ref 26.6–33)
MCHC RBC AUTO-ENTMCNC: 32.6 G/DL (ref 31.5–35.7)
MCV RBC AUTO: 80.3 FL (ref 79–97)
MONOCYTES # BLD AUTO: 0.72 10*3/MM3 (ref 0.1–0.9)
MONOCYTES NFR BLD AUTO: 8.1 % (ref 5–12)
NEUTROPHILS NFR BLD AUTO: 6.37 10*3/MM3 (ref 1.7–7)
NEUTROPHILS NFR BLD AUTO: 71.3 % (ref 42.7–76)
NRBC BLD AUTO-RTO: 0 /100 WBC (ref 0–0.2)
PLATELET # BLD AUTO: 292 10*3/MM3 (ref 140–450)
PMV BLD AUTO: 9.7 FL (ref 6–12)
POTASSIUM SERPL-SCNC: 3.7 MMOL/L (ref 3.5–5.2)
PROT SERPL-MCNC: 7.3 G/DL (ref 6–8.5)
RBC # BLD AUTO: 5.08 10*6/MM3 (ref 4.14–5.8)
SODIUM SERPL-SCNC: 137 MMOL/L (ref 136–145)
WBC NRBC COR # BLD: 8.94 10*3/MM3 (ref 3.4–10.8)

## 2021-11-19 PROCEDURE — 82746 ASSAY OF FOLIC ACID SERUM: CPT

## 2021-11-19 PROCEDURE — 99244 OFF/OP CNSLTJ NEW/EST MOD 40: CPT | Performed by: PSYCHIATRY & NEUROLOGY

## 2021-11-19 PROCEDURE — 36415 COLL VENOUS BLD VENIPUNCTURE: CPT

## 2021-11-19 PROCEDURE — 85652 RBC SED RATE AUTOMATED: CPT

## 2021-11-19 PROCEDURE — 82140 ASSAY OF AMMONIA: CPT

## 2021-11-19 PROCEDURE — 80053 COMPREHEN METABOLIC PANEL: CPT

## 2021-11-19 PROCEDURE — 85025 COMPLETE CBC W/AUTO DIFF WBC: CPT

## 2021-11-19 PROCEDURE — 86592 SYPHILIS TEST NON-TREP QUAL: CPT

## 2021-11-19 PROCEDURE — 82607 VITAMIN B-12: CPT

## 2021-11-19 PROCEDURE — 84443 ASSAY THYROID STIM HORMONE: CPT

## 2021-11-19 RX ORDER — FLUTICASONE PROPIONATE 50 MCG
SPRAY, SUSPENSION (ML) NASAL AS NEEDED
COMMUNITY
Start: 2021-10-15

## 2021-11-19 RX ORDER — TOPIRAMATE 50 MG/1
50 TABLET, FILM COATED ORAL 2 TIMES DAILY
Qty: 60 TABLET | Refills: 5 | Status: SHIPPED | OUTPATIENT
Start: 2021-11-19 | End: 2022-01-21

## 2021-11-19 RX ORDER — AMLODIPINE BESYLATE 5 MG/1
5 TABLET ORAL DAILY
COMMUNITY
Start: 2021-11-03

## 2021-11-19 RX ORDER — CHLORTHALIDONE 25 MG/1
25 TABLET ORAL DAILY
COMMUNITY
Start: 2021-11-03 | End: 2022-10-31 | Stop reason: HOSPADM

## 2021-11-19 RX ORDER — BUPRENORPHINE HYDROCHLORIDE 8 MG/1
8 TABLET SUBLINGUAL
COMMUNITY
Start: 2021-11-12

## 2021-11-19 RX ORDER — CYCLOBENZAPRINE HCL 10 MG
10 TABLET ORAL NIGHTLY PRN
COMMUNITY
Start: 2021-11-03 | End: 2022-10-26

## 2021-11-19 NOTE — PROGRESS NOTES
"Chief Complaint  dyasthargia    Subjective          Gopi Rafael Johnson presents to [facility] NEUROLOGY     History of Present Illness    46 y.o. male referred by Maria Victoria JOYCE for dysarthria, weakness.     After developing sepsis from osteomyelitis has increased HA, LE weakness, stuttering.  Myoclonic jerking.  Sx are worsening in last few months.        Taking TPM 25 mg qhs.    HA frequency is 4 - 5 times a week.  Increased intensity.  Provoked by weather changes.  7 - 8/10.  Last for up to day.  Sleep improves sx.        Reviewed medial records:    H/O spinal osteomyelitis, previous IVDA.      MRI L/S, my review of films, 8/2/21 mild disc dz L4/5 and L5/S1  MR pelvis 8/23/21 non displaced vertical fx of left sacral ala    C/o muscle spasms of legs, N/T in all extremities, difficulty with speech.    Labs 6/25/21 CMP, A1C,     5/4/21 TSH, HIV, CBC, hep pnl, U/A    PSG severe THOMAS AHI 68   Objective   Vital Signs:   /84   Pulse 103   Temp 97.1 °F (36.2 °C)   Ht 180.3 cm (70.98\")   Wt 131 kg (288 lb)   SpO2 95%   BMI 40.19 kg/m²     Physical Exam  Eyes:      Extraocular Movements: EOM normal.      Pupils: Pupils are equal, round, and reactive to light.   Neurological:      Mental Status: He is oriented to person, place, and time.      Coordination: Finger-Nose-Finger Test, Heel to Shin Test and Romberg Test normal.      Gait: Gait is intact. Tandem walk normal.      Deep Tendon Reflexes: Strength normal.   Psychiatric:         Speech: Speech normal.          Neurologic Exam     Mental Status   Oriented to person, place, and time.   Registration: recalls 3 of 3 objects. Recall at 5 minutes: recalls 3 of 3 objects. Follows 3 step commands.   Attention: normal. Concentration: normal.   Speech: speech is normal   Level of consciousness: alert  Knowledge: good and consistent with education.   Able to name object. Able to read. Able to repeat. Able to write. Normal comprehension. "     Cranial Nerves     CN II   Visual fields full to confrontation.   Visual acuity: normal  Right visual field deficit: none  Left visual field deficit: none     CN III, IV, VI   Pupils are equal, round, and reactive to light.  Extraocular motions are normal.   Nystagmus: none   Diplopia: none  Ophthalmoparesis: none  Upgaze: normal  Downgaze: normal  Conjugate gaze: present  Vestibulo-ocular reflex: present    CN V   Facial sensation intact.   Right corneal reflex: normal  Left corneal reflex: normal    CN VII   Right facial weakness: none  Left facial weakness: none    CN VIII   Hearing: intact    CN IX, X   Palate: symmetric  Right gag reflex: normal  Left gag reflex: normal    CN XI   Right sternocleidomastoid strength: normal  Left sternocleidomastoid strength: normal    CN XII   Tongue: not atrophic  Fasciculations: absent  Tongue deviation: none    Motor Exam   Muscle bulk: normal  Overall muscle tone: normal  Right arm tone: normal  Left arm tone: normal  Right leg tone: normal  Left leg tone: normal    Strength   Strength 5/5 throughout.     Sensory Exam   Light touch normal.   Pinprick normal.     Gait, Coordination, and Reflexes     Gait  Gait: normal    Coordination   Romberg: negative  Finger to nose coordination: normal  Heel to shin coordination: normal  Tandem walking coordination: normal    Tremor   Resting tremor: absent  Intention tremor: absent  Action tremor: left arm and right arm    Reflexes   Reflexes 2+ except as noted.      Result Review :   The following data was reviewed by: Rajendra Templeton MD on 11/19/2021:  Common labs    Common Labsle 8/16/21 8/16/21 9/13/21 9/13/21 10/6/21 10/6/21    1412 1412 1306 1306 1559 1559   Glucose  144 (A)  144 (A)  145 (A)   BUN  11  8  11   Creatinine  0.97  0.94  1.00   eGFR Non African Am  84  86  80   Sodium  139  139  137   Potassium  3.3 (A)  3.5  3.2 (A)   Chloride  100  100  98   Calcium  9.6  9.2  9.3   Albumin    4.10  4.40   Total Bilirubin     0.2  0.2   Alkaline Phosphatase    193 (A)  179 (A)   AST (SGOT)    39  21   ALT (SGPT)    64 (A)  64 (A)   WBC 8.79  8.86  11.22 (A)    Hemoglobin 12.7 (A)  12.7 (A)  13.2    Hematocrit 39.6  39.7  40.8    Platelets 294  297  289    (A) Abnormal value            Data reviewed: Radiologic studies MRI L/S and pelvis          Assessment and Plan    Diagnoses and all orders for this visit:    1. Periodic headache syndrome, not intractable (Primary)  Assessment & Plan:  Headaches are worsening.  Medication changes per orders.    MRI Brain     Labs    Increase TPM 50 BID         Orders:  -     Ammonia; Future  -     TSH; Future  -     Vitamin B12 & Folate; Future  -     Sedimentation Rate; Future  -     CBC & Differential; Future  -     Comprehensive Metabolic Panel; Future  -     RPR; Future  -     MRI Brain With & Without Contrast; Future    Other orders  -     topiramate (TOPAMAX) 50 MG tablet; Take 1 tablet by mouth 2 (Two) Times a Day.  Dispense: 60 tablet; Refill: 5      Follow Up   No follow-ups on file.  Patient was given instructions and counseling regarding his condition or for health maintenance advice. Please see specific information pulled into the AVS if appropriate.

## 2021-11-19 NOTE — ASSESSMENT & PLAN NOTE
Headaches are worsening.  Medication changes per orders.    MRI Brain     Labs    Increase TPM 50 BID

## 2021-11-20 LAB
ERYTHROCYTE [SEDIMENTATION RATE] IN BLOOD: 58 MM/HR (ref 0–15)
FOLATE SERPL-MCNC: 12 NG/ML (ref 4.78–24.2)
RPR SER QL: NORMAL
TSH SERPL DL<=0.05 MIU/L-ACNC: 3.34 UIU/ML (ref 0.27–4.2)
VIT B12 BLD-MCNC: 1211 PG/ML (ref 211–946)

## 2021-11-22 ENCOUNTER — TELEPHONE (OUTPATIENT)
Dept: NEUROLOGY | Facility: CLINIC | Age: 46
End: 2021-11-22

## 2021-11-22 RX ORDER — LACTULOSE 10 G/15ML
20 SOLUTION ORAL 3 TIMES DAILY
Qty: 30 ML | Refills: 5 | Status: SHIPPED | OUTPATIENT
Start: 2021-11-22 | End: 2022-03-01

## 2021-11-22 NOTE — PROGRESS NOTES
Pt's ammonia level and liver function tests are elevated     Ask him to stop Topamax  Called in lactulose   Needs to follow up with PCP for liver

## 2021-11-22 NOTE — TELEPHONE ENCOUNTER
----- Message from Rajendra Templeton MD sent at 11/22/2021  7:51 AM EST -----  Pt's ammonia level and liver function tests are elevated     Ask him to stop Topamax  Called in lactulose   Needs to follow up with PCP for liver

## 2021-11-22 NOTE — TELEPHONE ENCOUNTER
Caller: Alex Gopi Hall    Relationship: Self    Best call back number: [phone]    Caller requesting test results:  PT     What test was performed:  LABS     When was the test performed: 11/19/2021    Where was the test performed:  [facility]     Additional notes:  PT WOULD LIKE RESULTS SENT TO DR VERONICA REMY [facility]     [phone]   ASAP IF POSSIBLE

## 2021-11-22 NOTE — TELEPHONE ENCOUNTER
Relayed results to Gopi who states verbal understanding.    As well, he asked that these be forwarded to Dr.David Drew which I have. Thanks!

## 2021-11-29 ENCOUNTER — LAB (OUTPATIENT)
Dept: LAB | Facility: HOSPITAL | Age: 46
End: 2021-11-29

## 2021-11-29 DIAGNOSIS — R50.9 FEVER OF UNKNOWN ORIGIN (FUO): ICD-10-CM

## 2021-11-29 LAB
BASOPHILS # BLD AUTO: 0.04 10*3/MM3 (ref 0–0.2)
BASOPHILS NFR BLD AUTO: 0.5 % (ref 0–1.5)
CRP SERPL-MCNC: 1.2 MG/DL (ref 0–0.5)
DEPRECATED RDW RBC AUTO: 48.3 FL (ref 37–54)
EOSINOPHIL # BLD AUTO: 0.3 10*3/MM3 (ref 0–0.4)
EOSINOPHIL NFR BLD AUTO: 3.5 % (ref 0.3–6.2)
ERYTHROCYTE [DISTWIDTH] IN BLOOD BY AUTOMATED COUNT: 16.1 % (ref 12.3–15.4)
ERYTHROCYTE [SEDIMENTATION RATE] IN BLOOD: 30 MM/HR (ref 0–15)
HCT VFR BLD AUTO: 41.2 % (ref 37.5–51)
HGB BLD-MCNC: 12.9 G/DL (ref 13–17.7)
IMM GRANULOCYTES # BLD AUTO: 0.04 10*3/MM3 (ref 0–0.05)
IMM GRANULOCYTES NFR BLD AUTO: 0.5 % (ref 0–0.5)
LYMPHOCYTES # BLD AUTO: 1.88 10*3/MM3 (ref 0.7–3.1)
LYMPHOCYTES NFR BLD AUTO: 22.2 % (ref 19.6–45.3)
MCH RBC QN AUTO: 26.1 PG (ref 26.6–33)
MCHC RBC AUTO-ENTMCNC: 31.3 G/DL (ref 31.5–35.7)
MCV RBC AUTO: 83.4 FL (ref 79–97)
MONOCYTES # BLD AUTO: 0.61 10*3/MM3 (ref 0.1–0.9)
MONOCYTES NFR BLD AUTO: 7.2 % (ref 5–12)
NEUTROPHILS NFR BLD AUTO: 5.6 10*3/MM3 (ref 1.7–7)
NEUTROPHILS NFR BLD AUTO: 66.1 % (ref 42.7–76)
NRBC BLD AUTO-RTO: 0 /100 WBC (ref 0–0.2)
PLATELET # BLD AUTO: 286 10*3/MM3 (ref 140–450)
PMV BLD AUTO: 9 FL (ref 6–12)
RBC # BLD AUTO: 4.94 10*6/MM3 (ref 4.14–5.8)
WBC NRBC COR # BLD: 8.47 10*3/MM3 (ref 3.4–10.8)

## 2021-11-29 PROCEDURE — 85651 RBC SED RATE NONAUTOMATED: CPT

## 2021-11-29 PROCEDURE — 86140 C-REACTIVE PROTEIN: CPT

## 2021-11-29 PROCEDURE — 85025 COMPLETE CBC W/AUTO DIFF WBC: CPT

## 2021-11-29 PROCEDURE — 87040 BLOOD CULTURE FOR BACTERIA: CPT

## 2021-11-29 PROCEDURE — 36415 COLL VENOUS BLD VENIPUNCTURE: CPT

## 2021-12-04 LAB
BACTERIA SPEC AEROBE CULT: NORMAL
BACTERIA SPEC AEROBE CULT: NORMAL

## 2021-12-06 ENCOUNTER — TELEPHONE (OUTPATIENT)
Dept: SURGERY | Facility: CLINIC | Age: 46
End: 2021-12-06

## 2021-12-06 ENCOUNTER — OFFICE VISIT (OUTPATIENT)
Dept: UROLOGY | Facility: CLINIC | Age: 46
End: 2021-12-06

## 2021-12-06 VITALS
TEMPERATURE: 98.9 F | HEART RATE: 97 BPM | BODY MASS INDEX: 37.8 KG/M2 | HEIGHT: 71 IN | OXYGEN SATURATION: 97 % | WEIGHT: 270 LBS

## 2021-12-06 DIAGNOSIS — R39.9 LOWER URINARY TRACT SYMPTOMS (LUTS): ICD-10-CM

## 2021-12-06 PROCEDURE — 51798 US URINE CAPACITY MEASURE: CPT | Performed by: UROLOGY

## 2021-12-06 PROCEDURE — 99214 OFFICE O/P EST MOD 30 MIN: CPT | Performed by: UROLOGY

## 2021-12-06 RX ORDER — DIPHENHYDRAMINE HCL 25 MG/1
25 TABLET ORAL EVERY 6 HOURS PRN
COMMUNITY
Start: 2021-11-30 | End: 2022-02-24

## 2021-12-06 RX ORDER — CYCLOBENZAPRINE HCL 5 MG
TABLET ORAL
COMMUNITY
Start: 2021-12-03 | End: 2022-01-21

## 2021-12-06 NOTE — PROGRESS NOTES
Chief Complaint   Patient presents with   • Urinary Retention     Patient having trouble urinating. Patient states that urinating is worse than pre TURP procedure.       HPI  Ms. Johnson is a 46 y.o. male with history below in assessment, who presents for follow up.     At this visit patient says that weak stream is most bothersome symptom.     IPSS Questionnaire (AUA-7):  Incomplete emptying  Over the past month, how often have you had a sensation of not emptying your bladder completely after you finish?: Almost always (12/06/21 1200)  Frequency  Over the past month, how often have you had to urinate again less than two hours after you finishing urinating ?: Almost always (12/06/21 1200)  Intermittency  Over the past month, how often have you found you stopped and started again several time when you urinated ?: About half the time (12/06/21 1200)  Urgency  Over the last month, how difficult  have you found it to postpone urination ?: About half the time (12/06/21 1200)  Weak Stream  Over the past month, how often have you had a weak urinary stream ?: Almost always (12/06/21 1200)  Straining  Over the past month, how often have you had to push or strain to begin urination ?: Almost always (12/06/21 1200)  Nocturia  Over the past month, how many times did you most typically get up to urinate from the time you went to bed until the time you got up in the morning ?: Almost always (12/06/21 1200)  Quality of life due to urinary symptoms  If you were to spend the rest of your life with your urinary condition the way it is now, how would feel about that?: Mixed - about equally satisfied (12/06/21 1200)    Scores  Total IPSS Score: 31 (12/06/21 1200)     Past Medical History:   Diagnosis Date   • GERD (gastroesophageal reflux disease)    • High cholesterol    • Hypertension    • Malignant hyperthermia due to anesthesia     patient had muscle biopsy at [facility] and was negative, sister has malignant hyperthermia and a  cousin   • Migraine    • Osteomyelitis (HCC)        Past Surgical History:   Procedure Laterality Date   • BUNIONECTOMY     • CHOLECYSTECTOMY     • COLONOSCOPY     • CYSTOSCOPY TRANSURETHRAL RESECTION OF PROSTATE N/A 5/26/2021    Procedure: TRANSURETHRAL RESECTION OF PROSTATE;  Surgeon: Markel Centeno MD;  Location: [facility];  Service: Urology;  Laterality: N/A;   • ENDOSCOPY     • HERNIA REPAIR     • KNEE OPEN LATERAL RELEASE      Right   • TONSILLECTOMY AND ADENOIDECTOMY           Current Outpatient Medications:   •  Alcohol Swabs pads, Clean area prior to injection, Disp: 100 each, Rfl: 11  •  amLODIPine (NORVASC) 5 MG tablet, Take 5 mg by mouth Daily., Disp: , Rfl:   •  Banophen 25 MG tablet, , Disp: , Rfl:   •  buprenorphine (SUBUTEX) 8 MG sublingual tablet SL tablet, DISSOLVE 2 TABLETS UNDER THE TONGUE DAILY, Disp: , Rfl:   •  cetirizine (zyrTEC) 10 MG tablet, Take 1 tablet by mouth Daily., Disp:  , Rfl:   •  chlorthalidone (HYGROTON) 25 MG tablet, TAKE 1 TABLET BY MOUTH IN THE MORNING DAILY WITH FOOD, Disp: , Rfl:   •  cloNIDine (CATAPRES) 0.1 MG tablet, , Disp: , Rfl:   •  cyanocobalamin 1000 MCG/ML injection, , Disp: , Rfl:   •  cyclobenzaprine (FLEXERIL) 10 MG tablet, Take 10 mg by mouth At Night As Needed., Disp: , Rfl:   •  cyclobenzaprine (FLEXERIL) 5 MG tablet, , Disp: , Rfl:   •  fluticasone (FLONASE) 50 MCG/ACT nasal spray, INSTILL 2 SPRAYS IN EACH NOSTRILS ONCE DAILY, Disp: , Rfl:   •  lactulose (CHRONULAC) 10 GM/15ML solution, Take 30 mL by mouth 3 (Three) Times a Day., Disp: 30 mL, Rfl: 5  •  lidocaine (LIDODERM) 5 %, Place 1 patch on the skin as directed by provider Daily. Remove & Discard patch within 12 hours or as directed by MD, Disp:  , Rfl:   •  lisinopril (PRINIVIL,ZESTRIL) 30 MG tablet, , Disp: , Rfl:   •  Loratadine (Claritin) 10 MG capsule, Take  by mouth., Disp: , Rfl:   •  mupirocin (BACTROBAN) 2 % ointment, APPLY TO AFFECTED AREA(S) THREE TIMES DAILY FOR 7 DAYS, Disp: , Rfl:  "  •  Needle, Disp, 18G X 1-1/2\" misc, Use for drawing up the medication, Disp: 50 each, Rfl: 3  •  Needle, Disp, 23G X 1-1/2\" misc, 1 Device 1 (One) Time Per Week., Disp: 30 each, Rfl: 11  •  ondansetron (ZOFRAN) 4 MG tablet, Take 1 tablet by mouth Every 6 (Six) Hours As Needed for Nausea or Vomiting., Disp:  , Rfl:   •  pantoprazole (PROTONIX) 40 MG EC tablet, Take 1 tablet by mouth Every Morning., Disp:  , Rfl:   •  sulfamethoxazole-trimethoprim (BACTRIM DS,SEPTRA DS) 800-160 MG per tablet, Take 1 tablet by mouth Daily., Disp: , Rfl:   •  Syringe, Disposable, 3 ML misc, 1 syringe 1 (One) Time Per Week., Disp: 100 each, Rfl: 11  •  testosterone cypionate (DEPO-TESTOSTERONE) 100 MG/ML solution injection, Inject 1 mL into the appropriate muscle as directed by prescriber 1 (One) Time Per Week., Disp: 10 mL, Rfl: 1  •  topiramate (TOPAMAX) 50 MG tablet, Take 1 tablet by mouth 2 (Two) Times a Day., Disp: 60 tablet, Rfl: 5  •  traZODone (DESYREL) 50 MG tablet, Take 50 mg by mouth Daily As Needed for Sleep., Disp: , Rfl:   •  venlafaxine (EFFEXOR) 75 MG tablet, Take 75 mg by mouth Daily. At night, Disp: , Rfl:   •  venlafaxine XR (EFFEXOR-XR) 150 MG 24 hr capsule, Take 1 capsule by mouth Daily., Disp:  , Rfl:      Physical Exam  Visit Vitals  Pulse 97   Temp 98.9 °F (37.2 °C)   Ht 180.3 cm (70.98\")   Wt 122 kg (270 lb)   SpO2 97%   BMI 37.67 kg/m²       Labs  Brief Urine Lab Results  (Last result in the past 365 days)      Color   Clarity   Blood   Leuk Est   Nitrite   Protein   CREAT   Urine HCG        07/06/21 1410 Yellow   Clear   2+   Moderate (2+)   Negative   Trace                 Lab Results   Component Value Date    GLUCOSE 114 (H) 11/19/2021    CALCIUM 9.4 11/19/2021     11/19/2021    K 3.7 11/19/2021    CO2 29.0 11/19/2021    CL 99 11/19/2021    BUN 12 11/19/2021    CREATININE 0.95 11/19/2021    EGFRIFNONA 85 11/19/2021    BCR 12.6 11/19/2021    ANIONGAP 9.0 11/19/2021       Lab Results   Component Value " Date    WBC 8.47 11/29/2021    HGB 12.9 (L) 11/29/2021    HCT 41.2 11/29/2021    MCV 83.4 11/29/2021     11/29/2021       Urine Culture    Urine Culture 7/6/21   Urine Culture Final report              Lab Results   Component Value Date    PSA 1.460 03/12/2021    PSA 1.210 10/26/2016       Lab Results   Component Value Date    TESTFRE 5.3 (L) 08/13/2021    TESTOSTEROTT 247 (L) 08/13/2021        Radiographic Studies  MRI Pelvis With & Without Contrast  Result Date: 8/24/2021  Findings consistent with nondisplaced vertical fracture of the left sacral ala. No evidence of acute or healing bony or soft tissue trauma is seen elsewhere. No particular features to suggest that this represents infection/inflammation instead of fracture.    MRI LUMBAR SPINE WITH AND WITHOUT CONTRAST: Sagittal images show no significant abnormality of vertebral alignment. Vertebral body heights and disc spaces are generally well maintained with mild degenerative disc narrowing at L5-S1, associated small L5-S1 disc protrusion and minor L4-5 disc protrusion. Sagittal images show no evidence of spinal stenosis. No compression deformity is seen. There are degenerative endplate changes at L5-S1 typical for DDD. Left sacral marrow edema is again noted as described on pelvic MRI. No intracanalicular mass or collection is seen. No paraspinous edema/inflammation is seen on the sagittal imaging series.  Axial images show no evidence of significant spinal stenosis at T12-L1.  At L1-2, canal and foramina appear within normal limits.  At L2-3, canal and foramina appear within normal limits.  At L3-4, canal appears lower limits of normal diameter. Foramina appear normal.  At L4-5, canal again appears lower limits of normal diameter and foramina appear normal.  At L5-S1, there is a central disc protrusion, once again lowering the canal to lower limits of normal diameter. Foramina appear normal.  No pathologic paraspinous mass or acute inflammatory  focus is appreciated. Postcontrast images are heavily motion degraded. No pathologic postcontrast enhancement is identified.  IMPRESSION: 1. Minor lower lumbar degenerative changes with no evidence of significant spinal stenosis. No evidence of acute or healing trauma or significant focal subluxation. 2. Left sacral fracture again noted as described on previous pelvic MRI report.  D:  08/23/2021 E:  08/23/2021  This report was finalized on 8/24/2021 9:16 PM by Dr. Esteban Brand MD.      PVR  Post-void residual performed with ultrasound scanner by staff and interpreted by me - 11 mL    I have reviewed above labs and imaging.     Assessment  46 y.o. male with multiple complaints, history of osteomyelitis, history of nephrolithiasis, hypergonadism, lower urinary tract symptoms, status post TURP 5/26/2021 presents with continued lower urinary tract symptoms with IPSS of 30. Patient has anxiety and had difficulty with urodynamics.  He says the catheters could not be placed.    Plan  1.  Schedule for cystoscopy.  Risk factors are history of multiple kinds of infections.

## 2021-12-21 NOTE — PROGRESS NOTES
Patient: Gopi Johnson    YOB: 1975    Date: 12/27/2021    Primary Care Provider: Maria Victoria Paulino APRN    Chief Complaint   Patient presents with   • Colonoscopy       SUBJECTIVE:    History of present illness:  I saw the patient in the office today as a consultation for evaluation and treatment of bouts of heartburn and indigestion with recent bowel habit changes with constipation.     Apparently this been present for some time, epigastric discomfort is sharp in nature, located in the epigastrium, associated with reflux, nonradiating, proton pump inhibitors can help, heavy meals late at night tend to hurt.    The following portions of the patient's history were reviewed and updated as appropriate: allergies, current medications, past family history, past medical history, past social history, past surgical history and problem list.    Review of Systems   Constitutional: Negative for chills, fever and unexpected weight change.   HENT: Negative for trouble swallowing and voice change.    Eyes: Negative for visual disturbance.   Respiratory: Negative for apnea, cough, chest tightness, shortness of breath and wheezing.    Cardiovascular: Negative for chest pain, palpitations and leg swelling.   Gastrointestinal: Positive for constipation. Negative for abdominal distention, abdominal pain, anal bleeding, blood in stool, diarrhea, nausea, rectal pain and vomiting.   Endocrine: Negative for cold intolerance and heat intolerance.   Genitourinary: Negative for difficulty urinating, dysuria, flank pain, scrotal swelling and testicular pain.   Musculoskeletal: Negative for back pain, gait problem and joint swelling.   Skin: Negative for color change, rash and wound.   Neurological: Negative for dizziness, syncope, speech difficulty, weakness, numbness and headaches.   Hematological: Negative for adenopathy. Does not bruise/bleed easily.   Psychiatric/Behavioral: Negative for confusion. The patient is not  nervous/anxious.        History:  Past Medical History:   Diagnosis Date   • GERD (gastroesophageal reflux disease)    • High cholesterol    • Hypertension    • Malignant hyperthermia due to anesthesia     patient had muscle biopsy at [facility] and was negative, sister has malignant hyperthermia and a cousin   • Migraine    • Osteomyelitis (HCC)        Past Surgical History:   Procedure Laterality Date   • BUNIONECTOMY     • CHOLECYSTECTOMY     • COLONOSCOPY     • CYSTOSCOPY TRANSURETHRAL RESECTION OF PROSTATE N/A 5/26/2021    Procedure: TRANSURETHRAL RESECTION OF PROSTATE;  Surgeon: Markel Centeno MD;  Location: [facility];  Service: Urology;  Laterality: N/A;   • ENDOSCOPY     • HERNIA REPAIR     • KNEE OPEN LATERAL RELEASE      Right   • TONSILLECTOMY AND ADENOIDECTOMY     • TURP / TRANSURETHRAL INCISION / DRAINAGE PROSTATE  08/2021       Family History   Problem Relation Age of Onset   • Hypertension Father    • Hypertension Mother    • Prostate cancer Paternal Uncle    • Heart disease Paternal Grandmother    • Prostate cancer Paternal Grandfather    • Prostate cancer Paternal Uncle    • Malig Hyperthermia Sister        Social History     Tobacco Use   • Smoking status: Former Smoker     Packs/day: 0.50     Years: 30.00     Pack years: 15.00     Types: Cigarettes   • Smokeless tobacco: Never Used   Vaping Use   • Vaping Use: Every day   • Substances: Nicotine   • Devices: Pre-filled or refillable cartridge   Substance Use Topics   • Alcohol use: Not Currently   • Drug use: Not Currently     Types: Methamphetamines, Cocaine(coke), Ketamine, LSD, Marijuana, MDMA (ecstacy)     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       Allergies:  Allergies   Allergen Reactions   • Naloxone Swelling     Throat swelling, itching, rash   • Hydrocodone-Acetaminophen Itching       Medications:    Current Outpatient Medications:   •  Alcohol Swabs pads, Clean area prior to injection, Disp: 100 each, Rfl: 11  •  amLODIPine  "(NORVASC) 5 MG tablet, Take 5 mg by mouth Daily., Disp: , Rfl:   •  Banophen 25 MG tablet, , Disp: , Rfl:   •  buprenorphine (SUBUTEX) 8 MG sublingual tablet SL tablet, DISSOLVE 2 TABLETS UNDER THE TONGUE DAILY, Disp: , Rfl:   •  cetirizine (zyrTEC) 10 MG tablet, Take 1 tablet by mouth Daily., Disp:  , Rfl:   •  fluticasone (FLONASE) 50 MCG/ACT nasal spray, INSTILL 2 SPRAYS IN EACH NOSTRILS ONCE DAILY, Disp: , Rfl:   •  hydrOXYzine (ATARAX) 25 MG tablet, Take 50 mg by mouth every night at bedtime., Disp: , Rfl:   •  lidocaine (LIDODERM) 5 %, Place 1 patch on the skin as directed by provider Daily. Remove & Discard patch within 12 hours or as directed by MD, Disp:  , Rfl:   •  lisinopril (PRINIVIL,ZESTRIL) 30 MG tablet, , Disp: , Rfl:   •  Loratadine (Claritin) 10 MG capsule, Take  by mouth., Disp: , Rfl:   •  Multiple Vitamins-Minerals (MULTI COMPLETE PO), Take  by mouth., Disp: , Rfl:   •  mupirocin (BACTROBAN) 2 % ointment, APPLY TO AFFECTED AREA(S) THREE TIMES DAILY FOR 7 DAYS, Disp: , Rfl:   •  Needle, Disp, 18G X 1-1/2\" misc, Use for drawing up the medication, Disp: 50 each, Rfl: 3  •  Needle, Disp, 23G X 1-1/2\" misc, 1 Device 1 (One) Time Per Week., Disp: 30 each, Rfl: 11  •  sulfamethoxazole-trimethoprim (BACTRIM DS,SEPTRA DS) 800-160 MG per tablet, Take 1 tablet by mouth Daily., Disp: , Rfl:   •  Syringe, Disposable, 3 ML misc, 1 syringe 1 (One) Time Per Week., Disp: 100 each, Rfl: 11  •  testosterone cypionate (DEPO-TESTOSTERONE) 100 MG/ML solution injection, Inject 1 mL into the appropriate muscle as directed by prescriber 1 (One) Time Per Week., Disp: 10 mL, Rfl: 1  •  topiramate (TOPAMAX) 50 MG tablet, Take 1 tablet by mouth 2 (Two) Times a Day., Disp: 60 tablet, Rfl: 5  •  traZODone (DESYREL) 50 MG tablet, Take 50 mg by mouth Daily As Needed for Sleep., Disp: , Rfl:   •  triamcinolone (KENALOG) 0.1 % cream, APPLY TO AFFECTED AREA(S) TWICE DAILY FOR 4 WEEKS, Disp: , Rfl:   •  chlorthalidone (HYGROTON) 25 " "MG tablet, TAKE 1 TABLET BY MOUTH IN THE MORNING DAILY WITH FOOD, Disp: , Rfl:   •  cloNIDine (CATAPRES) 0.1 MG tablet, , Disp: , Rfl:   •  cyanocobalamin 1000 MCG/ML injection, , Disp: , Rfl:   •  cyclobenzaprine (FLEXERIL) 10 MG tablet, Take 10 mg by mouth At Night As Needed., Disp: , Rfl:   •  cyclobenzaprine (FLEXERIL) 5 MG tablet, , Disp: , Rfl:   •  lactulose (CHRONULAC) 10 GM/15ML solution, Take 30 mL by mouth 3 (Three) Times a Day., Disp: 30 mL, Rfl: 5  •  ondansetron (ZOFRAN) 4 MG tablet, Take 1 tablet by mouth Every 6 (Six) Hours As Needed for Nausea or Vomiting., Disp:  , Rfl:   •  pantoprazole (PROTONIX) 40 MG EC tablet, Take 1 tablet by mouth Every Morning., Disp:  , Rfl:   •  venlafaxine (EFFEXOR) 75 MG tablet, Take 75 mg by mouth Daily. At night, Disp: , Rfl:   •  venlafaxine XR (EFFEXOR-XR) 150 MG 24 hr capsule, Take 1 capsule by mouth Daily., Disp:  , Rfl:     OBJECTIVE:    Vital Signs:   Vitals:    12/27/21 0837   BP: 150/90   Pulse: 96   Resp: 18   Temp: 97.5 °F (36.4 °C)   TempSrc: Temporal   SpO2: 99%   Weight: 131 kg (289 lb 9.6 oz)   Height: 180.3 cm (71\")       Physical Exam:   General Appearance:    Alert, cooperative, in no acute distress   Head:    Normocephalic, without obvious abnormality, atraumatic   Eyes:            Lids and lashes normal, conjunctivae and sclerae normal, no   icterus, no pallor, corneas clear, PERRLA   Ears:    Ears appear intact with no abnormalities noted   Throat:   No oral lesions, no thrush, oral mucosa moist   Neck:   No adenopathy, supple, trachea midline, no thyromegaly, no   carotid bruit, no JVD   Lungs:     Clear to auscultation,respirations regular, even and                  unlabored    Heart:    Regular rhythm and normal rate, normal S1 and S2, no            murmur, no gallop, no rub, no click   Chest Wall:    No abnormalities observed   Abdomen:     Normal bowel sounds, no masses, no organomegaly, soft        non-tender, non-distended, no guarding, " there is evidence of epigastric  tenderness, no peritoneal signs   Extremities:   Moves all extremities well, no edema, no cyanosis, no             redness   Pulses:   Pulses palpable and equal bilaterally   Skin:   No bleeding, bruising or rash   Lymph nodes:   No palpable adenopathy   Neurologic:   Cranial nerves 2 - 12 grossly intact, sensation intact     Results Review:   I reviewed the patient's new clinical results.  I reviewed the patient's new imaging results and agree with the interpretation.  I reviewed the patient's other test results and agree with the interpretation    Review of Systems was reviewed and confirmed as accurate as documented by the MA.    ASSESSMENT/PLAN:    1. Generalized abdominal pain    2. Gastroesophageal reflux disease with esophagitis and hemorrhage        I did have a detailed and extensive discussion with the patient in the office and they understand that they need to undergo upper endoscopy. Full risks and benefits of operative versus nonoperative intervention were discussed with the patient and these include bleeding and esophageal injury. The patient understands, agrees, and wishes to proceed with the surgical treatment plan as mentioned above. The patient had no questions for me at the end of the discussion.       I discussed the patients findings and my recommendations with patient.     Electronically signed by Royal Dover MD  12/30/21 11:45 EST

## 2021-12-23 ENCOUNTER — HOSPITAL ENCOUNTER (OUTPATIENT)
Dept: MRI IMAGING | Facility: HOSPITAL | Age: 46
Discharge: HOME | End: 2021-12-23

## 2021-12-27 ENCOUNTER — OFFICE VISIT (OUTPATIENT)
Dept: SURGERY | Facility: CLINIC | Age: 46
End: 2021-12-27

## 2021-12-27 VITALS
WEIGHT: 289.6 LBS | DIASTOLIC BLOOD PRESSURE: 90 MMHG | TEMPERATURE: 97.5 F | BODY MASS INDEX: 40.54 KG/M2 | HEART RATE: 96 BPM | RESPIRATION RATE: 18 BRPM | HEIGHT: 71 IN | OXYGEN SATURATION: 99 % | SYSTOLIC BLOOD PRESSURE: 150 MMHG

## 2021-12-27 DIAGNOSIS — K21.01 GASTROESOPHAGEAL REFLUX DISEASE WITH ESOPHAGITIS AND HEMORRHAGE: ICD-10-CM

## 2021-12-27 DIAGNOSIS — R10.84 GENERALIZED ABDOMINAL PAIN: Primary | ICD-10-CM

## 2021-12-27 DIAGNOSIS — Z01.818 PRE-OP TESTING: Primary | ICD-10-CM

## 2021-12-27 PROCEDURE — 99243 OFF/OP CNSLTJ NEW/EST LOW 30: CPT | Performed by: SURGERY

## 2021-12-27 RX ORDER — HYDROXYZINE HYDROCHLORIDE 25 MG/1
50 TABLET, FILM COATED ORAL
COMMUNITY
Start: 2021-12-08 | End: 2022-05-05 | Stop reason: SDUPTHER

## 2021-12-27 RX ORDER — TRIAMCINOLONE ACETONIDE 1 MG/G
CREAM TOPICAL
COMMUNITY
Start: 2021-12-08 | End: 2022-03-01

## 2021-12-30 ENCOUNTER — TELEPHONE (OUTPATIENT)
Dept: SURGERY | Facility: CLINIC | Age: 46
End: 2021-12-30

## 2021-12-30 NOTE — TELEPHONE ENCOUNTER
Called spoke with patient mother she confirmed that patient was having EGD,01/05/22, Dr Dover @ [facility] [place]

## 2022-01-03 ENCOUNTER — LAB (OUTPATIENT)
Dept: LAB | Facility: HOSPITAL | Age: 47
End: 2022-01-03

## 2022-01-03 DIAGNOSIS — E29.1 HYPOGONADISM IN MALE: ICD-10-CM

## 2022-01-03 DIAGNOSIS — Z01.818 PRE-OP TESTING: ICD-10-CM

## 2022-01-03 PROBLEM — K21.01 GASTROESOPHAGEAL REFLUX DISEASE WITH ESOPHAGITIS AND HEMORRHAGE: Status: ACTIVE | Noted: 2022-01-03

## 2022-01-03 LAB — SARS-COV-2 RNA PNL SPEC NAA+PROBE: NOT DETECTED

## 2022-01-03 PROCEDURE — U0004 COV-19 TEST NON-CDC HGH THRU: HCPCS

## 2022-01-03 PROCEDURE — C9803 HOPD COVID-19 SPEC COLLECT: HCPCS

## 2022-01-04 NOTE — PRE-PROCEDURE INSTRUCTIONS
PAT phone history completed with pt for upcoming procedure on 01/05/22 with Dr. Dover.     PAT PASS GIVEN/REVIEWED WITH PT.  VERBALIZED UNDERSTANDING OF THE FOLLOWING:  DO NOT EAT, DRINK, SMOKE, USE SMOKELESS TOBACCO OR CHEW GUM AFTER MIDNIGHT THE NIGHT BEFORE SURGERY.  THIS ALSO INCLUDES HARD CANDIES AND MINTS.    DO NOT SHAVE THE AREA TO BE OPERATED ON AT LEAST 48 HOURS PRIOR TO THE PROCEDURE.  DO NOT WEAR MAKE UP OR NAIL POLISH.  DO NOT LEAVE IN ANY PIERCING OR WEAR JEWELRY THE DAY OF SURGERY.      DO NOT USE ADHESIVES IF YOU WEAR DENTURES.    DO NOT WEAR EYE CONTACTS; BRING IN YOUR GLASSES.    ONLY TAKE MEDICATION THE MORNING OF YOUR PROCEDURE IF INSTRUCTED BY YOUR SURGEON WITH ENOUGH WATER TO SWALLOW THE MEDICATION.  IF YOUR SURGEON DID NOT SPECIFY WHICH MEDICATIONS TO TAKE, YOU WILL NEED TO CALL THEIR OFFICE FOR FURTHER INSTRUCTIONS AND DO AS THEY INSTRUCT.    LEAVE ANYTHING YOU CONSIDER VALUABLE AT HOME.    YOU WILL NEED TO ARRANGE FOR SOMEONE TO DRIVE YOU HOME AFTER SURGERY.  IT IS RECOMMENDED THAT YOU DO NOT DRIVE, WORK, DRINK ALCOHOL OR MAKE MAJOR DECISIONS FOR AT LEAST 24 HOURS AFTER YOUR PROCEDURE IS COMPLETE.      THE DAY OF YOUR PROCEDURE, BRING IN THE FOLLOWING IF APPLICABLE:   PICTURE ID AND INSURANCE/MEDICARE OR MEDICAID CARDS/ANY CO-PAY THAT MAY BE DUE   COPY OF ADVANCED DIRECTIVE/LIVING WILL/POWER OR    CPAP/BIPAP/INHALERS   SKIN PREP SHEET   YOUR PREADMISSION TESTING PASS (IF NOT A PHONE HISTORY)           COVID self-quarantine instructions reviewed with the pt.  Verbalized understanding.

## 2022-01-05 ENCOUNTER — ANESTHESIA EVENT (OUTPATIENT)
Dept: GASTROENTEROLOGY | Facility: HOSPITAL | Age: 47
End: 2022-01-05

## 2022-01-05 ENCOUNTER — ANESTHESIA (OUTPATIENT)
Dept: GASTROENTEROLOGY | Facility: HOSPITAL | Age: 47
End: 2022-01-05

## 2022-01-05 ENCOUNTER — HOSPITAL ENCOUNTER (OUTPATIENT)
Facility: HOSPITAL | Age: 47
Setting detail: HOSPITAL OUTPATIENT SURGERY
Discharge: HOME | End: 2022-01-05
Attending: SURGERY | Admitting: SURGERY

## 2022-01-05 VITALS
DIASTOLIC BLOOD PRESSURE: 89 MMHG | HEART RATE: 86 BPM | OXYGEN SATURATION: 98 % | WEIGHT: 270 LBS | BODY MASS INDEX: 37.8 KG/M2 | TEMPERATURE: 98.7 F | SYSTOLIC BLOOD PRESSURE: 146 MMHG | HEIGHT: 71 IN | RESPIRATION RATE: 18 BRPM

## 2022-01-05 DIAGNOSIS — R10.84 GENERALIZED ABDOMINAL PAIN: ICD-10-CM

## 2022-01-05 DIAGNOSIS — K21.01 GASTROESOPHAGEAL REFLUX DISEASE WITH ESOPHAGITIS AND HEMORRHAGE: ICD-10-CM

## 2022-01-05 PROCEDURE — 25010000002 PROPOFOL 10 MG/ML EMULSION: Performed by: NURSE ANESTHETIST, CERTIFIED REGISTERED

## 2022-01-05 PROCEDURE — 43239 EGD BIOPSY SINGLE/MULTIPLE: CPT | Performed by: SURGERY

## 2022-01-05 RX ORDER — KETAMINE HYDROCHLORIDE 50 MG/ML
INJECTION, SOLUTION, CONCENTRATE INTRAMUSCULAR; INTRAVENOUS AS NEEDED
Status: DISCONTINUED | OUTPATIENT
Start: 2022-01-05 | End: 2022-01-05 | Stop reason: SURG

## 2022-01-05 RX ORDER — OMEPRAZOLE 40 MG/1
40 CAPSULE, DELAYED RELEASE ORAL DAILY
Qty: 30 CAPSULE | Refills: 5 | Status: SHIPPED | OUTPATIENT
Start: 2022-01-05 | End: 2022-04-26

## 2022-01-05 RX ORDER — MAGNESIUM HYDROXIDE 1200 MG/15ML
LIQUID ORAL AS NEEDED
Status: DISCONTINUED | OUTPATIENT
Start: 2022-01-05 | End: 2022-01-05 | Stop reason: HOSPADM

## 2022-01-05 RX ORDER — PROPOFOL 10 MG/ML
VIAL (ML) INTRAVENOUS AS NEEDED
Status: DISCONTINUED | OUTPATIENT
Start: 2022-01-05 | End: 2022-01-05 | Stop reason: SURG

## 2022-01-05 RX ORDER — LIDOCAINE HYDROCHLORIDE 20 MG/ML
INJECTION, SOLUTION INTRAVENOUS AS NEEDED
Status: DISCONTINUED | OUTPATIENT
Start: 2022-01-05 | End: 2022-01-05 | Stop reason: SURG

## 2022-01-05 RX ORDER — SODIUM CHLORIDE, SODIUM LACTATE, POTASSIUM CHLORIDE, CALCIUM CHLORIDE 600; 310; 30; 20 MG/100ML; MG/100ML; MG/100ML; MG/100ML
1000 INJECTION, SOLUTION INTRAVENOUS CONTINUOUS
Status: DISCONTINUED | OUTPATIENT
Start: 2022-01-05 | End: 2022-01-05 | Stop reason: HOSPADM

## 2022-01-05 RX ADMIN — SODIUM CHLORIDE, POTASSIUM CHLORIDE, SODIUM LACTATE AND CALCIUM CHLORIDE 1000 ML: 600; 310; 30; 20 INJECTION, SOLUTION INTRAVENOUS at 07:52

## 2022-01-05 RX ADMIN — KETAMINE HYDROCHLORIDE 20 MG: 50 INJECTION, SOLUTION INTRAMUSCULAR; INTRAVENOUS at 08:34

## 2022-01-05 RX ADMIN — PROPOFOL 100 MG: 10 INJECTION, EMULSION INTRAVENOUS at 08:34

## 2022-01-05 RX ADMIN — PROPOFOL 100 MG: 10 INJECTION, EMULSION INTRAVENOUS at 08:37

## 2022-01-05 RX ADMIN — LIDOCAINE HYDROCHLORIDE 40 MG: 20 INJECTION, SOLUTION INTRAVENOUS at 08:34

## 2022-01-05 NOTE — ANESTHESIA POSTPROCEDURE EVALUATION
Patient: Gopi Johnson    Procedure Summary     Date: 01/05/22 Room / Location: [facility] ENDOSCOPY 3 / [facility] ENDOSCOPY    Anesthesia Start: 0831 Anesthesia Stop:     Procedure: ESOPHAGOGASTRODUODENOSCOPY with biopsy (N/A Esophagus) Diagnosis:       Generalized abdominal pain      Gastroesophageal reflux disease with esophagitis and hemorrhage      (Generalized abdominal pain [R10.84])      (Gastroesophageal reflux disease with esophagitis and hemorrhage [K21.01])    Surgeons: Royal Dover MD Provider: Venkatesh Jackson CRNA    Anesthesia Type: MAC ASA Status: 3          Anesthesia Type: MAC    Vitals  HR 89  Sat 93  Resp 16  /61  Temp 98        Post Anesthesia Care and Evaluation    Patient location during evaluation: bedside  Patient participation: complete - patient participated  Level of consciousness: awake and alert and sleepy but conscious  Pain score: 0  Pain management: adequate  Airway patency: patent  Anesthetic complications: No anesthetic complications  PONV Status: none  Cardiovascular status: acceptable  Respiratory status: acceptable  Hydration status: acceptable

## 2022-01-05 NOTE — ANESTHESIA PREPROCEDURE EVALUATION
Anesthesia Evaluation     Patient summary reviewed and Nursing notes reviewed   history of anesthetic complications: malignant hyperthermia  NPO Solid Status: > 8 hours  NPO Liquid Status: > 8 hours           Airway   Mallampati: I  TM distance: >3 FB  Neck ROM: full  Possible difficult intubation  Dental - normal exam     Pulmonary - normal exam   (+) a smoker Former, sleep apnea,   Cardiovascular - normal exam    ECG reviewed  Rhythm: regular  Rate: normal    (+) hypertension 2 medications or greater, valvular problems/murmurs murmur, hyperlipidemia,     ROS comment: Echo 2020  Echocardiogram Findings    Left Ventricle Left ventricular systolic function is normal. Normal left ventricular cavity size and wall thickness noted.  Right Ventricle Normal right ventricular cavity size, wall thickness, systolic function and septal motion noted.  Left Atrium Left atrial cavity size is normal. No evidence of a left atrial appendage thrombus was present.  Right Atrium Normal right atrial size noted.  Aortic Valve The aortic valve is structurally normal with no regurgitation or stenosis present. The aortic valve appears trileaflet.  Mitral Valve The mitral valve is grossly normal in structure. Trace mitral valve regurgitation is present.  Tricuspid Valve Trace tricuspid valve regurgitation is present.  Pulmonic Valve The pulmonic valve is grossly normal in structure. There is no pulmonic valve regurgitation present.  Greater Vessels No dilation of the aortic root is present. No dilation of the sinuses of Valsalva is present.  Pericardium There is no evidence of pericardial effusion.          Neuro/Psych  (+) headaches, psychiatric history Anxiety, Depression and Bipolar,     GI/Hepatic/Renal/Endo    (+) obesity,  GERD,  renal disease stones,     Musculoskeletal     Abdominal  - normal exam    Abdomen: soft.  Bowel sounds: normal.   Substance History   (+) drug use     OB/GYN          Other                        Anesthesia  Plan    ASA 3     MAC   (Risks and benefits discussed including risk of aspiration, recall and dental damage. All patient questions answered. Will continue with POC.)  intravenous induction     Anesthetic plan, all risks, benefits, and alternatives have been provided, discussed and informed consent has been obtained with: patient.

## 2022-01-11 LAB
LAB AP CASE REPORT: NORMAL
PATH REPORT.FINAL DX SPEC: NORMAL

## 2022-01-18 ENCOUNTER — HOSPITAL ENCOUNTER (OUTPATIENT)
Dept: MRI IMAGING | Facility: HOSPITAL | Age: 47
Discharge: HOME | End: 2022-01-18
Admitting: PSYCHIATRY & NEUROLOGY

## 2022-01-18 DIAGNOSIS — G43.C0 PERIODIC HEADACHE SYNDROME, NOT INTRACTABLE: ICD-10-CM

## 2022-01-18 PROCEDURE — 70553 MRI BRAIN STEM W/O & W/DYE: CPT

## 2022-01-18 PROCEDURE — 0 GADOBENATE DIMEGLUMINE 529 MG/ML SOLUTION: Performed by: PSYCHIATRY & NEUROLOGY

## 2022-01-18 PROCEDURE — A9577 INJ MULTIHANCE: HCPCS | Performed by: PSYCHIATRY & NEUROLOGY

## 2022-01-18 RX ADMIN — GADOBENATE DIMEGLUMINE 24 ML: 529 INJECTION, SOLUTION INTRAVENOUS at 08:38

## 2022-01-20 ENCOUNTER — TELEPHONE (OUTPATIENT)
Dept: SURGERY | Facility: CLINIC | Age: 47
End: 2022-01-20

## 2022-01-21 ENCOUNTER — OFFICE VISIT (OUTPATIENT)
Dept: NEUROLOGY | Facility: CLINIC | Age: 47
End: 2022-01-21

## 2022-01-21 VITALS
DIASTOLIC BLOOD PRESSURE: 86 MMHG | WEIGHT: 270 LBS | HEIGHT: 71 IN | SYSTOLIC BLOOD PRESSURE: 140 MMHG | TEMPERATURE: 97.3 F | HEART RATE: 88 BPM | BODY MASS INDEX: 37.8 KG/M2 | OXYGEN SATURATION: 97 %

## 2022-01-21 DIAGNOSIS — G25.3 MYOCLONUS: ICD-10-CM

## 2022-01-21 DIAGNOSIS — E72.20 HYPERAMMONEMIA: Primary | ICD-10-CM

## 2022-01-21 DIAGNOSIS — G43.C0 PERIODIC HEADACHE SYNDROME, NOT INTRACTABLE: Chronic | ICD-10-CM

## 2022-01-21 DIAGNOSIS — G25.0 TREMOR, ESSENTIAL: Chronic | ICD-10-CM

## 2022-01-21 PROCEDURE — 99214 OFFICE O/P EST MOD 30 MIN: CPT | Performed by: PSYCHIATRY & NEUROLOGY

## 2022-01-21 RX ORDER — TOPIRAMATE 25 MG/1
TABLET ORAL
COMMUNITY
Start: 2022-01-03 | End: 2022-01-21

## 2022-01-21 NOTE — PROGRESS NOTES
"Chief Complaint    No chief complaint on file.    Subjective          Gopi Johnson presents to [facility] NEUROLOGY     History of Present Illness    46 y.o. male returns in follow up.  Last visit on 11/19/21 ordered MRI Brain, labs, increased TPM 50 mg BID.     MRI Brain, my review of films, 1/18/21 normal     Ammonia 88, , AST 66  ESR 58, TSH, B12 NCS     Called in Lactulose but only used for a brief.      After developing sepsis from osteomyelitis has increased HA, LE weakness, stuttering.  Myoclonic jerking.  Sx are worsening in last few months.       Taking TPM 25 mg BID.     HA frequency is 5 - 6 times a week.  Increased intensity.  Provoked by weather changes.  5 - 7/10.  Last for up to day.  Sleep improves sx.       Reviewed medial records:     H/O spinal osteomyelitis, previous IVDA.       MRI L/S, my review of films, 8/2/21 mild disc dz L4/5 and L5/S1  MR pelvis 8/23/21 non displaced vertical fx of left sacral ala     C/o muscle spasms of legs, N/T in all extremities, difficulty with speech.     Labs 6/25/21 CMP, A1C,     5/4/21 TSH, HIV, CBC, hep pnl, U/A     PSG severe THOMAS AHI 68      Objective   Vital Signs:   /86   Pulse 88   Temp 97.3 °F (36.3 °C)   Ht 180.3 cm (70.98\")   Wt 122 kg (270 lb)   SpO2 97%   BMI 37.67 kg/m²     Physical Exam  Eyes:      Extraocular Movements: EOM normal.      Pupils: Pupils are equal, round, and reactive to light.   Neurological:      Mental Status: He is oriented to person, place, and time.      Gait: Gait is intact.      Deep Tendon Reflexes: Strength normal.   Psychiatric:         Speech: Speech normal.          Neurologic Exam     Mental Status   Oriented to person, place, and time.   Speech: speech is normal   Level of consciousness: alert  Knowledge: good and consistent with education.   Normal comprehension.     Cranial Nerves   Cranial nerves II through XII intact.     CN II   Visual fields full to confrontation.   Visual " acuity: normal  Right visual field deficit: none  Left visual field deficit: none     CN III, IV, VI   Pupils are equal, round, and reactive to light.  Extraocular motions are normal.   Nystagmus: none   Diplopia: none  Ophthalmoparesis: none  Upgaze: normal  Downgaze: normal  Conjugate gaze: present    CN V   Facial sensation intact.   Right corneal reflex: normal  Left corneal reflex: normal    CN VII   Right facial weakness: none  Left facial weakness: none    CN VIII   Hearing: intact    CN IX, X   Palate: symmetric  Right gag reflex: normal  Left gag reflex: normal    CN XI   Right sternocleidomastoid strength: normal  Left sternocleidomastoid strength: normal    CN XII   Tongue: not atrophic  Fasciculations: absent  Tongue deviation: none    Motor Exam   Muscle bulk: normal  Overall muscle tone: normal    Strength   Strength 5/5 throughout.     Sensory Exam   Light touch normal.     Gait, Coordination, and Reflexes     Gait  Gait: normal    Tremor   Resting tremor: absent  Intention tremor: absent  Action tremor: absent    Reflexes   Reflexes 2+ except as noted.      Result Review :   The following data was reviewed by: Rajendra Templeton MD on 01/21/2022:  Common labs    Common Labsle 10/6/21 10/6/21 11/19/21 11/19/21 11/29/21    1559 1559 1502 1502    Glucose  145 (A)  114 (A)    BUN  11  12    Creatinine  1.00  0.95    eGFR Non African Am  80  85    Sodium  137  137    Potassium  3.2 (A)  3.7    Chloride  98  99    Calcium  9.3  9.4    Albumin  4.40  4.50    Total Bilirubin  0.2  0.2    Alkaline Phosphatase  179 (A)  223 (A)    AST (SGOT)  21  66 (A)    ALT (SGPT)  64 (A)  239 (A)    WBC 11.22 (A)  8.94  8.47   Hemoglobin 13.2  13.3  12.9 (A)   Hematocrit 40.8  40.8  41.2   Platelets 289  292  286   (A) Abnormal value            Data reviewed: Radiologic studies MRI Brain           Assessment and Plan    Diagnoses and all orders for this visit:    1. Hyperammonemia (HCC) (Primary)  -     Ambulatory Referral  to Gastroenterology    2. Periodic headache syndrome, not intractable  Assessment & Plan:  Headaches are unchanged.  Medication changes per orders.     Stop TPM    Start Nurtec qoday           3. Tremor, essential    4. Myoclonus  Assessment & Plan:  Secondary to elevated ammonia    Stop TPM     Continue lactulose         Follow Up   No follow-ups on file.  Patient was given instructions and counseling regarding his condition or for health maintenance advice. Please see specific information pulled into the AVS if appropriate.

## 2022-02-02 ENCOUNTER — TELEPHONE (OUTPATIENT)
Dept: NEUROLOGY | Facility: CLINIC | Age: 47
End: 2022-02-02

## 2022-02-02 NOTE — TELEPHONE ENCOUNTER
Caller: Gopi Johnson    Relationship: Self    Best call back number: [phone]    Preferred pharmacy: [facility] ([place]) - [address], [address] - [phone] [facility] [phone] FX    What was the call regarding: PT CALLED STATING HIS INSURANCE COMPANY SENT HIM A LETTER STATING THEY HAVE DENIED THE COVERAGE OF NURTEC MEDICATION. PT STATES THAT HIS PHARMACY DID HAVE A REFILL OF TOPIRAMATE ON FILE WHICH HE WAS ABLE TO HAVE FILLED. PT STATES THAT HE HAS BEEN TAKING THE TOPIRMATE MEDICATION AS NURTEC WAS DENIED, BUT STATES THAT DR. NAGULO D'C'D TOPIRAMATE DUE TO LIVER CONCERNS.    Do you require a callback: YES, PLEASE.    PLEASE REVIEW AND ADVISE.

## 2022-02-04 NOTE — PROGRESS NOTES
Patient: Gopi Johnson    YOB: 1975    Date: 02/09/2022    Primary Care Provider: Maria Victoria Paulino APRN    Reason for Consultation: Follow-up EGD    Chief Complaint   Patient presents with   • Follow-up     EGD       History of present illness:  I saw the patient in the office today as a followup from their recent EGD with biopsy.  The procedure note showed four non-bleeding superficial gastric ulcers, they were not bleeding. The gastric antrum biopsy showed reactive gastropathy with minimal chronic gastritis.  They state that they had blood work yesterday at [facility] he is concerned about.    He does have a history significant in the past for having an increased ammonia level.  Recent laboratory values did show evidence of a low potassium and a increase in ammonia as well as slight liver function elevation.    The following portions of the patient's history were reviewed and updated as appropriate: allergies, current medications, past family history, past medical history, past social history, past surgical history and problem list.    Review of Systems   Constitutional: Negative for chills, fever and unexpected weight change.   HENT: Negative for trouble swallowing and voice change.    Eyes: Negative for visual disturbance.   Respiratory: Negative for apnea, cough, chest tightness, shortness of breath and wheezing.    Cardiovascular: Negative for chest pain, palpitations and leg swelling.   Gastrointestinal: Negative for abdominal distention, abdominal pain, anal bleeding, blood in stool, constipation, diarrhea, nausea, rectal pain and vomiting.   Endocrine: Negative for cold intolerance and heat intolerance.   Genitourinary: Negative for difficulty urinating, dysuria, flank pain, scrotal swelling and testicular pain.   Musculoskeletal: Negative for back pain, gait problem and joint swelling.   Skin: Negative for color change, rash and wound.   Neurological: Negative for dizziness,  "syncope, speech difficulty, weakness, numbness and headaches.   Hematological: Negative for adenopathy. Does not bruise/bleed easily.   Psychiatric/Behavioral: Negative for confusion. The patient is not nervous/anxious.        Vital Signs:   Vitals:    02/09/22 1242   BP: 140/78   Pulse: 105   Resp: 18   Temp: 96.8 °F (36 °C)   TempSrc: Temporal   SpO2: 98%   Weight: 131 kg (289 lb)   Height: 180.3 cm (71\")       Allergies:  Allergies   Allergen Reactions   • Naloxone Swelling     Throat swelling, itching, rash   • Hydrocodone-Acetaminophen Itching and Rash       Medications:    Current Outpatient Medications:   •  Alcohol Swabs pads, Clean area prior to injection, Disp: 100 each, Rfl: 11  •  amLODIPine (NORVASC) 5 MG tablet, Take 5 mg by mouth Daily., Disp: , Rfl:   •  Banophen 25 MG tablet, , Disp: , Rfl:   •  buprenorphine (SUBUTEX) 8 MG sublingual tablet SL tablet, 8 mg., Disp: , Rfl:   •  cetirizine (zyrTEC) 10 MG tablet, Take 1 tablet by mouth Daily., Disp:  , Rfl:   •  chlorthalidone (HYGROTON) 25 MG tablet, Take 25 mg by mouth Daily., Disp: , Rfl:   •  cloNIDine (CATAPRES) 0.1 MG tablet, As Needed., Disp: , Rfl:   •  cyanocobalamin 1000 MCG/ML injection, , Disp: , Rfl:   •  cyclobenzaprine (FLEXERIL) 10 MG tablet, Take 10 mg by mouth At Night As Needed., Disp: , Rfl:   •  Enulose 10 GM/15ML solution solution (encephalopathy), , Disp: , Rfl:   •  fluticasone (FLONASE) 50 MCG/ACT nasal spray, As Needed., Disp: , Rfl:   •  hydrOXYzine (ATARAX) 25 MG tablet, Take 50 mg by mouth every night at bedtime., Disp: , Rfl:   •  lactulose (CHRONULAC) 10 GM/15ML solution, Take 30 mL by mouth 3 (Three) Times a Day., Disp: 30 mL, Rfl: 5  •  lidocaine (LIDODERM) 5 %, Place 1 patch on the skin as directed by provider Daily. Remove & Discard patch within 12 hours or as directed by MD, Disp:  , Rfl:   •  lisinopril (PRINIVIL,ZESTRIL) 30 MG tablet, , Disp: , Rfl:   •  Loratadine (Claritin) 10 MG capsule, Take  by mouth., Disp: , " "Rfl:   •  Multiple Vitamins-Minerals (MULTI COMPLETE PO), Take  by mouth., Disp: , Rfl:   •  mupirocin (BACTROBAN) 2 % ointment, As Needed., Disp: , Rfl:   •  Needle, Disp, 18G X 1-1/2\" misc, Use for drawing up the medication, Disp: 50 each, Rfl: 3  •  Needle, Disp, 23G X 1-1/2\" misc, 1 Device 1 (One) Time Per Week., Disp: 30 each, Rfl: 11  •  omeprazole (priLOSEC) 40 MG capsule, Take 1 capsule by mouth Daily., Disp: 30 capsule, Rfl: 5  •  ondansetron (ZOFRAN) 4 MG tablet, Take 1 tablet by mouth Every 6 (Six) Hours As Needed for Nausea or Vomiting., Disp:  , Rfl:   •  oxybutynin XL (Ditropan XL) 10 MG 24 hr tablet, Take 1 tablet by mouth Daily for 90 days., Disp: 30 tablet, Rfl: 2  •  pantoprazole (PROTONIX) 40 MG EC tablet, Take 1 tablet by mouth Every Morning., Disp:  , Rfl:   •  Syringe, Disposable, 3 ML misc, 1 syringe 1 (One) Time Per Week., Disp: 100 each, Rfl: 11  •  tamsulosin (FLOMAX) 0.4 MG capsule 24 hr capsule, Take 1 capsule by mouth Daily., Disp: 30 capsule, Rfl: 2  •  testosterone cypionate (DEPO-TESTOSTERONE) 100 MG/ML solution injection, Inject 1 mL into the appropriate muscle as directed by prescriber Every 14 (Fourteen) Days., Disp: 10 mL, Rfl: 1  •  traZODone (DESYREL) 50 MG tablet, Take 50 mg by mouth Daily As Needed for Sleep., Disp: , Rfl:   •  triamcinolone (KENALOG) 0.1 % cream, APPLY TO AFFECTED AREA(S) TWICE DAILY FOR 4 WEEKS, Disp: , Rfl:   •  venlafaxine (EFFEXOR) 75 MG tablet, Take 75 mg by mouth Daily. At night, Disp: , Rfl:   •  venlafaxine XR (EFFEXOR-XR) 150 MG 24 hr capsule, Take 1 capsule by mouth Daily., Disp:  , Rfl:     Physical Exam:   General Appearance:    Alert, cooperative, in no acute distress   Abdomen:     no masses, no organomegaly, soft non-tender, non-distended, no guarding, wounds are well healed, no evidence of recurrent hernia   Chest:      Clear toausculation            Cor:  Regular rate and rhythm      Results Review:   I reviewed the patient's new clinical " results.  I reviewed the patient's new imaging results and agree with the interpretation.  I reviewed the patient's other test results and agree with the interpretation    Review of Systems was reviewed and confirmed as accurate as documented by the MA.    Assessment / Plan:    1. PUD (peptic ulcer disease)        I did discuss the situation with the patient today in the office and they have done well from their recent EGD with biopsy. I have told the patient he needs to stay on his current dosage of omeprazole because of his peptic ulcer disease.  He also needs to keep his appointment today at his primary care provider's office and they most likely will give him some supplemental potassium.  He tells me that he does take Lasix.  He also has an appointment to be seen by the gastroneurology clinic later this month and I have asked him to keep this also.    Electronically signed by Royal Dover MD  02/09/22

## 2022-02-08 ENCOUNTER — TELEPHONE (OUTPATIENT)
Dept: SURGERY | Facility: CLINIC | Age: 47
End: 2022-02-08

## 2022-02-08 ENCOUNTER — OFFICE VISIT (OUTPATIENT)
Dept: UROLOGY | Facility: CLINIC | Age: 47
End: 2022-02-08

## 2022-02-08 ENCOUNTER — LAB (OUTPATIENT)
Dept: LAB | Facility: HOSPITAL | Age: 47
End: 2022-02-08

## 2022-02-08 VITALS
HEIGHT: 71 IN | DIASTOLIC BLOOD PRESSURE: 78 MMHG | BODY MASS INDEX: 40.46 KG/M2 | OXYGEN SATURATION: 99 % | TEMPERATURE: 98.1 F | WEIGHT: 289 LBS | HEART RATE: 105 BPM | SYSTOLIC BLOOD PRESSURE: 144 MMHG

## 2022-02-08 DIAGNOSIS — E29.1 HYPOGONADISM IN MALE: ICD-10-CM

## 2022-02-08 DIAGNOSIS — R39.9 LOWER URINARY TRACT SYMPTOMS (LUTS): Primary | ICD-10-CM

## 2022-02-08 LAB
BILIRUB BLD-MCNC: NEGATIVE MG/DL
CLARITY, POC: CLEAR
COLOR UR: YELLOW
EXPIRATION DATE: NORMAL
GLUCOSE UR STRIP-MCNC: NEGATIVE MG/DL
KETONES UR QL: NEGATIVE
LEUKOCYTE EST, POC: NEGATIVE
Lab: NORMAL
NITRITE UR-MCNC: NEGATIVE MG/ML
PH UR: 7.5 [PH] (ref 5–8)
PROT UR STRIP-MCNC: NEGATIVE MG/DL
RBC # UR STRIP: NEGATIVE /UL
SP GR UR: 1.01 (ref 1–1.03)
UROBILINOGEN UR QL: NORMAL

## 2022-02-08 PROCEDURE — 99215 OFFICE O/P EST HI 40 MIN: CPT | Performed by: STUDENT IN AN ORGANIZED HEALTH CARE EDUCATION/TRAINING PROGRAM

## 2022-02-08 PROCEDURE — 36415 COLL VENOUS BLD VENIPUNCTURE: CPT

## 2022-02-08 PROCEDURE — 51798 US URINE CAPACITY MEASURE: CPT | Performed by: STUDENT IN AN ORGANIZED HEALTH CARE EDUCATION/TRAINING PROGRAM

## 2022-02-08 RX ORDER — TAMSULOSIN HYDROCHLORIDE 0.4 MG/1
1 CAPSULE ORAL DAILY
Qty: 30 CAPSULE | Refills: 2 | Status: SHIPPED | OUTPATIENT
Start: 2022-02-08 | End: 2022-06-27 | Stop reason: SDUPTHER

## 2022-02-08 RX ORDER — OXYBUTYNIN CHLORIDE 10 MG/1
10 TABLET, EXTENDED RELEASE ORAL DAILY
Qty: 30 TABLET | Refills: 2 | Status: SHIPPED | OUTPATIENT
Start: 2022-02-08 | End: 2022-03-01 | Stop reason: SINTOL

## 2022-02-08 RX ORDER — TESTOSTERONE CYPIONATE 100 MG/ML
100 INJECTION, SOLUTION INTRAMUSCULAR
Qty: 10 ML | Refills: 1 | Status: SHIPPED | OUTPATIENT
Start: 2022-02-08 | End: 2022-05-02 | Stop reason: DRUGHIGH

## 2022-02-08 NOTE — TELEPHONE ENCOUNTER
Caller: Gopi Johnson    Relationship: Self    Best call back number: [phone]    What is the best time to reach you: ANYTIME    Who are you requesting to speak with (clinical staff, provider,  specific staff member): TIARA      What was the call regarding: TIARA LEFT VM FOR PT , HE WILL CONFIRM CHANGE IN APPT FOR 02/9/22 W/ NATE AT 12:40

## 2022-02-08 NOTE — PATIENT INSTRUCTIONS
Educational Material for Overactive Bladder (OAB)     Overactive bladder is a term that may describe numerous urinary complaints, including urinary urgency, frequency, waking to urinate at night (nocturia), urgency incontinence (leaking of urine if unable to hold bladder), etc.     Overactive bladder symptoms may be more prevalent after menopause related to loss of estrogen and its effects on bladder and urethral integrity.     Conservative Management (Non-Medication Treatment) Options    1) Double voiding (after urinating, stay in bathroom, wait a few minutes, attempt to urinate again), to ensure complete bladder emptying  2) Timed voiding (urinate every 2-3 hours during the day) to prevent urgency related to a full or filling bladder  3) Avoid caffeine, alcohol, dark ghanshyam, teas, sugary soft drinks and other potentially bladder irritating beverages   4) Drink plenty of water (at least 5-7 glasses daily), to ensure your urine is dilute and clear (concentrated urine can increase bothersome urinary urgency and frequency as well as bladder pain)     5) Pelvic Floor Physical Therapy - a referral to a pelvic floor physical therapist may be recommended to evaluate and treat potential dysfunction of your pelvic floor (laxity or high pelvic tone) which can exacerbate urinary symptoms      Medical Treatment    Vaginal Estrogen Therapy - Your urologist may have prescribed vaginal estrogen cream, this is typically described to women who are postmenopausal.  After menopause a decline in estrogen can affect the urethra and bladder tissues.  pH imbalances can result in the loss of normal bacteria in the vagina and around the urethra, increasing risk of infections and urinary symptoms.  Restoration of normal blood flow with estrogen treatment and correction of pH imbalances may help to relieve bladder overactivity symptoms.    Anticholinergic Therapy - There are numerous medications within the anticholinergic class which helped  to treat overactive bladder symptoms.  A short list of this medications include solifenacin, tolterodine, trospium, oxybutynin, etc.  These medications work by acting on the junction between bladder nerves and the bladder muscle.  These medications help to relax the bladder muscle tone and prevent sensation of urgency and frequency.  These medications may help you store your urine better.  Common side effects of these medications include dry eye, dry mouth, constipation, possible vision changes, rare but possible mental status changes in the elderly.  If you have been prescribed these medications, monitor for these symptoms.    Beta 3 Agonist Therapy - Medications in this class include Mirabegron (Myrbetriq) and Vibegron (Gemtesa).  These medications are typically prescribed if patients do not tolerate anticholinergics or cannot be prescribed anticholinergics.  These medications are typically more expensive than anticholinergics, but insurance companies may cover all or part of the cost depending on your individual plan.  These medications are just as effective as anticholinergics and act on a different nerve pathway in the bladder and do not have the typical side effects of dry eye, dry mouth, or constipation.  Mirabegron may potentially cause an increase in blood pressure in less than 10% of patients, which will need to be closely monitored during therapy.    Surgical Treatments    Bladder Botox Injections - For patients who fail conservative management or medical therapy, injection of Botox into the bladder muscle is an option for treatment.  This is an outpatient procedure which can usually be done under a very light anesthetic.  Botox injections into the bladder causes a slight paralysis of the bladder muscle and long-term bladder relaxation for resolution of overactive bladder symptoms without need for medication.  The effects of Botox injections may last anywhere from 3 to 12 months depending on the  individual.  There is a slight risk of urinary retention or unable to urinate after procedure requiring the possible need to learn how to self catheterize to empty the bladder, this only occurs in less than 3-5% of patients typically.  Risks of injections include bleeding, infection and urinary retention as described above.  These are typically very well-tolerated.  One of the downsides to this approach is that patients may need repeat outpatient procedures over the long-term for continued management.    Sacral Neuromodulation - Sacral neuromodulation describes a surgical procedure whereby a small electronic nerve stimulator device is placed underneath the skin and fat of the buttocks, connected to an electrical lead placed along the S3 (sacral nerve root #3) which is the nerve that controls bladder function.  It has been shown that low-dose electrical stimulation of the bladder nerve can help to prevent overactive bladder symptoms including urgency, frequency and urgency related incontinence, without the need for taking oral medications.  This also benefits men and women who have fecal incontinence or incontinence of stool.  There are 2 companies, [employer] and [employer] who make these devices.  Patients have control over their own device programming and stimulation levels with a small hand-held device.  This is an outpatient procedure but usually requires 2 separate procedures to complete placement.  The benefits of this approach includes long-term symptom control without the need for multiple procedures.  The implantable generator/nerve stimulator contains within it a battery which can be recharged with an externally worn device without the need for surgical intervention for battery replacement long-term.
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
## 2022-02-09 ENCOUNTER — OFFICE VISIT (OUTPATIENT)
Dept: SURGERY | Facility: CLINIC | Age: 47
End: 2022-02-09

## 2022-02-09 VITALS
SYSTOLIC BLOOD PRESSURE: 140 MMHG | WEIGHT: 289 LBS | RESPIRATION RATE: 18 BRPM | BODY MASS INDEX: 40.46 KG/M2 | DIASTOLIC BLOOD PRESSURE: 78 MMHG | HEART RATE: 105 BPM | HEIGHT: 71 IN | OXYGEN SATURATION: 98 % | TEMPERATURE: 96.8 F

## 2022-02-09 DIAGNOSIS — K27.9 PUD (PEPTIC ULCER DISEASE): Primary | ICD-10-CM

## 2022-02-09 PROCEDURE — 99213 OFFICE O/P EST LOW 20 MIN: CPT | Performed by: SURGERY

## 2022-02-09 RX ORDER — LACTULOSE 10 G/15ML
SOLUTION ORAL; RECTAL
COMMUNITY
Start: 2022-02-07 | End: 2022-10-26

## 2022-02-23 ENCOUNTER — TELEPHONE (OUTPATIENT)
Dept: NEUROLOGY | Facility: CLINIC | Age: 47
End: 2022-02-23

## 2022-02-24 ENCOUNTER — OFFICE VISIT (OUTPATIENT)
Dept: BARIATRICS/WEIGHT MGMT | Facility: CLINIC | Age: 47
End: 2022-02-24

## 2022-02-24 ENCOUNTER — DOCUMENTATION (OUTPATIENT)
Dept: BARIATRICS/WEIGHT MGMT | Facility: CLINIC | Age: 47
End: 2022-02-24

## 2022-02-24 VITALS
TEMPERATURE: 97.6 F | HEART RATE: 113 BPM | SYSTOLIC BLOOD PRESSURE: 150 MMHG | WEIGHT: 284 LBS | DIASTOLIC BLOOD PRESSURE: 74 MMHG | RESPIRATION RATE: 18 BRPM | OXYGEN SATURATION: 99 % | HEIGHT: 69 IN | BODY MASS INDEX: 42.06 KG/M2

## 2022-02-24 DIAGNOSIS — Z74.09 IMPAIRED MOBILITY: ICD-10-CM

## 2022-02-24 DIAGNOSIS — I10 ESSENTIAL HYPERTENSION: ICD-10-CM

## 2022-02-24 DIAGNOSIS — E66.01 OBESITY, CLASS III, BMI 40-49.9 (MORBID OBESITY): Primary | ICD-10-CM

## 2022-02-24 DIAGNOSIS — R74.8 ABNORMAL LIVER ENZYMES: ICD-10-CM

## 2022-02-24 DIAGNOSIS — E11.69 DIABETES MELLITUS TYPE 2 IN OBESE: ICD-10-CM

## 2022-02-24 DIAGNOSIS — G47.30 SLEEP APNEA, UNSPECIFIED TYPE: ICD-10-CM

## 2022-02-24 DIAGNOSIS — Z72.0 TOBACCO USE: ICD-10-CM

## 2022-02-24 DIAGNOSIS — R10.13 DYSPEPSIA: ICD-10-CM

## 2022-02-24 DIAGNOSIS — K21.9 GASTROESOPHAGEAL REFLUX DISEASE, UNSPECIFIED WHETHER ESOPHAGITIS PRESENT: ICD-10-CM

## 2022-02-24 DIAGNOSIS — E66.9 DIABETES MELLITUS TYPE 2 IN OBESE: ICD-10-CM

## 2022-02-24 PROCEDURE — 99215 OFFICE O/P EST HI 40 MIN: CPT | Performed by: PHYSICIAN ASSISTANT

## 2022-02-25 ENCOUNTER — PATIENT ROUNDING (BHMG ONLY) (OUTPATIENT)
Dept: BARIATRICS/WEIGHT MGMT | Facility: CLINIC | Age: 47
End: 2022-02-25

## 2022-02-25 LAB
ALBUMIN SERPL-MCNC: 4.5 G/DL (ref 4–5)
ALBUMIN/GLOB SERPL: 1.7 {RATIO} (ref 1.2–2.2)
ALP SERPL-CCNC: 175 IU/L (ref 44–121)
ALT SERPL-CCNC: 52 IU/L (ref 0–44)
AST SERPL-CCNC: 32 IU/L (ref 0–40)
BASOPHILS # BLD AUTO: 0.1 X10E3/UL (ref 0–0.2)
BASOPHILS NFR BLD AUTO: 1 %
BILIRUB SERPL-MCNC: 0.2 MG/DL (ref 0–1.2)
BUN SERPL-MCNC: 8 MG/DL (ref 6–24)
BUN/CREAT SERPL: 8 (ref 9–20)
CALCIUM SERPL-MCNC: 9.1 MG/DL (ref 8.7–10.2)
CHLORIDE SERPL-SCNC: 94 MMOL/L (ref 96–106)
CHOLEST SERPL-MCNC: 204 MG/DL (ref 100–199)
CO2 SERPL-SCNC: 28 MMOL/L (ref 20–29)
CREAT SERPL-MCNC: 0.96 MG/DL (ref 0.76–1.27)
EOSINOPHIL # BLD AUTO: 0.2 X10E3/UL (ref 0–0.4)
EOSINOPHIL NFR BLD AUTO: 1 %
ERYTHROCYTE [DISTWIDTH] IN BLOOD BY AUTOMATED COUNT: 15.1 % (ref 11.6–15.4)
GLOBULIN SER CALC-MCNC: 2.6 G/DL (ref 1.5–4.5)
GLUCOSE SERPL-MCNC: 98 MG/DL (ref 65–99)
H PYLORI IGA SER-ACNC: <9 UNITS (ref 0–8.9)
H PYLORI IGG SER IA-ACNC: 0.2 INDEX VALUE (ref 0–0.79)
HBA1C MFR BLD: 6.6 % (ref 4.8–5.6)
HCT VFR BLD AUTO: 42.3 % (ref 37.5–51)
HDLC SERPL-MCNC: 36 MG/DL
HGB BLD-MCNC: 13.9 G/DL (ref 13–17.7)
IMM GRANULOCYTES # BLD AUTO: 0.2 X10E3/UL (ref 0–0.1)
IMM GRANULOCYTES NFR BLD AUTO: 2 %
LDLC SERPL CALC-MCNC: 135 MG/DL (ref 0–99)
LYMPHOCYTES # BLD AUTO: 2.3 X10E3/UL (ref 0.7–3.1)
LYMPHOCYTES NFR BLD AUTO: 16 %
MCH RBC QN AUTO: 26 PG (ref 26.6–33)
MCHC RBC AUTO-ENTMCNC: 32.9 G/DL (ref 31.5–35.7)
MCV RBC AUTO: 79 FL (ref 79–97)
MONOCYTES # BLD AUTO: 1.1 X10E3/UL (ref 0.1–0.9)
MONOCYTES NFR BLD AUTO: 8 %
NEUTROPHILS # BLD AUTO: 10.3 X10E3/UL (ref 1.4–7)
NEUTROPHILS NFR BLD AUTO: 72 %
PLATELET # BLD AUTO: 311 X10E3/UL (ref 150–450)
POTASSIUM SERPL-SCNC: 3.4 MMOL/L (ref 3.5–5.2)
PROT SERPL-MCNC: 7.1 G/DL (ref 6–8.5)
RBC # BLD AUTO: 5.35 X10E6/UL (ref 4.14–5.8)
SODIUM SERPL-SCNC: 139 MMOL/L (ref 134–144)
TRIGL SERPL-MCNC: 183 MG/DL (ref 0–149)
TSH SERPL DL<=0.005 MIU/L-ACNC: 3.66 UIU/ML (ref 0.45–4.5)
VLDLC SERPL CALC-MCNC: 33 MG/DL (ref 5–40)
WBC # BLD AUTO: 14.2 X10E3/UL (ref 3.4–10.8)

## 2022-02-25 NOTE — PROGRESS NOTES
A [employer] message has been sent to the patient for PATIENT ROUNDING for [facility] [place] - Bariatric Surgery/[facility] Medical Weight Mgmt.

## 2022-03-01 ENCOUNTER — OFFICE VISIT (OUTPATIENT)
Dept: UROLOGY | Facility: CLINIC | Age: 47
End: 2022-03-01

## 2022-03-01 VITALS
HEIGHT: 69 IN | DIASTOLIC BLOOD PRESSURE: 78 MMHG | OXYGEN SATURATION: 97 % | HEART RATE: 112 BPM | BODY MASS INDEX: 42.06 KG/M2 | WEIGHT: 284 LBS | SYSTOLIC BLOOD PRESSURE: 162 MMHG

## 2022-03-01 DIAGNOSIS — R39.9 LOWER URINARY TRACT SYMPTOMS (LUTS): Primary | ICD-10-CM

## 2022-03-01 LAB
BILIRUB BLD-MCNC: NEGATIVE MG/DL
CLARITY, POC: CLEAR
COLOR UR: YELLOW
EXPIRATION DATE: NORMAL
GLUCOSE UR STRIP-MCNC: NEGATIVE MG/DL
KETONES UR QL: NEGATIVE
LEUKOCYTE EST, POC: NEGATIVE
Lab: NORMAL
NITRITE UR-MCNC: NEGATIVE MG/ML
PH UR: 8 [PH] (ref 5–8)
PROT UR STRIP-MCNC: NEGATIVE MG/DL
RBC # UR STRIP: NEGATIVE /UL
SP GR UR: 1.01 (ref 1–1.03)
UROBILINOGEN UR QL: NORMAL

## 2022-03-01 PROCEDURE — 52000 CYSTOURETHROSCOPY: CPT | Performed by: STUDENT IN AN ORGANIZED HEALTH CARE EDUCATION/TRAINING PROGRAM

## 2022-03-01 PROCEDURE — 81003 URINALYSIS AUTO W/O SCOPE: CPT | Performed by: STUDENT IN AN ORGANIZED HEALTH CARE EDUCATION/TRAINING PROGRAM

## 2022-03-01 NOTE — PROGRESS NOTES
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 male undergoing a flexible cystoscopy for the above mentioned indications.  Informed consent was obtained.      Findings  Cystoscopic findings included one right and left ureteral orifice in the normal orthotopic position with normal bladder mucosa and no tumors, masses or stones.  Widely patent bladder neck, no median lobe.   The urethral urothelium was within normal limits with no visible strictures.    The prostatic urethra revealed apically located residual lateral lobe coaptation.     Procedure  The patient was placed in supine position and prepped and draped in sterile fashion with lidocaine jelly instill per the urethra for anesthesia 5 minutes prior to procedure start.  A brief timeout was performed including available nursing staff and the patient.  The 16 Fr digital flexible cystoscope was lubricated and gently advanced into the urethral meatus. The penile and bulbar urethra are widely patent without obvious stricture or mucosal abnormality.    The prostatic urethra was entered, notable for persistent lateral lobe coaptation near the apical prostate, the verumontanum was intact, the prostatic fossa otherwise was widely patent, the bladder neck was widely patent with no bladder neck contracture or strictures.     The bladder was then completely visualized starting with the trigone. There were bilateral orthotopic ureteral orifices. The posterior wall, lateral walls, anterior wall, and dome were completely visualized WITHOUT obvious mucosal lesions, tumors, stones, or trabeculations.  The cystoscope was retroflexed and the bladder neck and prostate were further  visualized and appeared normal.  The cystoscope was then gently withdrawn while visualizing the urethra and the procedure was then terminated.  The patient tolerated the procedure well.      Follow Up:     Patient reports since starting tamsulosin he has a stronger stream, he does complain of some feeling like he needs to force his urine out which is possibly concerning for residual obstruction.  On cystoscopy today, we identified some lateral lobe hyperplasia and coaptation which he may benefit from repeat prostatic surgery.  He did not tolerate oxybutynin due to subjective difficulty emptying he discontinued this on his own.  Discussed finasteride is likely not an option for him because he is worried about libido and erectile dysfunction which are side effects.    Daxa options would include continue tamsulosin and follow-up in 3 months for symptom monitoring or consider repeat bladder outlet surgery.  Patient would like to continue medications and follow-up in 3 months, he is also possibly planning for bariatric surgery for weight loss and he is curious to see if weight loss may help his urinary symptoms which I think is very possible.    PLAN  - continue Tamsulosin 0.4 mg daily  - f/u 3 months for symptom abneral     Gurwinder Barney MD

## 2022-03-09 ENCOUNTER — APPOINTMENT (OUTPATIENT)
Dept: LAB | Facility: HOSPITAL | Age: 47
End: 2022-03-09

## 2022-03-09 ENCOUNTER — TRANSCRIBE ORDERS (OUTPATIENT)
Dept: LAB | Facility: HOSPITAL | Age: 47
End: 2022-03-09

## 2022-03-09 DIAGNOSIS — L02.212 ABSCESS OF BACK, EXCEPT BUTTOCK: ICD-10-CM

## 2022-03-09 DIAGNOSIS — L02.31 CELLULITIS AND ABSCESS OF BUTTOCK: ICD-10-CM

## 2022-03-09 DIAGNOSIS — L03.317 CELLULITIS AND ABSCESS OF BUTTOCK: ICD-10-CM

## 2022-03-09 DIAGNOSIS — A41.02 METHICILLIN RESISTANT STAPHYLOCOCCUS AUREUS SEPTICEMIA: ICD-10-CM

## 2022-03-09 DIAGNOSIS — M46.28 ABSCESS OF SACRUM: Primary | ICD-10-CM

## 2022-03-14 ENCOUNTER — HOSPITAL ENCOUNTER (OUTPATIENT)
Dept: PULMONOLOGY | Facility: HOSPITAL | Age: 47
Discharge: HOME | End: 2022-03-14
Admitting: PHYSICIAN ASSISTANT

## 2022-03-14 DIAGNOSIS — Z72.0 TOBACCO USE: ICD-10-CM

## 2022-03-14 DIAGNOSIS — E66.01 OBESITY, CLASS III, BMI 40-49.9 (MORBID OBESITY): ICD-10-CM

## 2022-03-14 DIAGNOSIS — G47.30 SLEEP APNEA, UNSPECIFIED TYPE: ICD-10-CM

## 2022-03-14 PROCEDURE — 94729 DIFFUSING CAPACITY: CPT | Performed by: INTERNAL MEDICINE

## 2022-03-14 PROCEDURE — 94726 PLETHYSMOGRAPHY LUNG VOLUMES: CPT | Performed by: INTERNAL MEDICINE

## 2022-03-14 PROCEDURE — 94010 BREATHING CAPACITY TEST: CPT

## 2022-03-14 PROCEDURE — 94010 BREATHING CAPACITY TEST: CPT | Performed by: INTERNAL MEDICINE

## 2022-03-14 PROCEDURE — 94726 PLETHYSMOGRAPHY LUNG VOLUMES: CPT

## 2022-03-14 PROCEDURE — 94729 DIFFUSING CAPACITY: CPT

## 2022-03-15 ENCOUNTER — OFFICE VISIT (OUTPATIENT)
Dept: CARDIOLOGY | Facility: CLINIC | Age: 47
End: 2022-03-15

## 2022-03-15 VITALS — HEIGHT: 69 IN | BODY MASS INDEX: 44.88 KG/M2 | WEIGHT: 303 LBS

## 2022-03-15 DIAGNOSIS — E66.01 MORBID OBESITY WITH BMI OF 45.0-49.9, ADULT: ICD-10-CM

## 2022-03-15 DIAGNOSIS — I10 ESSENTIAL HYPERTENSION: ICD-10-CM

## 2022-03-15 DIAGNOSIS — Z01.810 PREOP CARDIOVASCULAR EXAM: Primary | ICD-10-CM

## 2022-03-15 PROCEDURE — 93000 ELECTROCARDIOGRAM COMPLETE: CPT | Performed by: INTERNAL MEDICINE

## 2022-03-15 PROCEDURE — 99244 OFF/OP CNSLTJ NEW/EST MOD 40: CPT | Performed by: INTERNAL MEDICINE

## 2022-03-15 RX ORDER — LORATADINE 10 MG/1
TABLET ORAL DAILY
COMMUNITY
Start: 2022-02-22

## 2022-03-15 NOTE — PROGRESS NOTES
[facility] OP Consult Note    Gopi Johnson  [id number]  03/15/2022    Referred By: Kavita Callaway PA    Chief Complaint: Preoperative cardiac risk assessment    History of Present Illness:   Mr. Gopi Johnson is a 46 y.o. male who presents to the Cardiology Clinic for evaluation of preoperative cardiac risk assessment prior to undergoing bariatric surgery.  The patient has a past medical history significant for osteomyelitis, lymphedema, hypertension, elevated LFTs, GERD, and morbid obesity with a BMI 45 kg/m².  He does not have any significant past cardiac history, however he has a strong family history significant for CAD.  He presents today for cardiac risk assessment as he is planning to undergo gastric sleeve placement for weight loss.  Currently, the patient denies any chest pain or chest discomfort.  He reports his level of activity is somewhat limited due to chronic back pain, however he denies any exertional angina with usual daily activities.  No history of palpitations.  No presyncopal or syncopal events.  He does have lower extremity swelling, which is related to lymphedema in the setting of obesity.  No history of orthopnea or PND.  No other specific complaints.    Past Cardiac Testin. Last Coronary Angio: None  2. Prior Stress Testing: None  3. Last Echo: None  4. Prior Holter Monitor: None    Review of Systems:   Review of Systems   Constitutional: Negative for activity change, appetite change, chills, diaphoresis, fatigue, fever, unexpected weight gain and unexpected weight loss.   Eyes: Negative for blurred vision and double vision.   Respiratory: Negative for cough, chest tightness, shortness of breath and wheezing.    Cardiovascular: Positive for leg swelling. Negative for chest pain and palpitations.   Gastrointestinal: Negative for abdominal pain, anal bleeding, blood in stool and GERD.   Endocrine: Negative for cold intolerance and heat  intolerance.   Genitourinary: Negative for hematuria.   Musculoskeletal: Positive for back pain.   Neurological: Negative for dizziness, syncope, weakness and light-headedness.   Hematological: Does not bruise/bleed easily.   Psychiatric/Behavioral: Negative for depressed mood and stress. The patient is not nervous/anxious.        Past Medical History:   Past Medical History:   Diagnosis Date   • Anxiety    • Bipolar affective (HCC)    • Depression    • Dyspepsia    • Dyspnea on exertion    • Elevated LFTs     GI eval (P)   • Enlarged prostate     s/p TURP 5/2021   • Family history of malignant hyperthermia     patient had muscle biopsy at [facility] and was negative, sister has malignant hyperthermia and a cousin   • Fatigue    • Fractured pelvis (HCC)    • Frequent urination    • GERD (gastroesophageal reflux disease)     on PPI, hx erosive gastritis on EGD 1/5/22   • H/O: substance abuse (HCC)     clean since 2016, on Subutex, managed by PCP   • Headache     following w/ Neurology   • Hx MRSA infection 2020    w/ osteomyelitis    • Hyperammonemia (HCC)     following w/ GI   • Hypertension    • Hypogonadism in male     on testosterone q2wks, follows w/ Urology   • Hypokalemia    • Impaired mobility     w/ (L)sided weakness (since osteomyelitis), ambulates w/ cane   • Insomnia     on Trazodone   • Joint pain     w/ recent SI joint injections 2/2022   • Lower extremity edema    • Migraine    • Morbid obesity (HCC)    • Murmur    • Myoclonus     w/ body jerks, follows w/ Neurology   • Osteomyelitis (HCC) 2020    H/O spinal osteomyelitis, previous IVDA   • Piercing     ear/body   • PUD (peptic ulcer disease)     non-bleeding gastric ulcers on EGD 1/5/22 w/ Dr. Dover   • Seasonal allergies    • Sleep apnea     CPAP compliant   • Tattoo    • Tobacco use     trying to quit, currently vaping (0%) - mom does smoke in the home       Past Surgical History:   Past Surgical History:   Procedure Laterality Date   •  BUNIONECTOMY  2007   • COLONOSCOPY  2017   • CYSTOSCOPY TRANSURETHRAL RESECTION OF PROSTATE N/A 5/26/2021    Procedure: TRANSURETHRAL RESECTION OF PROSTATE;  Surgeon: Markel Centeno MD;  Location: [facility] OR;  Service: Urology;  Laterality: N/A;   • ENDOSCOPY N/A 1/5/2022    Procedure: ESOPHAGOGASTRODUODENOSCOPY with biopsy;  Surgeon: Royal Dover MD;  Location: [facility] ENDOSCOPY;  Service: Gastroenterology;  Laterality: N/A;   • INCISIONAL HERNIA REPAIR  2019    inferior to umbilicus w/ mesh - Dr. Dover   • KNEE OPEN LATERAL RELEASE Right 1985   • LAPAROSCOPIC CHOLECYSTECTOMY  2014    sand, no stones   • TONSILLECTOMY AND ADENOIDECTOMY  1980   • WISDOM TOOTH EXTRACTION  1997       Family History:   Family History   Problem Relation Age of Onset   • Hypertension Father    • Hypertension Mother    • Obesity Mother    • Heart disease Paternal Grandmother    • Prostate cancer Paternal Grandfather    • Malig Hyperthermia Sister    • Obesity Sister    • Leukemia Maternal Grandmother    • Heart attack Maternal Grandfather    • Colon cancer Half-Brother        Social History:   Social History     Socioeconomic History   • Marital status: Single   Tobacco Use   • Smoking status: Former Smoker     Packs/day: 0.50     Years: 30.00     Pack years: 15.00     Types: Cigarettes   • Smokeless tobacco: Never Used   Vaping Use   • Vaping Use: Every day   • Substances: Flavoring   • Devices: Pre-filled or refillable cartridge   Substance and Sexual Activity   • Alcohol use: Not Currently   • Drug use: Not Currently     Types: Methamphetamines, Cocaine(coke), Ketamine, LSD, Marijuana, MDMA (ecstacy)     Comment: clean since 2016   • Sexual activity: Defer       Medications:     Current Outpatient Medications:   •  Alcohol Swabs pads, Clean area prior to injection, Disp: 100 each, Rfl: 11  •  amLODIPine (NORVASC) 5 MG tablet, Take 5 mg by mouth Daily., Disp: , Rfl:   •  buprenorphine (SUBUTEX) 8 MG sublingual tablet SL  "tablet, 8 mg., Disp: , Rfl:   •  chlorthalidone (HYGROTON) 25 MG tablet, Take 25 mg by mouth Daily., Disp: , Rfl:   •  Enulose 10 GM/15ML solution solution (encephalopathy), , Disp: , Rfl:   •  lidocaine (LIDODERM) 5 %, Place 1 patch on the skin as directed by provider Daily. Remove & Discard patch within 12 hours or as directed by MD, Disp:  , Rfl:   •  lisinopril (PRINIVIL,ZESTRIL) 30 MG tablet, , Disp: , Rfl:   •  loratadine (CLARITIN) 10 MG tablet, Daily., Disp: , Rfl:   •  Multiple Vitamins-Minerals (MULTI COMPLETE PO), Take  by mouth., Disp: , Rfl:   •  mupirocin (BACTROBAN) 2 % ointment, As Needed., Disp: , Rfl:   •  Needle, Disp, 18G X 1-1/2\" misc, Use for drawing up the medication, Disp: 50 each, Rfl: 3  •  Needle, Disp, 23G X 1-1/2\" misc, 1 Device 1 (One) Time Per Week., Disp: 30 each, Rfl: 11  •  omeprazole (priLOSEC) 40 MG capsule, Take 1 capsule by mouth Daily., Disp: 30 capsule, Rfl: 5  •  ondansetron (ZOFRAN) 4 MG tablet, Take 1 tablet by mouth Every 6 (Six) Hours As Needed for Nausea or Vomiting., Disp:  , Rfl:   •  pantoprazole (PROTONIX) 40 MG EC tablet, Take 1 tablet by mouth Every Morning., Disp:  , Rfl:   •  Syringe, Disposable, 3 ML misc, 1 syringe 1 (One) Time Per Week., Disp: 100 each, Rfl: 11  •  tamsulosin (FLOMAX) 0.4 MG capsule 24 hr capsule, Take 1 capsule by mouth Daily., Disp: 30 capsule, Rfl: 2  •  testosterone cypionate (DEPO-TESTOSTERONE) 100 MG/ML solution injection, Inject 1 mL into the appropriate muscle as directed by prescriber Every 14 (Fourteen) Days., Disp: 10 mL, Rfl: 1  •  traZODone (DESYREL) 50 MG tablet, Take 50 mg by mouth Daily As Needed for Sleep., Disp: , Rfl:   •  venlafaxine (EFFEXOR) 75 MG tablet, Take 75 mg by mouth Daily. At night, Disp: , Rfl:   •  venlafaxine XR (EFFEXOR-XR) 150 MG 24 hr capsule, Take 1 capsule by mouth Daily., Disp:  , Rfl:   •  cetirizine (zyrTEC) 10 MG tablet, Take 1 tablet by mouth Daily., Disp:  , Rfl:   •  cloNIDine (CATAPRES) 0.1 MG " "tablet, As Needed., Disp: , Rfl:   •  cyclobenzaprine (FLEXERIL) 10 MG tablet, Take 10 mg by mouth At Night As Needed., Disp: , Rfl:   •  fluticasone (FLONASE) 50 MCG/ACT nasal spray, As Needed., Disp: , Rfl:   •  hydrOXYzine (ATARAX) 25 MG tablet, Take 50 mg by mouth every night at bedtime., Disp: , Rfl:   •  Loratadine 10 MG capsule, Take  by mouth., Disp: , Rfl:     Allergies:   Allergies   Allergen Reactions   • Naloxone Swelling     Throat swelling, itching, rash   • Hydrocodone-Acetaminophen Itching and Rash     Percocet OK       Physical Exam:  Vital Signs:   Vitals:    03/15/22 1400   Weight: (!) 137 kg (303 lb)   Height: 174 cm (68.5\")       Physical Exam  Constitutional:       General: He is not in acute distress.     Appearance: He is obese. He is not diaphoretic.   HENT:      Head: Normocephalic and atraumatic.   Cardiovascular:      Rate and Rhythm: Normal rate and regular rhythm.      Heart sounds: No murmur heard.  Pulmonary:      Effort: Pulmonary effort is normal. No respiratory distress.      Breath sounds: Normal breath sounds. No stridor. No wheezing, rhonchi or rales.   Abdominal:      General: Bowel sounds are normal. There is no distension.      Tenderness: There is no abdominal tenderness. There is no guarding or rebound.      Comments: Obese abdomen   Musculoskeletal:         General: Swelling present. Normal range of motion.      Cervical back: Neck supple. No tenderness.   Skin:     General: Skin is warm and dry.      Comments: Multiple lesions on bilateral upper extremities   Neurological:      General: No focal deficit present.      Mental Status: He is alert and oriented to person, place, and time.   Psychiatric:         Mood and Affect: Mood normal.         Behavior: Behavior normal.         Results Review:   I reviewed the patient's new clinical results.  I personally viewed and interpreted the patient's EKG/Telemetry data      ECG 12 Lead    Date/Time: 3/15/2022 2:33 PM  Performed " by: Blayne Vilchis MD  Authorized by: Blayne Vilchis MD   Comparison: not compared with previous ECG   Rhythm: sinus tachycardia  Rate: normal  QRS axis: normal  Other findings comments: Nonspecific T wave abnormality              Assessment / Plan:     1. Preop cardiovascular exam  --Presents today for preoperative cardiac risk assessment and consideration of undergoing bariatric surgery  --No significant prior cardiac history  --ECG today shows NSR with no significant abnormalities  --No chest pain or exertional anginal symptoms  --No evidence of decompensated CHF, valvulopathy, or arrhythmia  --The patient is currently at low risk for adverse perioperative cardiac events with a low risk revised cardiac risk index of 0.4%.  Given her functional status is > 4 METS without anginal symptoms it is reasonable to proceed with other procedure without further cardiac testing or interventions  --Will follow on a as needed basis    2. Essential hypertension  --Hypertensive today  --Currently being managed by PCP    3. Morbid obesity with BMI of 45.0-49.9  --Planning for bariatric surgery for weight loss        Follow Up:   Return if symptoms worsen or fail to improve.      Thank you for allowing me to participate in the care of your patient. Please to not hesitate to contact me with additional questions or concerns.     MICKI Vilchis MD  Interventional Cardiology   03/15/2022  14:09 EDT

## 2022-03-18 PROBLEM — M86.9 OSTEOMYELITIS: Status: ACTIVE | Noted: 2020-10-01

## 2022-03-21 ENCOUNTER — OFFICE VISIT (OUTPATIENT)
Dept: UROLOGY | Facility: CLINIC | Age: 47
End: 2022-03-21

## 2022-03-21 VITALS
WEIGHT: 303 LBS | OXYGEN SATURATION: 98 % | HEIGHT: 69 IN | HEART RATE: 86 BPM | SYSTOLIC BLOOD PRESSURE: 144 MMHG | BODY MASS INDEX: 44.88 KG/M2 | DIASTOLIC BLOOD PRESSURE: 78 MMHG

## 2022-03-21 DIAGNOSIS — R39.9 LOWER URINARY TRACT SYMPTOMS (LUTS): Primary | ICD-10-CM

## 2022-03-21 DIAGNOSIS — R39.198 DIFFICULTY URINATING: ICD-10-CM

## 2022-03-21 DIAGNOSIS — R33.9 URINARY RETENTION WITH INCOMPLETE BLADDER EMPTYING: ICD-10-CM

## 2022-03-21 LAB
BILIRUB BLD-MCNC: NEGATIVE MG/DL
CLARITY, POC: CLEAR
COLOR UR: YELLOW
EXPIRATION DATE: ABNORMAL
GLUCOSE UR STRIP-MCNC: NEGATIVE MG/DL
KETONES UR QL: NEGATIVE
LEUKOCYTE EST, POC: NEGATIVE
Lab: ABNORMAL
NITRITE UR-MCNC: NEGATIVE MG/ML
PH UR: 7 [PH] (ref 5–8)
PROT UR STRIP-MCNC: ABNORMAL MG/DL
RBC # UR STRIP: NEGATIVE /UL
SP GR UR: 1.01 (ref 1–1.03)
UROBILINOGEN UR QL: NORMAL

## 2022-03-21 PROCEDURE — 99214 OFFICE O/P EST MOD 30 MIN: CPT | Performed by: STUDENT IN AN ORGANIZED HEALTH CARE EDUCATION/TRAINING PROGRAM

## 2022-03-21 PROCEDURE — 51798 US URINE CAPACITY MEASURE: CPT | Performed by: STUDENT IN AN ORGANIZED HEALTH CARE EDUCATION/TRAINING PROGRAM
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
## 2022-03-22 ENCOUNTER — TELEPHONE (OUTPATIENT)
Dept: UROLOGY | Facility: CLINIC | Age: 47
End: 2022-03-22

## 2022-03-22 NOTE — TELEPHONE ENCOUNTER
Faxed a request to cancel the catheter order for the patient, since he is unwilling to catheterize himself at home.

## 2022-03-31 ENCOUNTER — HOSPITAL ENCOUNTER (OUTPATIENT)
Dept: NUCLEAR MEDICINE | Facility: HOSPITAL | Age: 47
Discharge: HOME | End: 2022-03-31

## 2022-03-31 DIAGNOSIS — R10.13 DYSPEPSIA: ICD-10-CM

## 2022-03-31 DIAGNOSIS — E66.9 DIABETES MELLITUS TYPE 2 IN OBESE: ICD-10-CM

## 2022-03-31 DIAGNOSIS — E11.69 DIABETES MELLITUS TYPE 2 IN OBESE: ICD-10-CM

## 2022-03-31 DIAGNOSIS — K21.9 GASTROESOPHAGEAL REFLUX DISEASE, UNSPECIFIED WHETHER ESOPHAGITIS PRESENT: ICD-10-CM

## 2022-03-31 PROCEDURE — A9541 TC99M SULFUR COLLOID: HCPCS | Performed by: PHYSICIAN ASSISTANT

## 2022-03-31 PROCEDURE — 0 TECHNETIUM SULFUR COLLOID: Performed by: PHYSICIAN ASSISTANT

## 2022-03-31 PROCEDURE — 78264 GASTRIC EMPTYING IMG STUDY: CPT

## 2022-03-31 RX ADMIN — TECHNETIUM TC 99M SULFUR COLLOID 1 DOSE: KIT at 08:51

## 2022-04-11 ENCOUNTER — TELEMEDICINE (OUTPATIENT)
Dept: PSYCHIATRY | Facility: CLINIC | Age: 47
End: 2022-04-11

## 2022-04-11 DIAGNOSIS — F43.10 POST TRAUMATIC STRESS DISORDER (PTSD): ICD-10-CM

## 2022-04-11 DIAGNOSIS — F33.0 MILD EPISODE OF RECURRENT MAJOR DEPRESSIVE DISORDER: Primary | ICD-10-CM

## 2022-04-11 DIAGNOSIS — F41.1 GENERALIZED ANXIETY DISORDER: ICD-10-CM

## 2022-04-11 PROCEDURE — 90792 PSYCH DIAG EVAL W/MED SRVCS: CPT

## 2022-04-11 NOTE — PROGRESS NOTES
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 Monocyte % 11/29/2021 7.2  5.0 - 12.0 % Final   • Eosinophil % 11/29/2021 3.5  0.3 - 6.2 % Final   • Basophil % 11/29/2021 0.5  0.0 - 1.5 % Final   • Immature Grans % 11/29/2021 0.5  0.0 - 0.5 % Final   • Neutrophils, Absolute 11/29/2021 5.60  1.70 - 7.00 10*3/mm3 Final   • Lymphocytes, Absolute 11/29/2021 1.88  0.70 - 3.10 10*3/mm3 Final   • Monocytes, Absolute 11/29/2021 0.61  0.10 - 0.90 10*3/mm3 Final   • Eosinophils, Absolute 11/29/2021 0.30  0.00 - 0.40 10*3/mm3 Final   • Basophils, Absolute 11/29/2021 0.04  0.00 - 0.20 10*3/mm3 Final   • Immature Grans, Absolute 11/29/2021 0.04  0.00 - 0.05 10*3/mm3 Final   • nRBC 11/29/2021 0.0  0.0 - 0.2 /100 WBC Final   • Blood Culture 11/29/2021 No growth at 5 days   Final   Lab on 11/19/2021   Component Date Value Ref Range Status   • Ammonia 11/19/2021 88 (A) 16 - 60 umol/L Final   • TSH 11/19/2021 3.340  0.270 - 4.200 uIU/mL Final   • Folate 11/19/2021 12.00  4.78 - 24.20 ng/mL Final   • Vitamin B-12 11/19/2021 1,211 (A) 211 - 946 pg/mL Final   • Sed Rate 11/19/2021 58 (A) 0 - 15 mm/hr Final   • Glucose 11/19/2021 114 (A) 65 - 99 mg/dL Final   • BUN 11/19/2021 12  6 - 20 mg/dL Final   • Creatinine 11/19/2021 0.95  0.76 - 1.27 mg/dL Final   • Sodium 11/19/2021 137  136 - 145 mmol/L Final   • Potassium 11/19/2021 3.7  3.5 - 5.2 mmol/L Final   • Chloride 11/19/2021 99  98 -  107 mmol/L Final   • CO2 11/19/2021 29.0  22.0 - 29.0 mmol/L Final   • Calcium 11/19/2021 9.4  8.6 - 10.5 mg/dL Final   • Total Protein 11/19/2021 7.3  6.0 - 8.5 g/dL Final   • Albumin 11/19/2021 4.50  3.50 - 5.20 g/dL Final   • ALT (SGPT) 11/19/2021 239 (A) 1 - 41 U/L Final   • AST (SGOT) 11/19/2021 66 (A) 1 - 40 U/L Final   • Alkaline Phosphatase 11/19/2021 223 (A) 39 - 117 U/L Final   • Total Bilirubin 11/19/2021 0.2  0.0 - 1.2 mg/dL Final   • eGFR Non  Amer 11/19/2021 85  >60 mL/min/1.73 Final   • Globulin 11/19/2021 2.8  gm/dL Final   • A/G Ratio 11/19/2021 1.6  g/dL Final   • BUN/Creatinine Ratio 11/19/2021 12.6  7.0 - 25.0 Final   • Anion Gap 11/19/2021 9.0  5.0 - 15.0 mmol/L Final   • RPR 11/19/2021 Non-Reactive  Non-Reactive Final   • WBC 11/19/2021 8.94  3.40 - 10.80 10*3/mm3 Final   • RBC 11/19/2021 5.08  4.14 - 5.80 10*6/mm3 Final   • Hemoglobin 11/19/2021 13.3  13.0 - 17.7 g/dL Final   • Hematocrit 11/19/2021 40.8  37.5 - 51.0 % Final   • MCV 11/19/2021 80.3  79.0 - 97.0 fL Final   • MCH 11/19/2021 26.2 (A) 26.6 - 33.0 pg Final   • MCHC 11/19/2021 32.6  31.5 - 35.7 g/dL Final   • RDW 11/19/2021 16.1 (A) 12.3 - 15.4 % Final   • RDW-SD 11/19/2021 45.8  37.0 - 54.0 fl Final   • MPV 11/19/2021 9.7  6.0 - 12.0 fL Final   • Platelets 11/19/2021 292  140 - 450 10*3/mm3 Final   • Neutrophil % 11/19/2021 71.3  42.7 - 76.0 % Final   • Lymphocyte % 11/19/2021 17.3 (A) 19.6 - 45.3 % Final   • Monocyte % 11/19/2021 8.1  5.0 - 12.0 % Final   • Eosinophil % 11/19/2021 2.0  0.3 - 6.2 % Final   • Basophil % 11/19/2021 0.4  0.0 - 1.5 % Final   • Immature Grans % 11/19/2021 0.9 (A) 0.0 - 0.5 % Final   • Neutrophils, Absolute 11/19/2021 6.37  1.70 - 7.00 10*3/mm3 Final   • Lymphocytes, Absolute 11/19/2021 1.55  0.70 - 3.10 10*3/mm3 Final   • Monocytes, Absolute 11/19/2021 0.72  0.10 - 0.90 10*3/mm3 Final   • Eosinophils, Absolute 11/19/2021 0.18  0.00 - 0.40 10*3/mm3 Final   • Basophils, Absolute 11/19/2021 0.04   0.00 - 0.20 10*3/mm3 Final   • Immature Grans, Absolute 11/19/2021 0.08 (A) 0.00 - 0.05 10*3/mm3 Final   • nRBC 11/19/2021 0.0  0.0 - 0.2 /100 WBC Final       Assessment/Plan   Problems Addressed this Visit    None     Visit Diagnoses     Mild episode of recurrent major depressive disorder (HCC)    -  Primary    Generalized anxiety disorder        Post traumatic stress disorder (PTSD)          Diagnoses       Codes Comments    Mild episode of recurrent major depressive disorder (HCC)    -  Primary ICD-10-CM: F33.0  ICD-9-CM: 296.31     Generalized anxiety disorder     ICD-10-CM: F41.1  ICD-9-CM: 300.02     Post traumatic stress disorder (PTSD)     ICD-10-CM: F43.10  ICD-9-CM: 309.81           Visit Diagnoses:    ICD-10-CM ICD-9-CM   1. Mild episode of recurrent major depressive disorder (HCC)  F33.0 296.31   2. Generalized anxiety disorder  F41.1 300.02   3. Post traumatic stress disorder (PTSD)  F43.10 309.81     From a psychiatric standpoint, the patient presents as a very good candidate for bariatric surgery.  Patient is motivated for the surgery, has showed readiness for the lifestyle change in terms of adjusting eating habits, and seems to have appropriate expectations of how to prepare and how to live after surgery in order to lose weight successfully.  Patient has good coping skills.   With ongoing treatment, there are no barriers from a psychiatric point of view to having bariatric surgery.    TREATMENT PLAN/GOALS: Continue supportive psychotherapy efforts and medications as indicated. Treatment and medication options discussed during today's visit. Patient acknowledged and verbally consented to continue with current treatment plan and was educated on the importance of compliance with treatment and follow-up appointments.    Short Term Goals: Patient will be compliant with medication, and patient will have no significant medication related side effects.  Patient will be engaged in psychotherapy as indicated.   Patient will report subjective improvement of symptoms.    Long term goals: To stabilize mood and treat/improve subjective symptoms, the patient will stay out of the hospital, the patient will be at an optimal level of functioning, and the patient will take all medications as prescribed.  The patient verbalized understanding and agreement with goals that were mutually set.    MEDICATION ISSUES:    Patient's mood is currently stable.  Encourage patient to continue seeing his therapist and provider for medication management.  Discussed with patient that this APRN has no reservations about him having surgery.  Informed patient that if he ever needed our service or we can provide any help to him that he could always call.  Patient agreed and verbalized understanding.  No follow-up will be necessary at this time.  Patient is agreeable to call the office with any worsening of symptoms or onset of side effects. Patient is agreeable to call 911 or go to the nearest ER should he/she begin having SI/HI.     MEDS ORDERED DURING VISIT:  No orders of the defined types were placed in this encounter.      No follow-ups on file.             This document has been electronically signed by MARIA DEL CARMEN Arenas  April 11, 2022 09:47 EDT    Part of this note may be an electronic transmission of spoken language to printed text using the Dragon Dictation System.

## 2022-04-26 ENCOUNTER — OFFICE VISIT (OUTPATIENT)
Dept: GASTROENTEROLOGY | Facility: CLINIC | Age: 47
End: 2022-04-26

## 2022-04-26 ENCOUNTER — LAB (OUTPATIENT)
Dept: LAB | Facility: HOSPITAL | Age: 47
End: 2022-04-26

## 2022-04-26 VITALS
SYSTOLIC BLOOD PRESSURE: 132 MMHG | HEIGHT: 69 IN | TEMPERATURE: 97.1 F | HEART RATE: 97 BPM | OXYGEN SATURATION: 95 % | DIASTOLIC BLOOD PRESSURE: 84 MMHG | BODY MASS INDEX: 43.58 KG/M2 | WEIGHT: 294.2 LBS

## 2022-04-26 DIAGNOSIS — R74.8 ELEVATED LIVER ENZYMES: ICD-10-CM

## 2022-04-26 DIAGNOSIS — K63.5 POLYP OF COLON, UNSPECIFIED PART OF COLON, UNSPECIFIED TYPE: ICD-10-CM

## 2022-04-26 DIAGNOSIS — K21.9 GASTROESOPHAGEAL REFLUX DISEASE WITHOUT ESOPHAGITIS: ICD-10-CM

## 2022-04-26 DIAGNOSIS — K25.9 GASTRIC ULCER WITHOUT HEMORRHAGE OR PERFORATION, UNSPECIFIED CHRONICITY: ICD-10-CM

## 2022-04-26 DIAGNOSIS — R74.8 ELEVATED LIVER ENZYMES: Primary | ICD-10-CM

## 2022-04-26 DIAGNOSIS — Z80.0 FAMILY HISTORY OF COLON CANCER: ICD-10-CM

## 2022-04-26 LAB
INR PPP: 0.97 (ref 0.84–1.13)
PROTHROMBIN TIME: 12.7 SECONDS (ref 11.4–14.4)

## 2022-04-26 PROCEDURE — 36415 COLL VENOUS BLD VENIPUNCTURE: CPT

## 2022-04-26 PROCEDURE — 99214 OFFICE O/P EST MOD 30 MIN: CPT | Performed by: PHYSICIAN ASSISTANT

## 2022-04-26 PROCEDURE — 85610 PROTHROMBIN TIME: CPT

## 2022-04-26 NOTE — PROGRESS NOTES
"     New Patient Consultation     Patient Name: Gopi Johnson  : 1975   MRN: [id number]     Chief Complaint:    Chief Complaint   Patient presents with   • Abnormal Lab     LFT       History of Present Illness: Gopi Johnson is a 46 y.o. male, PMH includes HTN, GERD, BPH, chronic back pain, h/o substance abuse, osteomyelitis of sacrum, obesity, who is here today for a Gastroenterology Consultation for elevated liver enzymes.    Pt states that he has been told that his LFTs were elevated for most of his adult life, but has not had formal GI workup for such. He notes that his LFTs \"fluctuate over time\" with no known cause. No recent abdominal imaging. He has been diagnosed with elevated NH3 and placed on lactulose by neurology, which he takes sporadically. He is planning to undergo gastric sleeve procedure in the near future.    Patient denies associated fever, chills, abdominal pain, indigestion, nausea, vomiting, diarrhea, constipation, hematemesis, dysphagia, hematochezia, melena, bloating, weight loss or gain, dysuria, jaundice or bruising.    Patient denies personal or FHx of PUD, H Pylori, gastritis, pancreatitis, colitis, Celiac disease, UC, Crohn's disease, IBS, gastric cancers. Brother passed from colon cancer at 35. Maternal aunt with elevated LFTs / fatty liver. Pt denies EtOH, tobacco, illicit substance or NSAID use. Ecigarette use without nicotine. No previous hepatitis, blood use. H/o IV drug use, sober 6 years. He has been on testosterone x 2 years.     EGD 2022 with Dr. Dover. Normal esophagus. Four nonbleeding superficial gastric ulcers with no stigmata of bleeding in gastric antrum. Bx negative for H Pylori. Normal duodenum.     CSY >5 years ago in [place]; presence of few polyps per pt report.     Subjective      Review of Systems:   Review of Systems   Constitutional: Negative for appetite change, chills, diaphoresis, fatigue, fever, unexpected weight gain and unexpected " weight loss.   HENT: Negative for drooling, facial swelling, mouth sores, nosebleeds, rhinorrhea, sore throat, swollen glands, tinnitus and trouble swallowing.    Eyes: Negative.    Respiratory: Negative for choking, chest tightness and shortness of breath.    Gastrointestinal: Negative for abdominal pain, anal bleeding, blood in stool, constipation, diarrhea, nausea, vomiting, GERD and indigestion.   Endocrine: Negative.    Genitourinary: Negative for dysuria, flank pain and hematuria.   Musculoskeletal: Negative for arthralgias, back pain, myalgias and neck pain.   Skin: Negative for color change, dry skin, pallor and bruise.   Neurological: Negative for dizziness, tremors, syncope, weakness and numbness.   Psychiatric/Behavioral: Negative for decreased concentration, hallucinations and self-injury. The patient is not nervous/anxious.    All other systems reviewed and are negative.      Past Medical History:   Past Medical History:   Diagnosis Date   • Anxiety    • Bipolar affective (HCC)    • Depression    • Dyspepsia    • Dyspnea on exertion    • Elevated LFTs     GI eval (P)   • Enlarged prostate     s/p TURP 5/2021   • Family history of malignant hyperthermia     patient had muscle biopsy at [facility] and was negative, sister has malignant hyperthermia and a cousin   • Fatigue    • Fractured pelvis (HCC)    • Frequent urination    • GERD (gastroesophageal reflux disease)     on PPI, hx erosive gastritis on EGD 1/5/22   • H/O: substance abuse (HCC)     clean since 2016, on Subutex, managed by PCP   • Headache     following w/ Neurology   • Hx MRSA infection 2020    w/ osteomyelitis    • Hyperammonemia (HCC)     following w/ GI   • Hypertension    • Hypogonadism in male     on testosterone q2wks, follows w/ Urology   • Hypokalemia    • Impaired mobility     w/ (L)sided weakness (since osteomyelitis), ambulates w/ cane   • Insomnia     on Trazodone   • Joint pain     w/ recent SI joint injections 2/2022   •  Lower extremity edema    • Migraine    • Morbid obesity (HCC)    • Murmur    • Myoclonus     w/ body jerks, follows w/ Neurology   • Osteomyelitis (HCC) 2020    H/O spinal osteomyelitis, previous IVDA   • Piercing     ear/body   • PUD (peptic ulcer disease)     non-bleeding gastric ulcers on EGD 1/5/22 w/ Dr. Dover   • Seasonal allergies    • Sleep apnea     CPAP compliant   • Tattoo    • Tobacco use     trying to quit, currently vaping (0%) - mom does smoke in the home       Past Surgical History:   Past Surgical History:   Procedure Laterality Date   • BUNIONECTOMY  2007   • COLONOSCOPY  2017   • CYSTOSCOPY TRANSURETHRAL RESECTION OF PROSTATE N/A 5/26/2021    Procedure: TRANSURETHRAL RESECTION OF PROSTATE;  Surgeon: Markel Centeno MD;  Location: [facility] OR;  Service: Urology;  Laterality: N/A;   • ENDOSCOPY N/A 1/5/2022    Procedure: ESOPHAGOGASTRODUODENOSCOPY with biopsy;  Surgeon: Royal Dover MD;  Location: [facility] ENDOSCOPY;  Service: Gastroenterology;  Laterality: N/A;   • INCISIONAL HERNIA REPAIR  2019    inferior to umbilicus w/ mesh - Dr. Dover   • KNEE OPEN LATERAL RELEASE Right 1985   • LAPAROSCOPIC CHOLECYSTECTOMY  2014    sand, no stones   • TONSILLECTOMY AND ADENOIDECTOMY  1980   • WISDOM TOOTH EXTRACTION  1997       Family History:   Family History   Problem Relation Age of Onset   • Hypertension Mother    • Obesity Mother    • Hypertension Father    • Malig Hyperthermia Sister    • Obesity Sister    • Leukemia Maternal Grandmother    • Heart attack Maternal Grandfather    • Heart disease Paternal Grandmother    • Prostate cancer Paternal Grandfather    • Colon polyps Half-Brother    • Colon cancer Half-Brother    • Esophageal cancer Neg Hx        Social History:   Social History     Socioeconomic History   • Marital status: Single   Tobacco Use   • Smoking status: Former Smoker     Packs/day: 0.50     Years: 30.00     Pack years: 15.00     Types: Cigarettes   • Smokeless tobacco: Never  "Used   Vaping Use   • Vaping Use: Every day   • Substances: Flavoring   • Devices: Pre-filled or refillable cartridge   Substance and Sexual Activity   • Alcohol use: Not Currently   • Drug use: Not Currently     Types: Methamphetamines, Cocaine(coke), Ketamine, LSD, Marijuana, MDMA (ecstacy)     Comment: clean since 2016   • Sexual activity: Defer       Alcohol/Tobacco History:   Social History     Substance and Sexual Activity   Alcohol Use Not Currently     Social History     Tobacco Use   Smoking Status Former Smoker   • Packs/day: 0.50   • Years: 30.00   • Pack years: 15.00   • Types: Cigarettes   Smokeless Tobacco Never Used       Medications:     Current Outpatient Medications:   •  Alcohol Swabs pads, Clean area prior to injection, Disp: 100 each, Rfl: 11  •  amLODIPine (NORVASC) 5 MG tablet, Take 5 mg by mouth Daily., Disp: , Rfl:   •  cetirizine (zyrTEC) 10 MG tablet, Take 1 tablet by mouth Daily., Disp:  , Rfl:   •  chlorthalidone (HYGROTON) 25 MG tablet, Take 25 mg by mouth Daily., Disp: , Rfl:   •  Cholecalciferol (Vitamin D3) 50 MCG (2000 UT) tablet, , Disp: , Rfl:   •  cloNIDine (CATAPRES) 0.1 MG tablet, As Needed., Disp: , Rfl:   •  cyclobenzaprine (FLEXERIL) 10 MG tablet, Take 10 mg by mouth At Night As Needed., Disp: , Rfl:   •  Enulose 10 GM/15ML solution solution (encephalopathy), , Disp: , Rfl:   •  fluticasone (FLONASE) 50 MCG/ACT nasal spray, As Needed., Disp: , Rfl:   •  hydrOXYzine (ATARAX) 25 MG tablet, Take 50 mg by mouth every night at bedtime., Disp: , Rfl:   •  lidocaine (LIDODERM) 5 %, Place 1 patch on the skin as directed by provider Daily. Remove & Discard patch within 12 hours or as directed by MD, Disp:  , Rfl:   •  lisinopril (PRINIVIL,ZESTRIL) 30 MG tablet, , Disp: , Rfl:   •  loratadine (CLARITIN) 10 MG tablet, Daily., Disp: , Rfl:   •  mupirocin (BACTROBAN) 2 % ointment, As Needed., Disp: , Rfl:   •  Needle, Disp, 18G X 1-1/2\" misc, Use for drawing up the medication, Disp: 50 " "each, Rfl: 3  •  Needle, Disp, 23G X 1-1/2\" misc, 1 Device 1 (One) Time Per Week., Disp: 30 each, Rfl: 11  •  omeprazole (priLOSEC) 40 MG capsule, Take 1 capsule by mouth Daily., Disp: 30 capsule, Rfl: 5  •  ondansetron (ZOFRAN) 4 MG tablet, Take 1 tablet by mouth Every 6 (Six) Hours As Needed for Nausea or Vomiting., Disp:  , Rfl:   •  oxybutynin XL (DITROPAN-XL) 10 MG 24 hr tablet, , Disp: , Rfl:   •  pantoprazole (PROTONIX) 40 MG EC tablet, Take 1 tablet by mouth Every Morning., Disp:  , Rfl:   •  potassium chloride (K-DUR,KLOR-CON) 20 MEQ CR tablet, , Disp: , Rfl:   •  Syringe, Disposable, 3 ML misc, 1 syringe 1 (One) Time Per Week., Disp: 100 each, Rfl: 11  •  tamsulosin (FLOMAX) 0.4 MG capsule 24 hr capsule, Take 1 capsule by mouth Daily., Disp: 30 capsule, Rfl: 2  •  testosterone cypionate (DEPO-TESTOSTERONE) 100 MG/ML solution injection, Inject 1 mL into the appropriate muscle as directed by prescriber Every 14 (Fourteen) Days., Disp: 10 mL, Rfl: 1  •  topiramate (TOPAMAX) 50 MG tablet, , Disp: , Rfl:   •  traZODone (DESYREL) 50 MG tablet, Take 50 mg by mouth Daily As Needed for Sleep., Disp: , Rfl:   •  venlafaxine (EFFEXOR) 75 MG tablet, Take 75 mg by mouth Daily. At night, Disp: , Rfl:   •  venlafaxine XR (EFFEXOR-XR) 150 MG 24 hr capsule, Take 1 capsule by mouth Daily., Disp:  , Rfl:   •  buprenorphine (SUBUTEX) 8 MG sublingual tablet SL tablet, 8 mg., Disp: , Rfl:   •  Multiple Vitamins-Minerals (MULTI COMPLETE PO), Take  by mouth., Disp: , Rfl:     Allergies:   Allergies   Allergen Reactions   • Naloxone Swelling     Throat swelling, itching, rash   • Hydrocodone-Acetaminophen Itching and Rash     Percocet OK       Objective     Physical Exam:  Vital Signs:   Vitals:    04/26/22 1352   BP: 132/84   BP Location: Left arm   Patient Position: Sitting   Cuff Size: Adult   Pulse: 97   Temp: 97.1 °F (36.2 °C)   TempSrc: Temporal   SpO2: 95%   Weight: 133 kg (294 lb 3.2 oz)   Height: 174 cm (68.5\")     Body " mass index is 44.08 kg/m².     Physical Exam  Vitals and nursing note reviewed.   Constitutional:       General: He is not in acute distress.     Appearance: Normal appearance. He is obese. He is not ill-appearing or diaphoretic.      Comments: Ambulates with cane. Pleasant adult male.    HENT:      Head: Normocephalic and atraumatic.      Right Ear: External ear normal.      Left Ear: External ear normal.      Nose: Nose normal. No rhinorrhea.      Mouth/Throat:      Mouth: Mucous membranes are moist.      Pharynx: Oropharynx is clear. No posterior oropharyngeal erythema.   Eyes:      General:         Right eye: No discharge.         Left eye: No discharge.      Conjunctiva/sclera: Conjunctivae normal.      Pupils: Pupils are equal, round, and reactive to light.   Cardiovascular:      Rate and Rhythm: Normal rate and regular rhythm.      Pulses: Normal pulses.      Heart sounds: No murmur heard.    No friction rub.   Pulmonary:      Effort: Pulmonary effort is normal. No respiratory distress.      Breath sounds: Normal breath sounds. No wheezing.   Abdominal:      General: Abdomen is flat. There is no distension.      Tenderness: There is abdominal tenderness (minimal, RLQ). There is no guarding or rebound.      Comments: Body habitus limits physical exam   Musculoskeletal:         General: Normal range of motion.      Cervical back: Normal range of motion and neck supple. No rigidity.   Skin:     General: Skin is warm and dry.      Capillary Refill: Capillary refill takes less than 2 seconds.      Coloration: Skin is not jaundiced or pale.      Comments: Multiple excoriated areas noted to all extremities. Pt scratching at skin during exam.    Neurological:      General: No focal deficit present.      Mental Status: He is alert and oriented to person, place, and time. Mental status is at baseline.   Psychiatric:         Mood and Affect: Mood normal.         Thought Content: Thought content normal.         Judgment:  Judgment normal.         Assessment / Plan      Assessment/Plan:   There are no diagnoses linked to this encounter.     Elevated LFTs  GERD  Gastric ulcer  H/o colon polyps  FHx colon cancer   - continue protonix 40mg PO daily   - pt given GERD diet instructions, advised to avoid GI irritants such as caffeine, carbonation, EtOH, tobacco, chocolate, peppermint, acid-based foods   - obtain CBC, CMP, hepatitis A/B/C serologies, PUTNAM fibrosure, iron profile, SURYA, smooth muscle Ab, mitochondrial Ab, alpha 1 antitryps, AFP, PT/INR, SPEP   - obtain liver US with elastography   - schedule for CSY   - follow up in clinic after completion of above studies   - call clinic at any time for questions or new / worsened sx    Follow Up:   Return in about 6 weeks (around 6/7/2022).    Plan of care reviewed with the patient at the conclusion of today's visit.  Education was provided regarding diagnosis, management, and any prescribed or recommended OTC medications.  Patient verbalized understanding of and agreement with management plan.     Time Statement:   Discussed plan of care in detail with patient today. Patient verbally understands and agrees. I have spent 45 minutes reviewing available diagnostics, obtaining history, examining the patient, developing a treatment plan, and educating the patient on disease process and plan of care.    Coral Mahoney PA-C   [facility]

## 2022-04-28 ENCOUNTER — OUTSIDE FACILITY SERVICE (OUTPATIENT)
Dept: GASTROENTEROLOGY | Facility: CLINIC | Age: 47
End: 2022-04-28

## 2022-04-28 PROCEDURE — 88305 TISSUE EXAM BY PATHOLOGIST: CPT | Performed by: INTERNAL MEDICINE

## 2022-04-28 PROCEDURE — 45380 COLONOSCOPY AND BIOPSY: CPT | Performed by: INTERNAL MEDICINE

## 2022-04-29 ENCOUNTER — LAB REQUISITION (OUTPATIENT)
Dept: LAB | Facility: HOSPITAL | Age: 47
End: 2022-04-29

## 2022-04-29 DIAGNOSIS — D12.8 BENIGN NEOPLASM OF RECTUM: ICD-10-CM

## 2022-04-29 DIAGNOSIS — Z86.010 PERSONAL HISTORY OF COLONIC POLYPS: ICD-10-CM

## 2022-04-29 DIAGNOSIS — Z80.0 FAMILY HISTORY OF MALIGNANT NEOPLASM OF DIGESTIVE ORGANS: ICD-10-CM

## 2022-04-29 DIAGNOSIS — K63.5 POLYP OF COLON: ICD-10-CM

## 2022-04-29 DIAGNOSIS — K64.8 OTHER HEMORRHOIDS: ICD-10-CM

## 2022-05-02 DIAGNOSIS — E29.1 HYPOGONADISM IN MALE: ICD-10-CM

## 2022-05-02 LAB
CYTO UR: NORMAL
LAB AP CASE REPORT: NORMAL
LAB AP CLINICAL INFORMATION: NORMAL
PATH REPORT.FINAL DX SPEC: NORMAL
PATH REPORT.GROSS SPEC: NORMAL

## 2022-05-02 RX ORDER — TESTOSTERONE CYPIONATE 200 MG/ML
200 INJECTION, SOLUTION INTRAMUSCULAR
Qty: 10 ML | Refills: 2 | Status: SHIPPED | OUTPATIENT
Start: 2022-05-02 | End: 2022-06-27 | Stop reason: SDUPTHER

## 2022-05-03 LAB
A1AT SERPL-MCNC: 155 MG/DL (ref 101–187)
A2 MACROGLOB SERPL-MCNC: 104 MG/DL (ref 110–276)
AFP-TM SERPL-MCNC: 1.5 NG/ML (ref 0–6.9)
ALBUMIN SERPL ELPH-MCNC: 3.7 G/DL (ref 2.9–4.4)
ALBUMIN SERPL-MCNC: 4.4 G/DL (ref 4–5)
ALBUMIN/GLOB SERPL: 1.2 {RATIO} (ref 0.7–1.7)
ALBUMIN/GLOB SERPL: 1.8 {RATIO} (ref 1.2–2.2)
ALP BONE CFR SERPL: 35 % (ref 12–68)
ALP INTEST CFR SERPL: 2 % (ref 0–18)
ALP LIVER CFR SERPL: 63 % (ref 13–88)
ALP SERPL-CCNC: 194 IU/L (ref 44–121)
ALPHA1 GLOB SERPL ELPH-MCNC: 0.3 G/DL (ref 0–0.4)
ALPHA2 GLOB SERPL ELPH-MCNC: 0.9 G/DL (ref 0.4–1)
ALT SERPL W P-5'-P-CCNC: 108 IU/L (ref 0–55)
ALT SERPL-CCNC: 104 IU/L (ref 0–44)
ANA SER QL: NEGATIVE
APO A-I SERPL-MCNC: 103 MG/DL (ref 101–178)
AST SERPL W P-5'-P-CCNC: 79 IU/L (ref 0–40)
AST SERPL-CCNC: 76 IU/L (ref 0–40)
B-GLOBULIN SERPL ELPH-MCNC: 1.1 G/DL (ref 0.7–1.3)
BASOPHILS # BLD AUTO: 0.1 X10E3/UL (ref 0–0.2)
BASOPHILS NFR BLD AUTO: 1 %
BILIRUB SERPL-MCNC: 0.3 MG/DL (ref 0–1.2)
BILIRUB SERPL-MCNC: 0.3 MG/DL (ref 0–1.2)
BUN SERPL-MCNC: 9 MG/DL (ref 6–24)
BUN/CREAT SERPL: 9 (ref 9–20)
CALCIUM SERPL-MCNC: 9.1 MG/DL (ref 8.7–10.2)
CHLORIDE SERPL-SCNC: 99 MMOL/L (ref 96–106)
CHOLEST SERPL-MCNC: 203 MG/DL (ref 100–199)
CO2 SERPL-SCNC: 21 MMOL/L (ref 20–29)
CREAT SERPL-MCNC: 0.98 MG/DL (ref 0.76–1.27)
EGFRCR SERPLBLD CKD-EPI 2021: 96 ML/MIN/1.73
EOSINOPHIL # BLD AUTO: 0.3 X10E3/UL (ref 0–0.4)
EOSINOPHIL NFR BLD AUTO: 3 %
ERYTHROCYTE [DISTWIDTH] IN BLOOD BY AUTOMATED COUNT: 14.9 % (ref 11.6–15.4)
FIBROSIS SCORING:: ABNORMAL
FIBROSIS STAGE SERPL QL: ABNORMAL
GAMMA GLOB SERPL ELPH-MCNC: 0.8 G/DL (ref 0.4–1.8)
GGT SERPL-CCNC: 741 IU/L (ref 0–65)
GLOBULIN SER CALC-MCNC: 2.4 G/DL (ref 1.5–4.5)
GLOBULIN SER CALC-MCNC: 3.1 G/DL (ref 2.2–3.9)
GLUCOSE SERPL-MCNC: 91 MG/DL (ref 65–99)
GLUCOSE SERPL-MCNC: 95 MG/DL (ref 65–99)
HAPTOGLOB SERPL-MCNC: 314 MG/DL (ref 23–355)
HAV AB SER QL IA: POSITIVE
HBV SURFACE AB SER QL: NON REACTIVE
HBV SURFACE AG SERPL QL IA: NEGATIVE
HCT VFR BLD AUTO: 41.1 % (ref 37.5–51)
HCV AB S/CO SERPL IA: <0.1 S/CO RATIO (ref 0–0.9)
HGB BLD-MCNC: 13.1 G/DL (ref 13–17.7)
IMM GRANULOCYTES # BLD AUTO: 0.1 X10E3/UL (ref 0–0.1)
IMM GRANULOCYTES NFR BLD AUTO: 1 %
INTERPRETATIONS: (REFERENCE): ABNORMAL
IRON SATN MFR SERPL: 9 % (ref 15–55)
IRON SERPL-MCNC: 34 UG/DL (ref 38–169)
LABORATORY COMMENT REPORT: ABNORMAL
LABORATORY COMMENT REPORT: NORMAL
LIVER FIBR SCORE SERPL CALC.FIBROSURE: 0.17 (ref 0–0.21)
LYMPHOCYTES # BLD AUTO: 2 X10E3/UL (ref 0.7–3.1)
LYMPHOCYTES NFR BLD AUTO: 23 %
M PROTEIN SERPL ELPH-MCNC: NORMAL G/DL
MCH RBC QN AUTO: 24.6 PG (ref 26.6–33)
MCHC RBC AUTO-ENTMCNC: 31.9 G/DL (ref 31.5–35.7)
MCV RBC AUTO: 77 FL (ref 79–97)
MITOCHONDRIA M2 IGG SER-ACNC: <20 UNITS (ref 0–20)
MONOCYTES # BLD AUTO: 0.7 X10E3/UL (ref 0.1–0.9)
MONOCYTES NFR BLD AUTO: 8 %
NASH SCORING (REFERENCE): ABNORMAL
NECROINFLAMMATORY ACT GRADE SERPL QL: ABNORMAL
NECROINFLAMMATORY ACT SCORE SERPL: 0.5
NEUTROPHILS # BLD AUTO: 5.6 X10E3/UL (ref 1.4–7)
NEUTROPHILS NFR BLD AUTO: 64 %
PLATELET # BLD AUTO: 318 X10E3/UL (ref 150–450)
POTASSIUM SERPL-SCNC: 3.7 MMOL/L (ref 3.5–5.2)
PROT SERPL-MCNC: 6.8 G/DL (ref 6–8.5)
RBC # BLD AUTO: 5.33 X10E6/UL (ref 4.14–5.8)
SERVICE CMNT-IMP: ABNORMAL
SMA IGG SER-ACNC: 9 UNITS (ref 0–19)
SODIUM SERPL-SCNC: 138 MMOL/L (ref 134–144)
SPECIMEN STATUS: NORMAL
STEATOSIS GRADE (REFERENCE): ABNORMAL
STEATOSIS GRADING (REFERENCE): ABNORMAL
STEATOSIS SCORE (REFERENCE): 0.98 (ref 0–0.3)
TIBC SERPL-MCNC: 394 UG/DL (ref 250–450)
TRIGL SERPL-MCNC: 360 MG/DL (ref 0–149)
UIBC SERPL-MCNC: 360 UG/DL (ref 111–343)
WBC # BLD AUTO: 8.7 X10E3/UL (ref 3.4–10.8)

## 2022-05-03 RX ORDER — IRON POLYSACCH/IRON HEME POLYP 28 MG
2 TABLET ORAL EVERY OTHER DAY
Qty: 60 TABLET | Refills: 5 | Status: SHIPPED | OUTPATIENT
Start: 2022-05-03

## 2022-05-05 ENCOUNTER — TELEMEDICINE (OUTPATIENT)
Dept: PSYCHIATRY | Facility: CLINIC | Age: 47
End: 2022-05-05

## 2022-05-05 DIAGNOSIS — F33.0 MILD EPISODE OF RECURRENT MAJOR DEPRESSIVE DISORDER: Primary | Chronic | ICD-10-CM

## 2022-05-05 DIAGNOSIS — F41.1 GENERALIZED ANXIETY DISORDER: Chronic | ICD-10-CM

## 2022-05-05 PROCEDURE — 99214 OFFICE O/P EST MOD 30 MIN: CPT

## 2022-05-05 RX ORDER — MIRTAZAPINE 15 MG/1
15 TABLET, FILM COATED ORAL NIGHTLY
Qty: 30 TABLET | Refills: 0 | Status: SHIPPED | OUTPATIENT
Start: 2022-05-05 | End: 2022-06-01 | Stop reason: SDUPTHER

## 2022-05-05 RX ORDER — VILAZODONE HYDROCHLORIDE 10 MG/1
TABLET ORAL
Qty: 53 TABLET | Refills: 0 | Status: SHIPPED | OUTPATIENT
Start: 2022-05-05 | End: 2022-06-01 | Stop reason: SDUPTHER

## 2022-05-05 RX ORDER — HYDROXYZINE HYDROCHLORIDE 25 MG/1
25 TABLET, FILM COATED ORAL 3 TIMES DAILY PRN
Qty: 90 TABLET | Refills: 0 | Status: SHIPPED | OUTPATIENT
Start: 2022-05-05 | End: 2022-06-01 | Stop reason: SDUPTHER

## 2022-05-05 RX ORDER — VENLAFAXINE HYDROCHLORIDE 37.5 MG/1
37.5 CAPSULE, EXTENDED RELEASE ORAL DAILY
Qty: 7 CAPSULE | Refills: 0 | Status: SHIPPED | OUTPATIENT
Start: 2022-05-05 | End: 2022-06-01

## 2022-05-05 NOTE — PROGRESS NOTES
This provider is located at [place], [place]. The Patient is seen remotely using Video. Patient is being seen via telehealth and confirm that they are in a secure environment for this session. Patient is located in [place], [place] at his home. The patient's condition being diagnosed/treated is appropriate for telemedicine. Provider identified as Cruz Calvillo as well as credentials APRN MSN PMHNP-BC.   The client/patient gave consent to be seen remotely, and when consent is given they understand that the consent allows for patient identifiable information to be sent to a third party as needed.  They may refuse to be seen remotely at any time. The electronic data is encrypted and password protected, and the patient has been advised of the potential risks to privacy not withstanding such measures.    Chief Complaint  Depression and Anxiety    Subjective        Gopi Johnson presents to [facility] for   History of Present Illness  Patient seen today for a follow-up visit for depression and anxiety.  Patient was seen last month for a bariatric evaluation.  Patient was informed at that time if he ever needed our services to call back, and that is what he did.  He states he was having trouble getting to his current therapist and prescriber.  He states he feels like they were not listening to him just always wanted to increase his medication and he feels that he needs some improvement in depression and anxiety.  He currently rates his depression as 7-8 on a 1-10 scale with 10 being the worst.  He states he has several days of feeling down.  Has decreased interest and pleasure in doing things.  States he feels fatigue and lack of motivation.  Also has a lot of negative thinking.  Denies any suicidal or homicidal ideation.  States his sleep has been poor.  He has been prescribed trazodone, but it is not working for him.  Appetite is fair.  Currently rates his anxiety a 5-6 on a 1-10  scale with 10 being the worst.  States he does have a lot of worry and over thinking.  Also has irritability.  Has catastrophic thinking as well.  Patient is requesting to be changed from the Effexor that he is currently taking.  He denies any history of manic type symptoms.  Denies any paranoia.  Denies any auditory or visual hallucinations.  He states he did get good news in that he has a court date for his disability now and he feels good about that.  He states he has been tried on Wellbutrin and Zoloft in the past with no success.  Objective   Vital Signs:   There were no vitals taken for this visit.      PHQ-9 Score:   PHQ-9 Total Score: (P) 18     Mental Status Exam:   Hygiene:   good  Cooperation:  Cooperative  Eye Contact:  Good  Psychomotor Behavior:  Appropriate  Affect:  Full range  Mood: depressed and anxious  Speech:  Normal  Thought Process:  Goal directed  Thought Content:  Normal  Suicidal:  None  Homicidal:  None  Hallucinations:  None  Delusion:  None  Memory:  Intact  Orientation:  Person, Place, Time and Situation  Reliability:  good  Insight:  Good  Judgement:  Good  Impulse Control:  Good  Physical/Medical Issues:  No      PHQ-9 Depression Screening  Little interest or pleasure in doing things? (P) 3   Feeling down, depressed, or hopeless? (P) 2   Trouble falling or staying asleep, or sleeping too much? (P) 3   Feeling tired or having little energy? (P) 3   Poor appetite or overeating? (P) 3   Feeling bad about yourself - or that you are a failure or have let yourself or your family down? (P) 1   Trouble concentrating on things, such as reading the newspaper or watching television? (P) 2   Moving or speaking so slowly that other people could have noticed? Or the opposite - being so fidgety or restless that you have been moving around a lot more than usual? (P) 1   Thoughts that you would be better off dead, or of hurting yourself in some way? (P) 0   PHQ-9 Total Score (P) 18   If you checked  "off any problems, how difficult have these problems made it for you to do your work, take care of things at home, or get along with other people? (P) Extremely dIfficult     PHQ-9 Total Score: (P) 18    JESSICA 7 anxiety screening tool that patient filled out virtually reviewed by this APRN at today's encounter.    PROMIS scale screening tool that patient filled out virtually reviewed by this APRN at today's encounter.    Previous Provider notes and available records reviewed by this APRN today.   Current Medications:   Current Outpatient Medications   Medication Sig Dispense Refill   • Alcohol Swabs pads Clean area prior to injection 100 each 11   • amLODIPine (NORVASC) 5 MG tablet Take 5 mg by mouth Daily.     • buprenorphine (SUBUTEX) 8 MG sublingual tablet SL tablet 8 mg.     • cetirizine (zyrTEC) 10 MG tablet Take 1 tablet by mouth Daily.     • chlorthalidone (HYGROTON) 25 MG tablet Take 25 mg by mouth Daily.     • Cholecalciferol (Vitamin D3) 50 MCG (2000 UT) tablet      • cloNIDine (CATAPRES) 0.1 MG tablet As Needed.     • cyclobenzaprine (FLEXERIL) 10 MG tablet Take 10 mg by mouth At Night As Needed.     • Enulose 10 GM/15ML solution solution (encephalopathy)      • fluticasone (FLONASE) 50 MCG/ACT nasal spray As Needed.     • hydrOXYzine (ATARAX) 25 MG tablet Take 1 tablet by mouth 3 (Three) Times a Day As Needed for Anxiety. 90 tablet 0   • lidocaine (LIDODERM) 5 % Place 1 patch on the skin as directed by provider Daily. Remove & Discard patch within 12 hours or as directed by MD     • lisinopril (PRINIVIL,ZESTRIL) 30 MG tablet      • loratadine (CLARITIN) 10 MG tablet Daily.     • mirtazapine (Remeron) 15 MG tablet Take 1 tablet by mouth Every Night for 30 days. 30 tablet 0   • Multiple Vitamins-Minerals (MULTI COMPLETE PO) Take  by mouth.     • mupirocin (BACTROBAN) 2 % ointment As Needed.     • Needle, Disp, 18G X 1-1/2\" misc Use for drawing up the medication 50 each 3   • Needle, Disp, 23G X 1-1/2\" misc 1 " Device 1 (One) Time Per Week. 30 each 11   • ondansetron (ZOFRAN) 4 MG tablet Take 1 tablet by mouth Every 6 (Six) Hours As Needed for Nausea or Vomiting.     • oxybutynin XL (DITROPAN-XL) 10 MG 24 hr tablet      • pantoprazole (PROTONIX) 40 MG EC tablet Take 1 tablet by mouth Every Morning.     • Polysacch Fe Cmp-Fe Heme Poly (Feosol Bifera) 28 MG tablet Take 2 tablets by mouth Every Other Day. 60 tablet 5   • potassium chloride (K-DUR,KLOR-CON) 20 MEQ CR tablet      • Syringe, Disposable, 3 ML misc 1 syringe 1 (One) Time Per Week. 100 each 11   • tamsulosin (FLOMAX) 0.4 MG capsule 24 hr capsule Take 1 capsule by mouth Daily. 30 capsule 2   • Testosterone Cypionate (Depo-Testosterone) 200 MG/ML injection Inject 1 mL into the appropriate muscle as directed by prescriber Every 14 (Fourteen) Days. 10 mL 2   • topiramate (TOPAMAX) 50 MG tablet      • venlafaxine XR (Effexor XR) 37.5 MG 24 hr capsule Take 1 capsule by mouth Daily. 7 capsule 0   • vilazodone (VIIBRYD) 10 MG tablet tablet Take 1 tablet by mouth Daily for 7 days, THEN 2 tablets Daily for 23 days. Take with food 53 tablet 0     No current facility-administered medications for this visit.       Physical Exam   Result Review :    The following data was reviewed by: MARIA DEL CARMEN Arenas on 05/05/2022:  Common labs    Common Labsle 2/24/22 2/24/22 2/24/22 2/24/22 3/9/22 4/26/22 4/26/22 4/26/22 4/26/22    1553 1553 1553 1553  1436 1436 1436 1436   Glucose  98      91    BUN  8      9    Creatinine  0.96      0.98    eGFR Non  Am  94          eGFR  Am  109          Sodium  139      138    Potassium  3.4 (A)      3.7    Chloride  94 (A)      99    Calcium  9.1      9.1    Total Protein  7.1      6.8    Albumin  4.5    3.7  4.4    Total Bilirubin  0.2      0.3    Alkaline Phosphatase  175 (A)      194 (A)    AST (SGOT)  32      76 (A)    ALT (SGPT)  52 (A)      104 (A)    WBC 14.2 (A)    8.66    8.7   Hemoglobin 13.9    12.7 (A)    13.1   Hematocrit  42.3    40.1    41.1   Platelets 311    291    318   Total Cholesterol    204 (A)        Triglycerides    183 (A)   360 (A)     HDL Cholesterol    36 (A)        LDL Cholesterol     135 (A)        Hemoglobin A1C   6.6 (A)         (A) Abnormal value       Comments are available for some flowsheets but are not being displayed.              Assessment and Plan   Problem List Items Addressed This Visit        Mental Health    Mild episode of recurrent major depressive disorder (HCC) - Primary    Relevant Medications    venlafaxine XR (Effexor XR) 37.5 MG 24 hr capsule    vilazodone (VIIBRYD) 10 MG tablet tablet    mirtazapine (Remeron) 15 MG tablet    hydrOXYzine (ATARAX) 25 MG tablet    Other Relevant Orders    Ambulatory Referral to Behavioral Health    Generalized anxiety disorder    Relevant Medications    venlafaxine XR (Effexor XR) 37.5 MG 24 hr capsule    vilazodone (VIIBRYD) 10 MG tablet tablet    mirtazapine (Remeron) 15 MG tablet    hydrOXYzine (ATARAX) 25 MG tablet    Other Relevant Orders    Ambulatory Referral to Behavioral Health        Discussed treatment options with patient.  Discussed with patient that we could change his Effexor to Viibryd to help with his depression and anxiety.  Informed him that we would taper off of the Effexor while starting the Viibryd.  Patient agreeable and states he would like to do so.  Taper off of Effexor XR by taking 75 mg daily for 5 days, then Effexor XR 37.5 mg for 5 days, then discontinue.  Patient has a supply of Effexor XR 75 mg.  Start Viibryd 10 mg daily for 7 days, then increase to 20 mg daily for depression and anxiety.  Also discussed patient's sleep as he states trazodone is not effective for him.  Discontinue trazodone.  Discussed starting Remeron 15 mg nightly for sleep.  Patient states he would like to do so, so we will do that as well.  Patient is currently prescribed hydroxyzine 25 mg for sleep.  Discussed with patient that he will not need that with the  Remeron, but he can take hydroxyzine 25 mg 3 times daily as needed for breakthrough anxiety.  Patient verbalizes understanding and agrees.  Patient is also interested in starting with a therapist, so we will coordinate appointment for him for that.  We will see patient again in 4 weeks.    TREATMENT PLAN/GOALS: Continue supportive psychotherapy efforts and medications as indicated. Treatment and medication options discussed during today's visit. Patient ackowledged and verbally consented to continue with current treatment plan and was educated on the importance of compliance with treatment and follow-up appointments.    DEPRESSION:  Patient screened positive for depression based on a PHQ-9 score of  on . Follow-up recommendations include: Prescribed antidepressant medication treatment and Referral to Social Work.       MEDICATION ISSUES:  We discussed risks, benefits, and side effects of the above medications and the patient was agreeable with the plan. Patient was educated on the importance of compliance with treatment and follow-up appointments.  Patient is agreeable to call the office with any worsening of symptoms or onset of side effects. Patient is agreeable to call 911 or go to the nearest ER should he/she begin having SI/HI.      Counseled patient regarding multimodal approach with healthy nutrition, healthy sleep, regular physical activity, social activities, counseling, and medications.      Coping skills reviewed and encouraged positive framing of thoughts     Assisted patient in processing above session content; acknowledged and normalized patient’s thoughts, feelings, and concerns.  Applied  positive coping skills and behavior management in session.  Allowed patient to freely discuss issues without interruption or judgment. Provided safe, confidential environment to facilitate the development of positive therapeutic relationship and encourage open, honest communication. Assisted patient in identifying  risk factors which would indicate the need for higher level of care including thoughts to harm self or others and/or self-harming behavior and encouraged patient to contact this office, call 911, or present to the nearest emergency room should any of these events occur. Discussed crisis intervention services and means to access.     MEDS ORDERED DURING VISIT:  New Medications Ordered This Visit   Medications   • venlafaxine XR (Effexor XR) 37.5 MG 24 hr capsule     Sig: Take 1 capsule by mouth Daily.     Dispense:  7 capsule     Refill:  0   • vilazodone (VIIBRYD) 10 MG tablet tablet     Sig: Take 1 tablet by mouth Daily for 7 days, THEN 2 tablets Daily for 23 days. Take with food     Dispense:  53 tablet     Refill:  0   • mirtazapine (Remeron) 15 MG tablet     Sig: Take 1 tablet by mouth Every Night for 30 days.     Dispense:  30 tablet     Refill:  0   • hydrOXYzine (ATARAX) 25 MG tablet     Sig: Take 1 tablet by mouth 3 (Three) Times a Day As Needed for Anxiety.     Dispense:  90 tablet     Refill:  0         Follow Up   Return in about 4 weeks (around 6/2/2022) for Video visit.    Patient was given instructions and counseling regarding his condition or for health maintenance advice. Please see specific information pulled into the AVS if appropriate.         This document has been electronically signed by MARIA DEL CARMEN Arenas  May 5, 2022 17:41 EDT    Part of this note may be an electronic transcription/translation of spoken language to printed text using the Dragon Dictation System.

## 2022-05-06 ENCOUNTER — PRIOR AUTHORIZATION (OUTPATIENT)
Dept: PSYCHIATRY | Facility: CLINIC | Age: 47
End: 2022-05-06

## 2022-05-25 ENCOUNTER — APPOINTMENT (OUTPATIENT)
Dept: ULTRASOUND IMAGING | Facility: HOSPITAL | Age: 47
End: 2022-05-25

## 2022-06-01 ENCOUNTER — TELEMEDICINE (OUTPATIENT)
Dept: PSYCHIATRY | Facility: CLINIC | Age: 47
End: 2022-06-01

## 2022-06-01 DIAGNOSIS — F33.0 MILD EPISODE OF RECURRENT MAJOR DEPRESSIVE DISORDER: Primary | Chronic | ICD-10-CM

## 2022-06-01 DIAGNOSIS — F41.1 GENERALIZED ANXIETY DISORDER: Chronic | ICD-10-CM

## 2022-06-01 DIAGNOSIS — F43.10 POST TRAUMATIC STRESS DISORDER (PTSD): ICD-10-CM

## 2022-06-01 PROCEDURE — 99214 OFFICE O/P EST MOD 30 MIN: CPT

## 2022-06-01 RX ORDER — VILAZODONE HYDROCHLORIDE 40 MG/1
40 TABLET ORAL DAILY
Qty: 30 TABLET | Refills: 1 | Status: SHIPPED | OUTPATIENT
Start: 2022-06-01 | End: 2022-07-06 | Stop reason: SDUPTHER

## 2022-06-01 RX ORDER — MIRTAZAPINE 15 MG/1
15 TABLET, FILM COATED ORAL NIGHTLY
Qty: 30 TABLET | Refills: 1 | Status: SHIPPED | OUTPATIENT
Start: 2022-06-01 | End: 2022-07-06 | Stop reason: SDUPTHER

## 2022-06-01 RX ORDER — HYDROXYZINE HYDROCHLORIDE 25 MG/1
25 TABLET, FILM COATED ORAL 3 TIMES DAILY PRN
Qty: 90 TABLET | Refills: 1 | Status: SHIPPED | OUTPATIENT
Start: 2022-06-01 | End: 2022-07-06 | Stop reason: SDUPTHER

## 2022-06-01 NOTE — PROGRESS NOTES
This provider is located at [place], [place]. The Patient is seen remotely using Video. Patient is being seen via telehealth and confirm that they are in a secure environment for this session. Patient is located in [place], [place] at his home. The patient's condition being diagnosed/treated is appropriate for telemedicine. Provider identified as Cruz Calvillo as well as credentials MARIA DEL CARMEN JHA PMHNP-BC.   The client/patient gave consent to be seen remotely, and when consent is given they understand that the consent allows for patient identifiable information to be sent to a third party as needed.  They may refuse to be seen remotely at any time. The electronic data is encrypted and password protected, and the patient has been advised of the potential risks to privacy not withstanding such measures.    Chief Complaint  Depression and Anxiety    Subjective        Gopi Rafael Johnson presents to [facility] for   History of Present Illness  Patient is seen today for follow-up visit for depression and anxiety.  Patient reports he did not have any issues tapering off of Effexor and starting Viibryd.  He states he has noticed some improvement in his depression and anxiety since starting the Viibryd.  Currently rates his depression a 4-5 on a 1-10 scale with 10 being the worst.  States he continues to have stressors going on including finances, and things going on at home with remodeling.  States his sleep is up and down.  States he had been taking the Remeron about 6-7 PM and not sleeping well.  States his appetite is good.  Denies any suicidal or homicidal ideation.  Rates his anxiety at 3-4 on a 1-10 scale with 10 being the worst.  States he has noticed some decrease in the amount of worry and over thinking that he does.  He states irritability has improved with the Viibryd.  Also has trouble relaxing and restlessness.  Denies any panic attacks.  Objective   Vital Signs:   There were no  vitals taken for this visit.      PHQ-9 Score:   PHQ-9 Total Score: (P) 11     Mental Status Exam:   Hygiene:   good  Cooperation:  Cooperative  Eye Contact:  Good  Psychomotor Behavior:  Appropriate  Affect:  Full range  Mood: depressed and anxious  Speech:  Normal  Thought Process:  Goal directed  Thought Content:  Normal  Suicidal:  None  Homicidal:  None  Hallucinations:  None  Delusion:  None  Memory:  Intact  Orientation:  Person, Place, Time and Situation  Reliability:  good  Insight:  Good  Judgement:  Good  Impulse Control:  Good  Physical/Medical Issues:  No      PHQ-9 Depression Screening  Little interest or pleasure in doing things? (P) 1   Feeling down, depressed, or hopeless? (P) 1   Trouble falling or staying asleep, or sleeping too much? (P) 3   Feeling tired or having little energy? (P) 3   Poor appetite or overeating? (P) 3   Feeling bad about yourself - or that you are a failure or have let yourself or your family down? (P) 0   Trouble concentrating on things, such as reading the newspaper or watching television? (P) 0   Moving or speaking so slowly that other people could have noticed? Or the opposite - being so fidgety or restless that you have been moving around a lot more than usual? (P) 0   Thoughts that you would be better off dead, or of hurting yourself in some way? (P) 0   PHQ-9 Total Score (P) 11   If you checked off any problems, how difficult have these problems made it for you to do your work, take care of things at home, or get along with other people? (P) Very difficult     PHQ-9 Total Score: (P) 11    JESSICA 7 anxiety screening tool that patient filled out virtually reviewed by this APRN at today's encounter.    PROMIS scale screening tool that patient filled out virtually reviewed by this APRN at today's encounter.    Previous Provider notes and available records reviewed by this APRN today.   Current Medications:   Current Outpatient Medications   Medication Sig Dispense Refill   •  "Alcohol Swabs pads Clean area prior to injection 100 each 11   • amLODIPine (NORVASC) 5 MG tablet Take 5 mg by mouth Daily.     • buprenorphine (SUBUTEX) 8 MG sublingual tablet SL tablet 8 mg.     • cetirizine (zyrTEC) 10 MG tablet Take 1 tablet by mouth Daily.     • chlorthalidone (HYGROTON) 25 MG tablet Take 25 mg by mouth Daily.     • Cholecalciferol (Vitamin D3) 50 MCG (2000 UT) tablet      • cloNIDine (CATAPRES) 0.1 MG tablet As Needed.     • cyclobenzaprine (FLEXERIL) 10 MG tablet Take 10 mg by mouth At Night As Needed.     • Enulose 10 GM/15ML solution solution (encephalopathy)      • fluticasone (FLONASE) 50 MCG/ACT nasal spray As Needed.     • hydrOXYzine (ATARAX) 25 MG tablet Take 1 tablet by mouth 3 (Three) Times a Day As Needed for Anxiety. 90 tablet 1   • lidocaine (LIDODERM) 5 % Place 1 patch on the skin as directed by provider Daily. Remove & Discard patch within 12 hours or as directed by MD     • lisinopril (PRINIVIL,ZESTRIL) 30 MG tablet      • loratadine (CLARITIN) 10 MG tablet Daily.     • mirtazapine (Remeron) 15 MG tablet Take 1 tablet by mouth Every Night for 60 days. 30 tablet 1   • Multiple Vitamins-Minerals (MULTI COMPLETE PO) Take  by mouth.     • mupirocin (BACTROBAN) 2 % ointment As Needed.     • Needle, Disp, 18G X 1-1/2\" misc Use for drawing up the medication 50 each 3   • Needle, Disp, 23G X 1-1/2\" misc 1 Device 1 (One) Time Per Week. 30 each 11   • oxybutynin XL (DITROPAN-XL) 10 MG 24 hr tablet      • pantoprazole (PROTONIX) 40 MG EC tablet Take 1 tablet by mouth Every Morning.     • Polysacch Fe Cmp-Fe Heme Poly (Feosol Bifera) 28 MG tablet Take 2 tablets by mouth Every Other Day. 60 tablet 5   • potassium chloride (K-DUR,KLOR-CON) 20 MEQ CR tablet      • Syringe, Disposable, 3 ML misc 1 syringe 1 (One) Time Per Week. 100 each 11   • tamsulosin (FLOMAX) 0.4 MG capsule 24 hr capsule Take 1 capsule by mouth Daily. 30 capsule 2   • Testosterone Cypionate (Depo-Testosterone) 200 MG/ML " injection Inject 1 mL into the appropriate muscle as directed by prescriber Every 14 (Fourteen) Days. 10 mL 2   • topiramate (TOPAMAX) 50 MG tablet      • vilazodone (VIIBRYD) 40 MG tablet tablet Take 1 tablet by mouth Daily. Take with food 30 tablet 1     No current facility-administered medications for this visit.       Physical Exam   Result Review :    The following data was reviewed by: MARIA DEL CARMEN Arenas on 06/01/2022:  Common labs    Common Labsle 2/24/22 2/24/22 2/24/22 2/24/22 3/9/22 4/26/22 4/26/22 4/26/22 4/26/22    1553 1553 1553 1553  1436 1436 1436 1436   Glucose  98      91    BUN  8      9    Creatinine  0.96      0.98    eGFR Non  Am  94          eGFR  Am  109          Sodium  139      138    Potassium  3.4 (A)      3.7    Chloride  94 (A)      99    Calcium  9.1      9.1    Total Protein  7.1      6.8    Albumin  4.5    3.7  4.4    Total Bilirubin  0.2      0.3    Alkaline Phosphatase  175 (A)      194 (A)    AST (SGOT)  32      76 (A)    ALT (SGPT)  52 (A)      104 (A)    WBC 14.2 (A)    8.66    8.7   Hemoglobin 13.9    12.7 (A)    13.1   Hematocrit 42.3    40.1    41.1   Platelets 311    291    318   Total Cholesterol    204 (A)        Triglycerides    183 (A)   360 (A)     HDL Cholesterol    36 (A)        LDL Cholesterol     135 (A)        Hemoglobin A1C   6.6 (A)         (A) Abnormal value       Comments are available for some flowsheets but are not being displayed.              Assessment and Plan   Problem List Items Addressed This Visit        Mental Health    Mild episode of recurrent major depressive disorder (HCC) - Primary    Relevant Medications    vilazodone (VIIBRYD) 40 MG tablet tablet    hydrOXYzine (ATARAX) 25 MG tablet    mirtazapine (Remeron) 15 MG tablet    Generalized anxiety disorder    Relevant Medications    vilazodone (VIIBRYD) 40 MG tablet tablet    hydrOXYzine (ATARAX) 25 MG tablet    mirtazapine (Remeron) 15 MG tablet      Other Visit Diagnoses     Post  traumatic stress disorder (PTSD)        Relevant Medications    vilazodone (VIIBRYD) 40 MG tablet tablet    hydrOXYzine (ATARAX) 25 MG tablet    mirtazapine (Remeron) 15 MG tablet        Discussed treatment options with patient.  Discussed with patient that we could increase Viibryd to 40 mg daily for depression and anxiety to further gain improvement in depression and anxiety.  Patient is agreeable to do so.  Increase Viibryd to 40 mg daily for depression and anxiety.  Did instruct patient that he needs to take Remeron closer to the time that he goes to bed, approximately 30 minutes before bedtime for it to work the best.  Also discussed with him that taking half a pill is more sedating than the full 15 mg so he could also do that.  Patient verbalized understanding and agreed.  Continue Remeron 15 mg nightly for sleep.  Continue hydroxyzine 25 mg 3 times daily as needed for anxiety.  Patient has a therapy appointment scheduled with [facility] later this month and encourage patient to keep that appointment.  We will see patient again in 4 weeks to reassess.    TREATMENT PLAN/GOALS: Continue supportive psychotherapy efforts and medications as indicated. Treatment and medication options discussed during today's visit. Patient ackowledged and verbally consented to continue with current treatment plan and was educated on the importance of compliance with treatment and follow-up appointments.    DEPRESSION:  Patient screened positive for depression based on a PHQ-9 score of  on . Follow-up recommendations include: Prescribed antidepressant medication treatment and Referral to Social Work.       MEDICATION ISSUES:  We discussed risks, benefits, and side effects of the above medications and the patient was agreeable with the plan. Patient was educated on the importance of compliance with treatment and follow-up appointments.  Patient is agreeable to call the office with any worsening of symptoms or onset of  side effects. Patient is agreeable to call 911 or go to the nearest ER should he/she begin having SI/HI.      Counseled patient regarding multimodal approach with healthy nutrition, healthy sleep, regular physical activity, social activities, counseling, and medications.      Coping skills reviewed and encouraged positive framing of thoughts     Assisted patient in processing above session content; acknowledged and normalized patient’s thoughts, feelings, and concerns.  Applied  positive coping skills and behavior management in session.  Allowed patient to freely discuss issues without interruption or judgment. Provided safe, confidential environment to facilitate the development of positive therapeutic relationship and encourage open, honest communication. Assisted patient in identifying risk factors which would indicate the need for higher level of care including thoughts to harm self or others and/or self-harming behavior and encouraged patient to contact this office, call 911, or present to the nearest emergency room should any of these events occur. Discussed crisis intervention services and means to access. If patient has any concerns or needs assistance they were instructed to call the [facility] at [phone].    MEDS ORDERED DURING VISIT:  New Medications Ordered This Visit   Medications   • vilazodone (VIIBRYD) 40 MG tablet tablet     Sig: Take 1 tablet by mouth Daily. Take with food     Dispense:  30 tablet     Refill:  1   • hydrOXYzine (ATARAX) 25 MG tablet     Sig: Take 1 tablet by mouth 3 (Three) Times a Day As Needed for Anxiety.     Dispense:  90 tablet     Refill:  1   • mirtazapine (Remeron) 15 MG tablet     Sig: Take 1 tablet by mouth Every Night for 60 days.     Dispense:  30 tablet     Refill:  1         Follow Up   Return in about 4 weeks (around 6/29/2022) for Video visit.    Patient was given instructions and counseling regarding his condition or for health  maintenance advice. Please see specific information pulled into the AVS if appropriate.         This document has been electronically signed by MARIA DEL CARMEN Arenas  June 1, 2022 15:03 EDT    Part of this note may be an electronic transcription/translation of spoken language to printed text using the Dragon Dictation System.

## 2022-06-07 ENCOUNTER — PREP FOR SURGERY (OUTPATIENT)
Dept: OTHER | Facility: HOSPITAL | Age: 47
End: 2022-06-07

## 2022-06-07 ENCOUNTER — OFFICE VISIT (OUTPATIENT)
Dept: GASTROENTEROLOGY | Facility: CLINIC | Age: 47
End: 2022-06-07

## 2022-06-07 ENCOUNTER — HOSPITAL ENCOUNTER (OUTPATIENT)
Dept: ULTRASOUND IMAGING | Facility: HOSPITAL | Age: 47
Discharge: HOME | End: 2022-06-07
Admitting: PHYSICIAN ASSISTANT

## 2022-06-07 VITALS
HEART RATE: 75 BPM | DIASTOLIC BLOOD PRESSURE: 86 MMHG | WEIGHT: 285.2 LBS | TEMPERATURE: 97.3 F | HEIGHT: 69 IN | OXYGEN SATURATION: 92 % | SYSTOLIC BLOOD PRESSURE: 142 MMHG | BODY MASS INDEX: 42.24 KG/M2

## 2022-06-07 DIAGNOSIS — Z12.11 ENCOUNTER FOR SCREENING FOR MALIGNANT NEOPLASM OF COLON: Primary | ICD-10-CM

## 2022-06-07 DIAGNOSIS — Z80.0 FAMILY HISTORY OF COLON CANCER: Primary | ICD-10-CM

## 2022-06-07 DIAGNOSIS — R74.8 ELEVATED LIVER ENZYMES: ICD-10-CM

## 2022-06-07 DIAGNOSIS — K63.5 POLYP OF COLON, UNSPECIFIED PART OF COLON, UNSPECIFIED TYPE: ICD-10-CM

## 2022-06-07 DIAGNOSIS — K76.0 FATTY LIVER: ICD-10-CM

## 2022-06-07 PROCEDURE — 76981 USE PARENCHYMA: CPT

## 2022-06-07 PROCEDURE — 76705 ECHO EXAM OF ABDOMEN: CPT

## 2022-06-07 PROCEDURE — 99214 OFFICE O/P EST MOD 30 MIN: CPT | Performed by: PHYSICIAN ASSISTANT

## 2022-06-07 RX ORDER — CYCLOBENZAPRINE HCL 5 MG
TABLET ORAL
COMMUNITY
Start: 2022-05-20 | End: 2022-06-27

## 2022-06-07 NOTE — PROGRESS NOTES
Follow Up      Patient Name: Gopi Johnson  : 1975   MRN: [id number]     Chief Complaint:    Chief Complaint   Patient presents with   • Follow-up       History of Present Illness: Gopi Johnson is a 46 y.o. male who is here today for follow up on fatty liver.    Liver US : 1. Echogenic liver parenchyma with grossly normal liver morphology, suggesting fatty liver change. No visible liver lesions. Postcholecystectomy common bile duct dilatation to 15 mm.    Labs : CMP reveals elevated alk phos 194, AST 76, , total bili 0.3, fractionated alk phos normal (liver 63, bone 35). Mitochondrial Ab, SMA, SURYA, CBC, AFP, PT/INR, SPEP normal. Fibrosure reveals F0 fibrosis and S3 steatosis. No Hep B immunity. Total Fe 34, iron sat 9%, TIBC 394.     Patient denies associated fever, chills, abdominal pain, indigestion, nausea, vomiting, diarrhea, constipation, hematemesis, dysphagia, hematochezia, melena, bloating, weight loss or gain, dysuria, jaundice or bruising. He is eager to proceed with upcoming gastric bypass procedure.     CSY  with Dr. Pleitez. Inadequate bowel prep conditions. Internal hemorrhoids. Large amount of stool in entire colon, interferring with visualization. Diminutive polyp in rectum, resected and retrieved. Recommend repeat CSY in 5 months with 2 day prep.     Subjective      Review of Systems:   Review of Systems   Constitutional: Negative for appetite change, chills, diaphoresis, fatigue, fever, unexpected weight gain and unexpected weight loss.   HENT: Negative for drooling, facial swelling, mouth sores, nosebleeds, rhinorrhea, sore throat, swollen glands, tinnitus and trouble swallowing.    Eyes: Negative.    Respiratory: Negative for choking, chest tightness and shortness of breath.    Gastrointestinal: Negative for abdominal pain, anal bleeding, blood in stool, constipation, diarrhea, nausea, vomiting, GERD and indigestion.   Endocrine: Negative.     Genitourinary: Negative for dysuria, flank pain and hematuria.   Musculoskeletal: Negative for arthralgias, back pain, myalgias and neck pain.   Skin: Negative for color change, dry skin, pallor and bruise.   Neurological: Negative for dizziness, tremors, syncope, weakness and numbness.   Psychiatric/Behavioral: Negative for decreased concentration, hallucinations and self-injury. The patient is not nervous/anxious.    All other systems reviewed and are negative.      Medications:     Current Outpatient Medications:   •  Alcohol Swabs pads, Clean area prior to injection, Disp: 100 each, Rfl: 11  •  amLODIPine (NORVASC) 5 MG tablet, Take 5 mg by mouth Daily., Disp: , Rfl:   •  buprenorphine (SUBUTEX) 8 MG sublingual tablet SL tablet, 8 mg., Disp: , Rfl:   •  cetirizine (zyrTEC) 10 MG tablet, Take 1 tablet by mouth Daily., Disp:  , Rfl:   •  chlorthalidone (HYGROTON) 25 MG tablet, Take 25 mg by mouth Daily., Disp: , Rfl:   •  Cholecalciferol (Vitamin D3) 50 MCG (2000 UT) tablet, , Disp: , Rfl:   •  cloNIDine (CATAPRES) 0.1 MG tablet, As Needed., Disp: , Rfl:   •  cyclobenzaprine (FLEXERIL) 10 MG tablet, Take 10 mg by mouth At Night As Needed., Disp: , Rfl:   •  cyclobenzaprine (FLEXERIL) 5 MG tablet, , Disp: , Rfl:   •  Enulose 10 GM/15ML solution solution (encephalopathy), , Disp: , Rfl:   •  fluticasone (FLONASE) 50 MCG/ACT nasal spray, As Needed., Disp: , Rfl:   •  hydrOXYzine (ATARAX) 25 MG tablet, Take 1 tablet by mouth 3 (Three) Times a Day As Needed for Anxiety., Disp: 90 tablet, Rfl: 1  •  lidocaine (LIDODERM) 5 %, Place 1 patch on the skin as directed by provider Daily. Remove & Discard patch within 12 hours or as directed by MD, Disp:  , Rfl:   •  lisinopril (PRINIVIL,ZESTRIL) 30 MG tablet, , Disp: , Rfl:   •  loratadine (CLARITIN) 10 MG tablet, Daily., Disp: , Rfl:   •  mirtazapine (Remeron) 15 MG tablet, Take 1 tablet by mouth Every Night for 60 days., Disp: 30 tablet, Rfl: 1  •  Multiple  "Vitamins-Minerals (MULTI COMPLETE PO), Take  by mouth., Disp: , Rfl:   •  mupirocin (BACTROBAN) 2 % ointment, As Needed., Disp: , Rfl:   •  Needle, Disp, 18G X 1-1/2\" misc, Use for drawing up the medication, Disp: 50 each, Rfl: 3  •  Needle, Disp, 23G X 1-1/2\" misc, 1 Device 1 (One) Time Per Week., Disp: 30 each, Rfl: 11  •  oxybutynin XL (DITROPAN-XL) 10 MG 24 hr tablet, , Disp: , Rfl:   •  pantoprazole (PROTONIX) 40 MG EC tablet, Take 1 tablet by mouth Every Morning., Disp:  , Rfl:   •  Polysacch Fe Cmp-Fe Heme Poly (Feosol Bifera) 28 MG tablet, Take 2 tablets by mouth Every Other Day., Disp: 60 tablet, Rfl: 5  •  potassium chloride (K-DUR,KLOR-CON) 20 MEQ CR tablet, , Disp: , Rfl:   •  Syringe, Disposable, 3 ML misc, 1 syringe 1 (One) Time Per Week., Disp: 100 each, Rfl: 11  •  tamsulosin (FLOMAX) 0.4 MG capsule 24 hr capsule, Take 1 capsule by mouth Daily., Disp: 30 capsule, Rfl: 2  •  Testosterone Cypionate (Depo-Testosterone) 200 MG/ML injection, Inject 1 mL into the appropriate muscle as directed by prescriber Every 14 (Fourteen) Days., Disp: 10 mL, Rfl: 2  •  topiramate (TOPAMAX) 50 MG tablet, , Disp: , Rfl:   •  vilazodone (VIIBRYD) 40 MG tablet tablet, Take 1 tablet by mouth Daily. Take with food, Disp: 30 tablet, Rfl: 1    Allergies:   Allergies   Allergen Reactions   • Naloxone Swelling     Throat swelling, itching, rash   • Hydrocodone-Acetaminophen Itching and Rash     Percocet OK       Social History:   Social History     Socioeconomic History   • Marital status: Single   Tobacco Use   • Smoking status: Former Smoker     Packs/day: 0.50     Years: 30.00     Pack years: 15.00     Types: Cigarettes   • Smokeless tobacco: Never Used   Vaping Use   • Vaping Use: Every day   • Substances: Flavoring   • Devices: Pre-filled or refillable cartridge   Substance and Sexual Activity   • Alcohol use: Not Currently   • Drug use: Not Currently     Types: Methamphetamines, Cocaine(coke), Ketamine, LSD, Marijuana, " MDMA (ecstacy)     Comment: clean since 2016   • Sexual activity: Defer        Surgical History:   Past Surgical History:   Procedure Laterality Date   • BUNIONECTOMY  2007   • COLONOSCOPY  2017   • CYSTOSCOPY TRANSURETHRAL RESECTION OF PROSTATE N/A 5/26/2021    Procedure: TRANSURETHRAL RESECTION OF PROSTATE;  Surgeon: Markel Centeno MD;  Location: [facility] OR;  Service: Urology;  Laterality: N/A;   • ENDOSCOPY N/A 1/5/2022    Procedure: ESOPHAGOGASTRODUODENOSCOPY with biopsy;  Surgeon: Royal Dover MD;  Location: [facility] ENDOSCOPY;  Service: Gastroenterology;  Laterality: N/A;   • INCISIONAL HERNIA REPAIR  2019    inferior to umbilicus w/ mesh - Dr. Dover   • KNEE OPEN LATERAL RELEASE Right 1985   • LAPAROSCOPIC CHOLECYSTECTOMY  2014    sand, no stones   • TONSILLECTOMY AND ADENOIDECTOMY  1980   • WISDOM TOOTH EXTRACTION  1997        Medical History:   Past Medical History:   Diagnosis Date   • Anxiety    • Bipolar affective (HCC)    • Colon polyp    • Depression    • Dyspepsia    • Dyspnea on exertion    • Elevated LFTs     GI eval (P)   • Enlarged prostate     s/p TURP 5/2021   • Family history of malignant hyperthermia     patient had muscle biopsy at [facility] and was negative, sister has malignant hyperthermia and a cousin   • Fatigue    • Fractured pelvis (HCC)    • Frequent urination    • GERD (gastroesophageal reflux disease)     on PPI, hx erosive gastritis on EGD 1/5/22   • H/O: substance abuse (HCC)     clean since 2016, on Subutex, managed by PCP   • Headache     following w/ Neurology   • Hx MRSA infection 2020    w/ osteomyelitis    • Hyperammonemia (HCC)     following w/ GI   • Hypertension    • Hypogonadism in male     on testosterone q2wks, follows w/ Urology   • Hypokalemia    • Impaired mobility     w/ (L)sided weakness (since osteomyelitis), ambulates w/ cane   • Insomnia     on Trazodone   • Joint pain     w/ recent SI joint injections 2/2022   • Lower extremity edema    • Migraine  "   • Morbid obesity (HCC)    • Murmur    • Myoclonus     w/ body jerks, follows w/ Neurology   • Osteomyelitis (HCC) 2020    H/O spinal osteomyelitis, previous IVDA   • Piercing     ear/body   • PUD (peptic ulcer disease)     non-bleeding gastric ulcers on EGD 1/5/22 w/ Dr. Dover   • Seasonal allergies    • Sleep apnea     CPAP compliant   • Tattoo    • Tobacco use     trying to quit, currently vaping (0%) - mom does smoke in the home        Objective     Physical Exam:  Vital Signs:   Vitals:    06/07/22 0925   BP: 142/86   BP Location: Left arm   Patient Position: Sitting   Cuff Size: Adult   Pulse: 75   Temp: 97.3 °F (36.3 °C)   TempSrc: Temporal   SpO2: 92%   Weight: 129 kg (285 lb 3.2 oz)   Height: 174 cm (68.5\")     Body mass index is 42.73 kg/m².     Physical Exam  Vitals and nursing note reviewed.   Constitutional:       General: He is not in acute distress.     Appearance: Normal appearance. He is obese. He is not ill-appearing or diaphoretic.   HENT:      Head: Normocephalic and atraumatic.      Right Ear: External ear normal.      Left Ear: External ear normal.      Nose: Nose normal. No rhinorrhea.      Mouth/Throat:      Mouth: Mucous membranes are moist.      Pharynx: Oropharynx is clear. No posterior oropharyngeal erythema.   Eyes:      General:         Right eye: No discharge.         Left eye: No discharge.      Conjunctiva/sclera: Conjunctivae normal.      Pupils: Pupils are equal, round, and reactive to light.   Cardiovascular:      Rate and Rhythm: Normal rate and regular rhythm.      Pulses: Normal pulses.      Heart sounds: No murmur heard.    No friction rub.   Pulmonary:      Effort: Pulmonary effort is normal. No respiratory distress.      Breath sounds: Normal breath sounds. No wheezing.   Abdominal:      General: Abdomen is flat. There is no distension.      Tenderness: There is no abdominal tenderness. There is no guarding or rebound.   Musculoskeletal:         General: Normal range of " motion.      Cervical back: Normal range of motion and neck supple. No rigidity.   Skin:     General: Skin is warm and dry.      Capillary Refill: Capillary refill takes less than 2 seconds.      Coloration: Skin is not jaundiced or pale.   Neurological:      General: No focal deficit present.      Mental Status: He is alert and oriented to person, place, and time. Mental status is at baseline.   Psychiatric:         Mood and Affect: Mood normal.         Thought Content: Thought content normal.         Judgment: Judgment normal.         Assessment / Plan      Assessment/Plan:   There are no diagnoses linked to this encounter.     Fatty liver with elevated LFTs  Personal history of colon polyps  FHx colon cancer   - previous labs and imaging reviewed; findings consistent with elevated LFTs 2/2 fatty liver and obesity   - recommend liver bx at time of bariatric procedure   - schedule CSY for September 2022 with two day prep    - follow up in clinic after completion of above studies   - call clinic at any time for questions or new / worsened sx    Follow Up:   Return in about 4 months (around 10/7/2022).    Plan of care reviewed with the patient at the conclusion of today's visit.  Education was provided regarding diagnosis, management, and any prescribed or recommended OTC medications.  Patient verbalized understanding of and agreement with management plan.     Time Statement:   Discussed plan of care in detail with patient today. Patient verbally understands and agrees. I have spent 30 minutes reviewing available diagnostics, obtaining history, examining the patient, developing a treatment plan, and educating the patient on disease process and plan of care.     Coral Mahoney PA-C   [facility]

## 2022-06-08 NOTE — PROGRESS NOTES
Please let Gopi know that his liver US reveals fatty liver, without evidence of cirrhosis. No liver masses or other abnormalities.    Please send our medical clearance (from GI standpoint) to Dr. Conklin for his upcoming bariatric surgery, and recommend obtaining liver biopsy at time of procedure.    Thank you!  Coral Mahoney PA-C

## 2022-06-09 NOTE — PROGRESS NOTES
UNABLE TO REACH PATIENT SENT RESULTS TO [facility], AND SENT RESULTS TO BARIATRIC SURGERY AND LAST NOTES

## 2022-06-20 PROBLEM — Z12.11 ENCOUNTER FOR SCREENING FOR MALIGNANT NEOPLASM OF COLON: Status: ACTIVE | Noted: 2022-06-20

## 2022-06-27 ENCOUNTER — APPOINTMENT (OUTPATIENT)
Dept: PREADMISSION TESTING | Facility: HOSPITAL | Age: 47
End: 2022-06-27

## 2022-06-27 ENCOUNTER — OFFICE VISIT (OUTPATIENT)
Dept: UROLOGY | Facility: CLINIC | Age: 47
End: 2022-06-27

## 2022-06-27 VITALS
HEIGHT: 69 IN | DIASTOLIC BLOOD PRESSURE: 78 MMHG | SYSTOLIC BLOOD PRESSURE: 142 MMHG | BODY MASS INDEX: 42.36 KG/M2 | HEART RATE: 95 BPM | WEIGHT: 286 LBS | OXYGEN SATURATION: 100 %

## 2022-06-27 DIAGNOSIS — Z72.0 TOBACCO USE: Primary | ICD-10-CM

## 2022-06-27 DIAGNOSIS — R39.198 DIFFICULTY URINATING: Primary | ICD-10-CM

## 2022-06-27 DIAGNOSIS — R33.9 URINARY RETENTION WITH INCOMPLETE BLADDER EMPTYING: ICD-10-CM

## 2022-06-27 DIAGNOSIS — E34.9 TESTOSTERONE DEFICIENCY: ICD-10-CM

## 2022-06-27 DIAGNOSIS — E29.1 HYPOGONADISM IN MALE: ICD-10-CM

## 2022-06-27 DIAGNOSIS — R39.9 LOWER URINARY TRACT SYMPTOMS (LUTS): ICD-10-CM

## 2022-06-27 LAB
BILIRUB BLD-MCNC: NEGATIVE MG/DL
CLARITY, POC: CLEAR
COLOR UR: YELLOW
EXPIRATION DATE: NORMAL
GLUCOSE UR STRIP-MCNC: NEGATIVE MG/DL
KETONES UR QL: NEGATIVE
LEUKOCYTE EST, POC: NEGATIVE
Lab: NORMAL
NITRITE UR-MCNC: NEGATIVE MG/ML
PH UR: 6 [PH] (ref 5–8)
PROT UR STRIP-MCNC: NEGATIVE MG/DL
RBC # UR STRIP: NEGATIVE /UL
SP GR UR: 1.01 (ref 1–1.03)
UROBILINOGEN UR QL: NORMAL

## 2022-06-27 PROCEDURE — 99214 OFFICE O/P EST MOD 30 MIN: CPT | Performed by: STUDENT IN AN ORGANIZED HEALTH CARE EDUCATION/TRAINING PROGRAM

## 2022-06-27 RX ORDER — TESTOSTERONE CYPIONATE 200 MG/ML
200 INJECTION, SOLUTION INTRAMUSCULAR
Qty: 10 ML | Refills: 2 | Status: SHIPPED | OUTPATIENT
Start: 2022-06-27 | End: 2022-07-18

## 2022-06-27 RX ORDER — TAMSULOSIN HYDROCHLORIDE 0.4 MG/1
1 CAPSULE ORAL DAILY
Qty: 30 CAPSULE | Refills: 2 | Status: SHIPPED | OUTPATIENT
Start: 2022-06-27 | End: 2023-02-22
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
## 2022-06-29 ENCOUNTER — TELEMEDICINE (OUTPATIENT)
Dept: PSYCHIATRY | Facility: CLINIC | Age: 47
End: 2022-06-29

## 2022-06-29 DIAGNOSIS — F41.1 GENERALIZED ANXIETY DISORDER: Primary | ICD-10-CM

## 2022-06-29 PROCEDURE — 90791 PSYCH DIAGNOSTIC EVALUATION: CPT | Performed by: COUNSELOR
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
## 2022-07-06 ENCOUNTER — TELEMEDICINE (OUTPATIENT)
Dept: PSYCHIATRY | Facility: CLINIC | Age: 47
End: 2022-07-06

## 2022-07-06 DIAGNOSIS — F33.0 MILD EPISODE OF RECURRENT MAJOR DEPRESSIVE DISORDER: Chronic | ICD-10-CM

## 2022-07-06 DIAGNOSIS — F41.1 GENERALIZED ANXIETY DISORDER: Chronic | ICD-10-CM

## 2022-07-06 PROCEDURE — 99214 OFFICE O/P EST MOD 30 MIN: CPT

## 2022-07-06 RX ORDER — VILAZODONE HYDROCHLORIDE 40 MG/1
40 TABLET ORAL DAILY
Qty: 30 TABLET | Refills: 0 | Status: SHIPPED | OUTPATIENT
Start: 2022-07-06 | End: 2022-08-03 | Stop reason: SDUPTHER

## 2022-07-06 RX ORDER — HYDROXYZINE HYDROCHLORIDE 25 MG/1
25 TABLET, FILM COATED ORAL 3 TIMES DAILY PRN
Qty: 90 TABLET | Refills: 0 | Status: SHIPPED | OUTPATIENT
Start: 2022-07-06 | End: 2022-08-03 | Stop reason: SDUPTHER

## 2022-07-06 RX ORDER — MIRTAZAPINE 30 MG/1
30 TABLET, FILM COATED ORAL NIGHTLY
Qty: 30 TABLET | Refills: 0 | Status: SHIPPED | OUTPATIENT
Start: 2022-07-06 | End: 2022-08-03 | Stop reason: SDUPTHER

## 2022-07-06 NOTE — PROGRESS NOTES
This provider is located at [place], [place]. The Patient is seen remotely using Video. Patient is being seen via telehealth and confirm that they are in a secure environment for this session. Patient is located in [place], [place] at his home. The patient's condition being diagnosed/treated is appropriate for telemedicine. Provider identified as Cruz Calvillo as well as credentials APRN MSN PMHNP-BC.   The client/patient gave consent to be seen remotely, and when consent is given they understand that the consent allows for patient identifiable information to be sent to a third party as needed.  They may refuse to be seen remotely at any time. The electronic data is encrypted and password protected, and the patient has been advised of the potential risks to privacy not withstanding such measures.    Chief Complaint  Depression and Anxiety    Subjective        Gopi Johnson presents to [facility] for   History of Present Illness  Patient seen today for a follow-up visit for depression and anxiety.  Patient reports he is doing well.  Patient praising Viibryd and how it is working good for him.  He currently rates his depression a 2-3 on a 1-10 scale with 10 being the worst.  States that he has been more upbeat and has more energy.  Denies any suicidal or homicidal ideation.  Rates his anxiety a 4-5 on a 1-10 scale with 10 being the worst.  He states some of this is situational stressors.  States they are having to remodel the home and also his disability hearing is at the latter part of July.  He states he is hopeful they will ruled in his favor and that will take a big burden off of him.  States overall his anxiety is much better.  States he has some worry and over thinking about finances.  Denies any manic type symptoms.  Denies any paranoia.  Denies any auditory or visual hallucinations.  Denies any side effects to the medication.  Patient does state that he would like to  increase the Remeron to 30 mg as he took that dose a few nights and it was very helpful in helping him sleep throughout the night.  States his sleep and appetite are good.    Objective   Vital Signs:   There were no vitals taken for this visit.      PHQ-9 Score:   PHQ-9 Total Score: (P) 10     Mental Status Exam:   Hygiene:   good  Cooperation:  Cooperative  Eye Contact:  Good  Psychomotor Behavior:  Appropriate  Affect:  Full range  Mood: anxious  Speech:  Normal  Thought Process:  Goal directed  Thought Content:  Normal  Suicidal:  None  Homicidal:  None  Hallucinations:  None  Delusion:  None  Memory:  Intact  Orientation:  Person, Place, Time and Situation  Reliability:  good  Insight:  Good  Judgement:  Good  Impulse Control:  Good  Physical/Medical Issues:  No      PHQ-9 Depression Screening  Little interest or pleasure in doing things? (P) 1   Feeling down, depressed, or hopeless? (P) 2   Trouble falling or staying asleep, or sleeping too much? (P) 1   Feeling tired or having little energy? (P) 1   Poor appetite or overeating? (P) 2   Feeling bad about yourself - or that you are a failure or have let yourself or your family down? (P) 1   Trouble concentrating on things, such as reading the newspaper or watching television? (P) 1   Moving or speaking so slowly that other people could have noticed? Or the opposite - being so fidgety or restless that you have been moving around a lot more than usual? (P) 1   Thoughts that you would be better off dead, or of hurting yourself in some way? (P) 0   PHQ-9 Total Score (P) 10   If you checked off any problems, how difficult have these problems made it for you to do your work, take care of things at home, or get along with other people? (P) Extremely dIfficult     PHQ-9 Total Score: (P) 10    JESSICA 7 anxiety screening tool that patient filled out virtually reviewed by this APRN at today's encounter.    PROMIS scale screening tool that patient filled out virtually reviewed  "by this APRN at today's encounter.    Previous Provider notes and available records reviewed by this APRN today.   Current Medications:   Current Outpatient Medications   Medication Sig Dispense Refill   • Alcohol Swabs pads Clean area prior to injection 100 each 11   • amLODIPine (NORVASC) 5 MG tablet Take 5 mg by mouth Daily.     • buprenorphine (SUBUTEX) 8 MG sublingual tablet SL tablet 8 mg.     • cetirizine (zyrTEC) 10 MG tablet Take 1 tablet by mouth Daily.     • chlorthalidone (HYGROTON) 25 MG tablet Take 25 mg by mouth Daily.     • Cholecalciferol (Vitamin D3) 50 MCG (2000 UT) tablet      • cloNIDine (CATAPRES) 0.1 MG tablet As Needed.     • cyclobenzaprine (FLEXERIL) 10 MG tablet Take 10 mg by mouth At Night As Needed.     • Enulose 10 GM/15ML solution solution (encephalopathy)      • fluticasone (FLONASE) 50 MCG/ACT nasal spray As Needed.     • hydrOXYzine (ATARAX) 25 MG tablet Take 1 tablet by mouth 3 (Three) Times a Day As Needed for Anxiety. 90 tablet 0   • lidocaine (LIDODERM) 5 % Place 1 patch on the skin as directed by provider Daily. Remove & Discard patch within 12 hours or as directed by MD     • lisinopril (PRINIVIL,ZESTRIL) 30 MG tablet      • loratadine (CLARITIN) 10 MG tablet Daily.     • mirtazapine (Remeron) 30 MG tablet Take 1 tablet by mouth Every Night for 30 days. 30 tablet 0   • Multiple Vitamins-Minerals (MULTI COMPLETE PO) Take  by mouth.     • mupirocin (BACTROBAN) 2 % ointment As Needed.     • Needle, Disp, 18G X 1-1/2\" misc Use for drawing up the medication 50 each 3   • Needle, Disp, 23G X 1-1/2\" misc 1 Device 1 (One) Time Per Week. 30 each 11   • oxybutynin XL (DITROPAN-XL) 10 MG 24 hr tablet      • pantoprazole (PROTONIX) 40 MG EC tablet Take 1 tablet by mouth Every Morning.     • Polysacch Fe Cmp-Fe Heme Poly (Feosol Bifera) 28 MG tablet Take 2 tablets by mouth Every Other Day. 60 tablet 5   • potassium chloride (K-DUR,KLOR-CON) 20 MEQ CR tablet      • Syringe, Disposable, 3 ML " misc 1 syringe 1 (One) Time Per Week. 100 each 11   • tamsulosin (FLOMAX) 0.4 MG capsule 24 hr capsule Take 1 capsule by mouth Daily. 30 capsule 2   • Testosterone Cypionate (Depo-Testosterone) 200 MG/ML injection Inject 1 mL into the appropriate muscle as directed by prescriber Every 14 (Fourteen) Days. 10 mL 2   • topiramate (TOPAMAX) 50 MG tablet      • vilazodone (VIIBRYD) 40 MG tablet tablet Take 1 tablet by mouth Daily. Take with food 30 tablet 0     No current facility-administered medications for this visit.       Physical Exam   Result Review :    The following data was reviewed by: MARIA DEL CARMEN Arenas on 07/06/2022:  Common labs    Common Labsle 2/24/22 2/24/22 2/24/22 2/24/22 3/9/22 4/26/22 4/26/22 4/26/22 4/26/22    1553 1553 1553 1553  1436 1436 1436 1436   Glucose  98      91    BUN  8      9    Creatinine  0.96      0.98    eGFR Non  Am  94          eGFR  Am  109          Sodium  139      138    Potassium  3.4 (A)      3.7    Chloride  94 (A)      99    Calcium  9.1      9.1    Total Protein  7.1      6.8    Albumin  4.5    3.7  4.4    Total Bilirubin  0.2      0.3    Alkaline Phosphatase  175 (A)      194 (A)    AST (SGOT)  32      76 (A)    ALT (SGPT)  52 (A)      104 (A)    WBC 14.2 (A)    8.66    8.7   Hemoglobin 13.9    12.7 (A)    13.1   Hematocrit 42.3    40.1    41.1   Platelets 311    291    318   Total Cholesterol    204 (A)        Triglycerides    183 (A)   360 (A)     HDL Cholesterol    36 (A)        LDL Cholesterol     135 (A)        Hemoglobin A1C   6.6 (A)         (A) Abnormal value       Comments are available for some flowsheets but are not being displayed.              Assessment and Plan   Problem List Items Addressed This Visit        Mental Health    Mild episode of recurrent major depressive disorder (HCC)    Relevant Medications    hydrOXYzine (ATARAX) 25 MG tablet    mirtazapine (Remeron) 30 MG tablet    vilazodone (VIIBRYD) 40 MG tablet tablet    Generalized  anxiety disorder    Relevant Medications    hydrOXYzine (ATARAX) 25 MG tablet    mirtazapine (Remeron) 30 MG tablet    vilazodone (VIIBRYD) 40 MG tablet tablet        Discussed treatment options with patient.  Patient is pleased with the Viibryd.  Continue Viibryd 40 mg daily for depression and anxiety.  Patient states Remeron 30 mg nightly worked better for him, so we will increase to that dose for depression, anxiety, and sleep.  Continue hydroxyzine 25 mg 3 times daily as needed for anxiety.  Wished patient well with his disability hearing.  We will see patient the week after the hearing to reassess.  Patient did state he had his first visit with Peg OWENS [facility] and that went well.  States he looks forward to working with her.    TREATMENT PLAN/GOALS: Continue supportive psychotherapy efforts and medications as indicated. Treatment and medication options discussed during today's visit. Patient ackowledged and verbally consented to continue with current treatment plan and was educated on the importance of compliance with treatment and follow-up appointments.    DEPRESSION:  Patient screened positive for depression based on a PHQ-9 score of  on . Follow-up recommendations include: Prescribed antidepressant medication treatment and Referral to Social Work.       MEDICATION ISSUES:  We discussed risks, benefits, and side effects of the above medications and the patient was agreeable with the plan. Patient was educated on the importance of compliance with treatment and follow-up appointments.  Patient is agreeable to call the office with any worsening of symptoms or onset of side effects. Patient is agreeable to call 911 or go to the nearest ER should he/she begin having SI/HI.      Counseled patient regarding multimodal approach with healthy nutrition, healthy sleep, regular physical activity, social activities, counseling, and medications.      Coping skills reviewed and encouraged positive framing of thoughts      Assisted patient in processing above session content; acknowledged and normalized patient’s thoughts, feelings, and concerns.  Applied  positive coping skills and behavior management in session.  Allowed patient to freely discuss issues without interruption or judgment. Provided safe, confidential environment to facilitate the development of positive therapeutic relationship and encourage open, honest communication. Assisted patient in identifying risk factors which would indicate the need for higher level of care including thoughts to harm self or others and/or self-harming behavior and encouraged patient to contact this office, call 911, or present to the nearest emergency room should any of these events occur. Discussed crisis intervention services and means to access. If patient has any concerns or needs assistance they were instructed to call the [facility] at [phone].    MEDS ORDERED DURING VISIT:  New Medications Ordered This Visit   Medications   • hydrOXYzine (ATARAX) 25 MG tablet     Sig: Take 1 tablet by mouth 3 (Three) Times a Day As Needed for Anxiety.     Dispense:  90 tablet     Refill:  0   • mirtazapine (Remeron) 30 MG tablet     Sig: Take 1 tablet by mouth Every Night for 30 days.     Dispense:  30 tablet     Refill:  0   • vilazodone (VIIBRYD) 40 MG tablet tablet     Sig: Take 1 tablet by mouth Daily. Take with food     Dispense:  30 tablet     Refill:  0         Follow Up   Return in about 4 weeks (around 8/3/2022) for Video visit.    Patient was given instructions and counseling regarding his condition or for health maintenance advice. Please see specific information pulled into the AVS if appropriate.         This document has been electronically signed by MARIA DEL CARMEN Arenas  July 6, 2022 13:57 EDT    Part of this note may be an electronic transcription/translation of spoken language to printed text using the Dragon Dictation System.

## 2022-07-15 ENCOUNTER — LAB (OUTPATIENT)
Dept: LAB | Facility: HOSPITAL | Age: 47
End: 2022-07-15

## 2022-07-15 DIAGNOSIS — Z72.0 TOBACCO USE: ICD-10-CM

## 2022-07-15 DIAGNOSIS — Z12.11 ENCOUNTER FOR SCREENING COLONOSCOPY: Primary | ICD-10-CM

## 2022-07-15 PROCEDURE — G0480 DRUG TEST DEF 1-7 CLASSES: HCPCS

## 2022-07-15 PROCEDURE — 84403 ASSAY OF TOTAL TESTOSTERONE: CPT | Performed by: UROLOGY

## 2022-07-15 PROCEDURE — 36415 COLL VENOUS BLD VENIPUNCTURE: CPT

## 2022-07-18 DIAGNOSIS — E29.1 HYPOGONADISM IN MALE: ICD-10-CM

## 2022-07-18 RX ORDER — TESTOSTERONE CYPIONATE 200 MG/ML
300 INJECTION, SOLUTION INTRAMUSCULAR
Qty: 10 ML | Refills: 2 | Status: SHIPPED | OUTPATIENT
Start: 2022-07-18

## 2022-07-23 LAB
COTININE SERPL-MCNC: 10.2 NG/ML
NICOTINE SERPL-MCNC: <1 NG/ML

## 2022-07-26 ENCOUNTER — TELEPHONE (OUTPATIENT)
Dept: GASTROENTEROLOGY | Facility: CLINIC | Age: 47
End: 2022-07-26

## 2022-07-26 NOTE — TELEPHONE ENCOUNTER
----- Message from Flakita Salazar Rep sent at 7/26/2022  8:17 AM EDT -----  PT WOULD LIKE TO HAVE HIS COLON FOR 8/2 CANCELED AND HE WILL CALL BACK TO RESCHEDULE AT A LATER TIME.

## 2022-07-27 ENCOUNTER — APPOINTMENT (OUTPATIENT)
Dept: PREADMISSION TESTING | Facility: HOSPITAL | Age: 47
End: 2022-07-27

## 2022-08-03 ENCOUNTER — TELEMEDICINE (OUTPATIENT)
Dept: PSYCHIATRY | Facility: CLINIC | Age: 47
End: 2022-08-03

## 2022-08-03 DIAGNOSIS — F41.1 GENERALIZED ANXIETY DISORDER: Chronic | ICD-10-CM

## 2022-08-03 DIAGNOSIS — F33.0 MILD EPISODE OF RECURRENT MAJOR DEPRESSIVE DISORDER: Chronic | ICD-10-CM

## 2022-08-03 PROCEDURE — 99214 OFFICE O/P EST MOD 30 MIN: CPT

## 2022-08-03 RX ORDER — HYDROXYZINE HYDROCHLORIDE 25 MG/1
25 TABLET, FILM COATED ORAL 3 TIMES DAILY PRN
Qty: 90 TABLET | Refills: 0 | Status: SHIPPED | OUTPATIENT
Start: 2022-08-03

## 2022-08-03 RX ORDER — MIRTAZAPINE 30 MG/1
30 TABLET, FILM COATED ORAL NIGHTLY
Qty: 30 TABLET | Refills: 0 | Status: SHIPPED | OUTPATIENT
Start: 2022-08-03 | End: 2022-10-03

## 2022-08-03 RX ORDER — VILAZODONE HYDROCHLORIDE 40 MG/1
40 TABLET ORAL DAILY
Qty: 30 TABLET | Refills: 0 | Status: SHIPPED | OUTPATIENT
Start: 2022-08-03 | End: 2022-10-12 | Stop reason: SDUPTHER

## 2022-08-03 NOTE — PROGRESS NOTES
This provider is located at [place], [place]. The Patient is seen remotely using Video. Patient is being seen via telehealth and confirm that they are in a secure environment for this session. Patient is located in [place], [place] at his home. The patient's condition being diagnosed/treated is appropriate for telemedicine. Provider identified as Crzu Calvillo as well as credentials APRN MSN PMHNP-BC.   The client/patient gave consent to be seen remotely, and when consent is given they understand that the consent allows for patient identifiable information to be sent to a third party as needed.  They may refuse to be seen remotely at any time. The electronic data is encrypted and password protected, and the patient has been advised of the potential risks to privacy not withstanding such measures.    Chief Complaint  Depression and Anxiety    Subjective        Gopi Johnson presents to [facility] for   History of Present Illness  Patient is seen today for follow-up visit for depression and anxiety.  Patient states he did get approved for his disability and will receive back pay from November 2019.  He states this is a big relief for him and has taken a lot of stress off of him.  He states his blood pressure was even normal when he saw his doctor for the first time in years.  Currently rates his depression a 5-6 on a 1-10 scale with 10 being the worst.  Denies any hopelessness.  Denies any sadness.  Denies any suicidal or homicidal ideation.  States his sleep and appetite are good.  Rates his anxiety a 3-4 on a 1-10 scale with 10 being the worst.  Denies any panic.  States he takes hydroxyzine on occasion.  Denies any manic type symptoms.  Denies any paranoia.  Denies any auditory or visual hallucinations.  Patient states he is currently pleased with his medications.  Denies any side effects to the medications.  Objective   Vital Signs:   There were no vitals taken for this  visit.      Mental Status Exam:   Hygiene:   good  Cooperation:  Cooperative  Eye Contact:  Good  Psychomotor Behavior:  Appropriate  Affect:  Full range  Mood: depressed and anxious  Speech:  Normal  Thought Process:  Goal directed  Thought Content:  Normal  Suicidal:  None  Homicidal:  None  Hallucinations:  None  Delusion:  None  Memory:  Intact  Orientation:  Person, Place, Time and Situation  Reliability:  good  Insight:  Good  Judgement:  Good  Impulse Control:  Good  Physical/Medical Issues:  No      PHQ-9 Depression Screening  Little interest or pleasure in doing things? 1-->several days   Feeling down, depressed, or hopeless? 1-->several days   Trouble falling or staying asleep, or sleeping too much? 0-->not at all   Feeling tired or having little energy? 1-->several days   Poor appetite or overeating? 1-->several days   Feeling bad about yourself - or that you are a failure or have let yourself or your family down? 1-->several days   Trouble concentrating on things, such as reading the newspaper or watching television? 1-->several days   Moving or speaking so slowly that other people could have noticed? Or the opposite - being so fidgety or restless that you have been moving around a lot more than usual? 0-->not at all   Thoughts that you would be better off dead, or of hurting yourself in some way? 0-->not at all   PHQ-9 Total Score 6   If you checked off any problems, how difficult have these problems made it for you to do your work, take care of things at home, or get along with other people? not difficult at all     PHQ-9 Total Score: 6  JESSICA 7 anxiety screening tool that patient filled out virtually reviewed by this APRN at today's encounter.    PROMIS scale screening tool that patient filled out virtually reviewed by this APRN at today's encounter.    Previous Provider notes and available records reviewed by this APRN today.   Current Medications:   Current Outpatient Medications   Medication Sig  "Dispense Refill   • Alcohol Swabs pads Clean area prior to injection 100 each 11   • amLODIPine (NORVASC) 5 MG tablet Take 5 mg by mouth Daily.     • buprenorphine (SUBUTEX) 8 MG sublingual tablet SL tablet 8 mg.     • cetirizine (zyrTEC) 10 MG tablet Take 1 tablet by mouth Daily.     • chlorthalidone (HYGROTON) 25 MG tablet Take 25 mg by mouth Daily.     • Cholecalciferol (Vitamin D3) 50 MCG (2000 UT) tablet      • cloNIDine (CATAPRES) 0.1 MG tablet As Needed.     • cyclobenzaprine (FLEXERIL) 10 MG tablet Take 10 mg by mouth At Night As Needed.     • Enulose 10 GM/15ML solution solution (encephalopathy)      • fluticasone (FLONASE) 50 MCG/ACT nasal spray As Needed.     • hydrOXYzine (ATARAX) 25 MG tablet Take 1 tablet by mouth 3 (Three) Times a Day As Needed for Anxiety. 90 tablet 0   • lidocaine (LIDODERM) 5 % Place 1 patch on the skin as directed by provider Daily. Remove & Discard patch within 12 hours or as directed by MD     • lisinopril (PRINIVIL,ZESTRIL) 30 MG tablet      • loratadine (CLARITIN) 10 MG tablet Daily.     • mirtazapine (Remeron) 30 MG tablet Take 1 tablet by mouth Every Night for 30 days. 30 tablet 0   • Multiple Vitamins-Minerals (MULTI COMPLETE PO) Take  by mouth.     • mupirocin (BACTROBAN) 2 % ointment As Needed.     • Needle, Disp, 18G X 1-1/2\" misc Use for drawing up the medication 50 each 3   • Needle, Disp, 23G X 1-1/2\" misc 1 Device 1 (One) Time Per Week. 30 each 11   • oxybutynin XL (DITROPAN-XL) 10 MG 24 hr tablet      • pantoprazole (PROTONIX) 40 MG EC tablet Take 1 tablet by mouth Every Morning.     • Polysacch Fe Cmp-Fe Heme Poly (Feosol Bifera) 28 MG tablet Take 2 tablets by mouth Every Other Day. 60 tablet 5   • potassium chloride (K-DUR,KLOR-CON) 20 MEQ CR tablet      • Sod Picosulfate-Mag Ox-Cit Acd 10-3.5-12 MG-GM -GM/160ML solution Take 160 mL by mouth Take As Directed for 2 doses. 320 mL 0   • Syringe, Disposable, 3 ML misc 1 syringe 1 (One) Time Per Week. 100 each 11   • " tamsulosin (FLOMAX) 0.4 MG capsule 24 hr capsule Take 1 capsule by mouth Daily. 30 capsule 2   • Testosterone Cypionate (Depo-Testosterone) 200 MG/ML injection Inject 1.5 mL into the appropriate muscle as directed by prescriber Every 14 (Fourteen) Days. 10 mL 2   • topiramate (TOPAMAX) 50 MG tablet      • vilazodone (VIIBRYD) 40 MG tablet tablet Take 1 tablet by mouth Daily. Take with food 30 tablet 0     No current facility-administered medications for this visit.       Physical Exam   Result Review :    The following data was reviewed by: MARIA DEL CARMEN Arenas on 08/03/2022:  Common labs    Common Labsle 2/24/22 2/24/22 2/24/22 2/24/22 3/9/22 4/26/22 4/26/22 4/26/22 4/26/22    1553 1553 1553 1553  1436 1436 1436 1436   Glucose  98      91    BUN  8      9    Creatinine  0.96      0.98    eGFR Non  Am  94          eGFR  Am  109          Sodium  139      138    Potassium  3.4 (A)      3.7    Chloride  94 (A)      99    Calcium  9.1      9.1    Total Protein  7.1      6.8    Albumin  4.5    3.7  4.4    Total Bilirubin  0.2      0.3    Alkaline Phosphatase  175 (A)      194 (A)    AST (SGOT)  32      76 (A)    ALT (SGPT)  52 (A)      104 (A)    WBC 14.2 (A)    8.66    8.7   Hemoglobin 13.9    12.7 (A)    13.1   Hematocrit 42.3    40.1    41.1   Platelets 311    291    318   Total Cholesterol    204 (A)        Triglycerides    183 (A)   360 (A)     HDL Cholesterol    36 (A)        LDL Cholesterol     135 (A)        Hemoglobin A1C   6.6 (A)         (A) Abnormal value       Comments are available for some flowsheets but are not being displayed.              Assessment and Plan   Problem List Items Addressed This Visit        Mental Health    Mild episode of recurrent major depressive disorder (HCC)    Relevant Medications    mirtazapine (Remeron) 30 MG tablet    hydrOXYzine (ATARAX) 25 MG tablet    vilazodone (VIIBRYD) 40 MG tablet tablet    Generalized anxiety disorder    Relevant Medications     mirtazapine (Remeron) 30 MG tablet    hydrOXYzine (ATARAX) 25 MG tablet    vilazodone (VIIBRYD) 40 MG tablet tablet        Discussed treatment options with patient.  Patient states he is well pleased with his current medications and would like to continue those.  Continue Remeron 30 mg nightly for sleep.  Continue hydroxyzine 25 mg 3 times daily as needed for anxiety.  Continue Viibryd 40 mg daily for depression and anxiety.  Patient to continue seeing Peg OWENS [facility] for therapy.  We will see patient again in 4 weeks to reassess.    TREATMENT PLAN/GOALS: Continue supportive psychotherapy efforts and medications as indicated. Treatment and medication options discussed during today's visit. Patient ackowledged and verbally consented to continue with current treatment plan and was educated on the importance of compliance with treatment and follow-up appointments.    DEPRESSION:  Patient screened positive for depression based on a PHQ-9 score of 6 on 8/3/2022. Follow-up recommendations include: Prescribed antidepressant medication treatment and Referral to Social Work.       MEDICATION ISSUES:  We discussed risks, benefits, and side effects of the above medications and the patient was agreeable with the plan. Patient was educated on the importance of compliance with treatment and follow-up appointments.  Patient is agreeable to call the office with any worsening of symptoms or onset of side effects. Patient is agreeable to call 911 or go to the nearest ER should he/she begin having SI/HI.      Counseled patient regarding multimodal approach with healthy nutrition, healthy sleep, regular physical activity, social activities, counseling, and medications.      Coping skills reviewed and encouraged positive framing of thoughts     Assisted patient in processing above session content; acknowledged and normalized patient’s thoughts, feelings, and concerns.  Applied  positive coping skills and behavior management in  session.  Allowed patient to freely discuss issues without interruption or judgment. Provided safe, confidential environment to facilitate the development of positive therapeutic relationship and encourage open, honest communication. Assisted patient in identifying risk factors which would indicate the need for higher level of care including thoughts to harm self or others and/or self-harming behavior and encouraged patient to contact this office, call 911, or present to the nearest emergency room should any of these events occur. Discussed crisis intervention services and means to access. If patient has any concerns or needs assistance they were instructed to call the [facility] at [phone].    MEDS ORDERED DURING VISIT:  New Medications Ordered This Visit   Medications   • mirtazapine (Remeron) 30 MG tablet     Sig: Take 1 tablet by mouth Every Night for 30 days.     Dispense:  30 tablet     Refill:  0   • hydrOXYzine (ATARAX) 25 MG tablet     Sig: Take 1 tablet by mouth 3 (Three) Times a Day As Needed for Anxiety.     Dispense:  90 tablet     Refill:  0   • vilazodone (VIIBRYD) 40 MG tablet tablet     Sig: Take 1 tablet by mouth Daily. Take with food     Dispense:  30 tablet     Refill:  0         Follow Up   Return in about 4 weeks (around 8/31/2022) for Video visit.    Patient was given instructions and counseling regarding his condition or for health maintenance advice. Please see specific information pulled into the AVS if appropriate.         This document has been electronically signed by MARIA DEL CARMEN Arenas  August 3, 2022 14:28 EDT    Part of this note may be an electronic transcription/translation of spoken language to printed text using the Dragon Dictation System.

## 2022-09-06 ENCOUNTER — TELEMEDICINE (OUTPATIENT)
Dept: PSYCHIATRY | Facility: CLINIC | Age: 47
End: 2022-09-06

## 2022-09-06 DIAGNOSIS — F43.10 POST TRAUMATIC STRESS DISORDER (PTSD): Primary | ICD-10-CM

## 2022-09-06 PROCEDURE — 90837 PSYTX W PT 60 MINUTES: CPT | Performed by: COUNSELOR
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
## 2022-09-30 DIAGNOSIS — F33.0 MILD EPISODE OF RECURRENT MAJOR DEPRESSIVE DISORDER: Chronic | ICD-10-CM

## 2022-09-30 DIAGNOSIS — R53.83 FATIGUE, UNSPECIFIED TYPE: Primary | ICD-10-CM

## 2022-09-30 DIAGNOSIS — R06.00 DYSPNEA, UNSPECIFIED TYPE: ICD-10-CM

## 2022-09-30 DIAGNOSIS — F41.1 GENERALIZED ANXIETY DISORDER: Chronic | ICD-10-CM

## 2022-10-03 RX ORDER — MIRTAZAPINE 30 MG/1
TABLET, FILM COATED ORAL
Qty: 30 TABLET | Refills: 0 | Status: SHIPPED | OUTPATIENT
Start: 2022-10-03

## 2022-10-12 DIAGNOSIS — F41.1 GENERALIZED ANXIETY DISORDER: Chronic | ICD-10-CM

## 2022-10-12 DIAGNOSIS — F33.0 MILD EPISODE OF RECURRENT MAJOR DEPRESSIVE DISORDER: Chronic | ICD-10-CM

## 2022-10-12 RX ORDER — VILAZODONE HYDROCHLORIDE 40 MG/1
40 TABLET ORAL DAILY
Qty: 30 TABLET | Refills: 0 | Status: SHIPPED | OUTPATIENT
Start: 2022-10-12 | End: 2022-10-24 | Stop reason: SDUPTHER

## 2022-10-13 ENCOUNTER — HOSPITAL ENCOUNTER (OUTPATIENT)
Dept: GENERAL RADIOLOGY | Facility: HOSPITAL | Age: 47
Discharge: HOME | End: 2022-10-13

## 2022-10-13 ENCOUNTER — LAB (OUTPATIENT)
Dept: LAB | Facility: HOSPITAL | Age: 47
End: 2022-10-13

## 2022-10-13 DIAGNOSIS — R06.00 DYSPNEA, UNSPECIFIED TYPE: ICD-10-CM

## 2022-10-13 DIAGNOSIS — R53.83 FATIGUE, UNSPECIFIED TYPE: ICD-10-CM

## 2022-10-13 PROCEDURE — 71046 X-RAY EXAM CHEST 2 VIEWS: CPT

## 2022-10-13 PROCEDURE — 36415 COLL VENOUS BLD VENIPUNCTURE: CPT

## 2022-10-13 PROCEDURE — 80053 COMPREHEN METABOLIC PANEL: CPT

## 2022-10-13 PROCEDURE — 85027 COMPLETE CBC AUTOMATED: CPT

## 2022-10-13 PROCEDURE — 84403 ASSAY OF TOTAL TESTOSTERONE: CPT | Performed by: STUDENT IN AN ORGANIZED HEALTH CARE EDUCATION/TRAINING PROGRAM

## 2022-10-14 LAB
ALBUMIN SERPL-MCNC: 3.8 G/DL (ref 3.5–5.2)
ALBUMIN/GLOB SERPL: 1.4 G/DL
ALP SERPL-CCNC: 344 U/L (ref 39–117)
ALT SERPL W P-5'-P-CCNC: 199 U/L (ref 1–41)
ANION GAP SERPL CALCULATED.3IONS-SCNC: 11.8 MMOL/L (ref 5–15)
AST SERPL-CCNC: 170 U/L (ref 1–40)
BILIRUB SERPL-MCNC: 0.5 MG/DL (ref 0–1.2)
BUN SERPL-MCNC: 8 MG/DL (ref 6–20)
BUN/CREAT SERPL: 7.5 (ref 7–25)
CALCIUM SPEC-SCNC: 9.5 MG/DL (ref 8.6–10.5)
CHLORIDE SERPL-SCNC: 92 MMOL/L (ref 98–107)
CO2 SERPL-SCNC: 29.2 MMOL/L (ref 22–29)
CREAT SERPL-MCNC: 1.07 MG/DL (ref 0.76–1.27)
DEPRECATED RDW RBC AUTO: 41.1 FL (ref 37–54)
EGFRCR SERPLBLD CKD-EPI 2021: 86.1 ML/MIN/1.73
ERYTHROCYTE [DISTWIDTH] IN BLOOD BY AUTOMATED COUNT: 14.6 % (ref 12.3–15.4)
GLOBULIN UR ELPH-MCNC: 2.8 GM/DL
GLUCOSE SERPL-MCNC: 298 MG/DL (ref 65–99)
HCT VFR BLD AUTO: 50.7 % (ref 37.5–51)
HGB BLD-MCNC: 16.8 G/DL (ref 13–17.7)
MCH RBC QN AUTO: 26.6 PG (ref 26.6–33)
MCHC RBC AUTO-ENTMCNC: 33.1 G/DL (ref 31.5–35.7)
MCV RBC AUTO: 80.2 FL (ref 79–97)
PLATELET # BLD AUTO: 242 10*3/MM3 (ref 140–450)
PMV BLD AUTO: 9.8 FL (ref 6–12)
POTASSIUM SERPL-SCNC: 3.4 MMOL/L (ref 3.5–5.2)
PROT SERPL-MCNC: 6.6 G/DL (ref 6–8.5)
RBC # BLD AUTO: 6.32 10*6/MM3 (ref 4.14–5.8)
SODIUM SERPL-SCNC: 133 MMOL/L (ref 136–145)
WBC NRBC COR # BLD: 8.04 10*3/MM3 (ref 3.4–10.8)

## 2022-10-18 ENCOUNTER — CONSULT (OUTPATIENT)
Dept: BARIATRICS/WEIGHT MGMT | Facility: CLINIC | Age: 47
End: 2022-10-18

## 2022-10-18 VITALS
BODY MASS INDEX: 37.25 KG/M2 | HEART RATE: 107 BPM | TEMPERATURE: 98.2 F | DIASTOLIC BLOOD PRESSURE: 88 MMHG | OXYGEN SATURATION: 98 % | SYSTOLIC BLOOD PRESSURE: 132 MMHG | WEIGHT: 251.5 LBS | HEIGHT: 69 IN | RESPIRATION RATE: 18 BRPM

## 2022-10-18 DIAGNOSIS — R79.89 ELEVATED LFTS: ICD-10-CM

## 2022-10-18 DIAGNOSIS — E66.01 MORBID EXOGENOUS OBESITY: Primary | ICD-10-CM

## 2022-10-18 PROCEDURE — 99214 OFFICE O/P EST MOD 30 MIN: CPT | Performed by: SURGERY

## 2022-10-18 RX ORDER — GLIPIZIDE 5 MG/1
TABLET, FILM COATED, EXTENDED RELEASE ORAL
COMMUNITY
Start: 2022-10-11 | End: 2022-10-31 | Stop reason: HOSPADM

## 2022-10-18 RX ORDER — ACETAMINOPHEN 500 MG
1000 TABLET ORAL ONCE
Status: CANCELLED | OUTPATIENT
Start: 2022-10-18 | End: 2022-10-18

## 2022-10-18 RX ORDER — GABAPENTIN 100 MG/1
600 CAPSULE ORAL ONCE
Status: CANCELLED | OUTPATIENT
Start: 2022-10-18 | End: 2022-10-18

## 2022-10-18 RX ORDER — GLUCOSAM/CHON-MSM1/C/MANG/BOSW 500-416.6
TABLET ORAL
COMMUNITY
Start: 2022-10-03

## 2022-10-18 RX ORDER — SODIUM CHLORIDE 0.9 % (FLUSH) 0.9 %
3 SYRINGE (ML) INJECTION EVERY 12 HOURS SCHEDULED
Status: CANCELLED | OUTPATIENT
Start: 2022-10-18

## 2022-10-18 RX ORDER — NITROFURANTOIN 25; 75 MG/1; MG/1
CAPSULE ORAL
COMMUNITY
Start: 2022-10-14 | End: 2022-10-26

## 2022-10-18 RX ORDER — ENOXAPARIN SODIUM 150 MG/ML
40 INJECTION SUBCUTANEOUS ONCE
Status: CANCELLED | OUTPATIENT
Start: 2022-10-18 | End: 2022-10-18

## 2022-10-18 RX ORDER — SCOLOPAMINE TRANSDERMAL SYSTEM 1 MG/1
1 PATCH, EXTENDED RELEASE TRANSDERMAL ONCE
Status: CANCELLED | OUTPATIENT
Start: 2022-10-18 | End: 2022-10-18

## 2022-10-18 RX ORDER — METFORMIN HYDROCHLORIDE 500 MG/1
TABLET, EXTENDED RELEASE ORAL
COMMUNITY
Start: 2022-09-30 | End: 2022-10-26

## 2022-10-18 RX ORDER — PANTOPRAZOLE SODIUM 40 MG/10ML
40 INJECTION, POWDER, LYOPHILIZED, FOR SOLUTION INTRAVENOUS ONCE
Status: CANCELLED | OUTPATIENT
Start: 2022-10-18 | End: 2022-10-18

## 2022-10-18 RX ORDER — INSULIN GLARGINE 100 [IU]/ML
INJECTION, SOLUTION SUBCUTANEOUS
COMMUNITY
Start: 2022-10-03

## 2022-10-18 RX ORDER — SODIUM CHLORIDE 9 MG/ML
150 INJECTION, SOLUTION INTRAVENOUS CONTINUOUS
Status: CANCELLED | OUTPATIENT
Start: 2022-10-18

## 2022-10-18 RX ORDER — SODIUM CHLORIDE 0.9 % (FLUSH) 0.9 %
3-10 SYRINGE (ML) INJECTION AS NEEDED
Status: CANCELLED | OUTPATIENT
Start: 2022-10-18

## 2022-10-18 RX ORDER — CHLORHEXIDINE GLUCONATE 0.12 MG/ML
30 RINSE ORAL
Status: CANCELLED | OUTPATIENT
Start: 2022-10-18 | End: 2022-10-18

## 2022-10-18 NOTE — PROGRESS NOTES
[facility] BARIATRIC SURGERY  [address]  [address]  [facility] [id number] [phone]      Patient  Name:  Gopi Johnson  :  1975      Date of Visit: 10/18/22    Chief Complaint:  weight gain; unable to maintain weight loss.   Evaluate for possible metabolic and bariatric surgery    History of Present Illness:  Gopi Johnson is a 47 y.o. male who presents today for evaluation, education and consultation regarding metabolic and bariatric surgery (MBS).  Since seen 2022 he has lost 34-1/2 pounds.  The patient returns for final visit prior to metabolic and bariatric surgery specifically the sleeve gastrectomy.  Original intake evaluation Kavita Callaway PA-C reviewed.  She notes the patient's maximum lifetime weight is 400 pounds.  She notes she has a history of spinal osteomyelitis in 2020 secondary to previous intravenous drug abuse clean since  on Subutex managed by PCP and has left-sided weakness since his osteomyelitis and ambulates with a cane he is following with neurology for myoclonic jerking with frequent headaches and stuttering all of which developed after his issues with sepsis as well and that he is recently been referred to GI for elevated ammonia levels of unclear etiology evaluation pending and has elevated liver function test with no known prior liver issues reflux controlled on daily PPI EGD in January with Dr. Dover showing erosive gastritis and has a history of tobacco use vaping 0% nicotine to help him quit smoking in the lives with his mother who does smoke in the home and is aware he must avoid tobacco nicotine use as well as secondhand smoke exposure for 2 weeks prior in 6 weeks after sleeve gastrectomy.     The patient has had issues with morbid obesity for years and only temporary success with non-surgical methods of weight loss.  The patient is seeking LSG to help with the morbid obesity related conditions of spinal  "osteomyelitis, history of MRSA, abnormal pulmonary function test, anxiety and depression, bipolar affective disorder, colonic polyps, former smoker, dyspnea exertion, elevated liver function tests, enlarged prostate, family history of malignant hyperthermia, fatigue, frequent urination, gastroparesis, GE reflux disease, history of substance abuse in remission, hyperammonemia, hypertension, hypogonadism in male, hypokalemia, hyponatremia and hyperchloremia, insomnia, type 2 insulin-dependent diabetes mellitus, joint pain, peripheral edema recently improved, migraine headaches, history of myoclonus, rectus diastases, obstructive sleep apnea on CPAP, former smoker, hyperlipidemia.    47-year-old morbidly obese male from [place].  His sister was with him for today's evaluation.  She has malignant hyperthermia.  He says he has been tested with muscle biopsy and is negative for this.  He says he came here because he wanted to stay in the [facility] system and \"heard you are one of the best\".  He does have impaired mobility and is ambulating with a cane today.  He quit smoking when entering the process here in February and says he vapes nonnicotine products only.  I instructed him to double his potassium tablets until surgery.  He says his recent weight loss since June was unintended.  There are several factors.  For unclear reasons his previous peripheral edema has cleared up over the last month.  He says he also became septic with staph infection and took oral antibiotics only.  He also says about 2 weeks ago he was diagnosed with diabetes with blood sugar in the 5-600 range and started on insulin now with blood sugars in the 250 range.  He says he has chronic osteomyelitis in his back and thinks this may be the source of his infections.  He has a problem picking at his skin and his sister says they will make sure he does not pick at his incisions.  He is going to recover from surgery and his sisters who does not smoke.  As " above his mother smokes and currently she is apparently ill with congestive heart failure he knows to avoid secondhand smoke 2 weeks prior in 6 weeks after surgery to minimize the risk of delayed leak.  He has not repeated the recommended colonoscopy and plans to do it after his sleeve.  He had a half brother that  of metastatic colon cancer at age 36.  He says he had significant reflux but is much better in the last year since starting on PPI therapy.  No dysphagia.  He is on the Subutex and says they usually have him hold it 1 day prior to procedures.  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  He has several discolored lesions of the ski, says the area on his left neck is from a grease burn and the others are from ingrown hairs.  He did see a dermatologist and had a skin biopsy and says this is what he was told.  He would like the surgery [facility] if possible but I felt given his overall medical condition it would need to be in [place] and he is agreeable.  With diabetes and gastroparesis gastric bypass could be considered but the patient is interested in sleeve gastrectomy and I think this is most appropriate as well.        Past Medical History:   Diagnosis Date   • Abnormal PFTs (pulmonary function tests)    • Anxiety    • Bipolar affective (HCC)    • Colon polyp    • Current every day vaping     Currently nonnicotine   • Depression    • Dyspepsia    • Dyspnea on exertion    • Elevated LFTs     GI eval (P)   • Enlarged prostate     s/p TURP 2021   • Family history of malignant hyperthermia     patient had muscle biopsy at [facility] and was negative, sister has malignant hyperthermia and a cousin   • Fatigue    • Fractured pelvis (HCC)    • Frequent urination    • Gastroparesis    • GERD (gastroesophageal reflux disease)     on PPI, hx erosive gastritis on EGD 22   • H/O: substance abuse ([facility])     clean since , on  Subutex, managed by PCP   • Headache     following w/ Neurology   • Hx MRSA infection 2020    w/ osteomyelitis    • Hyperammonemia (HCC)     following w/ GI   • Hyperlipidemia    • Hypertension    • Hypogonadism in male     on testosterone q2wks, follows w/ Urology   • Hypokalemia    • Impaired mobility     w/ (L)sided weakness (since osteomyelitis), ambulates w/ cane   • Insomnia     on Trazodone   • Insulin dependent type 2 diabetes mellitus (HCC)    • Joint pain     w/ recent SI joint injections 2/2022   • Lower extremity edema    • Migraine    • Morbid obesity (HCC)    • Murmur    • Myoclonus     w/ body jerks, follows w/ Neurology   • Osteomyelitis (HCC) 2020    H/O spinal osteomyelitis, previous IVDA   • Piercing     ear/body   • PUD (peptic ulcer disease)     non-bleeding gastric ulcers on EGD 1/5/22 w/ Dr. Dover   • Rectus diastasis    • Seasonal allergies    • Sleep apnea     CPAP compliant   • Tattoo    • Tobacco use     trying to quit, currently vaping (0%) - mom does smoke in the home     Past Surgical History:   Procedure Laterality Date   • BUNIONECTOMY  2007   • COLONOSCOPY  2017   • CYSTOSCOPY TRANSURETHRAL RESECTION OF PROSTATE N/A 5/26/2021    Procedure: TRANSURETHRAL RESECTION OF PROSTATE;  Surgeon: Markel Centeno MD;  [facility];  Service: Urology;  Laterality: N/A;   • ENDOSCOPY N/A 1/5/2022    Procedure: ESOPHAGOGASTRODUODENOSCOPY with biopsy;  Surgeon: Royal Dover MD;  Location: [facility] END[facility];  Service: Gastroenterology;  Laterality: N/A;   • INCISIONAL HERNIA REPAIR  2019    inferior to umbilicus w/ mesh - Dr. Dover   • KNEE OPEN LATERAL RELEASE Right 1985   • LAPAROSCOPIC CHOLECYSTECTOMY  2014    sand, no stones   • TONSILLECTOMY AND ADENOIDECTOMY  1980   • WISDOM TOOTH EXTRACTION  1997       Allergies   Allergen Reactions   • Naloxone Swelling     Throat swelling, itching, rash   • Hydrocodone-Acetaminophen Itching and Rash     Percocet OK       Current  "Outpatient Medications:   •  amLODIPine (NORVASC) 5 MG tablet, Take 5 mg by mouth Daily., Disp: , Rfl:   •  buprenorphine (SUBUTEX) 8 MG sublingual tablet SL tablet, 8 mg., Disp: , Rfl:   •  chlorthalidone (HYGROTON) 25 MG tablet, Take 25 mg by mouth Daily., Disp: , Rfl:   •  Cholecalciferol (Vitamin D3) 50 MCG (2000 UT) tablet, , Disp: , Rfl:   •  fluticasone (FLONASE) 50 MCG/ACT nasal spray, As Needed., Disp: , Rfl:   •  glipizide (GLUCOTROL XL) 5 MG ER tablet, , Disp: , Rfl:   •  hydrOXYzine (ATARAX) 25 MG tablet, Take 1 tablet by mouth 3 (Three) Times a Day As Needed for Anxiety., Disp: 90 tablet, Rfl: 0  •  Lantus SoloStar 100 UNIT/ML injection pen, , Disp: , Rfl:   •  lidocaine (LIDODERM) 5 %, Place 1 patch on the skin as directed by provider Daily. Remove & Discard patch within 12 hours or as directed by MD, Disp:  , Rfl:   •  lisinopril (PRINIVIL,ZESTRIL) 30 MG tablet, , Disp: , Rfl:   •  mirtazapine (REMERON) 30 MG tablet, TAKE 1 TABLET BY MOUTH ONCE DAILY AT NIGHT, Disp: 30 tablet, Rfl: 0  •  Multiple Vitamins-Minerals (MULTI COMPLETE PO), Take  by mouth., Disp: , Rfl:   •  Needle, Disp, 18G X 1-1/2\" misc, Use for drawing up the medication, Disp: 50 each, Rfl: 3  •  Needle, Disp, 23G X 1-1/2\" misc, 1 Device 1 (One) Time Per Week., Disp: 30 each, Rfl: 11  •  oxybutynin XL (DITROPAN-XL) 10 MG 24 hr tablet, , Disp: , Rfl:   •  pantoprazole (PROTONIX) 40 MG EC tablet, Take 1 tablet by mouth Every Morning., Disp:  , Rfl:   •  Polysacch Fe Cmp-Fe Heme Poly (Feosol Bifera) 28 MG tablet, Take 2 tablets by mouth Every Other Day., Disp: 60 tablet, Rfl: 5  •  potassium chloride (K-DUR,KLOR-CON) 20 MEQ CR tablet, , Disp: , Rfl:   •  Syringe, Disposable, 3 ML misc, 1 syringe 1 (One) Time Per Week., Disp: 100 each, Rfl: 11  •  tamsulosin (FLOMAX) 0.4 MG capsule 24 hr capsule, Take 1 capsule by mouth Daily., Disp: 30 capsule, Rfl: 2  •  Testosterone Cypionate (Depo-Testosterone) 200 MG/ML injection, Inject 1.5 mL into " the appropriate muscle as directed by prescriber Every 14 (Fourteen) Days., Disp: 10 mL, Rfl: 2  •  topiramate (TOPAMAX) 50 MG tablet, , Disp: , Rfl:   •  TRUEplus Lancets 28G misc, , Disp: , Rfl:   •  UltiCare Short Pen Needles 31G X 8 MM misc, , Disp: , Rfl:   •  vilazodone (VIIBRYD) 40 MG tablet tablet, Take 1 tablet by mouth Daily. Take with food, Disp: 30 tablet, Rfl: 0  •  Alcohol Swabs pads, Clean area prior to injection, Disp: 100 each, Rfl: 11  •  apixaban (Eliquis) 2.5 MG tablet tablet, Take 1 tablet by mouth Every 12 (Twelve) Hours for 42 doses., Disp: 42 tablet, Rfl: 0  •  cetirizine (zyrTEC) 10 MG tablet, Take 1 tablet by mouth Daily., Disp:  , Rfl:   •  cloNIDine (CATAPRES) 0.1 MG tablet, As Needed., Disp: , Rfl:   •  cyclobenzaprine (FLEXERIL) 10 MG tablet, Take 10 mg by mouth At Night As Needed., Disp: , Rfl:   •  Enulose 10 GM/15ML solution solution (encephalopathy), , Disp: , Rfl:   •  loratadine (CLARITIN) 10 MG tablet, Daily., Disp: , Rfl:   •  metFORMIN ER (GLUCOPHAGE-XR) 500 MG 24 hr tablet, , Disp: , Rfl:   •  mupirocin (BACTROBAN) 2 % ointment, As Needed., Disp: , Rfl:   •  nitrofurantoin, macrocrystal-monohydrate, (MACROBID) 100 MG capsule, , Disp: , Rfl:   •  Sod Picosulfate-Mag Ox-Cit Acd 10-3.5-12 MG-GM -GM/160ML solution, Take 160 mL by mouth Take As Directed for 2 doses., Disp: 320 mL, Rfl: 0    Social History     Socioeconomic History   • Marital status: Single   Tobacco Use   • Smoking status: Former     Packs/day: 0.50     Years: 30.00     Pack years: 15.00     Types: Cigarettes   • Smokeless tobacco: Never   Vaping Use   • Vaping Use: Every day   • Substances: Flavoring   • Devices: Pre-filled or refillable cartridge   Substance and Sexual Activity   • Alcohol use: Not Currently   • Drug use: Not Currently     Types: Methamphetamines, Cocaine(coke), Ketamine, LSD, Marijuana, MDMA (ecstacy)     Comment: clean since 2016   • Sexual activity: Defer     Family History   Problem Relation  Age of Onset   • Hypertension Mother    • Obesity Mother    • Hypertension Father    • Malig Hyperthermia Sister    • Obesity Sister    • Leukemia Maternal Grandmother    • Heart attack Maternal Grandfather    • Heart disease Paternal Grandmother    • Prostate cancer Paternal Grandfather    • Colon polyps Half-Brother    • Colon cancer Half-Brother    • Esophageal cancer Neg Hx        Review of Systems   Constitutional: Positive for fatigue and unexpected weight gain. Negative for chills, diaphoresis, fever and unexpected weight loss.   HENT: Negative for congestion and facial swelling.    Eyes: Negative for blurred vision, double vision and discharge.   Respiratory: Negative for chest tightness, shortness of breath and stridor.    Cardiovascular: Positive for leg swelling. Negative for chest pain and palpitations.   Gastrointestinal: Positive for GERD. Negative for blood in stool.   Endocrine: Negative for polydipsia.   Genitourinary: Negative for hematuria.   Musculoskeletal: Positive for arthralgias and back pain.   Skin: Negative for color change.   Allergic/Immunologic: Negative for immunocompromised state.   Neurological: Positive for weakness. Negative for confusion.   Psychiatric/Behavioral: Negative for self-injury. The patient is nervous/anxious.        I have reviewed the ROS and confirm that it's accurate today.    Physical Exam:  Vital Signs:  Weight: 114 kg (251 lb 8 oz)   Body mass index is 37.68 kg/m².  Temp: 98.2 °F (36.8 °C)   Heart Rate: 107   BP: 132/88     Physical Exam  Vitals reviewed.   Constitutional:       Appearance: He is well-developed.   HENT:      Head: Normocephalic and atraumatic.      Nose:      Comments: mask  Eyes:      Conjunctiva/sclera: Conjunctivae normal.      Pupils: Pupils are equal, round, and reactive to light.   Neck:      Thyroid: No thyromegaly.      Vascular: No carotid bruit.      Trachea: No tracheal deviation.   Cardiovascular:      Rate and Rhythm: Regular rhythm.  Tachycardia present.      Heart sounds: Normal heart sounds.   Pulmonary:      Effort: Pulmonary effort is normal. No respiratory distress.      Breath sounds: Normal breath sounds.   Abdominal:      General: There is no distension.      Palpations: Abdomen is soft.      Tenderness: There is no abdominal tenderness.      Comments: Rectus diastases, subumbilical curvilinear scar, no obvious recurrent hernia.  Multiple abdominal wall picking scars.   Musculoskeletal:         General: No deformity. Normal range of motion.      Cervical back: Normal range of motion and neck supple.   Skin:     General: Skin is warm and dry.      Findings: No rash.      Comments: Scattered tattoos, multiple skin lesions, no edema today   Neurological:      Mental Status: He is alert and oriented to person, place, and time.      Cranial Nerves: No cranial nerve deficit.      Coordination: Coordination normal.   Psychiatric:         Behavior: Behavior normal.         Thought Content: Thought content normal.         Judgment: Judgment normal.         Patient Active Problem List   Diagnosis   • Abdominal pain   • Anemia   • Constipation   • Diarrhea   • Abnormal liver enzymes   • BPH without urinary obstruction   • Kidney stone   • Sepsis ([facility])   • Abscess of paraspinal muscles   • Abscess, gluteal, left   • Bacteremia due to Gram-positive bacteria   • Chronic midline low back pain without sciatica   • Essential hypertension   • Osteomyelitis of sacrum ([facility])   • Lower urinary tract symptoms (LUTS)   • Periodic headache syndrome, not intractable   • Tremor, essential   • Myoclonus   • Hypokalemia   • Family history of malignant hyperthermia   • PUD (peptic ulcer disease)   • Sleep apnea   • Hypogonadism in male   • Hyperammonemia ([facility])   • GERD (gastroesophageal reflux disease)   • Tobacco use   • Fatigue   • Dyspepsia   • Morbid obesity with BMI of 45.0-49.9, adult ([facility])   • Dyspnea on exertion   • Lower extremity edema   • Insomnia   •  Impaired mobility   • H/O: substance abuse (HCC)   • Osteomyelitis (HCC)   • Mild episode of recurrent major depressive disorder (HCC)   • Generalized anxiety disorder   • Encounter for screening for malignant neoplasm of colon   • Testosterone deficiency   • Morbid exogenous obesity (HCC)       Assessment:    Gopi Johnson is a 47 y.o. year old male with medically complicated obesity.    Metabolic and bariatric surgery is deemed medically necessary given the following obesity related comorbidities including spinal osteomyelitis, history of MRSA, abnormal pulmonary function test, anxiety and depression, bipolar affective disorder, colonic polyps, former smoker, dyspnea exertion, elevated liver function tests, enlarged prostate, family history of malignant hyperthermia, fatigue, frequent urination, gastroparesis, GE reflux disease, history of substance abuse in remission, hyperammonemia, hypertension, hypogonadism in male, hypokalemia, hyponatremia and hyperchloremia, insomnia, type 2 insulin-dependent diabetes mellitus, joint pain, peripheral edema recently improved, migraine headaches, history of myoclonus, rectus diastases, obstructive sleep apnea on CPAP, former smoker with current Weight: 114 kg (251 lb 8 oz) and Body mass index is 37.68 kg/m²..    Encounter Diagnoses   Name Primary?   • Morbid exogenous obesity (HCC) Yes   • Elevated LFTs       Patient is aware that surgery is a tool, and that weight loss and improvement in comorbidities is not guaranteed but only seen in the context of appropriate use, follow up and physical activity.    The patient was present for an approximately a 2.5 hour discussion of the purpose of MBS, how MBS is a tool to assist in achieving weight loss goals, the most common complications and how best to avoid them, and the strategies for short and long term weight loss and improvement in comorbidities.  Ample opportunity to discuss questions was available both in group and  during the time of individual examination.    I reviewed his [facility] report showing multiple buprenorphine and testosterone.  Labs dated 10/14/2022 normal CMP except for glucose of 298 sodium 133 potassium 3.4 chloride 92 CO2 29.2   alkaline phosphatase 344 White count normal at 8 no differential H&H 16.8 and 50.7.  Chest x-ray dated 10/13/2022 no active process.  Gastric emptying study dated 3/31/2022 noting the patient terminated the study at 65 minutes in order to urinate.  Gastric emptying was only 26% at that time suggestive of delayed gastric emptying.  Pulmonary function test dated 3/14/2022 possible mild obstruction read by Dr. Ezekiel Russell.  Cardiology clearance dated 3/15/2022 Shaheenrafael Vilchis MD low risk.  EGD dated 1/5/2020 Dr. Royal Dover no hiatal hernia some superficial gastric ulcers.  Antrum biopsy only taken showing reactive gastropathy with minimal chronic gastritis negative for H. pylori.  Operative report dated 10/18/2019 at the [facility] for incarcerated incisional hernia inferior to the umbilicus repaired open with placement of an 8 x 8 cm dual sided mesh x2.  Royal Dover MD.  Dietitian evaluation dated 2/24/2022 Cheyanne spears RD noting the diet is low in protein and lacking in fruits and vegetables.  Letter support primary care provider MARIA DEL CARMEN Viera, FNP-C dated 5/20/2022.  Psychosocial evaluation Cruz Hsu Karli JOYCE dated 4/11 2217 page report very good candidate.  Labs dated 2/24/2022 negative serum H. pylori testing, normal CMP except for potassium 3.4 chloride 94 alkaline phosphatase 175 ALT 52.  Of note on 11/19/2021 alkaline phosphatase 223 AST 66 .  He will and A1c 6.6 cholesterol 204 triglycerides 183 HDL 36  normal TSH.  Negative nicotine testing dated 7/15/2020 pulmonary function test dated 3/14/2022 FVC 78 FEV1 76 DLCO 89 bronchodilators not given.  Colonoscopy dated 4/28/2022 Montana Pleitez MD noting inadequate prep internal  "hemorrhoids.  Diminutive polyp in the rectum biopsied path benign.  Ultrasound of the liver dated 6/7/2022 suggesting fatty liver.  Postcholecystectomy with common bile duct dilatation to 15 mm.  Message from Coral Mahoney PA-C with gastroenterology to let the patient know ultrasound reveals fatty liver without evidence of cirrhosis no masses or other abnormalities dated 6/8/2022.  She notes that the patient has medical clearance from a GI standpoint for Dr. Conklin to do his upcoming bariatric surgery and recommended obtaining a liver biopsy at the time of the procedure.  Office note Coral Mahoney PA-C with [facility] neurology dated 6/7/2022 noting the patient is here for follow-up of fatty liver recommending colonoscopy in September with a 2-day prep and liver biopsy at the time of his bariatric surgery.  Please see scanned records that I have reviewed and signed off on today.  All of this in addition to the patient's unique history and exam has been taken into consideration in determining their appropriate candidacy for MBS.    Complications  of laparoscopic/possible robotic gastric sleeve were discussed. The patient is well aware of the potential complications of surgery that include but not limited to bleeding, infections, deep venous thrombosis, pulmonary embolism, pulmonary complications such as pneumonia, cardiac events, hernias, small bowel obstruction, damage to the spleen or other organs, bowel injury, disfiguring scars, failure to lose weight, need for additional surgery, conversion to an open procedure, and death. Patient is also aware of complications which apply in this particular procedure that can include but are not limited to a \"leak\" at the staple line which in some instances may require conversion to gastric bypass.    The patient is aware if a hiatal hernia is encountered, it likely will be repaired.  R/B/A Rx to hiatal hernia repair were discussed as outlined in our " long consent form.  Briefly risks in addition to those for LSG include recurrent hernia, KEVIN, dysphagia, esophageal injury, pneumothorax, injury to the vagus nerves, injury to the thoracic duct, aorta or vena cava.    I discussed avoiding all tobacco products, nicotine,  and second hand smoke at least 2 weeks pre-operatively and 6 weeks post-operatively to minimize the risk of sleeve leak.  This included discussing the importance of avoiding even secondhand smoke as the risk of leak is increased.  Examples discussed:  Avoid going in a house or riding in a car where someone has previously smoked in the last 2 weeks and for 6 weeks postoperatively.  Avoid living in a house where someone smokes (even if it's in a separate room/patio/attached garage, etc.).   Avoid congregating with a group of people who are smoking even if it's outside.  It is OK to be around wood burning fires and barbecue.  I explained that I do not know if marijuana has a same effects but my overall recommendation is to avoid it for 2 weeks prior in 6 weeks after surgery.     Discussed the risks, benefits and alternative therapies at great length as outlined in our extensive consent forms, consent videos, and educational teaching process under the direction of the center's .    A copy of the patient's signed informed consent is on file.    R/B/A Rx discussed to postop anticoagulation incl but not limited to bleeding, drug reaction, venothromboembolic events, etc. and agreeable to taking post op  Eliquis 2.5 mg po Q 12 hrs #42      Plan:    After evaluation today I think the patient is a reasonable candidate for laparoscopic sleeve gastrectomy, EGD, and liver biopsy.  He has seen GI for his liver issues as above.  Despite negative work-up I think he still is at risk for findings of significant cirrhosis at exploration and this may explain his electrolyte abnormalities, elevated liver function test, high ammonia level, etc.   Previous history of IVDA and certainly could be hepatitis  - extensive GI work-up noted.  Prothrombin time and INR were normal.  No varices seen on EGD in January.  No definite ascites on exam, no caput medusa.  His picking issues may put him at increased risk for wound infection.  Other issues include spinal osteomyelitis, history of MRSA, abnormal pulmonary function test, anxiety and depression, bipolar affective disorder, former smoker, dyspnea exertion, elevated liver function tests, enlarged prostate, family history of malignant hyperthermia, fatigue, frequent urination, gastroparesis, GE reflux disease, history of substance abuse in remission, hyperammonemia, hypertension, hypogonadism in male, hypokalemia, hyponatremia and hyperchloremia, insomnia, recently diagnosed type 2 insulin-dependent diabetes mellitus, joint pain, peripheral edema recently improved, migraine headaches, history of myoclonus, rectus diastases, obstructive sleep apnea on CPAP, former smoker, colon polyps with family history of early and colon cancer, hyperlipidemia, elevated hemoglobin and hematocrit.    Thank you MARIA DEL CARMEN Viera for the opportunity evaluate Mr. Johnson.      Olu Rodriguez MD              Answers for HPI/ROS submitted by the patient on 10/12/2022  What is the primary reason for your visit?: Other  Please describe your symptoms.: Obesity  Have you had these symptoms before?: Yes  How long have you been having these symptoms?: Greater than 2 weeks  Please list any medications you are currently taking for this condition.: None  Please describe any probable cause for these symptoms. : High fructose corn syrup

## 2022-10-19 PROBLEM — R79.89 ELEVATED LFTS: Status: ACTIVE | Noted: 2022-10-19

## 2022-10-21 ENCOUNTER — TELEPHONE (OUTPATIENT)
Dept: PSYCHIATRY | Facility: CLINIC | Age: 47
End: 2022-10-21

## 2022-10-21 DIAGNOSIS — F41.1 GENERALIZED ANXIETY DISORDER: Chronic | ICD-10-CM

## 2022-10-21 DIAGNOSIS — F33.0 MILD EPISODE OF RECURRENT MAJOR DEPRESSIVE DISORDER: Chronic | ICD-10-CM

## 2022-10-21 NOTE — TELEPHONE ENCOUNTER
Patient had trouble with insurance paying provider he had to cancel several appointments - rescheduled for 10/26/22 but completely out of [employer]. Can he have refill Monday 10/24/22 please?    Thank You

## 2022-10-24 RX ORDER — VILAZODONE HYDROCHLORIDE 40 MG/1
40 TABLET ORAL DAILY
Qty: 30 TABLET | Refills: 0 | Status: SHIPPED | OUTPATIENT
Start: 2022-10-24

## 2022-10-25 ENCOUNTER — APPOINTMENT (OUTPATIENT)
Dept: PREADMISSION TESTING | Facility: HOSPITAL | Age: 47
End: 2022-10-25

## 2022-10-26 ENCOUNTER — PRE-ADMISSION TESTING (OUTPATIENT)
Dept: PREADMISSION TESTING | Facility: HOSPITAL | Age: 47
End: 2022-10-26

## 2022-10-26 DIAGNOSIS — E66.01 MORBID EXOGENOUS OBESITY: ICD-10-CM

## 2022-10-26 DIAGNOSIS — R79.89 ELEVATED LFTS: ICD-10-CM

## 2022-10-26 LAB
ABO GROUP BLD: NORMAL
BLD GP AB SCN SERPL QL: NEGATIVE
DEPRECATED RDW RBC AUTO: 40.3 FL (ref 37–54)
ERYTHROCYTE [DISTWIDTH] IN BLOOD BY AUTOMATED COUNT: 13.8 % (ref 12.3–15.4)
HBA1C MFR BLD: 11.8 % (ref 4.8–5.6)
HCT VFR BLD AUTO: 48.6 % (ref 37.5–51)
HGB BLD-MCNC: 16 G/DL (ref 13–17.7)
MCH RBC QN AUTO: 26.7 PG (ref 26.6–33)
MCHC RBC AUTO-ENTMCNC: 32.9 G/DL (ref 31.5–35.7)
MCV RBC AUTO: 81.1 FL (ref 79–97)
MRSA DNA SPEC QL NAA+PROBE: NEGATIVE
PLATELET # BLD AUTO: 251 10*3/MM3 (ref 140–450)
PMV BLD AUTO: 9.3 FL (ref 6–12)
POTASSIUM SERPL-SCNC: 3.4 MMOL/L (ref 3.5–5.2)
QT INTERVAL: 364 MS
QTC INTERVAL: 414 MS
RBC # BLD AUTO: 5.99 10*6/MM3 (ref 4.14–5.8)
RH BLD: POSITIVE
T&S EXPIRATION DATE: NORMAL
WBC NRBC COR # BLD: 7.2 10*3/MM3 (ref 3.4–10.8)

## 2022-10-26 PROCEDURE — 86901 BLOOD TYPING SEROLOGIC RH(D): CPT

## 2022-10-26 PROCEDURE — 85027 COMPLETE CBC AUTOMATED: CPT

## 2022-10-26 PROCEDURE — 83036 HEMOGLOBIN GLYCOSYLATED A1C: CPT

## 2022-10-26 PROCEDURE — 93005 ELECTROCARDIOGRAM TRACING: CPT

## 2022-10-26 PROCEDURE — 86850 RBC ANTIBODY SCREEN: CPT

## 2022-10-26 PROCEDURE — 84132 ASSAY OF SERUM POTASSIUM: CPT

## 2022-10-26 PROCEDURE — 86900 BLOOD TYPING SEROLOGIC ABO: CPT

## 2022-10-26 PROCEDURE — 36415 COLL VENOUS BLD VENIPUNCTURE: CPT

## 2022-10-26 PROCEDURE — 93010 ELECTROCARDIOGRAM REPORT: CPT | Performed by: INTERNAL MEDICINE

## 2022-10-26 PROCEDURE — 87641 MR-STAPH DNA AMP PROBE: CPT

## 2022-10-26 NOTE — PAT
"An arrival time for procedure was not provided during PAT visit. If patient had any questions or concerns about their arrival time, they were instructed to contact their surgeon/physician.  Additionally, if the patient referred to an arrival time that was acquired from their my chart account, patient was encouraged to verify that time with their surgeon/physician. Arrival times are NOT provided in Pre Admission Testing Department.    Per Anesthesia Request, patient instructed not to take their ACE/ARB medications on the AM of surgery.    Blood bank bracelet applied to patient during Pre Admission Testing visit.  Patient instructed not to remove from arm until after procedure and they are discharged from the hospital.  Explained to patient that they may shower and get the bracelet wet, but not to immerse under water for longer periods (bathing, swimming, hand dishwashing, etc).  Patient verbalized understanding.    Patient to apply Chlorhexadine wipes  to surgical area (as instructed) the night before procedure and the AM of procedure. Wipes provided.    Patient instructed to drink 20 ounces of Gatorade and it needs to be completed 1 hour (for Main OR patients) or 2 hours (scheduled  section & [facility] patients) before given arrival time for procedure (NO RED Gatorade)    Patient verbalized understanding.    PATIENT REPORTED THAT HE HAD INGROWN HAIRS ON HIS CHEST AND LEGS. HE STATES THAT HE IS A \"\". DURING HIS PAT APPT NONE OF THE WOUNDS/AREAS WERE OPEN OR OOZING ANY FLUIDS.     Patient viewed general PAT education video as instructed in their preoperative information received from their surgeon.  Patient stated the general PAT education video was viewed in its entirety and survey completed.  Copies of PAT general education handouts (Incentive Spirometry, Meds to Beds Program, Patient Belongings, Pre-op skin preparation instructions, Blood Glucose testing, Visitor policy, Surgery FAQ, Code H) " distributed to patient if not printed. Education related to the PAT pass and skin preparation for surgery (if applicable) completed in PAT as a reinforcement to PAT education video. Patient instructed to return PAT pass provided today as well as completed skin preparation sheet (if applicable) on the day of procedure.     Additionally if patient had not viewed video yet but intended to view it at home or in our waiting area, then referred them to the handout with QR code/link provided during PAT visit.  Instructed patient to complete survey after viewing the video in its entirety.  Encouraged patient/family to read PAT general education handouts thoroughly and notify PAT staff with any questions or concerns. Patient verbalized understanding of all information and priority content.    EKG ON CHART AND IN Epic FROM 10/26/2022

## 2022-10-28 ENCOUNTER — ANESTHESIA (OUTPATIENT)
Dept: PERIOP | Facility: HOSPITAL | Age: 47
End: 2022-10-28

## 2022-10-28 ENCOUNTER — ANESTHESIA EVENT CONVERTED (OUTPATIENT)
Dept: ANESTHESIOLOGY | Facility: HOSPITAL | Age: 47
End: 2022-10-28

## 2022-10-28 ENCOUNTER — HOSPITAL ENCOUNTER (INPATIENT)
Facility: HOSPITAL | Age: 47
LOS: 3 days | Discharge: HOME | End: 2022-10-31
Attending: SURGERY | Admitting: SURGERY

## 2022-10-28 ENCOUNTER — ANESTHESIA EVENT (OUTPATIENT)
Dept: PERIOP | Facility: HOSPITAL | Age: 47
End: 2022-10-28

## 2022-10-28 DIAGNOSIS — R79.89 ELEVATED LFTS: ICD-10-CM

## 2022-10-28 DIAGNOSIS — E66.01 MORBID EXOGENOUS OBESITY: ICD-10-CM

## 2022-10-28 LAB
ABO GROUP BLD: NORMAL
GLUCOSE BLDC GLUCOMTR-MCNC: 124 MG/DL (ref 70–130)
GLUCOSE BLDC GLUCOMTR-MCNC: 216 MG/DL (ref 70–130)
GLUCOSE BLDC GLUCOMTR-MCNC: 253 MG/DL (ref 70–130)
RH BLD: POSITIVE

## 2022-10-28 PROCEDURE — 82962 GLUCOSE BLOOD TEST: CPT

## 2022-10-28 PROCEDURE — 25010000002 ONDANSETRON PER 1 MG

## 2022-10-28 PROCEDURE — 0FB24ZX EXCISION OF LEFT LOBE LIVER, PERCUTANEOUS ENDOSCOPIC APPROACH, DIAGNOSTIC: ICD-10-PCS | Performed by: SURGERY

## 2022-10-28 PROCEDURE — 63710000001 INSULIN LISPRO (HUMAN) PER 5 UNITS: Performed by: SURGERY

## 2022-10-28 PROCEDURE — 94761 N-INVAS EAR/PLS OXIMETRY MLT: CPT

## 2022-10-28 PROCEDURE — 25010000002 MIDAZOLAM PER 1 MG

## 2022-10-28 PROCEDURE — 94660 CPAP INITIATION&MGMT: CPT

## 2022-10-28 PROCEDURE — 25010000002 HYDROMORPHONE 1 MG/ML SOLUTION

## 2022-10-28 PROCEDURE — 94799 UNLISTED PULMONARY SVC/PX: CPT

## 2022-10-28 PROCEDURE — 25010000002 FENTANYL CITRATE (PF) 50 MCG/ML SOLUTION

## 2022-10-28 PROCEDURE — 25010000002 ENOXAPARIN PER 10 MG: Performed by: SURGERY

## 2022-10-28 PROCEDURE — 88313 SPECIAL STAINS GROUP 2: CPT | Performed by: SURGERY

## 2022-10-28 PROCEDURE — 86900 BLOOD TYPING SEROLOGIC ABO: CPT

## 2022-10-28 PROCEDURE — 47001 NDL BIOPSY LVR TM OTH MAJ PX: CPT | Performed by: SURGERY

## 2022-10-28 PROCEDURE — 63710000001 INSULIN LISPRO (HUMAN) PER 5 UNITS

## 2022-10-28 PROCEDURE — C9399 UNCLASSIFIED DRUGS OR BIOLOG: HCPCS

## 2022-10-28 PROCEDURE — 25010000002 HYDRALAZINE PER 20 MG: Performed by: SURGERY

## 2022-10-28 PROCEDURE — 25010000002 GLUCAGON (HUMAN RECOMBINANT) 1 MG RECONSTITUTED SOLUTION

## 2022-10-28 PROCEDURE — 25010000002 BUPRENORPHINE PER 0.1 MG

## 2022-10-28 PROCEDURE — 25010000002 DROPERIDOL PER 5 MG

## 2022-10-28 PROCEDURE — 0DB64Z3 EXCISION OF STOMACH, PERCUTANEOUS ENDOSCOPIC APPROACH, VERTICAL: ICD-10-PCS | Performed by: SURGERY

## 2022-10-28 PROCEDURE — 25010000002 AMISULPRIDE (ANTIEMETIC) 10 MG/4ML SOLUTION

## 2022-10-28 PROCEDURE — 25010000002 PROPOFOL 10 MG/ML EMULSION

## 2022-10-28 PROCEDURE — 86901 BLOOD TYPING SEROLOGIC RH(D): CPT

## 2022-10-28 PROCEDURE — 25010000002 HYDROMORPHONE 1 MG/ML SOLUTION: Performed by: SURGERY

## 2022-10-28 PROCEDURE — 43775 LAP SLEEVE GASTRECTOMY: CPT | Performed by: SURGERY

## 2022-10-28 PROCEDURE — 25010000002 DEXAMETHASONE PER 1 MG

## 2022-10-28 PROCEDURE — 63710000001 PROMETHAZINE PER 12.5 MG: Performed by: SURGERY

## 2022-10-28 PROCEDURE — 25010000002 CEFAZOLIN PER 500 MG: Performed by: SURGERY

## 2022-10-28 PROCEDURE — 88307 TISSUE EXAM BY PATHOLOGIST: CPT | Performed by: SURGERY

## 2022-10-28 PROCEDURE — 25010000002 DEXAMETHASONE SODIUM PHOSPHATE 10 MG/ML SOLUTION

## 2022-10-28 DEVICE — ABSORBABLE WOUND CLOSURE DEVICE
Type: IMPLANTABLE DEVICE | Site: ABDOMEN | Status: FUNCTIONAL
Brand: V-LOC 180

## 2022-10-28 DEVICE — SEALANT WND FIBRIN TISSEEL PREFIL/SYR/PRIMAFZ 10ML: Type: IMPLANTABLE DEVICE | Site: ABDOMEN | Status: FUNCTIONAL

## 2022-10-28 DEVICE — IMPLANTABLE DEVICE
Type: IMPLANTABLE DEVICE | Site: STOMACH | Status: FUNCTIONAL
Brand: TITAN SGS STANDARD GASTRIC STAPLER

## 2022-10-28 RX ORDER — POTASSIUM CHLORIDE 750 MG/1
40 CAPSULE, EXTENDED RELEASE ORAL AS NEEDED
Status: DISCONTINUED | OUTPATIENT
Start: 2022-10-28 | End: 2022-10-31 | Stop reason: HOSPADM

## 2022-10-28 RX ORDER — PROCHLORPERAZINE MALEATE 10 MG
10 TABLET ORAL EVERY 6 HOURS PRN
Status: DISCONTINUED | OUTPATIENT
Start: 2022-10-28 | End: 2022-10-31 | Stop reason: HOSPADM

## 2022-10-28 RX ORDER — GABAPENTIN 300 MG/1
600 CAPSULE ORAL ONCE
Status: COMPLETED | OUTPATIENT
Start: 2022-10-28 | End: 2022-10-28

## 2022-10-28 RX ORDER — IPRATROPIUM BROMIDE AND ALBUTEROL SULFATE 2.5; .5 MG/3ML; MG/3ML
3 SOLUTION RESPIRATORY (INHALATION) ONCE AS NEEDED
Status: DISCONTINUED | OUTPATIENT
Start: 2022-10-28 | End: 2022-10-28 | Stop reason: HOSPADM

## 2022-10-28 RX ORDER — SODIUM CHLORIDE AND POTASSIUM CHLORIDE 150; 450 MG/100ML; MG/100ML
125 INJECTION, SOLUTION INTRAVENOUS CONTINUOUS
Status: DISCONTINUED | OUTPATIENT
Start: 2022-10-29 | End: 2022-10-30

## 2022-10-28 RX ORDER — PROPOFOL 10 MG/ML
VIAL (ML) INTRAVENOUS AS NEEDED
Status: DISCONTINUED | OUTPATIENT
Start: 2022-10-28 | End: 2022-10-28 | Stop reason: SURG

## 2022-10-28 RX ORDER — SODIUM CHLORIDE 9 MG/ML
INJECTION, SOLUTION INTRAVENOUS CONTINUOUS PRN
Status: DISCONTINUED | OUTPATIENT
Start: 2022-10-28 | End: 2022-10-28 | Stop reason: SURG

## 2022-10-28 RX ORDER — ONDANSETRON 2 MG/ML
INJECTION INTRAMUSCULAR; INTRAVENOUS AS NEEDED
Status: DISCONTINUED | OUTPATIENT
Start: 2022-10-28 | End: 2022-10-28 | Stop reason: SURG

## 2022-10-28 RX ORDER — SODIUM CHLORIDE, SODIUM LACTATE, POTASSIUM CHLORIDE, CALCIUM CHLORIDE 600; 310; 30; 20 MG/100ML; MG/100ML; MG/100ML; MG/100ML
9 INJECTION, SOLUTION INTRAVENOUS CONTINUOUS
Status: DISCONTINUED | OUTPATIENT
Start: 2022-10-28 | End: 2022-10-28

## 2022-10-28 RX ORDER — GABAPENTIN 100 MG/1
100 CAPSULE ORAL 3 TIMES DAILY
Status: COMPLETED | OUTPATIENT
Start: 2022-10-28 | End: 2022-10-30

## 2022-10-28 RX ORDER — ONDANSETRON 2 MG/ML
4 INJECTION INTRAMUSCULAR; INTRAVENOUS ONCE AS NEEDED
Status: COMPLETED | OUTPATIENT
Start: 2022-10-28 | End: 2022-10-28

## 2022-10-28 RX ORDER — METOCLOPRAMIDE HYDROCHLORIDE 5 MG/ML
10 INJECTION INTRAMUSCULAR; INTRAVENOUS EVERY 6 HOURS PRN
Status: DISCONTINUED | OUTPATIENT
Start: 2022-10-28 | End: 2022-10-31 | Stop reason: HOSPADM

## 2022-10-28 RX ORDER — PROMETHAZINE HYDROCHLORIDE 25 MG/1
25 SUPPOSITORY RECTAL ONCE AS NEEDED
Status: DISCONTINUED | OUTPATIENT
Start: 2022-10-28 | End: 2022-10-28 | Stop reason: HOSPADM

## 2022-10-28 RX ORDER — DEXAMETHASONE SODIUM PHOSPHATE 4 MG/ML
INJECTION, SOLUTION INTRA-ARTICULAR; INTRALESIONAL; INTRAMUSCULAR; INTRAVENOUS; SOFT TISSUE AS NEEDED
Status: DISCONTINUED | OUTPATIENT
Start: 2022-10-28 | End: 2022-10-28 | Stop reason: SURG

## 2022-10-28 RX ORDER — MAGNESIUM HYDROXIDE 1200 MG/15ML
LIQUID ORAL AS NEEDED
Status: DISCONTINUED | OUTPATIENT
Start: 2022-10-28 | End: 2022-10-28 | Stop reason: HOSPADM

## 2022-10-28 RX ORDER — SODIUM CHLORIDE 0.9 % (FLUSH) 0.9 %
3-10 SYRINGE (ML) INJECTION AS NEEDED
Status: DISCONTINUED | OUTPATIENT
Start: 2022-10-28 | End: 2022-10-28 | Stop reason: HOSPADM

## 2022-10-28 RX ORDER — TOPIRAMATE 25 MG/1
50 TABLET ORAL DAILY
Status: DISCONTINUED | OUTPATIENT
Start: 2022-10-29 | End: 2022-10-31 | Stop reason: HOSPADM

## 2022-10-28 RX ORDER — SIMETHICONE 80 MG
80 TABLET,CHEWABLE ORAL 4 TIMES DAILY PRN
Status: DISCONTINUED | OUTPATIENT
Start: 2022-10-28 | End: 2022-10-31 | Stop reason: HOSPADM

## 2022-10-28 RX ORDER — ONDANSETRON 2 MG/ML
INJECTION INTRAMUSCULAR; INTRAVENOUS
Status: COMPLETED
Start: 2022-10-28 | End: 2022-10-28

## 2022-10-28 RX ORDER — CETIRIZINE HYDROCHLORIDE 10 MG/1
10 TABLET ORAL DAILY
Status: DISCONTINUED | OUTPATIENT
Start: 2022-10-29 | End: 2022-10-31 | Stop reason: HOSPADM

## 2022-10-28 RX ORDER — INSULIN LISPRO 100 [IU]/ML
4 INJECTION, SOLUTION INTRAVENOUS; SUBCUTANEOUS ONCE
Status: COMPLETED | OUTPATIENT
Start: 2022-10-28 | End: 2022-10-28

## 2022-10-28 RX ORDER — INSULIN LISPRO 100 [IU]/ML
INJECTION, SOLUTION INTRAVENOUS; SUBCUTANEOUS
Status: COMPLETED
Start: 2022-10-28 | End: 2022-10-28

## 2022-10-28 RX ORDER — DIPHENHYDRAMINE HYDROCHLORIDE 50 MG/ML
25 INJECTION INTRAMUSCULAR; INTRAVENOUS EVERY 4 HOURS PRN
Status: DISCONTINUED | OUTPATIENT
Start: 2022-10-28 | End: 2022-10-31 | Stop reason: HOSPADM

## 2022-10-28 RX ORDER — SODIUM CHLORIDE 9 MG/ML
150 INJECTION, SOLUTION INTRAVENOUS CONTINUOUS
Status: DISCONTINUED | OUTPATIENT
Start: 2022-10-28 | End: 2022-10-28

## 2022-10-28 RX ORDER — HYDROMORPHONE HYDROCHLORIDE 2 MG/1
2 TABLET ORAL EVERY 4 HOURS PRN
Status: DISCONTINUED | OUTPATIENT
Start: 2022-10-28 | End: 2022-10-31 | Stop reason: HOSPADM

## 2022-10-28 RX ORDER — LIDOCAINE HYDROCHLORIDE 10 MG/ML
0.5 INJECTION, SOLUTION EPIDURAL; INFILTRATION; INTRACAUDAL; PERINEURAL ONCE AS NEEDED
Status: COMPLETED | OUTPATIENT
Start: 2022-10-28 | End: 2022-10-28

## 2022-10-28 RX ORDER — MEPERIDINE HYDROCHLORIDE 25 MG/ML
12.5 INJECTION INTRAMUSCULAR; INTRAVENOUS; SUBCUTANEOUS
Status: DISCONTINUED | OUTPATIENT
Start: 2022-10-28 | End: 2022-10-28 | Stop reason: HOSPADM

## 2022-10-28 RX ORDER — VILAZODONE HYDROCHLORIDE 40 MG/1
40 TABLET ORAL DAILY
Status: DISCONTINUED | OUTPATIENT
Start: 2022-10-29 | End: 2022-10-31 | Stop reason: HOSPADM

## 2022-10-28 RX ORDER — LIDOCAINE HYDROCHLORIDE 10 MG/ML
INJECTION, SOLUTION EPIDURAL; INFILTRATION; INTRACAUDAL; PERINEURAL AS NEEDED
Status: DISCONTINUED | OUTPATIENT
Start: 2022-10-28 | End: 2022-10-28 | Stop reason: SURG

## 2022-10-28 RX ORDER — DROPERIDOL 2.5 MG/ML
0.62 INJECTION, SOLUTION INTRAMUSCULAR; INTRAVENOUS
Status: DISCONTINUED | OUTPATIENT
Start: 2022-10-28 | End: 2022-10-28 | Stop reason: HOSPADM

## 2022-10-28 RX ORDER — FAMOTIDINE 20 MG/1
20 TABLET, FILM COATED ORAL ONCE
Status: DISCONTINUED | OUTPATIENT
Start: 2022-10-28 | End: 2022-10-28 | Stop reason: HOSPADM

## 2022-10-28 RX ORDER — NICOTINE POLACRILEX 4 MG
15 LOZENGE BUCCAL
Status: DISCONTINUED | OUTPATIENT
Start: 2022-10-28 | End: 2022-10-31 | Stop reason: HOSPADM

## 2022-10-28 RX ORDER — INSULIN LISPRO 100 [IU]/ML
0-14 INJECTION, SOLUTION INTRAVENOUS; SUBCUTANEOUS
Status: DISCONTINUED | OUTPATIENT
Start: 2022-10-28 | End: 2022-10-31 | Stop reason: HOSPADM

## 2022-10-28 RX ORDER — HYDRALAZINE HYDROCHLORIDE 20 MG/ML
5 INJECTION INTRAMUSCULAR; INTRAVENOUS
Status: DISCONTINUED | OUTPATIENT
Start: 2022-10-28 | End: 2022-10-28 | Stop reason: HOSPADM

## 2022-10-28 RX ORDER — SODIUM CHLORIDE 0.9 % (FLUSH) 0.9 %
3 SYRINGE (ML) INJECTION EVERY 12 HOURS SCHEDULED
Status: DISCONTINUED | OUTPATIENT
Start: 2022-10-28 | End: 2022-10-28 | Stop reason: HOSPADM

## 2022-10-28 RX ORDER — AMLODIPINE BESYLATE 5 MG/1
5 TABLET ORAL DAILY
Status: DISCONTINUED | OUTPATIENT
Start: 2022-10-28 | End: 2022-10-31 | Stop reason: HOSPADM

## 2022-10-28 RX ORDER — DROPERIDOL 2.5 MG/ML
INJECTION, SOLUTION INTRAMUSCULAR; INTRAVENOUS
Status: COMPLETED
Start: 2022-10-28 | End: 2022-10-28

## 2022-10-28 RX ORDER — FENTANYL CITRATE 50 UG/ML
INJECTION, SOLUTION INTRAMUSCULAR; INTRAVENOUS AS NEEDED
Status: DISCONTINUED | OUTPATIENT
Start: 2022-10-28 | End: 2022-10-28 | Stop reason: SURG

## 2022-10-28 RX ORDER — LORAZEPAM 1 MG/1
1 TABLET ORAL EVERY 12 HOURS PRN
Status: DISCONTINUED | OUTPATIENT
Start: 2022-10-28 | End: 2022-10-31 | Stop reason: HOSPADM

## 2022-10-28 RX ORDER — ENOXAPARIN SODIUM 100 MG/ML
40 INJECTION SUBCUTANEOUS ONCE
Status: DISCONTINUED | OUTPATIENT
Start: 2022-10-28 | End: 2022-10-28 | Stop reason: HOSPADM

## 2022-10-28 RX ORDER — PANTOPRAZOLE SODIUM 40 MG/10ML
40 INJECTION, POWDER, LYOPHILIZED, FOR SOLUTION INTRAVENOUS
Status: DISCONTINUED | OUTPATIENT
Start: 2022-10-29 | End: 2022-10-29

## 2022-10-28 RX ORDER — ENOXAPARIN SODIUM 100 MG/ML
40 INJECTION SUBCUTANEOUS DAILY
Status: DISCONTINUED | OUTPATIENT
Start: 2022-10-29 | End: 2022-10-31 | Stop reason: HOSPADM

## 2022-10-28 RX ORDER — FIBRINOGEN HUMAN AND THROMBIN HUMAN 10 ML
KIT TOPICAL AS NEEDED
Status: DISCONTINUED | OUTPATIENT
Start: 2022-10-28 | End: 2022-10-28 | Stop reason: HOSPADM

## 2022-10-28 RX ORDER — TRANEXAMIC ACID 10 MG/ML
1000 INJECTION, SOLUTION INTRAVENOUS ONCE
Status: COMPLETED | OUTPATIENT
Start: 2022-10-28 | End: 2022-10-28

## 2022-10-28 RX ORDER — PROMETHAZINE HYDROCHLORIDE 25 MG/1
25 TABLET ORAL ONCE AS NEEDED
Status: DISCONTINUED | OUTPATIENT
Start: 2022-10-28 | End: 2022-10-28 | Stop reason: HOSPADM

## 2022-10-28 RX ORDER — PROMETHAZINE HYDROCHLORIDE 12.5 MG/1
12.5 TABLET ORAL EVERY 6 HOURS PRN
Status: DISCONTINUED | OUTPATIENT
Start: 2022-10-28 | End: 2022-10-31 | Stop reason: HOSPADM

## 2022-10-28 RX ORDER — MIRTAZAPINE 15 MG/1
30 TABLET, FILM COATED ORAL NIGHTLY
Status: DISCONTINUED | OUTPATIENT
Start: 2022-10-28 | End: 2022-10-31 | Stop reason: HOSPADM

## 2022-10-28 RX ORDER — FAMOTIDINE 10 MG/ML
20 INJECTION, SOLUTION INTRAVENOUS ONCE
Status: DISCONTINUED | OUTPATIENT
Start: 2022-10-28 | End: 2022-10-28 | Stop reason: HOSPADM

## 2022-10-28 RX ORDER — DEXAMETHASONE SODIUM PHOSPHATE 10 MG/ML
INJECTION, SOLUTION INTRAMUSCULAR; INTRAVENOUS
Status: COMPLETED | OUTPATIENT
Start: 2022-10-28 | End: 2022-10-28

## 2022-10-28 RX ORDER — HYDRALAZINE HYDROCHLORIDE 20 MG/ML
10 INJECTION INTRAMUSCULAR; INTRAVENOUS
Status: DISCONTINUED | OUTPATIENT
Start: 2022-10-28 | End: 2022-10-31 | Stop reason: HOSPADM

## 2022-10-28 RX ORDER — ENOXAPARIN SODIUM 100 MG/ML
INJECTION SUBCUTANEOUS AS NEEDED
Status: DISCONTINUED | OUTPATIENT
Start: 2022-10-28 | End: 2022-10-28 | Stop reason: HOSPADM

## 2022-10-28 RX ORDER — SODIUM CHLORIDE 0.9 % (FLUSH) 0.9 %
10 SYRINGE (ML) INJECTION AS NEEDED
Status: DISCONTINUED | OUTPATIENT
Start: 2022-10-28 | End: 2022-10-28 | Stop reason: HOSPADM

## 2022-10-28 RX ORDER — NALOXONE HCL 0.4 MG/ML
0.4 VIAL (ML) INJECTION
Status: DISCONTINUED | OUTPATIENT
Start: 2022-10-28 | End: 2022-10-31 | Stop reason: HOSPADM

## 2022-10-28 RX ORDER — POTASSIUM CHLORIDE 1.5 G/1.77G
40 POWDER, FOR SOLUTION ORAL AS NEEDED
Status: DISCONTINUED | OUTPATIENT
Start: 2022-10-28 | End: 2022-10-31 | Stop reason: HOSPADM

## 2022-10-28 RX ORDER — ALBUTEROL SULFATE 2.5 MG/3ML
2.5 SOLUTION RESPIRATORY (INHALATION) EVERY 4 HOURS PRN
Status: DISCONTINUED | OUTPATIENT
Start: 2022-10-28 | End: 2022-10-31 | Stop reason: HOSPADM

## 2022-10-28 RX ORDER — HYDROXYZINE HYDROCHLORIDE 25 MG/1
25 TABLET, FILM COATED ORAL 3 TIMES DAILY PRN
Status: DISCONTINUED | OUTPATIENT
Start: 2022-10-28 | End: 2022-10-31 | Stop reason: HOSPADM

## 2022-10-28 RX ORDER — FENTANYL CITRATE 50 UG/ML
50 INJECTION, SOLUTION INTRAMUSCULAR; INTRAVENOUS
Status: DISCONTINUED | OUTPATIENT
Start: 2022-10-28 | End: 2022-10-28 | Stop reason: HOSPADM

## 2022-10-28 RX ORDER — BUPIVACAINE HYDROCHLORIDE 2.5 MG/ML
INJECTION, SOLUTION EPIDURAL; INFILTRATION; INTRACAUDAL
Status: COMPLETED | OUTPATIENT
Start: 2022-10-28 | End: 2022-10-28

## 2022-10-28 RX ORDER — NALOXONE HCL 0.4 MG/ML
0.1 VIAL (ML) INJECTION
Status: DISCONTINUED | OUTPATIENT
Start: 2022-10-28 | End: 2022-10-31 | Stop reason: HOSPADM

## 2022-10-28 RX ORDER — ACETAMINOPHEN 500 MG
1000 TABLET ORAL ONCE
Status: COMPLETED | OUTPATIENT
Start: 2022-10-28 | End: 2022-10-28

## 2022-10-28 RX ORDER — ALPRAZOLAM 0.25 MG/1
0.25 TABLET ORAL ONCE AS NEEDED
Status: DISCONTINUED | OUTPATIENT
Start: 2022-10-28 | End: 2022-10-31 | Stop reason: HOSPADM

## 2022-10-28 RX ORDER — ONDANSETRON 2 MG/ML
4 INJECTION INTRAMUSCULAR; INTRAVENOUS EVERY 4 HOURS PRN
Status: DISCONTINUED | OUTPATIENT
Start: 2022-10-28 | End: 2022-10-31 | Stop reason: HOSPADM

## 2022-10-28 RX ORDER — ONDANSETRON 4 MG/1
4 TABLET, FILM COATED ORAL EVERY 4 HOURS PRN
Status: DISCONTINUED | OUTPATIENT
Start: 2022-10-28 | End: 2022-10-31 | Stop reason: HOSPADM

## 2022-10-28 RX ORDER — PANTOPRAZOLE SODIUM 40 MG/10ML
40 INJECTION, POWDER, LYOPHILIZED, FOR SOLUTION INTRAVENOUS ONCE
Status: COMPLETED | OUTPATIENT
Start: 2022-10-28 | End: 2022-10-28

## 2022-10-28 RX ORDER — BUPIVACAINE HYDROCHLORIDE AND EPINEPHRINE 5; 5 MG/ML; UG/ML
INJECTION, SOLUTION PERINEURAL AS NEEDED
Status: DISCONTINUED | OUTPATIENT
Start: 2022-10-28 | End: 2022-10-28 | Stop reason: HOSPADM

## 2022-10-28 RX ORDER — LABETALOL HYDROCHLORIDE 5 MG/ML
5 INJECTION, SOLUTION INTRAVENOUS
Status: DISCONTINUED | OUTPATIENT
Start: 2022-10-28 | End: 2022-10-28 | Stop reason: HOSPADM

## 2022-10-28 RX ORDER — CYANOCOBALAMIN 1000 UG/ML
1000 INJECTION, SOLUTION INTRAMUSCULAR; SUBCUTANEOUS ONCE
Status: COMPLETED | OUTPATIENT
Start: 2022-10-29 | End: 2022-10-29

## 2022-10-28 RX ORDER — LORAZEPAM 2 MG/ML
0.5 INJECTION INTRAMUSCULAR EVERY 12 HOURS PRN
Status: DISCONTINUED | OUTPATIENT
Start: 2022-10-28 | End: 2022-10-31 | Stop reason: HOSPADM

## 2022-10-28 RX ORDER — ROCURONIUM BROMIDE 10 MG/ML
INJECTION, SOLUTION INTRAVENOUS AS NEEDED
Status: DISCONTINUED | OUTPATIENT
Start: 2022-10-28 | End: 2022-10-28 | Stop reason: SURG

## 2022-10-28 RX ORDER — CHLORHEXIDINE GLUCONATE 0.12 MG/ML
30 RINSE ORAL
Status: COMPLETED | OUTPATIENT
Start: 2022-10-28 | End: 2022-10-28

## 2022-10-28 RX ORDER — HYDROMORPHONE HYDROCHLORIDE 1 MG/ML
0.5 INJECTION, SOLUTION INTRAMUSCULAR; INTRAVENOUS; SUBCUTANEOUS
Status: DISCONTINUED | OUTPATIENT
Start: 2022-10-28 | End: 2022-10-28 | Stop reason: HOSPADM

## 2022-10-28 RX ORDER — FLUTICASONE PROPIONATE 50 MCG
1 SPRAY, SUSPENSION (ML) NASAL 2 TIMES DAILY PRN
Status: DISCONTINUED | OUTPATIENT
Start: 2022-10-28 | End: 2022-10-31 | Stop reason: HOSPADM

## 2022-10-28 RX ORDER — NALOXONE HCL 0.4 MG/ML
0.4 VIAL (ML) INJECTION AS NEEDED
Status: DISCONTINUED | OUTPATIENT
Start: 2022-10-28 | End: 2022-10-28 | Stop reason: HOSPADM

## 2022-10-28 RX ORDER — POTASSIUM CHLORIDE 7.45 MG/ML
10 INJECTION INTRAVENOUS
Status: DISCONTINUED | OUTPATIENT
Start: 2022-10-28 | End: 2022-10-31 | Stop reason: HOSPADM

## 2022-10-28 RX ORDER — TAMSULOSIN HYDROCHLORIDE 0.4 MG/1
0.4 CAPSULE ORAL DAILY
Status: DISCONTINUED | OUTPATIENT
Start: 2022-10-28 | End: 2022-10-31 | Stop reason: HOSPADM

## 2022-10-28 RX ORDER — MIDAZOLAM HYDROCHLORIDE 1 MG/ML
1 INJECTION INTRAMUSCULAR; INTRAVENOUS
Status: DISCONTINUED | OUTPATIENT
Start: 2022-10-28 | End: 2022-10-28 | Stop reason: HOSPADM

## 2022-10-28 RX ORDER — CEFAZOLIN SODIUM IN 0.9 % NACL 2 G/100 ML
2 PLASTIC BAG, INJECTION (ML) INTRAVENOUS EVERY 8 HOURS
Status: COMPLETED | OUTPATIENT
Start: 2022-10-28 | End: 2022-10-29

## 2022-10-28 RX ORDER — ACETAMINOPHEN 160 MG/5ML
1000 SOLUTION ORAL EVERY 8 HOURS
Status: DISCONTINUED | OUTPATIENT
Start: 2022-10-28 | End: 2022-10-30

## 2022-10-28 RX ORDER — ONDANSETRON 4 MG/1
4 TABLET, FILM COATED ORAL EVERY 6 HOURS PRN
Status: DISCONTINUED | OUTPATIENT
Start: 2022-11-01 | End: 2022-10-31 | Stop reason: HOSPADM

## 2022-10-28 RX ORDER — SODIUM CHLORIDE 0.9 % (FLUSH) 0.9 %
10 SYRINGE (ML) INJECTION EVERY 12 HOURS SCHEDULED
Status: DISCONTINUED | OUTPATIENT
Start: 2022-10-28 | End: 2022-10-28 | Stop reason: HOSPADM

## 2022-10-28 RX ORDER — SCOLOPAMINE TRANSDERMAL SYSTEM 1 MG/1
1 PATCH, EXTENDED RELEASE TRANSDERMAL ONCE
Status: DISCONTINUED | OUTPATIENT
Start: 2022-10-28 | End: 2022-10-28

## 2022-10-28 RX ORDER — ACETAMINOPHEN 500 MG
1000 TABLET ORAL EVERY 8 HOURS
Status: DISCONTINUED | OUTPATIENT
Start: 2022-10-28 | End: 2022-10-30

## 2022-10-28 RX ORDER — CEFAZOLIN SODIUM IN 0.9 % NACL 2 G/100 ML
2 PLASTIC BAG, INJECTION (ML) INTRAVENOUS ONCE
Status: COMPLETED | OUTPATIENT
Start: 2022-10-28 | End: 2022-10-28

## 2022-10-28 RX ORDER — BUPRENORPHINE HYDROCHLORIDE 0.32 MG/ML
INJECTION INTRAMUSCULAR; INTRAVENOUS
Status: COMPLETED | OUTPATIENT
Start: 2022-10-28 | End: 2022-10-28

## 2022-10-28 RX ORDER — KETAMINE HCL IN NACL, ISO-OSM 100MG/10ML
SYRINGE (ML) INJECTION AS NEEDED
Status: DISCONTINUED | OUTPATIENT
Start: 2022-10-28 | End: 2022-10-28 | Stop reason: SURG

## 2022-10-28 RX ORDER — MIDAZOLAM HYDROCHLORIDE 1 MG/ML
INJECTION INTRAMUSCULAR; INTRAVENOUS AS NEEDED
Status: DISCONTINUED | OUTPATIENT
Start: 2022-10-28 | End: 2022-10-28 | Stop reason: SURG

## 2022-10-28 RX ORDER — DROPERIDOL 2.5 MG/ML
0.62 INJECTION, SOLUTION INTRAMUSCULAR; INTRAVENOUS ONCE AS NEEDED
Status: DISCONTINUED | OUTPATIENT
Start: 2022-10-28 | End: 2022-10-28 | Stop reason: HOSPADM

## 2022-10-28 RX ORDER — REMIFENTANIL HYDROCHLORIDE 1 MG/ML
INJECTION, POWDER, LYOPHILIZED, FOR SOLUTION INTRAVENOUS AS NEEDED
Status: DISCONTINUED | OUTPATIENT
Start: 2022-10-28 | End: 2022-10-28 | Stop reason: SURG

## 2022-10-28 RX ORDER — MORPHINE SULFATE 4 MG/ML
4 INJECTION, SOLUTION INTRAMUSCULAR; INTRAVENOUS
Status: DISCONTINUED | OUTPATIENT
Start: 2022-10-28 | End: 2022-10-31 | Stop reason: HOSPADM

## 2022-10-28 RX ORDER — GABAPENTIN 250 MG/5ML
100 SOLUTION ORAL 3 TIMES DAILY
Status: COMPLETED | OUTPATIENT
Start: 2022-10-28 | End: 2022-10-30

## 2022-10-28 RX ORDER — SODIUM CHLORIDE 9 MG/ML
100 INJECTION, SOLUTION INTRAVENOUS CONTINUOUS
Status: DISCONTINUED | OUTPATIENT
Start: 2022-10-28 | End: 2022-10-31 | Stop reason: HOSPADM

## 2022-10-28 RX ORDER — OXYCODONE HYDROCHLORIDE 5 MG/1
5 TABLET ORAL EVERY 6 HOURS PRN
Status: DISCONTINUED | OUTPATIENT
Start: 2022-10-28 | End: 2022-10-31 | Stop reason: HOSPADM

## 2022-10-28 RX ORDER — SODIUM CHLORIDE 9 MG/ML
INJECTION, SOLUTION INTRAVENOUS AS NEEDED
Status: DISCONTINUED | OUTPATIENT
Start: 2022-10-28 | End: 2022-10-28 | Stop reason: HOSPADM

## 2022-10-28 RX ORDER — DEXTROSE MONOHYDRATE 25 G/50ML
25 INJECTION, SOLUTION INTRAVENOUS
Status: DISCONTINUED | OUTPATIENT
Start: 2022-10-28 | End: 2022-10-31 | Stop reason: HOSPADM

## 2022-10-28 RX ADMIN — BUPIVACAINE HYDROCHLORIDE 60 ML: 2.5 INJECTION, SOLUTION EPIDURAL; INFILTRATION; INTRACAUDAL; PERINEURAL at 08:14

## 2022-10-28 RX ADMIN — Medication 50 MG: at 08:30

## 2022-10-28 RX ADMIN — ACETAMINOPHEN 1000 MG: 500 TABLET ORAL at 07:35

## 2022-10-28 RX ADMIN — ROCURONIUM BROMIDE 60 MG: 10 INJECTION, SOLUTION INTRAVENOUS at 08:13

## 2022-10-28 RX ADMIN — CEFAZOLIN 2 G: 10 INJECTION, POWDER, FOR SOLUTION INTRAVENOUS at 16:43

## 2022-10-28 RX ADMIN — ROCURONIUM BROMIDE 20 MG: 10 INJECTION, SOLUTION INTRAVENOUS at 08:47

## 2022-10-28 RX ADMIN — SODIUM CHLORIDE 1000 ML: 9 INJECTION, SOLUTION INTRAVENOUS at 07:34

## 2022-10-28 RX ADMIN — INSULIN LISPRO 4 UNITS: 100 INJECTION, SOLUTION INTRAVENOUS; SUBCUTANEOUS at 10:55

## 2022-10-28 RX ADMIN — ONDANSETRON 4 MG: 2 INJECTION INTRAMUSCULAR; INTRAVENOUS at 10:11

## 2022-10-28 RX ADMIN — SODIUM CHLORIDE: 9 INJECTION, SOLUTION INTRAVENOUS at 08:10

## 2022-10-28 RX ADMIN — SUGAMMADEX 500 MG: 100 INJECTION, SOLUTION INTRAVENOUS at 09:39

## 2022-10-28 RX ADMIN — LIDOCAINE HYDROCHLORIDE 100 MG: 10 INJECTION, SOLUTION EPIDURAL; INFILTRATION; INTRACAUDAL; PERINEURAL at 08:13

## 2022-10-28 RX ADMIN — SODIUM CHLORIDE 150 ML/HR: 9 INJECTION, SOLUTION INTRAVENOUS at 19:45

## 2022-10-28 RX ADMIN — HYDROMORPHONE HYDROCHLORIDE 0.5 MG: 1 INJECTION, SOLUTION INTRAMUSCULAR; INTRAVENOUS; SUBCUTANEOUS at 11:10

## 2022-10-28 RX ADMIN — PROPOFOL 200 MCG/KG/MIN: 10 INJECTION, EMULSION INTRAVENOUS at 08:13

## 2022-10-28 RX ADMIN — TRANEXAMIC ACID 1000 MG: 10 INJECTION, SOLUTION INTRAVENOUS at 09:04

## 2022-10-28 RX ADMIN — ONDANSETRON 4 MG: 2 INJECTION INTRAMUSCULAR; INTRAVENOUS at 09:31

## 2022-10-28 RX ADMIN — AMLODIPINE BESYLATE 5 MG: 5 TABLET ORAL at 14:02

## 2022-10-28 RX ADMIN — GABAPENTIN 100 MG: 100 CAPSULE ORAL at 21:28

## 2022-10-28 RX ADMIN — DEXAMETHASONE SODIUM PHOSPHATE 4 MG: 4 INJECTION, SOLUTION INTRA-ARTICULAR; INTRALESIONAL; INTRAMUSCULAR; INTRAVENOUS; SOFT TISSUE at 08:22

## 2022-10-28 RX ADMIN — CHLORHEXIDINE GLUCONATE 0.12% ORAL RINSE 30 ML: 1.2 LIQUID ORAL at 07:39

## 2022-10-28 RX ADMIN — DROPERIDOL 0.62 MG: 2.5 INJECTION, SOLUTION INTRAMUSCULAR; INTRAVENOUS at 10:00

## 2022-10-28 RX ADMIN — BUPRENORPHINE HYDROCHLORIDE 0.3 MG: 0.32 INJECTION INTRAMUSCULAR; INTRAVENOUS at 08:14

## 2022-10-28 RX ADMIN — ACETAMINOPHEN 1000 MG: 500 TABLET ORAL at 16:44

## 2022-10-28 RX ADMIN — AMISULPRIDE 10 MG: 2.5 INJECTION, SOLUTION INTRAVENOUS at 09:31

## 2022-10-28 RX ADMIN — SCOPALAMINE 1 PATCH: 1 PATCH, EXTENDED RELEASE TRANSDERMAL at 07:36

## 2022-10-28 RX ADMIN — DEXAMETHASONE SODIUM PHOSPHATE 4 MG: 10 INJECTION, SOLUTION INTRAMUSCULAR; INTRAVENOUS at 08:14

## 2022-10-28 RX ADMIN — HYDROMORPHONE HYDROCHLORIDE 0.5 MG: 1 INJECTION, SOLUTION INTRAMUSCULAR; INTRAVENOUS; SUBCUTANEOUS at 10:09

## 2022-10-28 RX ADMIN — INSULIN LISPRO 5 UNITS: 100 INJECTION, SOLUTION INTRAVENOUS; SUBCUTANEOUS at 16:43

## 2022-10-28 RX ADMIN — SODIUM CHLORIDE 150 ML/HR: 9 INJECTION, SOLUTION INTRAVENOUS at 11:51

## 2022-10-28 RX ADMIN — PROPOFOL 300 MG: 10 INJECTION, EMULSION INTRAVENOUS at 08:13

## 2022-10-28 RX ADMIN — CHLORHEXIDINE GLUCONATE 0.12% ORAL RINSE 30 ML: 1.2 LIQUID ORAL at 07:36

## 2022-10-28 RX ADMIN — GLUCAGON HYDROCHLORIDE 1 MG: KIT at 08:45

## 2022-10-28 RX ADMIN — HYDROMORPHONE HYDROCHLORIDE 1 MG: 1 INJECTION, SOLUTION INTRAMUSCULAR; INTRAVENOUS; SUBCUTANEOUS at 16:44

## 2022-10-28 RX ADMIN — SODIUM CHLORIDE 150 ML/HR: 9 INJECTION, SOLUTION INTRAVENOUS at 07:47

## 2022-10-28 RX ADMIN — HYDRALAZINE HYDROCHLORIDE 10 MG: 20 INJECTION INTRAMUSCULAR; INTRAVENOUS at 21:29

## 2022-10-28 RX ADMIN — ROCURONIUM BROMIDE 20 MG: 10 INJECTION, SOLUTION INTRAVENOUS at 09:11

## 2022-10-28 RX ADMIN — PANTOPRAZOLE SODIUM 40 MG: 40 INJECTION, POWDER, FOR SOLUTION INTRAVENOUS at 07:36

## 2022-10-28 RX ADMIN — MIRTAZAPINE 30 MG: 15 TABLET, FILM COATED ORAL at 21:28

## 2022-10-28 RX ADMIN — TAMSULOSIN HYDROCHLORIDE 0.4 MG: 0.4 CAPSULE ORAL at 14:02

## 2022-10-28 RX ADMIN — REMIFENTANIL HYDROCHLORIDE 0.1 MCG/KG/MIN: 1 INJECTION, POWDER, LYOPHILIZED, FOR SOLUTION INTRAVENOUS at 08:13

## 2022-10-28 RX ADMIN — GABAPENTIN 100 MG: 100 CAPSULE ORAL at 16:44

## 2022-10-28 RX ADMIN — GABAPENTIN 600 MG: 300 CAPSULE ORAL at 07:35

## 2022-10-28 RX ADMIN — CEFAZOLIN 2 G: 10 INJECTION, POWDER, FOR SOLUTION INTRAVENOUS at 08:11

## 2022-10-28 RX ADMIN — LIDOCAINE HYDROCHLORIDE 0.5 ML: 10 INJECTION, SOLUTION EPIDURAL; INFILTRATION; INTRACAUDAL; PERINEURAL at 07:35

## 2022-10-28 RX ADMIN — FENTANYL CITRATE 100 MCG: 50 INJECTION, SOLUTION INTRAMUSCULAR; INTRAVENOUS at 09:23

## 2022-10-28 RX ADMIN — PROMETHAZINE HYDROCHLORIDE 12.5 MG: 12.5 TABLET ORAL at 16:44

## 2022-10-28 RX ADMIN — MIDAZOLAM HYDROCHLORIDE 2 MG: 1 INJECTION, SOLUTION INTRAMUSCULAR; INTRAVENOUS at 08:13

## 2022-10-28 NOTE — ANESTHESIA PROCEDURE NOTES
Airway  Urgency: elective    Date/Time: 10/28/2022 8:18 AM  End Time:10/28/2022 8:18 AM  Airway not difficult    General Information and Staff    Patient location during procedure: OR  CRNA/CAA: Socorro Noriega CRNA    Indications and Patient Condition  Indications for airway management: airway protection    Preoxygenated: yes  MILS not maintained throughout  Mask difficulty assessment: 2 - vent by mask + OA or adjuvant +/- NMBA    Final Airway Details  Final airway type: endotracheal airway      Successful airway: ETT  Cuffed: yes   Successful intubation technique: video laryngoscopy  Endotracheal tube insertion site: oral  Blade: Whitt  Blade size: 4  ETT size (mm): 8.0  Cormack-Lehane Classification: grade I - full view of glottis  Placement verified by: chest auscultation and capnometry   Measured from: lips  ETT/EBT  to lips (cm): 20  Number of attempts at approach: 1  Assessment: lips, teeth, and gum same as pre-op and atraumatic intubation    Additional Comments  Negative epigastric sounds, Breath sound equal bilaterally with symmetric chest rise and fall

## 2022-10-28 NOTE — ANESTHESIA PREPROCEDURE EVALUATION
Anesthesia Evaluation     Patient summary reviewed and Nursing notes reviewed   no history of anesthetic complications:  NPO Solid Status: > 8 hours  NPO Liquid Status: > 8 hours           Airway   Mallampati: II  TM distance: >3 FB  Neck ROM: full  No difficulty expected  Dental      Pulmonary - normal exam   (+) shortness of breath, sleep apnea,   Cardiovascular - normal exam    (+) hypertension, valvular problems/murmurs, hyperlipidemia,       Neuro/Psych  (+) headaches, psychiatric history,    GI/Hepatic/Renal/Endo    (+) morbid obesity, GERD, PUD,  renal disease, diabetes mellitus,     Musculoskeletal     Abdominal    Substance History      OB/GYN          Other                        Anesthesia Plan    ASA 3     general with block     intravenous induction     Anesthetic plan, risks, benefits, and alternatives have been provided, discussed and informed consent has been obtained with: patient.    Plan discussed with CRNA.        CODE STATUS:

## 2022-10-28 NOTE — ANESTHESIA POSTPROCEDURE EVALUATION
Patient: Gopi Johnson    Procedure Summary     Date: 10/28/22 [facility] / [facility]    Anesthesia Start: 0810 Anesthesia Stop: 0955    Procedures:       GASTRIC SLEEVE LAPAROSCOPIC, LIVER BIOPSY LAPAROSCOPIC (Abdomen)      ESOPHAGOGASTRODUODENOSCOPY (Esophagus)      LIVER BIOPSY LAPAROSCOPIC (Abdomen) Diagnosis:       Morbid exogenous obesity (HCC)      Elevated LFTs      (Morbid exogenous obesity (HCC) [E66.01])      (Elevated LFTs [R79.89])    Surgeons: Olu Rodriguez MD Provider: Telly Melton MD    Anesthesia Type: general with block ASA Status: 3          Anesthesia Type: general with block    Vitals  Vitals Value Taken Time   /99 10/28/22 0955   Temp 97.1 °F (36.2 °C) 10/28/22 0955   Pulse 73 10/28/22 0955   Resp 14 10/28/22 0955   SpO2 97 % 10/28/22 0955           Post Anesthesia Care and Evaluation    Patient location during evaluation: PACU  Patient participation: complete - patient participated  Level of consciousness: sleepy but conscious  Pain management: adequate    Airway patency: patent  Anesthetic complications: No anesthetic complications  PONV Status: none  Cardiovascular status: hemodynamically stable and acceptable  Respiratory status: nonlabored ventilation, acceptable and nasal cannula  Hydration status: acceptable    Comments: Discussed risk of MH with nurse and reviewed allergies at bedside

## 2022-10-28 NOTE — ANESTHESIA PROCEDURE NOTES
Peripheral Block    Pre-sedation assessment completed: 10/28/2022 8:12 AM    Patient reassessed immediately prior to procedure    Patient location during procedure: OR  Start time: 10/28/2022 8:14 AM  Stop time: 10/28/2022 8:17 AM  Reason for block: at surgeon's request and post-op pain management  Performed by  Anesthesiologist: Telly Melton MD  Preanesthetic Checklist  Completed: patient identified, IV checked, site marked, risks and benefits discussed, surgical consent, monitors and equipment checked, pre-op evaluation and timeout performed  Prep:  Pt Position: supine  Sterile barriers:cap, gloves, mask and washed/disinfected hands  Prep: ChloraPrep  Patient monitoring: blood pressure monitoring, continuous pulse oximetry and EKG  Procedure    Sedation: yes  Performed under: general  Guidance:ultrasound guided  Images:still images obtained, printed/placed on chart    Laterality:Bilateral  Block Type:TAP  Injection Technique:single-shot  Needle Type:short-bevel and echogenic  Needle Gauge:20 G  Resistance on Injection: none    Medications Used: bupivacaine PF (MARCAINE) 0.25 % injection - Injection   60 mL - 10/28/2022 8:14:00 AM  buprenorphine (BUPRENEX) injection - Injection   0.3 mg - 10/28/2022 8:14:00 AM  dexamethasone sodium phosphate injection - Injection   4 mg - 10/28/2022 8:14:00 AM      Medications  Comment:Block Injection:  LA dose divided between Right and Left block        Post Assessment  Injection Assessment: negative aspiration for heme, incremental injection and no paresthesia on injection  Patient Tolerance:comfortable throughout block  Complications:no  Additional Notes    Subcostal TAPs    A high-frequency linear transducer, with sterile cover, was placed sub-xiphoid to identify [place], right and left Rectus Abdominus Muscles (SRINIVASAN). The transducer was moved either right or left subcostally to identify the SRINIVASAN and the Transverse Abdominus Muscle (LO). The insertion site was prepped  "in sterile fashion and then localized with 2-5 ml of 1% Lidocaine. Using ultrasound-guidance, a 20-gauge B-Springer 4\" Ultraplex 360 non-stimulating echogenic needle was advanced in plane, from medial to lateral, until the tip of the needle was in the fascial plane between the SRINIVASAN and LO. 1-3ml of preservative free normal saline was used to hydro-dissect the fascial planes. After the fascial plane was verified, the local anesthetic (LA) was injected. The procedure was repeated on the opposite side for bilateral coverage. Aspiration every 5 ml to prevent intravascular injection. Injection was completed with negative aspiration of blood and negative intravascular injection. Injection pressures were normal with minimal resistance. The subcostal approach to the TAP nerve block ideally anesthetizes the intercostal nerves T6-T9.     Mid-Axillary/Lateral TAPs    A high-frequency linear transducer, with sterile cover, was placed in the midaxillary line between the ASIS and costal margin. The External Oblique Muscle (EOM), Internal Oblique Muscle (IOM), Transverse Abdominus Muscle (LO), and Peritoneum were identified. The insertion site was prepped in sterile fashion and then localized with 2-5 ml of 1% Lidocaine. Using ultrasound-guidance, a 20-gauge B-Springer 4\" Ultraplex 360 non-stimulating echogenic needle was advanced in plane, from medial to lateral, until the tip of the needle was in the fascial plane between the IOM and LO. 1-3ml of preservative free normal saline was used to hydro-dissect the fascial planes. After the fascial plane was verified, the local anesthetic (LA) was injected. The procedure was repeated on the opposite side for bilateral coverage. Aspiration every 5 ml to prevent intravascular injection. Injection was completed with negative aspiration of blood and negative intravascular injection. Injection pressures were normal with minimal resistance. Midaxillary TAPs should reach intercostal nerves T10- " T11 and the subcostal nerve T12.

## 2022-10-29 ENCOUNTER — APPOINTMENT (OUTPATIENT)
Dept: GENERAL RADIOLOGY | Facility: HOSPITAL | Age: 47
End: 2022-10-29

## 2022-10-29 LAB
ALBUMIN SERPL-MCNC: 3.5 G/DL (ref 3.5–5.2)
ALBUMIN SERPL-MCNC: 3.9 G/DL (ref 3.5–5.2)
ALBUMIN/GLOB SERPL: 1.3 G/DL
ALBUMIN/GLOB SERPL: 1.6 G/DL
ALP SERPL-CCNC: 237 U/L (ref 39–117)
ALP SERPL-CCNC: 288 U/L (ref 39–117)
ALT SERPL W P-5'-P-CCNC: 269 U/L (ref 1–41)
ALT SERPL W P-5'-P-CCNC: 326 U/L (ref 1–41)
ANION GAP SERPL CALCULATED.3IONS-SCNC: 11 MMOL/L (ref 5–15)
ANION GAP SERPL CALCULATED.3IONS-SCNC: 12 MMOL/L (ref 5–15)
AST SERPL-CCNC: 330 U/L (ref 1–40)
AST SERPL-CCNC: 384 U/L (ref 1–40)
BASOPHILS # BLD AUTO: 0.02 10*3/MM3 (ref 0–0.2)
BASOPHILS NFR BLD AUTO: 0.2 % (ref 0–1.5)
BILIRUB SERPL-MCNC: 1.3 MG/DL (ref 0–1.2)
BILIRUB SERPL-MCNC: 1.8 MG/DL (ref 0–1.2)
BUN SERPL-MCNC: 5 MG/DL (ref 6–20)
BUN SERPL-MCNC: 7 MG/DL (ref 6–20)
BUN/CREAT SERPL: 10.3 (ref 7–25)
BUN/CREAT SERPL: 8.1 (ref 7–25)
CALCIUM SPEC-SCNC: 7.8 MG/DL (ref 8.6–10.5)
CALCIUM SPEC-SCNC: 8.5 MG/DL (ref 8.6–10.5)
CHLORIDE SERPL-SCNC: 96 MMOL/L (ref 98–107)
CHLORIDE SERPL-SCNC: 99 MMOL/L (ref 98–107)
CO2 SERPL-SCNC: 24 MMOL/L (ref 22–29)
CO2 SERPL-SCNC: 25 MMOL/L (ref 22–29)
CREAT SERPL-MCNC: 0.62 MG/DL (ref 0.76–1.27)
CREAT SERPL-MCNC: 0.68 MG/DL (ref 0.76–1.27)
DEPRECATED RDW RBC AUTO: 40.9 FL (ref 37–54)
EGFRCR SERPLBLD CKD-EPI 2021: 115.4 ML/MIN/1.73
EGFRCR SERPLBLD CKD-EPI 2021: 118.6 ML/MIN/1.73
EOSINOPHIL # BLD AUTO: 0 10*3/MM3 (ref 0–0.4)
EOSINOPHIL NFR BLD AUTO: 0 % (ref 0.3–6.2)
ERYTHROCYTE [DISTWIDTH] IN BLOOD BY AUTOMATED COUNT: 14 % (ref 12.3–15.4)
GLOBULIN UR ELPH-MCNC: 2.4 GM/DL
GLOBULIN UR ELPH-MCNC: 2.8 GM/DL
GLUCOSE BLDC GLUCOMTR-MCNC: 162 MG/DL (ref 70–130)
GLUCOSE BLDC GLUCOMTR-MCNC: 162 MG/DL (ref 70–130)
GLUCOSE BLDC GLUCOMTR-MCNC: 164 MG/DL (ref 70–130)
GLUCOSE SERPL-MCNC: 162 MG/DL (ref 65–99)
GLUCOSE SERPL-MCNC: 174 MG/DL (ref 65–99)
HCT VFR BLD AUTO: 46.4 % (ref 37.5–51)
HGB BLD-MCNC: 15.2 G/DL (ref 13–17.7)
IMM GRANULOCYTES # BLD AUTO: 0.11 10*3/MM3 (ref 0–0.05)
IMM GRANULOCYTES NFR BLD AUTO: 1.2 % (ref 0–0.5)
IRON 24H UR-MRATE: 96 MCG/DL (ref 59–158)
LYMPHOCYTES # BLD AUTO: 1.14 10*3/MM3 (ref 0.7–3.1)
LYMPHOCYTES NFR BLD AUTO: 12.4 % (ref 19.6–45.3)
MCH RBC QN AUTO: 26.6 PG (ref 26.6–33)
MCHC RBC AUTO-ENTMCNC: 32.8 G/DL (ref 31.5–35.7)
MCV RBC AUTO: 81.3 FL (ref 79–97)
MONOCYTES # BLD AUTO: 0.65 10*3/MM3 (ref 0.1–0.9)
MONOCYTES NFR BLD AUTO: 7.1 % (ref 5–12)
NEUTROPHILS NFR BLD AUTO: 7.29 10*3/MM3 (ref 1.7–7)
NEUTROPHILS NFR BLD AUTO: 79.1 % (ref 42.7–76)
NRBC BLD AUTO-RTO: 0 /100 WBC (ref 0–0.2)
PLATELET # BLD AUTO: 242 10*3/MM3 (ref 140–450)
PMV BLD AUTO: 9.4 FL (ref 6–12)
POTASSIUM SERPL-SCNC: 3.5 MMOL/L (ref 3.5–5.2)
POTASSIUM SERPL-SCNC: 4.1 MMOL/L (ref 3.5–5.2)
PREALB SERPL-MCNC: 20.3 MG/DL (ref 20–40)
PROT SERPL-MCNC: 6.3 G/DL (ref 6–8.5)
PROT SERPL-MCNC: 6.3 G/DL (ref 6–8.5)
RBC # BLD AUTO: 5.71 10*6/MM3 (ref 4.14–5.8)
SODIUM SERPL-SCNC: 132 MMOL/L (ref 136–145)
SODIUM SERPL-SCNC: 135 MMOL/L (ref 136–145)
WBC NRBC COR # BLD: 9.21 10*3/MM3 (ref 3.4–10.8)

## 2022-10-29 PROCEDURE — 74240 X-RAY XM UPR GI TRC 1CNTRST: CPT

## 2022-10-29 PROCEDURE — 82962 GLUCOSE BLOOD TEST: CPT

## 2022-10-29 PROCEDURE — 97530 THERAPEUTIC ACTIVITIES: CPT

## 2022-10-29 PROCEDURE — 99024 POSTOP FOLLOW-UP VISIT: CPT | Performed by: SURGERY

## 2022-10-29 PROCEDURE — 25010000002 THIAMINE PER 100 MG: Performed by: SURGERY

## 2022-10-29 PROCEDURE — 25010000002 CYANOCOBALAMIN PER 1000 MCG: Performed by: SURGERY

## 2022-10-29 PROCEDURE — 84134 ASSAY OF PREALBUMIN: CPT | Performed by: SURGERY

## 2022-10-29 PROCEDURE — 0 DIATRIZOATE MEGLUMINE & SODIUM PER 1 ML: Performed by: SURGERY

## 2022-10-29 PROCEDURE — 80053 COMPREHEN METABOLIC PANEL: CPT | Performed by: SURGERY

## 2022-10-29 PROCEDURE — 97161 PT EVAL LOW COMPLEX 20 MIN: CPT

## 2022-10-29 PROCEDURE — 85025 COMPLETE CBC W/AUTO DIFF WBC: CPT | Performed by: SURGERY

## 2022-10-29 PROCEDURE — 63710000001 INSULIN LISPRO (HUMAN) PER 5 UNITS: Performed by: SURGERY

## 2022-10-29 PROCEDURE — 83540 ASSAY OF IRON: CPT | Performed by: SURGERY

## 2022-10-29 PROCEDURE — 63710000001 ONDANSETRON PER 8 MG: Performed by: SURGERY

## 2022-10-29 PROCEDURE — 25010000002 ENOXAPARIN PER 10 MG: Performed by: SURGERY

## 2022-10-29 PROCEDURE — 0 POTASSIUM CHLORIDE PER 2 MEQ: Performed by: SURGERY

## 2022-10-29 PROCEDURE — 25010000002 CEFAZOLIN PER 500 MG: Performed by: SURGERY

## 2022-10-29 PROCEDURE — 25010000002 ONDANSETRON PER 1 MG: Performed by: SURGERY

## 2022-10-29 RX ORDER — PANTOPRAZOLE SODIUM 40 MG/1
40 TABLET, DELAYED RELEASE ORAL
Status: DISCONTINUED | OUTPATIENT
Start: 2022-10-30 | End: 2022-10-31 | Stop reason: HOSPADM

## 2022-10-29 RX ORDER — ONDANSETRON 4 MG/1
4 TABLET, ORALLY DISINTEGRATING ORAL EVERY 8 HOURS PRN
Qty: 10 TABLET | Refills: 0 | Status: SHIPPED | OUTPATIENT
Start: 2022-10-29 | End: 2022-11-07

## 2022-10-29 RX ORDER — OXYCODONE HYDROCHLORIDE 5 MG/1
5 TABLET ORAL EVERY 6 HOURS PRN
Qty: 10 TABLET | Refills: 0 | Status: SHIPPED | OUTPATIENT
Start: 2022-10-29 | End: 2022-10-31 | Stop reason: SDUPTHER

## 2022-10-29 RX ADMIN — ACETAMINOPHEN 1000 MG: 500 TABLET ORAL at 16:21

## 2022-10-29 RX ADMIN — ACETAMINOPHEN 1000 MG: 500 TABLET ORAL at 00:44

## 2022-10-29 RX ADMIN — OXYCODONE HYDROCHLORIDE 5 MG: 5 TABLET ORAL at 18:53

## 2022-10-29 RX ADMIN — ONDANSETRON 4 MG: 2 INJECTION INTRAMUSCULAR; INTRAVENOUS at 05:51

## 2022-10-29 RX ADMIN — TAMSULOSIN HYDROCHLORIDE 0.4 MG: 0.4 CAPSULE ORAL at 08:11

## 2022-10-29 RX ADMIN — PANTOPRAZOLE SODIUM 40 MG: 40 INJECTION, POWDER, FOR SOLUTION INTRAVENOUS at 06:19

## 2022-10-29 RX ADMIN — ONDANSETRON HYDROCHLORIDE 4 MG: 4 TABLET, FILM COATED ORAL at 16:22

## 2022-10-29 RX ADMIN — OXYCODONE HYDROCHLORIDE 5 MG: 5 TABLET ORAL at 05:52

## 2022-10-29 RX ADMIN — ENOXAPARIN SODIUM 40 MG: 40 INJECTION SUBCUTANEOUS at 12:43

## 2022-10-29 RX ADMIN — OXYCODONE HYDROCHLORIDE 5 MG: 5 TABLET ORAL at 12:43

## 2022-10-29 RX ADMIN — ACETAMINOPHEN 1000 MG: 500 TABLET ORAL at 23:55

## 2022-10-29 RX ADMIN — THIAMINE HYDROCHLORIDE 100 ML/HR: 100 INJECTION, SOLUTION INTRAMUSCULAR; INTRAVENOUS at 02:45

## 2022-10-29 RX ADMIN — MIRTAZAPINE 30 MG: 15 TABLET, FILM COATED ORAL at 19:44

## 2022-10-29 RX ADMIN — POTASSIUM CHLORIDE AND SODIUM CHLORIDE 125 ML/HR: 450; 150 INJECTION, SOLUTION INTRAVENOUS at 18:55

## 2022-10-29 RX ADMIN — CETIRIZINE HYDROCHLORIDE 10 MG: 10 TABLET, FILM COATED ORAL at 08:12

## 2022-10-29 RX ADMIN — AMLODIPINE BESYLATE 5 MG: 5 TABLET ORAL at 08:11

## 2022-10-29 RX ADMIN — LISINOPRIL 30 MG: 20 TABLET ORAL at 08:12

## 2022-10-29 RX ADMIN — ACETAMINOPHEN 1000 MG: 500 TABLET ORAL at 08:12

## 2022-10-29 RX ADMIN — CEFAZOLIN 2 G: 10 INJECTION, POWDER, FOR SOLUTION INTRAVENOUS at 00:44

## 2022-10-29 RX ADMIN — GABAPENTIN 100 MG: 100 CAPSULE ORAL at 16:21

## 2022-10-29 RX ADMIN — GABAPENTIN 100 MG: 100 CAPSULE ORAL at 19:44

## 2022-10-29 RX ADMIN — INSULIN LISPRO 3 UNITS: 100 INJECTION, SOLUTION INTRAVENOUS; SUBCUTANEOUS at 08:11

## 2022-10-29 RX ADMIN — INSULIN LISPRO 3 UNITS: 100 INJECTION, SOLUTION INTRAVENOUS; SUBCUTANEOUS at 18:52

## 2022-10-29 RX ADMIN — POTASSIUM CHLORIDE 40 MEQ: 750 CAPSULE, EXTENDED RELEASE ORAL at 16:21

## 2022-10-29 RX ADMIN — INSULIN LISPRO 3 UNITS: 100 INJECTION, SOLUTION INTRAVENOUS; SUBCUTANEOUS at 12:43

## 2022-10-29 RX ADMIN — CYANOCOBALAMIN 1000 MCG: 1000 INJECTION, SOLUTION INTRAMUSCULAR; SUBCUTANEOUS at 08:12

## 2022-10-29 RX ADMIN — GABAPENTIN 100 MG: 100 CAPSULE ORAL at 08:11

## 2022-10-29 RX ADMIN — POTASSIUM CHLORIDE AND SODIUM CHLORIDE 125 ML/HR: 450; 150 INJECTION, SOLUTION INTRAVENOUS at 12:44

## 2022-10-29 RX ADMIN — TOPIRAMATE 50 MG: 25 TABLET, FILM COATED ORAL at 08:11

## 2022-10-30 LAB
ALBUMIN SERPL-MCNC: 3.7 G/DL (ref 3.5–5.2)
ALBUMIN/GLOB SERPL: 1.5 G/DL
ALP SERPL-CCNC: 385 U/L (ref 39–117)
ALT SERPL W P-5'-P-CCNC: 569 U/L (ref 1–41)
ANION GAP SERPL CALCULATED.3IONS-SCNC: 17 MMOL/L (ref 5–15)
AST SERPL-CCNC: 762 U/L (ref 1–40)
BACTERIA UR QL AUTO: NORMAL /HPF
BASOPHILS # BLD AUTO: 0.06 10*3/MM3 (ref 0–0.2)
BASOPHILS NFR BLD AUTO: 0.8 % (ref 0–1.5)
BILIRUB CONJ SERPL-MCNC: 3.1 MG/DL (ref 0–0.3)
BILIRUB SERPL-MCNC: 3.6 MG/DL (ref 0–1.2)
BILIRUB UR QL STRIP: NEGATIVE
BUN SERPL-MCNC: 7 MG/DL (ref 6–20)
BUN/CREAT SERPL: 10.4 (ref 7–25)
CALCIUM SPEC-SCNC: 8.2 MG/DL (ref 8.6–10.5)
CHLORIDE SERPL-SCNC: 97 MMOL/L (ref 98–107)
CLARITY UR: ABNORMAL
CO2 SERPL-SCNC: 18 MMOL/L (ref 22–29)
COLOR UR: YELLOW
CREAT SERPL-MCNC: 0.67 MG/DL (ref 0.76–1.27)
DEPRECATED RDW RBC AUTO: 45.1 FL (ref 37–54)
EGFRCR SERPLBLD CKD-EPI 2021: 115.9 ML/MIN/1.73
EOSINOPHIL # BLD AUTO: 0.01 10*3/MM3 (ref 0–0.4)
EOSINOPHIL NFR BLD AUTO: 0.1 % (ref 0.3–6.2)
ERYTHROCYTE [DISTWIDTH] IN BLOOD BY AUTOMATED COUNT: 15.3 % (ref 12.3–15.4)
GLOBULIN UR ELPH-MCNC: 2.4 GM/DL
GLUCOSE BLDC GLUCOMTR-MCNC: 129 MG/DL (ref 70–130)
GLUCOSE BLDC GLUCOMTR-MCNC: 133 MG/DL (ref 70–130)
GLUCOSE BLDC GLUCOMTR-MCNC: 161 MG/DL (ref 70–130)
GLUCOSE SERPL-MCNC: 148 MG/DL (ref 65–99)
GLUCOSE UR STRIP-MCNC: ABNORMAL MG/DL
HCT VFR BLD AUTO: 49.7 % (ref 37.5–51)
HGB BLD-MCNC: 15.7 G/DL (ref 13–17.7)
HGB UR QL STRIP.AUTO: NEGATIVE
HYALINE CASTS UR QL AUTO: NORMAL /LPF
IMM GRANULOCYTES # BLD AUTO: 0.15 10*3/MM3 (ref 0–0.05)
IMM GRANULOCYTES NFR BLD AUTO: 1.9 % (ref 0–0.5)
INR PPP: 1.06 (ref 0.84–1.13)
KETONES UR QL STRIP: ABNORMAL
LEUKOCYTE ESTERASE UR QL STRIP.AUTO: NEGATIVE
LYMPHOCYTES # BLD AUTO: 1.47 10*3/MM3 (ref 0.7–3.1)
LYMPHOCYTES NFR BLD AUTO: 18.4 % (ref 19.6–45.3)
MCH RBC QN AUTO: 26.5 PG (ref 26.6–33)
MCHC RBC AUTO-ENTMCNC: 31.6 G/DL (ref 31.5–35.7)
MCV RBC AUTO: 83.8 FL (ref 79–97)
MONOCYTES # BLD AUTO: 0.67 10*3/MM3 (ref 0.1–0.9)
MONOCYTES NFR BLD AUTO: 8.4 % (ref 5–12)
NEUTROPHILS NFR BLD AUTO: 5.64 10*3/MM3 (ref 1.7–7)
NEUTROPHILS NFR BLD AUTO: 70.4 % (ref 42.7–76)
NITRITE UR QL STRIP: NEGATIVE
NRBC BLD AUTO-RTO: 0 /100 WBC (ref 0–0.2)
PH UR STRIP.AUTO: 6.5 [PH] (ref 5–8)
PLATELET # BLD AUTO: 266 10*3/MM3 (ref 140–450)
PMV BLD AUTO: 9.4 FL (ref 6–12)
POTASSIUM SERPL-SCNC: 4.5 MMOL/L (ref 3.5–5.2)
PROT SERPL-MCNC: 6.1 G/DL (ref 6–8.5)
PROT UR QL STRIP: NEGATIVE
PROTHROMBIN TIME: 13.7 SECONDS (ref 11.4–14.4)
RBC # BLD AUTO: 5.93 10*6/MM3 (ref 4.14–5.8)
RBC # UR STRIP: NORMAL /HPF
REF LAB TEST METHOD: NORMAL
SODIUM SERPL-SCNC: 132 MMOL/L (ref 136–145)
SP GR UR STRIP: 1.01 (ref 1–1.03)
SQUAMOUS #/AREA URNS HPF: NORMAL /HPF
UROBILINOGEN UR QL STRIP: ABNORMAL
WBC # UR STRIP: NORMAL /HPF
WBC NRBC COR # BLD: 8 10*3/MM3 (ref 3.4–10.8)

## 2022-10-30 PROCEDURE — 94660 CPAP INITIATION&MGMT: CPT

## 2022-10-30 PROCEDURE — 99024 POSTOP FOLLOW-UP VISIT: CPT | Performed by: SURGERY

## 2022-10-30 PROCEDURE — 81001 URINALYSIS AUTO W/SCOPE: CPT | Performed by: SURGERY

## 2022-10-30 PROCEDURE — 99223 1ST HOSP IP/OBS HIGH 75: CPT | Performed by: NURSE PRACTITIONER

## 2022-10-30 PROCEDURE — 80053 COMPREHEN METABOLIC PANEL: CPT | Performed by: SURGERY

## 2022-10-30 PROCEDURE — 0 POTASSIUM CHLORIDE PER 2 MEQ: Performed by: SURGERY

## 2022-10-30 PROCEDURE — 25010000002 ONDANSETRON PER 1 MG: Performed by: SURGERY

## 2022-10-30 PROCEDURE — 85025 COMPLETE CBC W/AUTO DIFF WBC: CPT | Performed by: SURGERY

## 2022-10-30 PROCEDURE — 25010000002 METOCLOPRAMIDE PER 10 MG: Performed by: SURGERY

## 2022-10-30 PROCEDURE — 82248 BILIRUBIN DIRECT: CPT | Performed by: SURGERY

## 2022-10-30 PROCEDURE — 25010000002 HYDRALAZINE PER 20 MG: Performed by: SURGERY

## 2022-10-30 PROCEDURE — 82962 GLUCOSE BLOOD TEST: CPT

## 2022-10-30 PROCEDURE — 94799 UNLISTED PULMONARY SVC/PX: CPT

## 2022-10-30 PROCEDURE — 63710000001 INSULIN LISPRO (HUMAN) PER 5 UNITS: Performed by: SURGERY

## 2022-10-30 PROCEDURE — 85610 PROTHROMBIN TIME: CPT | Performed by: SURGERY

## 2022-10-30 PROCEDURE — 25010000002 HYDROMORPHONE 1 MG/ML SOLUTION: Performed by: SURGERY

## 2022-10-30 PROCEDURE — 25010000002 ENOXAPARIN PER 10 MG: Performed by: SURGERY

## 2022-10-30 RX ADMIN — HYDRALAZINE HYDROCHLORIDE 10 MG: 20 INJECTION INTRAMUSCULAR; INTRAVENOUS at 01:13

## 2022-10-30 RX ADMIN — TAMSULOSIN HYDROCHLORIDE 0.4 MG: 0.4 CAPSULE ORAL at 08:50

## 2022-10-30 RX ADMIN — LISINOPRIL 30 MG: 20 TABLET ORAL at 08:50

## 2022-10-30 RX ADMIN — POTASSIUM CHLORIDE AND SODIUM CHLORIDE 125 ML/HR: 450; 150 INJECTION, SOLUTION INTRAVENOUS at 01:21

## 2022-10-30 RX ADMIN — OXYCODONE HYDROCHLORIDE 5 MG: 5 TABLET ORAL at 08:57

## 2022-10-30 RX ADMIN — METOCLOPRAMIDE 10 MG: 5 INJECTION, SOLUTION INTRAMUSCULAR; INTRAVENOUS at 08:57

## 2022-10-30 RX ADMIN — MIRTAZAPINE 30 MG: 15 TABLET, FILM COATED ORAL at 20:56

## 2022-10-30 RX ADMIN — OXYCODONE HYDROCHLORIDE 5 MG: 5 TABLET ORAL at 17:43

## 2022-10-30 RX ADMIN — OXYCODONE HYDROCHLORIDE 5 MG: 5 TABLET ORAL at 01:12

## 2022-10-30 RX ADMIN — HYDRALAZINE HYDROCHLORIDE 10 MG: 20 INJECTION INTRAMUSCULAR; INTRAVENOUS at 05:42

## 2022-10-30 RX ADMIN — PANTOPRAZOLE SODIUM 40 MG: 40 TABLET, DELAYED RELEASE ORAL at 05:42

## 2022-10-30 RX ADMIN — HYDROMORPHONE HYDROCHLORIDE 1 MG: 1 INJECTION, SOLUTION INTRAMUSCULAR; INTRAVENOUS; SUBCUTANEOUS at 13:21

## 2022-10-30 RX ADMIN — ENOXAPARIN SODIUM 40 MG: 40 INJECTION SUBCUTANEOUS at 08:49

## 2022-10-30 RX ADMIN — ONDANSETRON 4 MG: 2 INJECTION INTRAMUSCULAR; INTRAVENOUS at 06:57

## 2022-10-30 RX ADMIN — HYDROMORPHONE HYDROCHLORIDE 2 MG: 2 TABLET ORAL at 20:57

## 2022-10-30 RX ADMIN — CETIRIZINE HYDROCHLORIDE 10 MG: 10 TABLET, FILM COATED ORAL at 08:50

## 2022-10-30 RX ADMIN — SODIUM CHLORIDE 1000 ML: 9 INJECTION, SOLUTION INTRAVENOUS at 15:10

## 2022-10-30 RX ADMIN — GABAPENTIN 100 MG: 100 CAPSULE ORAL at 08:50

## 2022-10-30 RX ADMIN — INSULIN LISPRO 3 UNITS: 100 INJECTION, SOLUTION INTRAVENOUS; SUBCUTANEOUS at 08:50

## 2022-10-30 RX ADMIN — SODIUM CHLORIDE 100 ML/HR: 9 INJECTION, SOLUTION INTRAVENOUS at 17:44

## 2022-10-30 RX ADMIN — AMLODIPINE BESYLATE 5 MG: 5 TABLET ORAL at 08:50

## 2022-10-30 RX ADMIN — TOPIRAMATE 50 MG: 25 TABLET, FILM COATED ORAL at 08:49

## 2022-10-31 ENCOUNTER — APPOINTMENT (OUTPATIENT)
Dept: ULTRASOUND IMAGING | Facility: HOSPITAL | Age: 47
End: 2022-10-31

## 2022-10-31 VITALS
WEIGHT: 251.32 LBS | HEART RATE: 93 BPM | HEIGHT: 69 IN | DIASTOLIC BLOOD PRESSURE: 92 MMHG | OXYGEN SATURATION: 98 % | RESPIRATION RATE: 18 BRPM | BODY MASS INDEX: 37.22 KG/M2 | TEMPERATURE: 98.2 F | SYSTOLIC BLOOD PRESSURE: 149 MMHG

## 2022-10-31 LAB
ALBUMIN SERPL-MCNC: 3.7 G/DL (ref 3.5–5.2)
ALBUMIN SERPL-MCNC: 3.9 G/DL (ref 3.5–5.2)
ALBUMIN/GLOB SERPL: 1.5 G/DL
ALBUMIN/GLOB SERPL: 1.6 G/DL
ALP SERPL-CCNC: 324 U/L (ref 39–117)
ALP SERPL-CCNC: 338 U/L (ref 39–117)
ALT SERPL W P-5'-P-CCNC: 361 U/L (ref 1–41)
ALT SERPL W P-5'-P-CCNC: 378 U/L (ref 1–41)
ANION GAP SERPL CALCULATED.3IONS-SCNC: 11 MMOL/L (ref 5–15)
ANION GAP SERPL CALCULATED.3IONS-SCNC: 13 MMOL/L (ref 5–15)
AST SERPL-CCNC: 152 U/L (ref 1–40)
AST SERPL-CCNC: 164 U/L (ref 1–40)
BASOPHILS # BLD AUTO: 0.05 10*3/MM3 (ref 0–0.2)
BASOPHILS # BLD AUTO: 0.05 10*3/MM3 (ref 0–0.2)
BASOPHILS NFR BLD AUTO: 0.5 % (ref 0–1.5)
BASOPHILS NFR BLD AUTO: 0.6 % (ref 0–1.5)
BILIRUB SERPL-MCNC: 0.8 MG/DL (ref 0–1.2)
BILIRUB SERPL-MCNC: 1.2 MG/DL (ref 0–1.2)
BUN SERPL-MCNC: 10 MG/DL (ref 6–20)
BUN SERPL-MCNC: 9 MG/DL (ref 6–20)
BUN/CREAT SERPL: 12.3 (ref 7–25)
BUN/CREAT SERPL: 13.4 (ref 7–25)
CALCIUM SPEC-SCNC: 8.3 MG/DL (ref 8.6–10.5)
CALCIUM SPEC-SCNC: 8.5 MG/DL (ref 8.6–10.5)
CHLORIDE SERPL-SCNC: 101 MMOL/L (ref 98–107)
CHLORIDE SERPL-SCNC: 102 MMOL/L (ref 98–107)
CO2 SERPL-SCNC: 21 MMOL/L (ref 22–29)
CO2 SERPL-SCNC: 21 MMOL/L (ref 22–29)
CREAT SERPL-MCNC: 0.67 MG/DL (ref 0.76–1.27)
CREAT SERPL-MCNC: 0.81 MG/DL (ref 0.76–1.27)
CYTO UR: NORMAL
DEPRECATED RDW RBC AUTO: 45.7 FL (ref 37–54)
DEPRECATED RDW RBC AUTO: 46.6 FL (ref 37–54)
EGFRCR SERPLBLD CKD-EPI 2021: 109.4 ML/MIN/1.73
EGFRCR SERPLBLD CKD-EPI 2021: 115.9 ML/MIN/1.73
EOSINOPHIL # BLD AUTO: 0.04 10*3/MM3 (ref 0–0.4)
EOSINOPHIL # BLD AUTO: 0.05 10*3/MM3 (ref 0–0.4)
EOSINOPHIL NFR BLD AUTO: 0.5 % (ref 0.3–6.2)
EOSINOPHIL NFR BLD AUTO: 0.5 % (ref 0.3–6.2)
ERYTHROCYTE [DISTWIDTH] IN BLOOD BY AUTOMATED COUNT: 15.8 % (ref 12.3–15.4)
ERYTHROCYTE [DISTWIDTH] IN BLOOD BY AUTOMATED COUNT: 16.5 % (ref 12.3–15.4)
GLOBULIN UR ELPH-MCNC: 2.4 GM/DL
GLOBULIN UR ELPH-MCNC: 2.5 GM/DL
GLUCOSE BLDC GLUCOMTR-MCNC: 119 MG/DL (ref 70–130)
GLUCOSE BLDC GLUCOMTR-MCNC: 119 MG/DL (ref 70–130)
GLUCOSE SERPL-MCNC: 127 MG/DL (ref 65–99)
GLUCOSE SERPL-MCNC: 129 MG/DL (ref 65–99)
HCT VFR BLD AUTO: 48.7 % (ref 37.5–51)
HCT VFR BLD AUTO: 51.2 % (ref 37.5–51)
HGB BLD-MCNC: 15.7 G/DL (ref 13–17.7)
HGB BLD-MCNC: 16.3 G/DL (ref 13–17.7)
IMM GRANULOCYTES # BLD AUTO: 0.17 10*3/MM3 (ref 0–0.05)
IMM GRANULOCYTES # BLD AUTO: 0.18 10*3/MM3 (ref 0–0.05)
IMM GRANULOCYTES NFR BLD AUTO: 1.8 % (ref 0–0.5)
IMM GRANULOCYTES NFR BLD AUTO: 2.1 % (ref 0–0.5)
LAB AP CASE REPORT: NORMAL
LAB AP CLINICAL INFORMATION: NORMAL
LYMPHOCYTES # BLD AUTO: 1.76 10*3/MM3 (ref 0.7–3.1)
LYMPHOCYTES # BLD AUTO: 1.94 10*3/MM3 (ref 0.7–3.1)
LYMPHOCYTES NFR BLD AUTO: 20.4 % (ref 19.6–45.3)
LYMPHOCYTES NFR BLD AUTO: 20.5 % (ref 19.6–45.3)
MCH RBC QN AUTO: 26.3 PG (ref 26.6–33)
MCH RBC QN AUTO: 26.6 PG (ref 26.6–33)
MCHC RBC AUTO-ENTMCNC: 31.8 G/DL (ref 31.5–35.7)
MCHC RBC AUTO-ENTMCNC: 32.2 G/DL (ref 31.5–35.7)
MCV RBC AUTO: 81.7 FL (ref 79–97)
MCV RBC AUTO: 83.7 FL (ref 79–97)
MONOCYTES # BLD AUTO: 0.72 10*3/MM3 (ref 0.1–0.9)
MONOCYTES # BLD AUTO: 0.82 10*3/MM3 (ref 0.1–0.9)
MONOCYTES NFR BLD AUTO: 8.3 % (ref 5–12)
MONOCYTES NFR BLD AUTO: 8.7 % (ref 5–12)
NEUTROPHILS NFR BLD AUTO: 5.89 10*3/MM3 (ref 1.7–7)
NEUTROPHILS NFR BLD AUTO: 6.44 10*3/MM3 (ref 1.7–7)
NEUTROPHILS NFR BLD AUTO: 68 % (ref 42.7–76)
NEUTROPHILS NFR BLD AUTO: 68.1 % (ref 42.7–76)
NRBC BLD AUTO-RTO: 0 /100 WBC (ref 0–0.2)
NRBC BLD AUTO-RTO: 0 /100 WBC (ref 0–0.2)
PATH REPORT.FINAL DX SPEC: NORMAL
PATH REPORT.GROSS SPEC: NORMAL
PLATELET # BLD AUTO: 258 10*3/MM3 (ref 140–450)
PLATELET # BLD AUTO: 282 10*3/MM3 (ref 140–450)
PMV BLD AUTO: 9.2 FL (ref 6–12)
PMV BLD AUTO: 9.2 FL (ref 6–12)
POTASSIUM SERPL-SCNC: 4.4 MMOL/L (ref 3.5–5.2)
POTASSIUM SERPL-SCNC: 4.6 MMOL/L (ref 3.5–5.2)
PROT SERPL-MCNC: 6.1 G/DL (ref 6–8.5)
PROT SERPL-MCNC: 6.4 G/DL (ref 6–8.5)
RBC # BLD AUTO: 5.96 10*6/MM3 (ref 4.14–5.8)
RBC # BLD AUTO: 6.12 10*6/MM3 (ref 4.14–5.8)
SODIUM SERPL-SCNC: 134 MMOL/L (ref 136–145)
SODIUM SERPL-SCNC: 135 MMOL/L (ref 136–145)
WBC NRBC COR # BLD: 8.64 10*3/MM3 (ref 3.4–10.8)
WBC NRBC COR # BLD: 9.47 10*3/MM3 (ref 3.4–10.8)

## 2022-10-31 PROCEDURE — 25010000002 ENOXAPARIN PER 10 MG: Performed by: SURGERY

## 2022-10-31 PROCEDURE — 85025 COMPLETE CBC W/AUTO DIFF WBC: CPT | Performed by: SURGERY

## 2022-10-31 PROCEDURE — 99024 POSTOP FOLLOW-UP VISIT: CPT | Performed by: SURGERY

## 2022-10-31 PROCEDURE — 80053 COMPREHEN METABOLIC PANEL: CPT | Performed by: SURGERY

## 2022-10-31 PROCEDURE — 82962 GLUCOSE BLOOD TEST: CPT

## 2022-10-31 PROCEDURE — 76705 ECHO EXAM OF ABDOMEN: CPT

## 2022-10-31 RX ORDER — OXYCODONE HYDROCHLORIDE 5 MG/1
5 TABLET ORAL EVERY 4 HOURS PRN
Qty: 10 TABLET | Refills: 0 | Status: SHIPPED | OUTPATIENT
Start: 2022-10-31 | End: 2022-11-07

## 2022-10-31 RX ORDER — ONDANSETRON 4 MG/1
4 TABLET, FILM COATED ORAL EVERY 4 HOURS PRN
Qty: 8 TABLET | Refills: 0 | Status: SHIPPED | OUTPATIENT
Start: 2022-10-31 | End: 2022-11-07

## 2022-10-31 RX ADMIN — AMLODIPINE BESYLATE 5 MG: 5 TABLET ORAL at 11:18

## 2022-10-31 RX ADMIN — OXYCODONE HYDROCHLORIDE 5 MG: 5 TABLET ORAL at 11:19

## 2022-10-31 RX ADMIN — TOPIRAMATE 50 MG: 25 TABLET, FILM COATED ORAL at 11:18

## 2022-10-31 RX ADMIN — SODIUM CHLORIDE 100 ML/HR: 9 INJECTION, SOLUTION INTRAVENOUS at 03:01

## 2022-10-31 RX ADMIN — OXYCODONE HYDROCHLORIDE 5 MG: 5 TABLET ORAL at 00:26

## 2022-10-31 RX ADMIN — PANTOPRAZOLE SODIUM 40 MG: 40 TABLET, DELAYED RELEASE ORAL at 06:41

## 2022-10-31 RX ADMIN — CETIRIZINE HYDROCHLORIDE 10 MG: 10 TABLET, FILM COATED ORAL at 11:20

## 2022-10-31 RX ADMIN — LISINOPRIL 30 MG: 20 TABLET ORAL at 11:18

## 2022-10-31 RX ADMIN — TAMSULOSIN HYDROCHLORIDE 0.4 MG: 0.4 CAPSULE ORAL at 11:20

## 2022-10-31 RX ADMIN — ENOXAPARIN SODIUM 40 MG: 40 INJECTION SUBCUTANEOUS at 11:24

## 2022-10-31 RX ADMIN — OXYCODONE HYDROCHLORIDE 5 MG: 5 TABLET ORAL at 06:41

## 2022-11-01 NOTE — DISCHARGE SUMMARY
Admission Diagnosis:   Morbid Obesity with Multiple Co-morbidities, elevated liver function tests    Discharge Diagnosis:  Same    Principal Procedure Performed:   Laparoscopic sleeve gastrectomy,  EGD, laparoscopic Michi-Cut liver biopsies    Other procedures performed: Upper GI, ultrasound abdomen    Complications: None    Consultations: Gastroenterology    History of present illness:  This is a 47-year-old morbidly obese patient who presents for elective laparoscopic sleeve gastrectomy and liver biopsy for history of elevated liver function tests followed as an outpatient by GI.  The preoperative testing and evaluation is in order and the patient is admitted for elective surgery.    Hospital Course:  The patient was admitted and underwent uneventful surgery as described.  The liver did not appear particularly abnormal at the time of surgery with mild enlargement and no nodularity.  Postoperatively the patient was transferred to the bariatric telemetry unit. Upper GI on postoperative day #1 was unremarkable.  He was doing well clinically on postop day #1 but it was noted that his liver function tests had worsened with an ALT of 269  alkaline phosphatase 237 total bilirubin 1.3.  Iron and prealbumin were normal.  White count was normal and hemoglobin stable.  He was therefore observed another day and repeat liver function test the following day continued to worsen with an ALT of 569 AST of 762 alkaline phosphatase of 385 and total bilirubin of 3.6.  Direct component 3.1.  He continued to do well clinically and was not jaundiced.  PT/INR was normal at 13.7 and 1.06.  GI was consulted.  They obtained a history of sand-like stones at the time of his cholecystectomy and noted the patient had claustrophobia issues and therefore did not order an MRCP and therefore ordered an ultrasound.  This showed dilatation of the common bile duct but improved from previous study otherwise unremarkable.  At the time of discharge  on postoperative day #3 the patient is tolerating a diet and pain is controlled with oral medication.  The patient is afebrile and abdominal exam is appropriate, wounds look okay.  Liver function tests are improving with an ALT of 361 AST of 164 alkaline phosphatase 338 and total bilirubin now normal at 1.2.  Discharge white count normal at 8.6.  Normal differential.  Discharge H&H 16.3 and 51.2 similar to preop levels.  Final pathology of the liver showed mild to moderate steatosis only.  The patient is discharged home in good condition.  Discharge instructions were discussed.  The medication reconciliation has been completed.  The patient is to follow-up in 1-2 weeks in the office.  He requested oxycodone and I gave him 10 tablets 5 mg with the understanding that no additional narcotics will be prescribed and he can restart his Suboxone once he completes his oxycodone.  He says he knows how to manage his insulin to avoid insulin shock.  He says he has not required insulin last day or so in the hospital.  He says he has his Eliquis prescription and is instructed to start it this evening around 9 PM and every 12 hours till completed.  Call with any problems questions or concerns.

## 2022-11-07 ENCOUNTER — OFFICE VISIT (OUTPATIENT)
Dept: BARIATRICS/WEIGHT MGMT | Facility: CLINIC | Age: 47
End: 2022-11-07

## 2022-11-07 VITALS
TEMPERATURE: 97.5 F | BODY MASS INDEX: 34.58 KG/M2 | HEIGHT: 69 IN | HEART RATE: 91 BPM | WEIGHT: 233.5 LBS | RESPIRATION RATE: 18 BRPM | SYSTOLIC BLOOD PRESSURE: 142 MMHG | DIASTOLIC BLOOD PRESSURE: 88 MMHG | OXYGEN SATURATION: 98 %

## 2022-11-07 DIAGNOSIS — Z98.84 S/P BARIATRIC SURGERY: Primary | ICD-10-CM

## 2022-11-07 PROCEDURE — 99024 POSTOP FOLLOW-UP VISIT: CPT | Performed by: PHYSICIAN ASSISTANT

## 2022-11-07 RX ORDER — GLIPIZIDE 5 MG/1
TABLET, FILM COATED, EXTENDED RELEASE ORAL
COMMUNITY
Start: 2022-11-03

## 2022-11-07 RX ORDER — CHLORTHALIDONE 25 MG/1
TABLET ORAL
COMMUNITY
Start: 2022-10-31

## 2022-11-07 NOTE — PROGRESS NOTES
"[facility] Bariatric Surgery  [address]  [address]  [facility] [id number] [phone]      Patient Name:  Gopi Johnson  :  1975      Date of Visit: 2022      Reason for Visit:  POD #10    HPI:  Gopi Johnson is a 47 y.o. male s/p LSG/liver bx (steatosis) 10/28/ GDW    Discharged on POD#3 - elevated LFTs s/p remote lindsay, GI consulted, unremarkable U/S.      Feeling \"indifferent\", \"but good.\"  Started back on Subutex yesterday.  Getting 70-100g prot/day.  Tolerating liquids, shakes, smoothies, cottage cheese, eggs.      Denies dysphagia, reflux, nausea, vomiting, pulmonary issues and fevers.  Has some abd.pain/tenderness at periumbilical incision mostly.     Drinking \"not quite\" 64 fluid oz/day, but improving each day.  Voiding w/out issue now.  Taking MVI, B12, Vit D, and iron.    On Pantoprazole and Eliquis (but taking only once daily).  Holding Diuretics  and Glipizide.  FSBS 142 this AM, still holding insulin, PCP managing.  Ambulating frequently.     Presurgery weight: 251 pounds.  Today's weight is 106 kg (233 lb 8 oz) pounds, today's  Body mass index is 34.99 kg/m²., and weight loss since surgery is 18 pounds.       Final Diagnosis   1. STOMACH, SUBTOTAL GASTRECTOMY:         Stomach with reactive oxyntic-type mucosa.         No Helicobacter pylori-like organisms seen.         Negative for dysplasia or malignancy.  2.   LIVER, BIOPSIES:  Mild to moderate steatosis.       Past Medical History:   Diagnosis Date   • Abnormal PFTs (pulmonary function tests)    • Anxiety    • Arthritis    • Bipolar affective (HCC)    • Colon polyp    • Current every day vaping     Currently nonnicotine   • Depression    • Dyspepsia    • Dyspnea on exertion    • Elevated LFTs     GI eval (P)   • Enlarged prostate     s/p TURP 2021   • Family history of malignant hyperthermia     patient had muscle biopsy at [facility] and was negative, sister has malignant hyperthermia and " a cousin   • Fatigue    • Fractured pelvis (HCC)    • Frequent urination    • Gastroparesis    • GERD (gastroesophageal reflux disease)     on PPI, hx erosive gastritis on EGD 1/5/22   • H/O: substance abuse (HCC)     clean since 2016, on Subutex, managed by PCP   • Headache     following w/ Neurology   • Hx MRSA infection 2020    w/ osteomyelitis    • Hyperammonemia (HCC)     following w/ GI   • Hyperlipidemia    • Hypertension    • Hypogonadism in male     on testosterone q2wks, follows w/ Urology   • Hypokalemia    • Impaired mobility     w/ (L)sided weakness (since osteomyelitis), ambulates w/ cane   • Insomnia     on Trazodone   • Insulin dependent type 2 diabetes mellitus (HCC)    • Joint pain     w/ recent SI joint injections 2/2022   • Lower extremity edema    • Migraine    • Morbid obesity (HCC)    • Murmur    • Myoclonus     w/ body jerks, follows w/ Neurology   • Osteomyelitis (HCC) 2020    H/O spinal osteomyelitis, previous IVDA   • Piercing     ear/body   • PUD (peptic ulcer disease)     non-bleeding gastric ulcers on EGD 1/5/22 w/ Dr. Dover   • Rectus diastasis    • Seasonal allergies    • Sleep apnea     CPAP compliant   • Staph infection 2020   • Substance abuse (HCC)     Not currently, over 6 years clean and sober!   • Tattoo    • Tobacco use     trying to quit, currently vaping (0%) - mom does smoke in the home     Past Surgical History:   Procedure Laterality Date   • BUNIONECTOMY  2007   • COLONOSCOPY  2017   • CYSTOSCOPY TRANSURETHRAL RESECTION OF PROSTATE N/A 05/26/2021    Procedure: TRANSURETHRAL RESECTION OF PROSTATE;  Surgeon: Markel Centeno MD;  Location: [facility] OR;  Service: Urology;  Laterality: N/A;   • ENDOSCOPY N/A 01/05/2022    Procedure: ESOPHAGOGASTRODUODENOSCOPY with biopsy;  Surgeon: Royal Dover MD;  Location: [facility] ENDOSCOPY;  Service: Gastroenterology;  Laterality: N/A;   • ENDOSCOPY N/A 10/28/2022    Procedure: ESOPHAGOGASTRODUODENOSCOPY;  Surgeon: Olu Rodriguez  "MD Reji;  Location:  [facility] OR;  Service: Bariatric;  Laterality: N/A;   • GASTRECTOMY N/A 10/28/2022    Procedure: GASTRECTOMY LAPAROSCOPIC;  Surgeon: Olu Rodriguez MD;  Location:  [facility] OR;  Service: Bariatric;  Laterality: N/A;   • HEMORRHOIDECTOMY      Dr Dover do the removal   • INCISIONAL HERNIA REPAIR  2019    inferior to umbilicus w/ mesh - Dr. Dover   • KNEE OPEN LATERAL RELEASE Right 1985   • LAPAROSCOPIC CHOLECYSTECTOMY  2014    sand, no stones   • LIVER BIOPSY N/A 10/28/2022    Procedure: LIVER BIOPSY LAPAROSCOPIC;  Surgeon: Olu Rodriguez MD;  Location:  [facility] OR;  Service: Bariatric;  Laterality: N/A;   • TONSILLECTOMY AND ADENOIDECTOMY  1980   • WISDOM TOOTH EXTRACTION  1997     Outpatient Medications Marked as Taking for the 11/7/22 encounter (Office Visit) with Kavita Callaway PA   Medication Sig Dispense Refill   • Alcohol Swabs pads Clean area prior to injection 100 each 11   • amLODIPine (NORVASC) 5 MG tablet Take 5 mg by mouth Daily.     • apixaban (Eliquis) 2.5 MG tablet tablet Take 1 tablet by mouth Every 12 (Twelve) Hours for 42 doses. 42 tablet 0   • buprenorphine (SUBUTEX) 8 MG sublingual tablet SL tablet 8 mg.     • chlorthalidone (HYGROTON) 25 MG tablet      • Cholecalciferol (Vitamin D3) 50 MCG (2000 UT) tablet      • fluticasone (FLONASE) 50 MCG/ACT nasal spray As Needed.     • hydrOXYzine (ATARAX) 25 MG tablet Take 1 tablet by mouth 3 (Three) Times a Day As Needed for Anxiety. 90 tablet 0   • Lantus SoloStar 100 UNIT/ML injection pen      • lidocaine (LIDODERM) 5 % Place 1 patch on the skin as directed by provider Daily. Remove & Discard patch within 12 hours or as directed by MD     • lisinopril (PRINIVIL,ZESTRIL) 30 MG tablet      • mirtazapine (REMERON) 30 MG tablet TAKE 1 TABLET BY MOUTH ONCE DAILY AT NIGHT 30 tablet 0   • Multiple Vitamins-Minerals (MULTI COMPLETE PO) Take  by mouth.     • Needle, Disp, 18G X 1-1/2\" misc Use for drawing up the medication 50 " "each 3   • Needle, Disp, 23G X 1-1/2\" misc 1 Device 1 (One) Time Per Week. 30 each 11   • pantoprazole (PROTONIX) 40 MG EC tablet Take 1 tablet by mouth Every Morning.     • Polysacch Fe Cmp-Fe Heme Poly (Feosol Bifera) 28 MG tablet Take 2 tablets by mouth Every Other Day. 60 tablet 5   • Syringe, Disposable, 3 ML misc 1 syringe 1 (One) Time Per Week. 100 each 11   • tamsulosin (FLOMAX) 0.4 MG capsule 24 hr capsule Take 1 capsule by mouth Daily. 30 capsule 2   • Testosterone Cypionate (Depo-Testosterone) 200 MG/ML injection Inject 1.5 mL into the appropriate muscle as directed by prescriber Every 14 (Fourteen) Days. 10 mL 2   • topiramate (TOPAMAX) 50 MG tablet      • TRUEplus Lancets 28G misc      • UltiCare Short Pen Needles 31G X 8 MM misc      • vilazodone (VIIBRYD) 40 MG tablet tablet Take 1 tablet by mouth Daily. Take with food 30 tablet 0     Allergies   Allergen Reactions   • Naloxone Swelling     Throat swelling, itching, rash   • Hydrocodone-Acetaminophen Itching and Rash     Percocet OK       Social History     Socioeconomic History   • Marital status: Single   Tobacco Use   • Smoking status: Former     Packs/day: 0.50     Years: 15.00     Pack years: 7.50     Types: Cigarettes     Quit date: 2022     Years since quittin.2   • Smokeless tobacco: Never   • Tobacco comments:     Recently I have quit smoking   Vaping Use   • Vaping Use: Some days   • Substances: Flavoring   • Devices: Pre-filled or refillable cartridge   Substance and Sexual Activity   • Alcohol use: Not Currently   • Drug use: Not Currently     Types: Amphetamines, Barbiturates, Benzodiazepines, \"Crack\" cocaine, Cocaine(coke), Codeine, Fentanyl, GHB, Hashish, Heroin, Hydrocodone, Ketamine, LSD, Marijuana, MDMA (ecstacy), Methamphetamines, Morphine, Nitrous oxide, Opium, Oxycodone     Comment: clean since    • Sexual activity: Never     Social History     Social History Narrative    Lives in [place] w/ his mother.  Single.  " "Disabled since 2020 d/t spinal osteomyelitis.        /88 (BP Location: Left arm, Patient Position: Sitting)   Pulse 91   Temp 97.5 °F (36.4 °C)   Resp 18   Ht 174 cm (68.5\")   Wt 106 kg (233 lb 8 oz)   SpO2 98%   BMI 34.99 kg/m²   Physical Exam  Constitutional:       Appearance: He is well-developed.      Comments: wearing a mask   Cardiovascular:      Rate and Rhythm: Normal rate and regular rhythm.   Pulmonary:      Effort: Pulmonary effort is normal.   Abdominal:      Palpations: Abdomen is soft.      Tenderness: There is no abdominal tenderness.      Hernia: No hernia is present.      Comments: incisions healing well   Musculoskeletal:         General: Normal range of motion.   Skin:     General: Skin is warm and dry.   Neurological:      Mental Status: He is alert.           Assessment:   POD # 10 s/p LSG/liver bx (steatosis) 10/28/22 GDW        Plan:  Doing well.  Continue to advance diet per manual, as tolerated.  Continue protein 100g/day.  Increase exercise/activity as tolerated.  Reviewed lifting restrictions, nothing >25 lbs x 2 more weeks.  Continue vitamins as discussed.  Continue PPI.  Continue Eliquis as prescribed BID.  Continue to avoid ASA/NSAIDs/tobacco x 6 weeks postop, steroids x 8 weeks postop.  Call w/ problems/concerns.    The patient was instructed to follow up in 3 weeks, sooner if needed.   "

## 2022-12-05 ENCOUNTER — OFFICE VISIT (OUTPATIENT)
Dept: BARIATRICS/WEIGHT MGMT | Facility: CLINIC | Age: 47
End: 2022-12-05

## 2022-12-05 VITALS
HEIGHT: 69 IN | SYSTOLIC BLOOD PRESSURE: 140 MMHG | BODY MASS INDEX: 34.51 KG/M2 | RESPIRATION RATE: 18 BRPM | WEIGHT: 233 LBS | HEART RATE: 85 BPM | OXYGEN SATURATION: 98 % | DIASTOLIC BLOOD PRESSURE: 88 MMHG | TEMPERATURE: 97.6 F

## 2022-12-05 DIAGNOSIS — Z13.0 SCREENING, IRON DEFICIENCY ANEMIA: ICD-10-CM

## 2022-12-05 DIAGNOSIS — Z13.21 MALNUTRITION SCREEN: ICD-10-CM

## 2022-12-05 DIAGNOSIS — R53.83 FATIGUE, UNSPECIFIED TYPE: ICD-10-CM

## 2022-12-05 DIAGNOSIS — R10.13 EPIGASTRIC PAIN: ICD-10-CM

## 2022-12-05 DIAGNOSIS — Z98.84 STATUS POST BARIATRIC SURGERY: ICD-10-CM

## 2022-12-05 DIAGNOSIS — Z90.3 POSTGASTRECTOMY MALABSORPTION: ICD-10-CM

## 2022-12-05 DIAGNOSIS — E66.9 OBESITY, CLASS I, BMI 30-34.9: Primary | ICD-10-CM

## 2022-12-05 DIAGNOSIS — R11.0 NAUSEA: ICD-10-CM

## 2022-12-05 DIAGNOSIS — K91.2 POSTGASTRECTOMY MALABSORPTION: ICD-10-CM

## 2022-12-05 DIAGNOSIS — E55.9 HYPOVITAMINOSIS D: ICD-10-CM

## 2022-12-05 PROCEDURE — 99024 POSTOP FOLLOW-UP VISIT: CPT | Performed by: PHYSICIAN ASSISTANT

## 2022-12-05 NOTE — PROGRESS NOTES
[facility] Bariatric Surgery   [address]  [address]  [facility] [id number] [phone]      Patient Name:  Gopi Johnson.  :  1975      Reason for Visit:  1 month postop    HPI:  Gopi Johnson is a 47 y.o. male  s/p LSG/liver bx (steatosis) 10/28/22 GDW    Discharged on POD#3 - elevated LFTs s/p remote lindsay, GI consulted, unremarkable U/S.      Complains of postprandial nausea and epigastric pain for the last couple weights since progressing diet to solid foods.  Feels like food sits in his chest and vomits undigested food majority of meals.  Says he does fine with liquids and pills.  Does note that symptoms occur after eating full meals and wonders if he does eat too fast.  Denies any heartburn type symptoms of burning but more notices regurgitation. Denies pulmonary issues and fevers.Tolerating diet progression - on stage 6.  Getting 80-100g prot/day.  Drinking 64+ fluid oz/day. Protein smoothie in morning.  Eating regular meals through the day.  Taking MVI, B12, Calcium, Vit D and iron.  On Pantoprazole.  Still holding ASA , NSAIDs , Tramadol, Hormones, Diuretics , Steroids and Immunologics. Reports he is not around secondhand smoke.  Active as much as he can be.     -200, insulin has come down to 6 units twice daily. Says PCP recently checked labs to assess LFTs.     Taking ibuprofen 400mg Daily for the last month.  Has had HA daily.     Presurgery weight:  251 pounds. Today's weight is 106 kg (233 lb) pounds, today's Body mass index is 34.91 kg/m²., and@ weight loss since surgery is 18 pounds.       Past Medical History:   Diagnosis Date   • Abnormal PFTs (pulmonary function tests)    • Anxiety    • Arthritis    • Bipolar affective (HCC)    • Colon polyp    • Current every day vaping     Currently nonnicotine   • Depression    • Dyspepsia    • Dyspnea on exertion    • Elevated LFTs     GI eval (P)   • Enlarged prostate     s/p TURP 2021   • Family history  of malignant hyperthermia     patient had muscle biopsy at [facility] and was negative, sister has malignant hyperthermia and a cousin   • Fatigue    • Fractured pelvis (HCC)    • Frequent urination    • Gastroparesis    • GERD (gastroesophageal reflux disease)     on PPI, hx erosive gastritis on EGD 1/5/22   • H/O: substance abuse (HCC)     clean since 2016, on Subutex, managed by PCP   • Headache     following w/ Neurology   • Hx MRSA infection 2020    w/ osteomyelitis    • Hyperammonemia (HCC)     following w/ GI   • Hyperlipidemia    • Hypertension    • Hypogonadism in male     on testosterone q2wks, follows w/ Urology   • Hypokalemia    • Impaired mobility     w/ (L)sided weakness (since osteomyelitis), ambulates w/ cane   • Insomnia     on Trazodone   • Insulin dependent type 2 diabetes mellitus (HCC)    • Joint pain     w/ recent SI joint injections 2/2022   • Lower extremity edema    • Migraine    • Morbid obesity (HCC)    • Murmur    • Myoclonus     w/ body jerks, follows w/ Neurology   • Osteomyelitis (HCC) 2020    H/O spinal osteomyelitis, previous IVDA   • Piercing     ear/body   • PUD (peptic ulcer disease)     non-bleeding gastric ulcers on EGD 1/5/22 w/ Dr. Dover   • Rectus diastasis    • Seasonal allergies    • Sleep apnea     CPAP compliant   • Staph infection 2020   • Substance abuse (HCC)     Not currently, over 6 years clean and sober!   • Tattoo    • Tobacco use     trying to quit, currently vaping (0%) - mom does smoke in the home     Past Surgical History:   Procedure Laterality Date   • BUNIONECTOMY  2007   • COLONOSCOPY  2017   • CYSTOSCOPY TRANSURETHRAL RESECTION OF PROSTATE N/A 05/26/2021    Procedure: TRANSURETHRAL RESECTION OF PROSTATE;  Surgeon: Markel Centeno MD;  Location: [facility] OR;  Service: Urology;  Laterality: N/A;   • ENDOSCOPY N/A 01/05/2022    Procedure: ESOPHAGOGASTRODUODENOSCOPY with biopsy;  Surgeon: Royal Dover MD;  Location: [facility] ENDOSCOPY;  Service:  Gastroenterology;  Laterality: N/A;   • ENDOSCOPY N/A 10/28/2022    Procedure: ESOPHAGOGASTRODUODENOSCOPY;  Surgeon: Olu Rodriguez MD;  Location:  [facility] OR;  Service: Bariatric;  Laterality: N/A;   • GASTRECTOMY N/A 10/28/2022    Procedure: GASTRECTOMY LAPAROSCOPIC;  Surgeon: Olu Rodriguez MD;  Location:  [facility] OR;  Service: Bariatric;  Laterality: N/A;   • HEMORRHOIDECTOMY      Dr Dover do the removal   • INCISIONAL HERNIA REPAIR  2019    inferior to umbilicus w/ mesh - Dr. Dover   • KNEE OPEN LATERAL RELEASE Right 1985   • LAPAROSCOPIC CHOLECYSTECTOMY  2014    sand, no stones   • LIVER BIOPSY N/A 10/28/2022    Procedure: LIVER BIOPSY LAPAROSCOPIC;  Surgeon: Olu Rodriguez MD;  Location:  [facility] OR;  Service: Bariatric;  Laterality: N/A;   • TONSILLECTOMY AND ADENOIDECTOMY  1980   • WISDOM TOOTH EXTRACTION  1997     Outpatient Medications Marked as Taking for the 12/5/22 encounter (Office Visit) with Ofelia Leary PA-C   Medication Sig Dispense Refill   • Alcohol Swabs pads Clean area prior to injection 100 each 11   • amLODIPine (NORVASC) 5 MG tablet Take 5 mg by mouth Daily.     • buprenorphine (SUBUTEX) 8 MG sublingual tablet SL tablet 8 mg.     • chlorthalidone (HYGROTON) 25 MG tablet      • Cholecalciferol (Vitamin D3) 50 MCG (2000 UT) tablet      • fluticasone (FLONASE) 50 MCG/ACT nasal spray As Needed.     • hydrOXYzine (ATARAX) 25 MG tablet Take 1 tablet by mouth 3 (Three) Times a Day As Needed for Anxiety. 90 tablet 0   • Lantus SoloStar 100 UNIT/ML injection pen      • lidocaine (LIDODERM) 5 % Place 1 patch on the skin as directed by provider Daily. Remove & Discard patch within 12 hours or as directed by MD     • lisinopril (PRINIVIL,ZESTRIL) 30 MG tablet      • loratadine (CLARITIN) 10 MG tablet Daily.     • mirtazapine (REMERON) 30 MG tablet TAKE 1 TABLET BY MOUTH ONCE DAILY AT NIGHT 30 tablet 0   • Multiple Vitamins-Minerals (MULTI COMPLETE PO) Take  by mouth.     • Needle,  "Disp, 18G X 1-1/2\" misc Use for drawing up the medication 50 each 3   • Needle, Disp, 23G X 1-1/2\" misc 1 Device 1 (One) Time Per Week. 30 each 11   • pantoprazole (PROTONIX) 40 MG EC tablet Take 1 tablet by mouth Every Morning.     • Polysacch Fe Cmp-Fe Heme Poly (Feosol Bifera) 28 MG tablet Take 2 tablets by mouth Every Other Day. 60 tablet 5   • Syringe, Disposable, 3 ML misc 1 syringe 1 (One) Time Per Week. 100 each 11   • tamsulosin (FLOMAX) 0.4 MG capsule 24 hr capsule Take 1 capsule by mouth Daily. 30 capsule 2   • Testosterone Cypionate (Depo-Testosterone) 200 MG/ML injection Inject 1.5 mL into the appropriate muscle as directed by prescriber Every 14 (Fourteen) Days. 10 mL 2   • topiramate (TOPAMAX) 50 MG tablet      • TRUEplus Lancets 28G misc      • UltiCare Short Pen Needles 31G X 8 MM misc      • vilazodone (VIIBRYD) 40 MG tablet tablet Take 1 tablet by mouth Daily. Take with food 30 tablet 0     Allergies   Allergen Reactions   • Naloxone Swelling     Throat swelling, itching, rash   • Hydrocodone-Acetaminophen Itching and Rash     Percocet OK       Social History     Socioeconomic History   • Marital status: Single   Tobacco Use   • Smoking status: Former     Packs/day: 0.50     Years: 15.00     Pack years: 7.50     Types: Cigarettes     Quit date: 2022     Years since quittin.3   • Smokeless tobacco: Never   • Tobacco comments:     Recently I have quit smoking   Vaping Use   • Vaping Use: Some days   • Substances: Flavoring   • Devices: Pre-filled or refillable cartridge   Substance and Sexual Activity   • Alcohol use: Not Currently   • Drug use: Not Currently     Types: Amphetamines, Barbiturates, Benzodiazepines, \"Crack\" cocaine, Cocaine(coke), Codeine, Fentanyl, GHB, Hashish, Heroin, Hydrocodone, Ketamine, LSD, Marijuana, MDMA (ecstacy), Methamphetamines, Morphine, Nitrous oxide, Opium, Oxycodone     Comment: clean since    • Sexual activity: Never       /88 (BP Location: Left " "arm, Patient Position: Sitting)   Pulse 85   Temp 97.6 °F (36.4 °C)   Resp 18   Ht 174 cm (68.5\")   Wt 106 kg (233 lb)   SpO2 98%   BMI 34.91 kg/m²     Physical Exam  Constitutional:       Appearance: He is well-developed. He is obese.   HENT:      Head: Normocephalic and atraumatic.   Cardiovascular:      Rate and Rhythm: Normal rate and regular rhythm.   Pulmonary:      Effort: Pulmonary effort is normal.      Breath sounds: Normal breath sounds.   Abdominal:      General: Bowel sounds are normal.      Palpations: Abdomen is soft.      Tenderness: Tenderness: TTP of epigatrium.      Comments: Incisions healing well   Skin:     General: Skin is warm and dry.   Neurological:      Mental Status: He is alert.   Psychiatric:         Mood and Affect: Mood normal.         Behavior: Behavior normal.         Thought Content: Thought content normal.         Judgment: Judgment normal.           Assessment:  1 month s/p LSG/liver bx (steatosis) 10/28/22 GDW    ICD-10-CM ICD-9-CM   1. Obesity, Class I, BMI 30-34.9  E66.9 278.00   2. Status post bariatric surgery  Z98.84 V45.86   3. Epigastric pain  R10.13 789.06   4. Nausea  R11.0 787.02   5. Fatigue, unspecified type  R53.83 780.79   6. Hypovitaminosis D  E55.9 268.9   7. Malnutrition screen  Z13.21 V77.2   8. Screening, iron deficiency anemia  Z13.0 V78.0   9. Postgastrectomy malabsorption  K91.2 579.3    Z90.3          Plan: We will evaluate with labs today and stat upper GI imaging. Advised ED if symptoms worsen in interim.  It is concerning that he is taking daily ibuprofen when advised to avoid ulcerogenics.  Is living with his mother who previously noted to be a smoker.  Continue to advance diet per manual.  Continue protein 70-100g/day.  Encouraged good food choices - high protein, low carb.  Continue fluids 64+oz per day. Continue routine exercise, lifting restrictions lifted.  Continue PPI. Discussed importance of avoiding NSAIDS, ASA, tramadol, tobacco x 6 " weeks postop, steroids x 8 weeks postop.  Routine bariatric labs ordered.  Continue vitamins w/ adjustments pending lab results.  Call w/ problems/concerns.             The patient was instructed to follow up in 2 months, sooner if needed.

## 2022-12-06 ENCOUNTER — HOSPITAL ENCOUNTER (OUTPATIENT)
Dept: GENERAL RADIOLOGY | Facility: HOSPITAL | Age: 47
Discharge: HOME | End: 2022-12-06
Admitting: PHYSICIAN ASSISTANT

## 2022-12-06 DIAGNOSIS — R10.13 EPIGASTRIC PAIN: ICD-10-CM

## 2022-12-06 DIAGNOSIS — R11.0 NAUSEA: ICD-10-CM

## 2022-12-06 PROCEDURE — 74240 X-RAY XM UPR GI TRC 1CNTRST: CPT

## 2022-12-09 LAB
25(OH)D3+25(OH)D2 SERPL-MCNC: 19.3 NG/ML (ref 30–100)
ALBUMIN SERPL-MCNC: 4.6 G/DL (ref 4–5)
ALBUMIN/GLOB SERPL: 1.8 {RATIO} (ref 1.2–2.2)
ALP SERPL-CCNC: 101 IU/L (ref 44–121)
ALT SERPL-CCNC: 14 IU/L (ref 0–44)
AMYLASE SERPL-CCNC: 30 U/L (ref 31–110)
AST SERPL-CCNC: 13 IU/L (ref 0–40)
BASOPHILS # BLD AUTO: 0 X10E3/UL (ref 0–0.2)
BASOPHILS NFR BLD AUTO: 0 %
BILIRUB SERPL-MCNC: 0.3 MG/DL (ref 0–1.2)
BUN SERPL-MCNC: 8 MG/DL (ref 6–24)
BUN/CREAT SERPL: 9 (ref 9–20)
CALCIUM SERPL-MCNC: 10 MG/DL (ref 8.7–10.2)
CHLORIDE SERPL-SCNC: 97 MMOL/L (ref 96–106)
CO2 SERPL-SCNC: 30 MMOL/L (ref 20–29)
CREAT SERPL-MCNC: 0.88 MG/DL (ref 0.76–1.27)
EGFRCR SERPLBLD CKD-EPI 2021: 107 ML/MIN/1.73
EOSINOPHIL # BLD AUTO: 0.2 X10E3/UL (ref 0–0.4)
EOSINOPHIL NFR BLD AUTO: 2 %
ERYTHROCYTE [DISTWIDTH] IN BLOOD BY AUTOMATED COUNT: 15.4 % (ref 11.6–15.4)
FERRITIN SERPL-MCNC: 88 NG/ML (ref 30–400)
FOLATE SERPL-MCNC: 10 NG/ML
GLOBULIN SER CALC-MCNC: 2.6 G/DL (ref 1.5–4.5)
GLUCOSE SERPL-MCNC: 88 MG/DL (ref 70–99)
HCT VFR BLD AUTO: 45.9 % (ref 37.5–51)
HGB BLD-MCNC: 15.3 G/DL (ref 13–17.7)
IMM GRANULOCYTES # BLD AUTO: 0.1 X10E3/UL (ref 0–0.1)
IMM GRANULOCYTES NFR BLD AUTO: 1 %
IRON SERPL-MCNC: 60 UG/DL (ref 38–169)
LIPASE SERPL-CCNC: 25 U/L (ref 13–78)
LYMPHOCYTES # BLD AUTO: 3 X10E3/UL (ref 0.7–3.1)
LYMPHOCYTES NFR BLD AUTO: 28 %
MCH RBC QN AUTO: 27 PG (ref 26.6–33)
MCHC RBC AUTO-ENTMCNC: 33.3 G/DL (ref 31.5–35.7)
MCV RBC AUTO: 81 FL (ref 79–97)
METHYLMALONATE SERPL-SCNC: 143 NMOL/L (ref 0–378)
MONOCYTES # BLD AUTO: 0.6 X10E3/UL (ref 0.1–0.9)
MONOCYTES NFR BLD AUTO: 6 %
NEUTROPHILS # BLD AUTO: 6.6 X10E3/UL (ref 1.4–7)
NEUTROPHILS NFR BLD AUTO: 63 %
PLATELET # BLD AUTO: 339 X10E3/UL (ref 150–450)
POTASSIUM SERPL-SCNC: 3.8 MMOL/L (ref 3.5–5.2)
PREALB SERPL-MCNC: 23 MG/DL (ref 12–34)
PROT SERPL-MCNC: 7.2 G/DL (ref 6–8.5)
RBC # BLD AUTO: 5.66 X10E6/UL (ref 4.14–5.8)
SODIUM SERPL-SCNC: 141 MMOL/L (ref 134–144)
VIT B1 BLD-SCNC: 123.4 NMOL/L (ref 66.5–200)
WBC # BLD AUTO: 10.5 X10E3/UL (ref 3.4–10.8)

## 2022-12-14 ENCOUNTER — TELEPHONE (OUTPATIENT)
Dept: BARIATRICS/WEIGHT MGMT | Facility: CLINIC | Age: 47
End: 2022-12-14

## 2023-01-17 DIAGNOSIS — E29.1 HYPOGONADISM IN MALE: ICD-10-CM

## 2023-01-17 RX ORDER — TESTOSTERONE CYPIONATE 200 MG/ML
300 INJECTION, SOLUTION INTRAMUSCULAR
Qty: 10 ML | Refills: 1 | OUTPATIENT
Start: 2023-01-17

## 2023-01-18 NOTE — TELEPHONE ENCOUNTER
I called and left the patient a voicemail as well as sent him a MyChart message informing him of this and asking him to call our office to schedule this appointment.

## 2023-01-18 NOTE — TELEPHONE ENCOUNTER
Please have patient schedule a follow up with me at his convenience within the next 2-3 weeks with repeat testosterone labs 1 week after his last T injection.

## 2023-02-22 ENCOUNTER — TELEPHONE (OUTPATIENT)
Dept: UROLOGY | Facility: CLINIC | Age: 48
End: 2023-02-22

## 2023-02-22 DIAGNOSIS — R39.9 LOWER URINARY TRACT SYMPTOMS (LUTS): ICD-10-CM

## 2023-02-22 RX ORDER — TAMSULOSIN HYDROCHLORIDE 0.4 MG/1
1 CAPSULE ORAL DAILY
Qty: 90 CAPSULE | Refills: 3 | Status: SHIPPED | OUTPATIENT
Start: 2023-02-22

## 2023-02-22 NOTE — TELEPHONE ENCOUNTER
DELETE AFTER REVIEWING: Send the encounter HIGH priority, If patient has less than a 3 day supply. If the patient will run out of medication over the weekend add that information to the additional details line. Send this encounter to the clinical pool.    Caller: Gopi Johnson    Relationship: Self    Best call back number: [phone]    Requested Prescriptions: FLOMAX - TAMSULOSIN   Requested Prescriptions      No prescriptions requested or ordered in this encounter        Pharmacy where request should be sent: [facility] IN [place]    Additional details provided by patient: PT IS CURRENTLY ON HOUSE ARREST AND WILL CALL TO MAKE AN APPOINTMENT WHEN HE IS RELEASED.    Does the patient have less than a 3 day supply:  [x] Yes  [] No    Would you like a call back once the refill request has been completed: [x] Yes [] No    If the office needs to give you a call back, can they leave a voicemail: [x] Yes [] No    Flakita Fuchs Rep   02/22/23 09:46 EST

## 2023-07-14 PROCEDURE — 88305 TISSUE EXAM BY PATHOLOGIST: CPT

## 2023-07-17 ENCOUNTER — LAB REQUISITION (OUTPATIENT)
Dept: LAB | Facility: HOSPITAL | Age: 48
End: 2023-07-17
Payer: MEDICARE

## 2023-07-17 DIAGNOSIS — D12.3 BENIGN NEOPLASM OF TRANSVERSE COLON: ICD-10-CM

## 2023-07-17 DIAGNOSIS — D12.2 BENIGN NEOPLASM OF ASCENDING COLON: ICD-10-CM

## 2023-07-17 DIAGNOSIS — D12.8 BENIGN NEOPLASM OF RECTUM: ICD-10-CM

## 2023-07-17 DIAGNOSIS — K64.8 OTHER HEMORRHOIDS: ICD-10-CM

## 2023-07-17 DIAGNOSIS — Z86.010 PERSONAL HISTORY OF COLONIC POLYPS: ICD-10-CM

## 2023-07-17 DIAGNOSIS — Z80.0 FAMILY HISTORY OF MALIGNANT NEOPLASM OF DIGESTIVE ORGANS: ICD-10-CM

## 2023-07-17 DIAGNOSIS — Z12.11 ENCOUNTER FOR SCREENING FOR MALIGNANT NEOPLASM OF COLON: ICD-10-CM

## 2023-07-19 LAB — REF LAB TEST METHOD: NORMAL

## 2023-08-14 ENCOUNTER — OFFICE VISIT (OUTPATIENT)
Dept: UROLOGY | Facility: CLINIC | Age: 48
End: 2023-08-14
Payer: MEDICARE

## 2023-08-14 VITALS
HEIGHT: 69 IN | HEART RATE: 58 BPM | WEIGHT: 230 LBS | SYSTOLIC BLOOD PRESSURE: 124 MMHG | OXYGEN SATURATION: 95 % | BODY MASS INDEX: 34.07 KG/M2 | DIASTOLIC BLOOD PRESSURE: 84 MMHG

## 2023-08-14 DIAGNOSIS — N50.812 LEFT TESTICULAR PAIN: ICD-10-CM

## 2023-08-14 DIAGNOSIS — E34.9 TESTOSTERONE DEFICIENCY: ICD-10-CM

## 2023-08-14 DIAGNOSIS — N40.0 BPH WITHOUT URINARY OBSTRUCTION: ICD-10-CM

## 2023-08-14 DIAGNOSIS — R39.9 LOWER URINARY TRACT SYMPTOMS (LUTS): Primary | ICD-10-CM

## 2023-08-14 LAB
BILIRUB BLD-MCNC: NEGATIVE MG/DL
CLARITY, POC: CLEAR
COLOR UR: YELLOW
EXPIRATION DATE: NORMAL
GLUCOSE UR STRIP-MCNC: NEGATIVE MG/DL
KETONES UR QL: NEGATIVE
LEUKOCYTE EST, POC: NEGATIVE
Lab: NORMAL
NITRITE UR-MCNC: NEGATIVE MG/ML
PH UR: 7 [PH] (ref 5–8)
PROT UR STRIP-MCNC: NEGATIVE MG/DL
RBC # UR STRIP: NEGATIVE /UL
SP GR UR: 1.01 (ref 1–1.03)
UROBILINOGEN UR QL: NORMAL

## 2023-08-14 PROCEDURE — 3079F DIAST BP 80-89 MM HG: CPT | Performed by: STUDENT IN AN ORGANIZED HEALTH CARE EDUCATION/TRAINING PROGRAM

## 2023-08-14 PROCEDURE — 99214 OFFICE O/P EST MOD 30 MIN: CPT | Performed by: STUDENT IN AN ORGANIZED HEALTH CARE EDUCATION/TRAINING PROGRAM

## 2023-08-14 PROCEDURE — 1160F RVW MEDS BY RX/DR IN RCRD: CPT | Performed by: STUDENT IN AN ORGANIZED HEALTH CARE EDUCATION/TRAINING PROGRAM

## 2023-08-14 PROCEDURE — 3074F SYST BP LT 130 MM HG: CPT | Performed by: STUDENT IN AN ORGANIZED HEALTH CARE EDUCATION/TRAINING PROGRAM

## 2023-08-14 PROCEDURE — 81003 URINALYSIS AUTO W/O SCOPE: CPT | Performed by: STUDENT IN AN ORGANIZED HEALTH CARE EDUCATION/TRAINING PROGRAM

## 2023-08-14 PROCEDURE — 1159F MED LIST DOCD IN RCRD: CPT | Performed by: STUDENT IN AN ORGANIZED HEALTH CARE EDUCATION/TRAINING PROGRAM

## 2023-08-14 RX ORDER — CELECOXIB 200 MG/1
200 CAPSULE ORAL DAILY
COMMUNITY
Start: 2023-08-07

## 2023-08-14 RX ORDER — PROPRANOLOL HYDROCHLORIDE 60 MG/1
60 TABLET ORAL 2 TIMES DAILY
COMMUNITY
Start: 2023-07-26

## 2023-08-14 NOTE — PROGRESS NOTES
Follow Up Office Visit      Patient Name: Gopi Johnson  : 1975   MRN: [id number]     Chief Complaint:    Chief Complaint   Patient presents with    Painful lump on left testicle       Referring Provider: No ref. provider found    History of Present Illness: Gopi Johnson is a 47 y.o. male who presents today for follow up of numerous urologic issues.  Patient was seen last 2022.  History of testosterone deficiency, morbid obesity, BPH status post TURP , progressive urinary tract symptoms, completed a cystoscopy in 2022 demonstrating a fairly patent prostatic fossa and bladder neck.  Has been managing his symptoms with tamsulosin and oxybutynin.  Has also been offered intermittent catheterization given ongoing incomplete bladder emptying.  Patient was on house arrest recently and was unable to come to clinic over the last 6 to 12 months.  Patient also reports he developed left-sided testicular pain and swelling and he was able to have a telehealth visit with his primary care provider who gave him 2 weeks of doxycycline with some improvement.  He has not completed a scrotal ultrasound.  Examination today demonstrates a normal testicle bilateral, mild tenderness to the epididymis on the left.  He has not been sexually active.  Underwent a gastric sleeve procedure 2022.  He states he has lost roughly 100 pounds he believes.    Has had to self cath for near retention episodes at home over the last 6 months.  Reports straining to urinate and weak stream.  IPSS severe at 26, reports unhappy quality of life  Has had some left sided epididymal pain.  He was previously being treated with testosterone cypionate injections however he has never followed back up for repeat labs and therefore we will need to discontinue his testosterone.  He states while he was taking testosterone injections most recently, at least 6 months ago, he did not demonstrate any improvement in his energy levels or  libido.      Subjective      Review of System: Review of Systems   Genitourinary:  Positive for difficulty urinating, frequency and testicular pain.    I have reviewed the ROS documented by my clinical staff, I have updated appropriately and I agree. Gurwinder Barney MD    I have reviewed and the following portions of the patient's history were updated as appropriate: past family history, past medical history, past social history, past surgical history and problem list.    Medications:     Current Outpatient Medications:     Alcohol Swabs pads, Clean area prior to injection, Disp: 100 each, Rfl: 11    amLODIPine (NORVASC) 5 MG tablet, Take 1 tablet by mouth Daily., Disp: , Rfl:     buprenorphine (SUBUTEX) 8 MG sublingual tablet SL tablet, 1 tablet., Disp: , Rfl:     celecoxib (CeleBREX) 200 MG capsule, Take 1 capsule by mouth Daily., Disp: , Rfl:     chlorthalidone (HYGROTON) 25 MG tablet, , Disp: , Rfl:     Cholecalciferol (Vitamin D3) 50 MCG (2000 UT) tablet, , Disp: , Rfl:     Cyanocobalamin (Vitamin B-12) 1000 MCG sublingual tablet, Place 1 tablet under the tongue Daily., Disp: 90 each, Rfl: 0    fluticasone (FLONASE) 50 MCG/ACT nasal spray, As Needed., Disp: , Rfl:     hydrOXYzine (ATARAX) 25 MG tablet, Take 1 tablet by mouth 3 (Three) Times a Day As Needed for Anxiety., Disp: 90 tablet, Rfl: 0    lidocaine (LIDODERM) 5 %, Place 1 patch on the skin as directed by provider Daily. Remove & Discard patch within 12 hours or as directed by MD, Disp:  , Rfl:     loratadine (CLARITIN) 10 MG tablet, Daily., Disp: , Rfl:     mirtazapine (REMERON) 30 MG tablet, TAKE 1 TABLET BY MOUTH ONCE DAILY AT NIGHT, Disp: 30 tablet, Rfl: 0    Multiple Vitamins-Minerals (MULTI COMPLETE PO), Take  by mouth., Disp: , Rfl:     mupirocin (BACTROBAN) 2 % ointment, , Disp: , Rfl:     pantoprazole (PROTONIX) 40 MG EC tablet, Take 1 tablet by mouth Every Morning., Disp:  , Rfl:     propranolol (INDERAL) 60 MG tablet, Take 1 tablet by  mouth 2 (Two) Times a Day., Disp: , Rfl:     silver sulfadiazine (SILVADENE, SSD) 1 % cream, , Disp: , Rfl:     Sod Picosulfate-Mag Ox-Cit Acd (Clenpiq) 10-3.5-12 MG-GM -GM/160ML solution, Take 350 mL by mouth Take As Directed., Disp: 350 mL, Rfl: 0    tamsulosin (FLOMAX) 0.4 MG capsule 24 hr capsule, Take 1 capsule by mouth Daily., Disp: 90 capsule, Rfl: 3    vilazodone (VIIBRYD) 40 MG tablet tablet, Take 1 tablet by mouth Daily. Take with food, Disp: 30 tablet, Rfl: 0    vitamin D (ERGOCALCIFEROL) 1.25 MG (88620 UT) capsule capsule, Take 1 capsule by mouth Every 7 (Seven) Days for 12 doses., Disp: 12 capsule, Rfl: 0    propranolol (INDERAL) 40 MG tablet, 1 tablet. (Patient not taking: Reported on 8/14/2023), Disp: , Rfl:     Allergies:   Allergies   Allergen Reactions    Naloxone Swelling     Throat swelling, itching, rash    Hydrocodone-Acetaminophen Itching and Rash     Percocet OK       IPSS Questionnaire (AUA-7):  Over the past month.    1)  Incomplete Emptying:       How often have you had a sensation of not emptying you had the sensation of not emptying your bladder completely after you finished urinating?  4 - More than half the time   2)  Frequency:       How often have you had the urinate again less than two hours after you finished urinating?  4 - More than half the time   3)  Intermittency:       How often have you found you stopped and started again several times when you urinated?   3 - About half the time   4) Urgency:      How often have you found it difficult to postpone urination?  3 - About half the time   5) Weak Stream:      How often have you had a weak urinary stream?  4 - More than half the time   6) Straining:       How often have you had to push or strain to begin urination?  5 - Almost always   7) Nocturia:      How many times did you most typically get up to urinate from the time you went to bed at night until the time you got up in the morning?  3 - 3 times   Total Score:  26   The  "International Prostate Symptom Score (IPSS) is used to screen, diagnose, track symptoms of benign prostatic hyperplasia (BPH).   0-7 (Mild Symptoms) 8-19 (Moderate) 20-35 (Severe)   Quality of Life (QoL):  If you were to spend the rest of your life with your urinary condition just the way it is now, how would you feel about that? 5-Unhappy   Urine Leakage (Incontinence) 0-No Leakage     Sexual Health Inventory for Men (DORENE)   Over the past 6 months:     1. How do you rate your confidence that you could get and keep an erection?  3 - Moderate   2. When you had erections with sexual  stimulation, how often were your erections hard enough for penetration (entering your partner)?  0 - No Sexual Activity    3. During sexual intercourse, how often were you able to maintain your erection after you had penetrated (entered) your partner?  0 - Did not attempt intercourse   4. During sexual intercourse, how difficult was it to maintain your erection to completion of intercourse?  0 - Did not attempt intercourse   5. When you attempted sexual intercourse, how often was it satisfactory for you?  0 - Did not attempt intercourse    Total Score: 3   The Sexual Health Inventory for Men further classifies ED severity with the following breakpoints:   1-7 (Severe ED) 8-11 (Moderate ED) 12-16 (Mild to Moderate ED) 17-21 (Mild ED)          Objective     Physical Exam:   Vital Signs:   Vitals:    08/14/23 1615   BP: 124/84   Pulse: 58   SpO2: 95%   Weight: 104 kg (230 lb)   Height: 174 cm (68.5\")   PainSc: 0-No pain     Body mass index is 34.46 kg/mý.     Physical Exam  Vitals and nursing note reviewed.   Constitutional:       Appearance: Normal appearance.   HENT:      Head: Normocephalic and atraumatic.      Nose: Nose normal.      Mouth/Throat:      Mouth: Mucous membranes are moist.      Pharynx: Oropharynx is clear.   Eyes:      Extraocular Movements: Extraocular movements intact.      Conjunctiva/sclera: Conjunctivae normal.      " Pupils: Pupils are equal, round, and reactive to light.   Cardiovascular:      Rate and Rhythm: Normal rate and regular rhythm.   Pulmonary:      Effort: Pulmonary effort is normal. No respiratory distress.   Abdominal:      Palpations: Abdomen is soft.      Tenderness: There is no abdominal tenderness. There is no right CVA tenderness or left CVA tenderness.   Genitourinary:     Comments: Circumcised phallus, orthotopic meatus, bilaterally descended testicles without masses, or lesions.  Mild tenderness to left epididymis.  Musculoskeletal:         General: Normal range of motion.      Cervical back: Normal range of motion and neck supple.   Skin:     General: Skin is warm and dry.      Findings: No lesion or rash.   Neurological:      General: No focal deficit present.      Mental Status: He is alert and oriented to person, place, and time. Mental status is at baseline.   Psychiatric:         Mood and Affect: Mood normal.         Behavior: Behavior normal.       Labs:   Brief Urine Lab Results  (Last result in the past 365 days)        Color   Clarity   Blood   Leuk Est   Nitrite   Protein   CREAT   Urine HCG        08/14/23 1635 Yellow   Clear   Negative   Negative   Negative   Negative                        Lab Results   Component Value Date    GLUCOSE 104 (H) 07/05/2023    CALCIUM 9.5 07/05/2023     07/05/2023    K 4.2 07/05/2023    CO2 26 07/05/2023    CL 99 07/05/2023    BUN 9 07/05/2023    CREATININE 0.93 07/05/2023    EGFRIFAFRI 109 02/24/2022    EGFRIFNONA 94 02/24/2022    BCR 10 07/05/2023    ANIONGAP 13.0 10/31/2022       Lab Results   Component Value Date    WBC 8.5 07/05/2023    HGB 14.3 07/05/2023    HCT 42.7 07/05/2023    MCV 84 07/05/2023     07/05/2023       Images:   No Images in the past 120 days found..    Measures:   Tobacco:   Gopi Johnson  reports that he quit smoking about a year ago. His smoking use included cigarettes. He has been exposed to tobacco smoke. He has never used  smokeless tobacco.      Assessment / Plan      Assessment/Plan:   47 y.o. male who presented today for follow up of numerous urologic complaints.  History of testosterone deficiency, BPH status post TURP, progressive lower urinary tract symptoms including urgency, frequency, weak stream, fairly normal cystoscopy last year, and now new left-sided epididymal tenderness.  Was treated empirically with doxycycline for epididymitis.  He has a history of incomplete bladder emptying and states he has had to self cath at home with intermittent catheters a few times since he was last evaluated last year.  I offered him a repeat evaluation with cystoscopy and uroflow to assess flow rate to make sure he is not progressively obstructive which may require additional prostatic surgery.  We will also order a resolve MDX urine culture to assess for any smoldering bacteria that may be causing his symptoms.  His left epididymis is mildly tender and his testicular examination is entirely normal therefore a scrotal ultrasound is not necessary at this time.  He was offered empiric antibiotic therapy or continued monitoring, he elects for continued monitoring with scrotal support.  He will discontinue testosterone supplementation given no benefit and he has not been following up consistently for repeat labs given house arrest issues.  I will stop his testosterone therapy at this time.  He reports stable libido and energy off testosterone.     Diagnoses and all orders for this visit:    1. Lower urinary tract symptoms (LUTS) (Primary)  -     POC Urinalysis Dipstick, Automated    2. BPH without urinary obstruction  - continue Tamsulosin  - cystoscopy + Uroflow to reassess given weak stream symptoms and prior TURP    3. Testosterone deficiency  - stop Testosterone supplementation    4. Left testicular pain  - stable, ok to monitor  - Resolve Mdx urine culture, will call with results      Follow Up:   Return in about 15 days (around  8/29/2023) for 9/5/23 flexible cystoscopy, Uroflow in clinic.    I spent approximately 34 minutes providing clinical care for this patient; including review of patient's chart and provider documentation, face to face time spent with patient in examination room (obtaining history, performing physical exam, discussing diagnosis and management options), placing orders, and completing patient documentation.     Gurwinder Barney MD  [facility] [place]

## 2023-08-22 DIAGNOSIS — N30.00 ACUTE CYSTITIS WITHOUT HEMATURIA: Primary | ICD-10-CM

## 2023-08-22 RX ORDER — AMOXICILLIN AND CLAVULANATE POTASSIUM 875; 125 MG/1; MG/1
1 TABLET, FILM COATED ORAL 2 TIMES DAILY
Qty: 14 TABLET | Refills: 0 | Status: SHIPPED | OUTPATIENT
Start: 2023-08-22

## 2023-08-22 NOTE — PROGRESS NOTES
Please let patient know his urine culture/Resolve MDx is positive for Enterococcus, likely a smoldering infection.  I will treat him with Augmentin x7 days.  Thanks.    Gurwinder Barney MD

## 2023-08-23 ENCOUNTER — TELEPHONE (OUTPATIENT)
Dept: UROLOGY | Facility: CLINIC | Age: 48
End: 2023-08-23
Payer: MEDICARE

## 2023-08-23 NOTE — TELEPHONE ENCOUNTER
----- Message from Gurwinder Barney MD sent at 8/22/2023  3:06 PM EDT -----  Please let patient know his urine culture/Resolve MDx is positive for Enterococcus, likely a smoldering infection.  I will treat him with Augmentin x7 days.  Thanks.    Gurwinder Barney MD

## 2023-08-23 NOTE — TELEPHONE ENCOUNTER
I called the patient and let him know that his culture was positive for entorococcus and that Dr. Barney sent 7 days worth of Augmentin to the pharmacy for him.  He stated that he would pick it up and get started on it.

## 2023-08-31 ENCOUNTER — OFFICE VISIT (OUTPATIENT)
Dept: BARIATRICS/WEIGHT MGMT | Facility: CLINIC | Age: 48
End: 2023-08-31
Payer: MEDICARE

## 2023-08-31 VITALS
HEIGHT: 68 IN | HEART RATE: 73 BPM | OXYGEN SATURATION: 100 % | DIASTOLIC BLOOD PRESSURE: 88 MMHG | SYSTOLIC BLOOD PRESSURE: 126 MMHG | WEIGHT: 244.6 LBS | BODY MASS INDEX: 37.07 KG/M2

## 2023-08-31 DIAGNOSIS — E11.69 DIABETES MELLITUS TYPE 2 IN OBESE: ICD-10-CM

## 2023-08-31 DIAGNOSIS — M19.90 ARTHRITIS: ICD-10-CM

## 2023-08-31 DIAGNOSIS — G47.30 SLEEP APNEA, UNSPECIFIED TYPE: ICD-10-CM

## 2023-08-31 DIAGNOSIS — K76.0 FATTY LIVER: ICD-10-CM

## 2023-08-31 DIAGNOSIS — E66.01 CLASS 2 SEVERE OBESITY WITH SERIOUS COMORBIDITY AND BODY MASS INDEX (BMI) OF 37.0 TO 37.9 IN ADULT, UNSPECIFIED OBESITY TYPE: Primary | ICD-10-CM

## 2023-08-31 DIAGNOSIS — R63.2 POLYPHAGIA: ICD-10-CM

## 2023-08-31 DIAGNOSIS — E34.9 TESTOSTERONE DEFICIENCY: ICD-10-CM

## 2023-08-31 DIAGNOSIS — E66.9 DIABETES MELLITUS TYPE 2 IN OBESE: ICD-10-CM

## 2023-08-31 DIAGNOSIS — I10 ESSENTIAL HYPERTENSION: ICD-10-CM

## 2023-08-31 DIAGNOSIS — K21.9 GASTROESOPHAGEAL REFLUX DISEASE, UNSPECIFIED WHETHER ESOPHAGITIS PRESENT: ICD-10-CM

## 2023-08-31 PROBLEM — E66.812 CLASS 2 SEVERE OBESITY WITH SERIOUS COMORBIDITY AND BODY MASS INDEX (BMI) OF 37.0 TO 37.9 IN ADULT: Status: ACTIVE | Noted: 2022-10-18

## 2023-08-31 NOTE — ASSESSMENT & PLAN NOTE
Patient's (Body mass index is 37.19 kg/mý.) indicates that they are morbidly/severely obese (BMI > 40 or > 35 with obesity - related health condition) with health conditions that include obstructive sleep apnea, hypertension, diabetes mellitus, GERD, and osteoarthritis . Weight is worsening. BMI  is above average; BMI management plan is completed. We discussed low calorie, low carb based diet program, portion control, increasing exercise, joining a fitness center or start home based exercise program, and an jayson-based approach such as [employer] or Lose It.  Topics of discussion included obesity as a disease, nutritional education on food groups, exercise, and medications. Patient was instructed in adequate protein, controlled carb and controlled fat intake.   Patient received instructions on using the medicines as a tool in controlling their weight with nutritional and behavioral changes. Risks and benefits were discussed. I believe the potential benefits of medication helping to decrease weight outweighs the risks. Patient is to try nutritonal/behavioral changes only first.   Patient received our clinic education booklet.   Our patient consent form was reviewed including potential risks of weight loss. We also reviewed our confidentiality and HIPPA statements. Patients current FITT score was reviewed along with current capability for exercise tolerance and a patient will work towards a FITT score of:     Frequency   Intensity Time Strength Training   []   0 None  []   0 None  []   0 None  []   0 None    []   1 (1-2x/week) []   1 (light) []   1 (<10 min) []   1 (1x/week)   [x]   2 (3-5x/week) [x]   2 (moderate) []   2 (10-20 min) [x]   2 (2x/week)   []   3 (daily)   []   3 (moderately hard)  []   4 (very hard) []   3 (20-30 min)  [x]   4 (>30 min) []   3 (3-4x/week)       Patient's past medical history was reviewed in detail and barriers to weight loss were identified and discussed. Past efforts at weight  reduction on their own as well as under physician supervision were documented and discussed.  I advised patient to continue routine care with their Primary Care Provider.     Nutritional recommendations and goals were reviewed including Calories:8662-1110 daily adjusted for exercise calories burnt, Protein:>100 g daily, Net carbs (total carb - fiber) of 50-75g per day.  Start to keep a food journal and bring into next visit in 2 weeks for review. Practice the behavioral modification technique of mindful eating. Take one MVI daily and 2000mg fish oil daily. Take other medications and supplements as directed.  - Repeat labwork today  - AOM options are limited due to substance abuse history (meth), allergic to naloxone and he is on suboxone so contrave is contraindicated. Wellbutrin not ideal due to saliva test showing it is not a good medication for him, He is not enthusiastic about trying topamax again since it did not affect his appetite previously. Wegovy and saxenda are not covered by his insurance and therefore unaffordable.   That leaves us with options of ozempic or mounjaro depending on his A1c which was 11 prior to LSG but has not been retested. Discussed option of metformin to counteract weight positive effect of celebrex and propranolol.   - Treatment goal 190lb

## 2023-08-31 NOTE — PROGRESS NOTES
[facility]  [address]  [place] [address]   Office Note      Date: 2023  Patient Name: Gopi Johnson  MRN: [id number]  : 1975  Subjective  Subjective     Chief Complaint  Obesity Management consult, nutrition counseling          Gopi Johnson presents to [facility] for obesity management and weight regain following MBS. s/p LSG/liver bx (steatosis) 10/28/22 GDW. Presurgery weight: 251lb. Ras: 220lb     Weight history:  Highest lifetime weight: 350 pounds. Today's weight is 111 kg (244 lb 9.6 oz) pounds.   Weight 5 years ago: 200  Weight has been a struggle his whole life. Had his tonsils out when he was 7 and a year later his weight jumped up. He was self conscious about his weight in high school and turned to meth for weight loss which was effective but he developed an addiction for several years. He has been sober for 10 years now.       Current lifestyle:   The following seem to sabotage weight loss efforts:comfort/stress eating, mindless eating (snacking while working or watching TV), eating too fast, always hungry, boredom eating, and specific cravings like carbohydrates.   He is exercising some. Plans to join a local gym and do strength training. Loves to walk, swim, bike, swim.   Calories are around 1200. He is hungry all the time.     Pertinent medical history:  Kidney stones: one incident, drank several pops. No longer drinks pop.   Migraines: took topamax recently, no problems but was no longer helping headaches. Now on propranolol one a day.   Arthritis: recently started on celebrex, knows this can be a weight positive med, thinks it has contributed to weight gain.   Diabetes: A1c prior to LSG was 11.2. Used lantus prior to surgery, would check blood glucose and use 6 units if > 150. Has never taken metformin or any other anti-diabetic meds. Around mealtimes sometimes gets hot flahes, lethargic, drained,  thinks it is blood sugar related.   Sleep apnea: still using CPAP  Depression: Had a saliva test to determine therapeutic matches, wellbutrin was on the not recommended list. Now managed with viibryd, is working very well.  Hypogonadism: was using testosterone injections and had no improvement in symptoms. Managed by urology, will have testosterone lab repeated at visit with him next week.   Fractured pelvis 2 years ago, pain managed with subutex which he is also taking that due to history of meth abuse.   Meth abuse history: Meets with a therapist monthly. Subutex is rx by primary care. 10 years sober.     Pertinent family history:  Mother has prediabetes- is on metformin.      Review of Systems   Constitutional:  Positive for fatigue and unexpected weight gain.        Positive for weight gain   HENT:  Positive for trouble swallowing.         Negative for throat swelling   Respiratory:  Positive for apnea, shortness of breath and wheezing.         Negative for snoring   Cardiovascular:  Positive for leg swelling. Negative for chest pain and palpitations.   Gastrointestinal:  Positive for abdominal pain, constipation, GERD and indigestion. Negative for diarrhea.   Endocrine: Positive for polydipsia and polyphagia. Negative for cold intolerance, heat intolerance and polyuria.        Negative for loss of hair  Negative for hirsutism     Genitourinary:         Denies menstrual irregularities   Musculoskeletal:  Negative for arthralgias.        Denies exercise limitations  Denies chronic pain   Skin:  Positive for dry skin.        Negative for acne   Neurological:  Positive for headache. Negative for memory problem.        Negative for paresthesias   Psychiatric/Behavioral:  Positive for sleep disturbance and depressed mood. Negative for self-injury and suicidal ideas. The patient is nervous/anxious.      PHQ-9 Total Score: 19     Objective   Body mass index is 37.19 kg/mý.  Body composition analysis completed and  "showed:   %body fat: 35.2    Measurements (in inches)  Neck: 15.25  Chest: 46.5  Waist: 49  Hips: 45  Thighs: 35    Vital Signs:   /88   Pulse 73   Ht 172.7 cm (68\")   Wt 111 kg (244 lb 9.6 oz)   SpO2 100%   BMI 37.19 kg/mý     Physical Exam   General appears stated age and normal appearance   HEENT PERRLA, EOM intact, conjunctivae normal, and without thyromegaly or thyroid nodules   Chest/lungs ANABEL grade 3/6, Normal rate, Regular rhythm, Breathing is unlabored, Clear to auscultation bilaterally, and Diminished breath sounds BML   Abdomen Central adiposity and Tenderness right periumbilical region   Extremities without edema   Neuro Normal DTRs, Good historian, and No focal deficit   Skin Warm, dry, intact   Psych normal behavior, normal thought content, and normal concentration     Result Review :   The following data was reviewed by: MARIA DEL CARMEN Jones on 08/31/2023:  Office Visit on 08/14/2023   Component Date Value Ref Range Status    Color 08/14/2023 Yellow  Yellow, Straw, Dark Yellow, Vianney Final    Clarity, UA 08/14/2023 Clear  Clear Final    Specific Gravity  08/14/2023 1.015  1.005 - 1.030 Final    pH, Urine 08/14/2023 7.0  5.0 - 8.0 Final    Leukocytes 08/14/2023 Negative  Negative Final    Nitrite, UA 08/14/2023 Negative  Negative Final    Protein, POC 08/14/2023 Negative  Negative mg/dL Final    Glucose, UA 08/14/2023 Negative  Negative mg/dL Final    Ketones, UA 08/14/2023 Negative  Negative Final    Urobilinogen, UA 08/14/2023 Normal  Normal, 0.2 E.U./dL Final    Bilirubin 08/14/2023 Negative  Negative Final    Blood, UA 08/14/2023 Negative  Negative Final    Lot Number 08/14/2023 [id number]   Final    Expiration Date 08/14/2023 10/25/2024   Final   Lab Requisition on 07/14/2023   Component Date Value Ref Range Status    Reference Lab Report 07/14/2023    Final                    Value:[facility]  [address] [place] [address]  Phone: [phone] or " [phone]  Fax: [phone]  Manuel Mederos M.D., Medical Director    PATIENT NAME                           LABORATORY NO.  GANGA BERNARD                        [id number]  [id number]                         AGE              SEX  SSN           CLIENT REF #  [facility]           47      1975      [id number]   [id number]    [address] [facility]              REQUESTING M.D.     ATTEND[place] [address]                EDIE MILLER KRISTEN  DATE COLLECTED      DATE RECEIVED      DATE REPORTED  2023    DIAGNOSIS:  A.   ASCENDING COLON POLYP:  Colonic mucosa with no significant pathologic abnormality; no definite  adenomatous or hyperplastic changes are identified  B.   TRANSVERSE COLON POLYP:  Tubular adenoma  No high grade dysplasia identified  C.   RECTAL                           POLYP:  Hyperplastic polyp    CLINICAL HISTORY:  Other hemorrhoids, benign neoplasm of rectum, benign neoplasm of transverse  colon, benign neoplasm of ascending colon, family history of malignant  neoplasm of digestive organs, personal history of colonic polyps, encounter for  screening for malignant neoplasm of colon    SPECIMENS RECEIVED:  A.  ASCENDING COLON POLYP  B.  TRANSVERSE COLON POLYP  C.  RECTAL POLYP    MICROSCOPIC DESCRIPTION:  Tissue blocks are prepared and slides are examined microscopically on all  specimens. See diagnosis for details.    Professional interpretation rendered by Bela Miller D.O., F.C.A.P. at  [facility], [place], [address] [place] [address].    GROSS DESCRIPTION:  A.  Labeled ascending colon polyp is a 1.5 x 0.2 x 0.1 cm portion of tan soft  tissue which is submitted entirely in 1 block.  BW  B.  Labeled transverse colon polyp are 2 portions of tan soft tissue measuring  0.6 x 0.4 x 0.3 cm in aggregate.  Submitted entirely in 1 block.  C.                            Labeled  rectum polyp is a 0.6 x 0.3 x 0.2 cm portion of tan soft tissue  which is submitted entirely in 1 block.    REVIEWED, DIAGNOSED AND ELECTRONICALLY  SIGNED BY:    Bela Pleitez D.O., F.C.A.P.  CPT CODES:  88305x3     Office Visit on 07/05/2023   Component Date Value Ref Range Status    WBC 07/05/2023 8.5  3.4 - 10.8 x10E3/uL Final    RBC 07/05/2023 5.07  4.14 - 5.80 x10E6/uL Final    Hemoglobin 07/05/2023 14.3  13.0 - 17.7 g/dL Final    Hematocrit 07/05/2023 42.7  37.5 - 51.0 % Final    MCV 07/05/2023 84  79 - 97 fL Final    MCH 07/05/2023 28.2  26.6 - 33.0 pg Final    MCHC 07/05/2023 33.5  31.5 - 35.7 g/dL Final    RDW 07/05/2023 13.3  11.6 - 15.4 % Final    Platelets 07/05/2023 286  150 - 450 x10E3/uL Final    Neutrophil Rel % 07/05/2023 57  Not Estab. % Final    Lymphocyte Rel % 07/05/2023 30  Not Estab. % Final    Monocyte Rel % 07/05/2023 8  Not Estab. % Final    Eosinophil Rel % 07/05/2023 4  Not Estab. % Final    Basophil Rel % 07/05/2023 1  Not Estab. % Final    Neutrophils Absolute 07/05/2023 4.9  1.4 - 7.0 x10E3/uL Final    Lymphocytes Absolute 07/05/2023 2.6  0.7 - 3.1 x10E3/uL Final    Monocytes Absolute 07/05/2023 0.7  0.1 - 0.9 x10E3/uL Final    Eosinophils Absolute 07/05/2023 0.3  0.0 - 0.4 x10E3/uL Final    Basophils Absolute 07/05/2023 0.1  0.0 - 0.2 x10E3/uL Final    Immature Granulocyte Rel % 07/05/2023 0  Not Estab. % Final    Immature Grans Absolute 07/05/2023 0.0  0.0 - 0.1 x10E3/uL Final    Ferritin 07/05/2023 88  30 - 400 ng/mL Final    Folate 07/05/2023 9.1  >3.0 ng/mL Final    Comment: A serum folate concentration of less than 3.1 ng/mL is  considered to represent clinical deficiency.      Iron 07/05/2023 72  38 - 169 ug/dL Final    Glucose 07/05/2023 104 (H)  70 - 99 mg/dL Final    BUN 07/05/2023 9  6 - 24 mg/dL Final    Creatinine 07/05/2023 0.93  0.76 - 1.27 mg/dL Final    EGFR Result 07/05/2023 102  >59 mL/min/1.73 Final    BUN/Creatinine Ratio 07/05/2023 10  9 - 20 Final    Sodium  07/05/2023 139  134 - 144 mmol/L Final    Potassium 07/05/2023 4.2  3.5 - 5.2 mmol/L Final    Chloride 07/05/2023 99  96 - 106 mmol/L Final    Total CO2 07/05/2023 26  20 - 29 mmol/L Final    Calcium 07/05/2023 9.5  8.7 - 10.2 mg/dL Final    Total Protein 07/05/2023 6.7  6.0 - 8.5 g/dL Final    Albumin 07/05/2023 4.2  4.0 - 5.0 g/dL Final    Comment:                **Effective July 10, 2023 Albumin reference interval**                   will be changing to:                              Age                  Male          Female                             0 -   7 days       3.6 - 4.9      3.6 - 4.9                             8 -  30 days       3.5 - 4.6      3.5 - 4.6                             1 -   6 months     3.7 - 4.8      3.7 - 4.8                      7 months -   2 years      4.0 - 5.0      4.0 - 5.0                             3 -   5 years      4.1 - 5.0      4.1 - 5.0                             6 -  12 years      4.2 - 5.0      4.2 - 5.0                            13 -  30 years      4.3 - 5.2      4.0 - 5.0                            31 -  50 years      4.1 - 5.1      3.9 - 4.9                            51 -  60 years      3.8 - 4.9      3.8 - 4.9                            61 -  70 years      3.9 - 4.9      3.9 - 4.9                            71 -  80 years      3.8 - 4.8      3.8 - 4.8                            8                           1 -  89 years      3.7 - 4.7      3.7 - 4.7                            90 - 199 years      3.6 - 4.6      3.6 - 4.6      Globulin 07/05/2023 2.5  1.5 - 4.5 g/dL Final    A/G Ratio 07/05/2023 1.7  1.2 - 2.2 Final    Total Bilirubin 07/05/2023 0.4  0.0 - 1.2 mg/dL Final    Alkaline Phosphatase 07/05/2023 203 (H)  44 - 121 IU/L Final    AST (SGOT) 07/05/2023 88 (H)  0 - 40 IU/L Final    ALT (SGPT) 07/05/2023 53 (H)  0 - 44 IU/L Final    Methylmalonic Acid 07/05/2023 372  0 - 378 nmol/L Final    Prealbumin 07/05/2023 23  12 - 34 mg/dL Final    Vitamin B1, Whole Blood  07/05/2023 165.5  66.5 - 200.0 nmol/L Final    25 Hydroxy, Vitamin D 07/05/2023 16.6 (L)  30.0 - 100.0 ng/mL Final    Comment: Vitamin D deficiency has been defined by the [place] of  Medicine and an Endocrine Society practice guideline as a  level of serum 25-OH vitamin D less than 20 ng/mL (1,2).  The Endocrine Society went on to further define vitamin D  insufficiency as a level between 21 and 29 ng/mL (2).  1. IOM ([place] of Medicine). 2010. Dietary reference     intakes for calcium and D. [place]: The     National Academies Press.  2. Luis Daniel MF, Enmanuel FABIAN, Kwame HINOJOSA, et al.     Evaluation, treatment, and prevention of vitamin D     deficiency: an Endocrine Society clinical practice     guideline. JCEM. 2011 Jul; 96(9):1911-30.     Office Visit on 12/05/2022   Component Date Value Ref Range Status    WBC 12/05/2022 10.5  3.4 - 10.8 x10E3/uL Final    RBC 12/05/2022 5.66  4.14 - 5.80 x10E6/uL Final    Hemoglobin 12/05/2022 15.3  13.0 - 17.7 g/dL Final    Hematocrit 12/05/2022 45.9  37.5 - 51.0 % Final    MCV 12/05/2022 81  79 - 97 fL Final    MCH 12/05/2022 27.0  26.6 - 33.0 pg Final    MCHC 12/05/2022 33.3  31.5 - 35.7 g/dL Final    RDW 12/05/2022 15.4  11.6 - 15.4 % Final    Platelets 12/05/2022 339  150 - 450 x10E3/uL Final    Neutrophil Rel % 12/05/2022 63  Not Estab. % Final    Lymphocyte Rel % 12/05/2022 28  Not Estab. % Final    Monocyte Rel % 12/05/2022 6  Not Estab. % Final    Eosinophil Rel % 12/05/2022 2  Not Estab. % Final    Basophil Rel % 12/05/2022 0  Not Estab. % Final    Neutrophils Absolute 12/05/2022 6.6  1.4 - 7.0 x10E3/uL Final    Lymphocytes Absolute 12/05/2022 3.0  0.7 - 3.1 x10E3/uL Final    Monocytes Absolute 12/05/2022 0.6  0.1 - 0.9 x10E3/uL Final    Eosinophils Absolute 12/05/2022 0.2  0.0 - 0.4 x10E3/uL Final    Basophils Absolute 12/05/2022 0.0  0.0 - 0.2 x10E3/uL Final    Immature Granulocyte Rel % 12/05/2022 1  Not Estab. % Final    Immature Grans Absolute  12/05/2022 0.1  0.0 - 0.1 x10E3/uL Final    Glucose 12/05/2022 88  70 - 99 mg/dL Final    BUN 12/05/2022 8  6 - 24 mg/dL Final    Creatinine 12/05/2022 0.88  0.76 - 1.27 mg/dL Final    EGFR Result 12/05/2022 107  >59 mL/min/1.73 Final    BUN/Creatinine Ratio 12/05/2022 9  9 - 20 Final    Sodium 12/05/2022 141  134 - 144 mmol/L Final    Potassium 12/05/2022 3.8  3.5 - 5.2 mmol/L Final    Chloride 12/05/2022 97  96 - 106 mmol/L Final    Total CO2 12/05/2022 30 (H)  20 - 29 mmol/L Final    Calcium 12/05/2022 10.0  8.7 - 10.2 mg/dL Final    Total Protein 12/05/2022 7.2  6.0 - 8.5 g/dL Final    Albumin 12/05/2022 4.6  4.0 - 5.0 g/dL Final    Globulin 12/05/2022 2.6  1.5 - 4.5 g/dL Final    A/G Ratio 12/05/2022 1.8  1.2 - 2.2 Final    Total Bilirubin 12/05/2022 0.3  0.0 - 1.2 mg/dL Final    Alkaline Phosphatase 12/05/2022 101  44 - 121 IU/L Final    AST (SGOT) 12/05/2022 13  0 - 40 IU/L Final    ALT (SGPT) 12/05/2022 14  0 - 44 IU/L Final    Ferritin 12/05/2022 88  30 - 400 ng/mL Final    Folate 12/05/2022 10.0  >3.0 ng/mL Final    Comment: A serum folate concentration of less than 3.1 ng/mL is  considered to represent clinical deficiency.      Iron 12/05/2022 60  38 - 169 ug/dL Final    Methylmalonic Acid 12/05/2022 143  0 - 378 nmol/L Final    Prealbumin 12/05/2022 23  12 - 34 mg/dL Final    Vitamin B1, Whole Blood 12/05/2022 123.4  66.5 - 200.0 nmol/L Final    25 Hydroxy, Vitamin D 12/05/2022 19.3 (L)  30.0 - 100.0 ng/mL Final    Comment: Vitamin D deficiency has been defined by the [place] of  Medicine and an Endocrine Society practice guideline as a  level of serum 25-OH vitamin D less than 20 ng/mL (1,2).  The Endocrine Society went on to further define vitamin D  insufficiency as a level between 21 and 29 ng/mL (2).  1. IOM ([place] of Medicine). 2010. Dietary reference     intakes for calcium and D. [place]: The     National Academies Press.  2. Luis Daniel LYNN, Enmanuel FABIAN, Kwame HINOJOSA, et al.      Evaluation, treatment, and prevention of vitamin D     deficiency: an Endocrine Society clinical practice     guideline. JCEM. 2011 Jul; 96(3):1911-30.      Amylase 12/05/2022 30 (L)  31 - 110 U/L Final    Lipase 12/05/2022 25  13 - 78 U/L Final   No results displayed because visit has over 200 results.      Pre-Admission Testing on 10/26/2022   Component Date Value Ref Range Status    Hemoglobin A1C 10/26/2022 11.80 (H)  4.80 - 5.60 % Final    Potassium 10/26/2022 3.4 (L)  3.5 - 5.2 mmol/L Final    WBC 10/26/2022 7.20  3.40 - 10.80 10*3/mm3 Final    RBC 10/26/2022 5.99 (H)  4.14 - 5.80 10*6/mm3 Final    Hemoglobin 10/26/2022 16.0  13.0 - 17.7 g/dL Final    Hematocrit 10/26/2022 48.6  37.5 - 51.0 % Final    MCV 10/26/2022 81.1  79.0 - 97.0 fL Final    MCH 10/26/2022 26.7  26.6 - 33.0 pg Final    MCHC 10/26/2022 32.9  31.5 - 35.7 g/dL Final    RDW 10/26/2022 13.8  12.3 - 15.4 % Final    RDW-SD 10/26/2022 40.3  37.0 - 54.0 fl Final    MPV 10/26/2022 9.3  6.0 - 12.0 fL Final    Platelets 10/26/2022 251  140 - 450 10*3/mm3 Final    QT Interval 10/26/2022 364  ms Final    QTC Interval 10/26/2022 414  ms Final    ABO Type 10/26/2022 B   Final    RH type 10/26/2022 Positive   Final    Antibody Screen 10/26/2022 Negative   Final    T&S Expiration Date 10/26/2022 10/29/2022 11:59:59 PM   Final    MRSA PCR 10/26/2022 Negative  Negative Final   Lab on 10/13/2022   Component Date Value Ref Range Status    WBC 10/13/2022 8.04  3.40 - 10.80 10*3/mm3 Final    RBC 10/13/2022 6.32 (H)  4.14 - 5.80 10*6/mm3 Final    Hemoglobin 10/13/2022 16.8  13.0 - 17.7 g/dL Final    Hematocrit 10/13/2022 50.7  37.5 - 51.0 % Final    MCV 10/13/2022 80.2  79.0 - 97.0 fL Final    MCH 10/13/2022 26.6  26.6 - 33.0 pg Final    MCHC 10/13/2022 33.1  31.5 - 35.7 g/dL Final    RDW 10/13/2022 14.6  12.3 - 15.4 % Final    RDW-SD 10/13/2022 41.1  37.0 - 54.0 fl Final    MPV 10/13/2022 9.8  6.0 - 12.0 fL Final    Platelets 10/13/2022 242  140 - 450 10*3/mm3  Final    Glucose 10/13/2022 298 (H)  65 - 99 mg/dL Final    BUN 10/13/2022 8  6 - 20 mg/dL Final    Creatinine 10/13/2022 1.07  0.76 - 1.27 mg/dL Final    Sodium 10/13/2022 133 (L)  136 - 145 mmol/L Final    Potassium 10/13/2022 3.4 (L)  3.5 - 5.2 mmol/L Final    Slight hemolysis detected by analyzer. Results may be affected.    Chloride 10/13/2022 92 (L)  98 - 107 mmol/L Final    CO2 10/13/2022 29.2 (H)  22.0 - 29.0 mmol/L Final    Calcium 10/13/2022 9.5  8.6 - 10.5 mg/dL Final    Total Protein 10/13/2022 6.6  6.0 - 8.5 g/dL Final    Albumin 10/13/2022 3.80  3.50 - 5.20 g/dL Final    ALT (SGPT) 10/13/2022 199 (H)  1 - 41 U/L Final    AST (SGOT) 10/13/2022 170 (H)  1 - 40 U/L Final    Alkaline Phosphatase 10/13/2022 344 (H)  39 - 117 U/L Final    Total Bilirubin 10/13/2022 0.5  0.0 - 1.2 mg/dL Final    Globulin 10/13/2022 2.8  gm/dL Final    A/G Ratio 10/13/2022 1.4  g/dL Final    BUN/Creatinine Ratio 10/13/2022 7.5  7.0 - 25.0 Final    Anion Gap 10/13/2022 11.8  5.0 - 15.0 mmol/L Final    eGFR 10/13/2022 86.1  >60.0 mL/min/1.73 Final    National Kidney Foundation and American Society of Nephrology (ASN) Task Force recommended calculation based on the Chronic Kidney Disease Epidemiology Collaboration (CKD-EPI) equation refit without adjustment for race.   Results Encounter on 09/23/2022   Component Date Value Ref Range Status    Testosterone, Total 10/13/2022 160.00 (L)  249.00 - 836.00 ng/dL Final                     Assessment / Plan       Diagnoses and all orders for this visit:    1. Class 2 severe obesity with serious comorbidity and body mass index (BMI) of 37.0 to 37.9 in adult, unspecified obesity type (Primary)  Assessment & Plan:  Patient's (Body mass index is 37.19 kg/mý.) indicates that they are morbidly/severely obese (BMI > 40 or > 35 with obesity - related health condition) with health conditions that include obstructive sleep apnea, hypertension, diabetes mellitus, GERD, and osteoarthritis . Weight  is worsening. BMI  is above average; BMI management plan is completed. We discussed low calorie, low carb based diet program, portion control, increasing exercise, joining a fitness center or start home based exercise program, and an jayson-based approach such as [employer] Pal or Lose It.  Topics of discussion included obesity as a disease, nutritional education on food groups, exercise, and medications. Patient was instructed in adequate protein, controlled carb and controlled fat intake.   Patient received instructions on using the medicines as a tool in controlling their weight with nutritional and behavioral changes. Risks and benefits were discussed. I believe the potential benefits of medication helping to decrease weight outweighs the risks. Patient is to try nutritonal/behavioral changes only first.   Patient received our clinic education booklet.   Our patient consent form was reviewed including potential risks of weight loss. We also reviewed our confidentiality and HIPPA statements. Patients current FITT score was reviewed along with current capability for exercise tolerance and a patient will work towards a FITT score of:     Frequency   Intensity Time Strength Training   []   0 None  []   0 None  []   0 None  []   0 None    []   1 (1-2x/week) []   1 (light) []   1 (<10 min) []   1 (1x/week)   [x]   2 (3-5x/week) [x]   2 (moderate) []   2 (10-20 min) [x]   2 (2x/week)   []   3 (daily)   []   3 (moderately hard)  []   4 (very hard) []   3 (20-30 min)  [x]   4 (>30 min) []   3 (3-4x/week)       Patient's past medical history was reviewed in detail and barriers to weight loss were identified and discussed. Past efforts at weight reduction on their own as well as under physician supervision were documented and discussed.  I advised patient to continue routine care with their Primary Care Provider.     Nutritional recommendations and goals were reviewed including Calories:8559-3207 daily adjusted for exercise  calories burnt, Protein:>100 g daily, Net carbs (total carb - fiber) of 50-75g per day.  Start to keep a food journal and bring into next visit in 2 weeks for review. Practice the behavioral modification technique of mindful eating. Take one MVI daily and 2000mg fish oil daily. Take other medications and supplements as directed.  - Repeat labwork today  - AOM options are limited due to substance abuse history (meth), allergic to naloxone and he is on suboxone so contrave is contraindicated. Wellbutrin not ideal due to saliva test showing it is not a good medication for him, He is not enthusiastic about trying topamax again since it did not affect his appetite previously. Wegovy and saxenda are not covered by his insurance and therefore unaffordable.   That leaves us with options of ozempic or mounjaro depending on his A1c which was 11 prior to LSG but has not been retested. Discussed option of metformin to counteract weight positive effect of celebrex and propranolol.   - Treatment goal 190lb      Orders:  -     Hemoglobin A1c; Future  -     Comprehensive Metabolic Panel; Future  -     Lipid Panel; Future  -     TSH; Future  -     T4, Free; Future    2. Diabetes mellitus type 2 in obese  Assessment & Plan:  A1c 11 prior to LSG. Repeat A1c this week, external lab order given. Consider ozempic or mounjaro for optimal treatment of coexisting diabetes and obesity. Continue low carb diet, discussed exercise plan.     Orders:  -     Hemoglobin A1c; Future  -     Comprehensive Metabolic Panel; Future  -     TSH; Future  -     T4, Free; Future    3. Essential hypertension    4. Testosterone deficiency  Assessment & Plan:  Followed by urology. Recently discontinued testosterone injections, had no improvement in symptoms.       5. Sleep apnea, unspecified type  Assessment & Plan:  CPAP compliant. Continue with weight reduction. May need to repeat test at goal weight.       6. Polyphagia  Assessment & Plan:  Hunger as soon as  30 minutes after a meal.       7. Gastroesophageal reflux disease, unspecified whether esophagitis present    8. Arthritis  Assessment & Plan:  Worsening. Was recently started on celebrex, weight has increased 24lb. Will inquire more at next visit about who is managing this, not in Yazidi encounters.      9. Fatty liver  Assessment & Plan:  Followed by gastro. Weight loss should help.            I spent 76 minutes caring for Gopi on this date of service. This time includes time spent by me in the following activities:preparing for the visit, reviewing tests, performing a medically appropriate examination and/or evaluation , counseling and educating the patient/family/caregiver, ordering medications, tests, or procedures, documenting information in the medical record, and independently interpreting results and communicating that information with the patient/family/caregiver  Follow Up   Return in about 2 weeks (around 9/14/2023) for Labs before next visit, Next scheduled follow up.  Patient was given instructions and counseling regarding his condition or for health maintenance advice. Please see specific information pulled into the AVS if appropriate.     Jeannie Simon, APRN  08/31/2023

## 2023-08-31 NOTE — ASSESSMENT & PLAN NOTE
Worsening. Was recently started on celebrex, weight has increased 24lb. Will inquire more at next visit about who is managing this, not in [employer] encounters.

## 2023-08-31 NOTE — ASSESSMENT & PLAN NOTE
A1c 11 prior to LSG. Repeat A1c this week, external lab order given. Consider ozempic or mounjaro for optimal treatment of coexisting diabetes and obesity. Continue low carb diet, discussed exercise plan.

## 2023-09-19 ENCOUNTER — PROCEDURE VISIT (OUTPATIENT)
Dept: UROLOGY | Facility: CLINIC | Age: 48
End: 2023-09-19
Payer: MEDICARE

## 2023-09-19 VITALS
SYSTOLIC BLOOD PRESSURE: 138 MMHG | HEART RATE: 70 BPM | HEIGHT: 68 IN | WEIGHT: 244 LBS | OXYGEN SATURATION: 98 % | BODY MASS INDEX: 36.98 KG/M2 | DIASTOLIC BLOOD PRESSURE: 76 MMHG

## 2023-09-19 DIAGNOSIS — R39.9 LOWER URINARY TRACT SYMPTOMS (LUTS): Primary | ICD-10-CM

## 2023-09-19 DIAGNOSIS — N40.0 BPH WITHOUT URINARY OBSTRUCTION: ICD-10-CM

## 2023-09-19 NOTE — PROGRESS NOTES
Preprocedure diagnosis  Lower urinary tract symptoms after remote TURP  Weak stream    Postprocedure diagnosis  Lower urinary tract symptoms after remote TURP  Weak stream    Procedure  Flexible Cystourethroscopy    Attending surgeon  Gurwinder Barney MD    Anesthesia  2% lidocaine jelly intraurethrally    Complications  None    Indications  48 y.o. male undergoing a flexible cystoscopy for the above mentioned indications.  Informed consent was obtained.  Remote TURP by Dr Centeno. Progressive weak stream. Cysto today to rule out obstruction or stricture.    Findings  The urethral urothelium was within normal limits with no visible strictures.      The prostatic urethra revealed NO lateral lobe coaptation, with NO median lobe.     Widely patent prostatic fossa and bladder neck after TURP.     Bladder findings included bilateral ureters in orthotopic position, normal bladder mucosa without tumors, lesions, or stones.     Procedure  The patient was placed in supine position and prepped and draped in sterile fashion with lidocaine jelly instill per the urethra for anesthesia 5 minutes prior to procedure start.  A brief timeout was performed including available nursing staff and the patient.      The 16 Fr digital flexible cystoscope was lubricated and gently advanced into the urethral meatus.     The penile and bulbar urethra appeared widely patent without visible lesion or stricture.     The prostatic urethra was notable for NO lateral lobe coaptation, with NO median lobe component. Prostate and bladder neck is widely patent.      The scope was then advanced into the bladder. The bladder was completely visualized starting with the trigone.     There were bilateral orthotopic ureteral orifices.     The posterior wall, lateral walls, anterior wall, and dome were completely visualized WITHOUT obvious mucosal lesions, tumors, stones, or trabeculations.      The cystoscope was retroflexed and the bladder neck and prostate  were further visualized and appeared normal.      The cystoscope was then gently withdrawn while visualizing the urethra and the procedure was then terminated.  The patient tolerated the procedure well.      He voided for Uroflow:  Uroflow     Avg flow: 2.9  ml/sec  Max flow: 7.8 ml/sec  Time to max flow: 18 sec  Voided volume: 233 mL      Follow Up:     Diagnoses:   LUTS  Neurogenic bladder    No concern for obstruction, widely patent prostate and bladder neck.   Patient's uroflow demonstrates a weak intermittent stream requiring abdominal straining.  I discussed with the patient, he likely has a neurogenic bladder, possibly related to chronic history of diabetes but unclear.      Urodynamics was offered, however this will likely not change her management.  There is no obvious evidence of any obstruction.      He has previously performed intermittent catheterization.  I recommend he initiate 3X daily catheterization to keep himself emptying to reduce his risk of symptoms and UTI.  I will send catheters to his home via medical supply company.    Continue Tamsulosin if any benefit.   F/u 6 months.     Gurwinder Barney MD

## 2023-10-03 RX ORDER — MIRTAZAPINE 30 MG/1
30 TABLET, FILM COATED ORAL NIGHTLY
COMMUNITY

## 2023-10-10 ENCOUNTER — OFFICE VISIT (OUTPATIENT)
Dept: BARIATRICS/WEIGHT MGMT | Facility: CLINIC | Age: 48
End: 2023-10-10
Payer: MEDICARE

## 2023-10-10 ENCOUNTER — DOCUMENTATION (OUTPATIENT)
Dept: BARIATRICS/WEIGHT MGMT | Facility: CLINIC | Age: 48
End: 2023-10-10
Payer: MEDICARE

## 2023-10-10 VITALS
OXYGEN SATURATION: 98 % | SYSTOLIC BLOOD PRESSURE: 128 MMHG | WEIGHT: 243.5 LBS | TEMPERATURE: 98.2 F | HEART RATE: 60 BPM | DIASTOLIC BLOOD PRESSURE: 70 MMHG | BODY MASS INDEX: 36.07 KG/M2 | HEIGHT: 69 IN

## 2023-10-10 DIAGNOSIS — E66.9 OBESITY, CLASS II, BMI 35-39.9: Primary | ICD-10-CM

## 2023-10-10 DIAGNOSIS — K21.9 GASTROESOPHAGEAL REFLUX DISEASE, UNSPECIFIED WHETHER ESOPHAGITIS PRESENT: ICD-10-CM

## 2023-10-10 DIAGNOSIS — Z98.84 STATUS POST BARIATRIC SURGERY: ICD-10-CM

## 2023-10-10 PROCEDURE — 99215 OFFICE O/P EST HI 40 MIN: CPT | Performed by: PHYSICIAN ASSISTANT

## 2023-10-10 PROCEDURE — 1160F RVW MEDS BY RX/DR IN RCRD: CPT | Performed by: PHYSICIAN ASSISTANT

## 2023-10-10 PROCEDURE — 3074F SYST BP LT 130 MM HG: CPT | Performed by: PHYSICIAN ASSISTANT

## 2023-10-10 PROCEDURE — 3078F DIAST BP <80 MM HG: CPT | Performed by: PHYSICIAN ASSISTANT

## 2023-10-10 PROCEDURE — 1159F MED LIST DOCD IN RCRD: CPT | Performed by: PHYSICIAN ASSISTANT

## 2023-10-10 NOTE — PROGRESS NOTES
"Weight Loss Surgery  Presurgical Nutrition Assessment     Gopi Johnson  10/10/2023  [id number]  [id number]  1975   male    Surgery desired: Revision - sleeve gastrectomy on 10/28/2022     HEIGHT 174 cm (68.5\")   WEIGHT 110 kg (243.5 #)   BMI 36.49        Highest weight ever:  182 kg  (400 #) - this information obtained from 02/22/2023 pre-surgery [facility].      Allergies   Allergen Reactions    Naloxone Swelling     Throat swelling, itching, rash    Hydrocodone-Acetaminophen Itching and Rash     Percocet OK       Current Outpatient Medications:     Alcohol Swabs pads, Clean area prior to injection, Disp: 100 each, Rfl: 11    amLODIPine (NORVASC) 5 MG tablet, Take 1 tablet by mouth Daily., Disp: , Rfl:     amoxicillin-clavulanate (AUGMENTIN) 875-125 MG per tablet, Take 1 tablet by mouth 2 (Two) Times a Day., Disp: 14 tablet, Rfl: 0    buprenorphine (SUBUTEX) 8 MG sublingual tablet SL tablet, 1 tablet., Disp: , Rfl:     celecoxib (CeleBREX) 200 MG capsule, Take 1 capsule by mouth Daily., Disp: , Rfl:     chlorthalidone (HYGROTON) 25 MG tablet, , Disp: , Rfl:     Cholecalciferol (Vitamin D3) 50 MCG (2000 UT) tablet, , Disp: , Rfl:     Cyanocobalamin (Vitamin B-12) 1000 MCG sublingual tablet, Place 1 tablet under the tongue Daily., Disp: 90 each, Rfl: 0    fluticasone (FLONASE) 50 MCG/ACT nasal spray, As Needed., Disp: , Rfl:     hydrOXYzine (ATARAX) 25 MG tablet, Take 1 tablet by mouth 3 (Three) Times a Day As Needed for Anxiety., Disp: 90 tablet, Rfl: 0    lidocaine (LIDODERM) 5 %, Place 1 patch on the skin as directed by provider Daily. Remove & Discard patch within 12 hours or as directed by MD, Disp:  , Rfl:     loratadine (CLARITIN) 10 MG tablet, Daily., Disp: , Rfl:     mirtazapine (REMERON) 30 MG tablet, Take 1 tablet by mouth Every Night., Disp: , Rfl:     Multiple Vitamins-Minerals (MULTI COMPLETE PO), Take  by mouth., Disp: , Rfl:     mupirocin (BACTROBAN) 2 % ointment, , Disp: , Rfl:     pantoprazole " (PROTONIX) 40 MG EC tablet, Take 1 tablet by mouth Every Morning., Disp:  , Rfl:     propranolol (INDERAL) 60 MG tablet, Take 1 tablet by mouth 2 (Two) Times a Day., Disp: , Rfl:     silver sulfadiazine (SILVADENE, SSD) 1 % cream, , Disp: , Rfl:     Sod Picosulfate-Mag Ox-Cit Acd (Clenpiq) 10-3.5-12 MG-GM -GM/160ML solution, Take 350 mL by mouth Take As Directed., Disp: 350 mL, Rfl: 0    tamsulosin (FLOMAX) 0.4 MG capsule 24 hr capsule, Take 1 capsule by mouth Daily., Disp: 90 capsule, Rfl: 3    vilazodone (VIIBRYD) 40 MG tablet tablet, Take 1 tablet by mouth Daily. Take with food, Disp: 30 tablet, Rfl: 0    vitamin D (ERGOCALCIFEROL) 1.25 MG (15042 UT) capsule capsule, Take 1 capsule by mouth Every 7 (Seven) Days for 12 doses., Disp: 12 capsule, Rfl: 0      Subjective information: [review completed of previous reports of 02/24/2022 (those both of vickey and  JEOVANNY Adams) as well as that of MARIA DEL CARMEN Chacon, on 08/31/2023)]   Patient's primary complaint to me is that he is always hungry.  He states that he lost over 100 # after LSG on 10/28/2022, but has gained much of that back.  His pre-surgery weight was 129 kg (284 #) and is now ~40 pounds less than that at 243.  He has changed his eating habits for the better since 2022.  At that time was eating at least 2 slices whole wheat toast with butter and 3 waffles with 1/2 cup syrup before noon, when he snacked on sandwich cookies.  His only balanced meal was at ~ 1 pm - roast beef & swiss cheese on a croissant. He no longer eats in that manner, nor does he any longer drink 2 glasses sweet tea per day.  Affirmed patient for the positive changes he has made. Reviewed further ones that he could focus on now.    Nutrition Recall   Example of Usual 24 hour intake:     Breakfast: At 5 am - 2 slices dry multigrain toast with water and unsweet tea    Lunch: a protein shake OR cottage cheese OR 4 to 6 oz ham, turkey or chicken, with tea or water.     Dinner: 4 to 8 oz beef, pork  or chicken with rice or baked potato and green beans with water    Snacks: 6 to 8 pretzel rods OR PB crackers OR zero sugar vanilla Greek yogurt, cottage cheese with tea or water    Beverages of Choice: water, tea    Food Allergies or Intolerances: none stated or recorded   However, patient states he does not eat cheese - dislikes it, except for cottage cheese (which he loves and eats frequently).     Exercise / Activity:  1/2 to 1 hour every other day      Assessment of Nutritional Adequacy, Excessive Intake or Deficiencies:        Protein intake is too low to ensure successful weight loss. Too few eating episodes containing protein                                        Processed / simple Carbohydrate intake is: moderate - no fast food fries, etc, No sugary snack foods.                                       Balance of diet with a variety of fruits and vegetables is: minimal - no fruits and only a few vegetables at dinner                                                       Reliance on restaurant food including fast food is: not a problem - rarely eats out                                                                          Ingestion of sweet beverages eg soda, sweet tea, fruit juice: not a problem - rarely eats out.       Education    Provided Nutrition Guidelines for Bariatric and Metabolic Surgery   Reviewed guidelines for higher protein, limited carbohydrate diet to promote weight loss.  Encouraged patient to incorporate these principles of healthy eating.    Educated patient to wisely choose an appropriate protein supplement beverage for the post-surgery liquid diet.  Provided product guidelines and examples.    Explained importance of goal setting to help in changing eating behaviors that are not conducive to weight loss.  Specific macronutrient goals as below.   Provided follow-up options for support, including contact information for dietitians here.     Discussed importance of tracking grams of  protein and carbohydrate in diet.  Web-based support information and apps for smart phones and computers given.        Nutrition Goals   Protein goal:  grams per day in three regular balanced meals and two to three high protein snacks each day, to ensure desired weight loss.   Carbohydrate goal:  100-140 grams per day  Beverage goal: Appropriate non-carbonated beverage intake.  Patient to wean self off of any sweet beverages, including soda.    Exercise Goals  Continue current exercise routine, if appropriate, and obtain approval from caregiver if physically limited for any reason.   Start activity plan per PCP/specialist advice if not currently exercising.     Recommend that team proceed with surgery and follow per protocol.   Cheyanne Yen, MALCOML  10/10/2023  11:23 EDT

## 2023-10-10 NOTE — PROGRESS NOTES
"[facility] BARIATRIC SURGERY   [address]  [address]  [facility] [address] [phone]      Patient  Name:  Gopi Johnson  :  1975        Chief Complaint:  weight gain; unable to maintain weight loss    History of Present Illness:  Gopi Johnson is a 48 y.o. male s/p LSG/liver bx (steatosis) 10/28/22 [facility]  who presents today for evaluation, education and consultation regarding bariatric and metabolic surgery. The patient is interested in Revision with Dr. Michael Nguyễn has been overweight for at many years, with numerous failed dietary/weight loss attempts.  He now has obesity related comorbidities and as such has decided to pursue weight loss surgery.    Pt is almost one year s/p LSG, last seen at 9 month postop, previous to that was 1 month postop- stat imaging ordered at that time for nausea/ abd pain and noted to be taking daily NSAIDs staying with mother previously noted to smoke in the house.  Last visit with C/o constant hunger snacking all day to stay satisfied. Weight was 230lb. Advised dietician cornel and has since been working with medical weight management. Presurgery Start weight of 251lb, lowest weight reportedly 198lb, current weight 243lb with 8lb weightloss.     Patient presents to day interested in revision options for further weightloss, specifically interested in gastric bypass as he doesn't feel the gastric sleeve has \"worked for him\" despite doing \"everything right\".  He was off insulin in the months post LSG with DM in remission but now back on insulin with last A1C 5.7.  says his reflux is  fairly well controlled with daily protonix, rare abd pain. Focuses on protein, meat/ shake/ cottage cheese. No pop.  Gets around 1700 ca/day.  Had recent colonoscopy with Dr. Pleitez. Had liver bx with Dr. Rodriguez.     History of hypertension, lower extremity edema.  Sleep apnea.  BPH status post TURP procedure with urology and intermittent urinary retention requiring " straight caths as needed on Flomax daily.  Chronic back pain, arthritis.  Migraines.  Insulin-dependent diabetes.  History of MRSA with osteomyelitis requiring IV antibiotics and inpatient drainage.  History of substance abuse with meth last use 2016.  Treated with buprenorphine followed by therapist and PCP regularly.    Other past chart review with family history of malignant hyperthermia, patient tested negative on muscle biopsy. 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    All other past medical, surgical, social and family history have been obtained and discussed as pertinent to bariatric surgery as below.     Has had covid in the past, not hospitalized, is  vaccinated.     Past Medical History:   Diagnosis Date    Abnormal PFTs (pulmonary function tests)     Anxiety     Arthritis     Benign prostatic hyperplasia     Bipolar affective     Chronic pain     Cirrhosis     Colon polyp     Current every day vaping     Currently nonnicotine    Depression     Dyspepsia     Dyspnea on exertion     Elevated LFTs     GI eval (P)    Enlarged prostate     s/p TURP 5/2021    Epididymitis     Erectile dysfunction After T.E.R.P.    Family history of malignant hyperthermia     patient had muscle biopsy at [facility] and was negative, sister has malignant hyperthermia and a cousin    Fatigue     Fatty liver     Fractured pelvis     Frequent urination     Gastroparesis     GERD (gastroesophageal reflux disease)     on PPI, hx erosive gastritis on EGD 1/5/22    H/O: substance abuse     clean since 2016, on Subutex, managed by PCP    Headache     following w/ Neurology    Hx MRSA infection 2020    w/ osteomyelitis L hip, fracture hip, hospitalized with IV abx and drainage, had port with home abx    Hyperammonemia     following w/ GI    Hyperlipidemia     Hypertension     Hypogonadism in male     on testosterone q2wks, follows w/ Urology     Hypokalemia     Impaired mobility     w/ (L)sided weakness (since osteomyelitis), ambulates w/ cane    Insomnia     on Trazodone    Insulin dependent type 2 diabetes mellitus     resolved with weightloss, now back on insulin    Joint pain     w/ recent SI joint injections 2/2022    Kidney stone 11/30/2016    Kidney stones     Lower extremity edema     Migraine     Morbid obesity     Murmur     as a child    Myoclonus     w/ body jerks, follows w/ Neurology    Osteomyelitis 2020    H/O spinal osteomyelitis, previous IVDA    Piercing     ear/body    PUD (peptic ulcer disease)     non-bleeding gastric ulcers on EGD 1/5/22 w/ Dr. Dover    Rectus diastasis     Seasonal allergies     Sleep apnea     CPAP compliant    Staph infection 2020    Substance abuse     Not currently, over 6 years clean and sober!    Tattoo     Tobacco use     trying to quit, currently vaping (0%) - mom does smoke in the home    Urinary incontinence     Urinary tract infection      Past Surgical History:   Procedure Laterality Date    ABDOMINAL HERNIA REPAIR  2020    incisional, Dr. Dover, inferior to umbilicus    BUNIONECTOMY  2007    COLONOSCOPY  2023    CYSTOSCOPY TRANSURETHRAL RESECTION OF PROSTATE N/A 05/26/2021    Procedure: TRANSURETHRAL RESECTION OF PROSTATE;  Surgeon: Markel Centeno MD;  Location: [facility] OR;  Service: Urology;  Laterality: N/A;    ENDOSCOPY N/A 01/05/2022    Procedure: ESOPHAGOGASTRODUODENOSCOPY with biopsy;  Surgeon: Royal Dover MD;  Location: [facility] ENDOSCOPY;  Service: Gastroenterology;  Laterality: N/A;    ENDOSCOPY N/A 10/28/2022    Procedure: ESOPHAGOGASTRODUODENOSCOPY;  Surgeon: Olu Rodriguez MD;  Location:  [facility];  Service: Bariatric;  Laterality: N/A;    GASTRECTOMY N/A 10/28/2022    Procedure: GASTRECTOMY LAPAROSCOPIC;  Surgeon: Olu Rodriguez MD;  Location:  [facility] OR;  Service: Bariatric;  Laterality: N/A;    HEMORRHOIDECTOMY      Dr Dover do the removal    HIATAL HERNIA REPAIR   2019    INGUINAL HERNIA REPAIR  2019    Dr. Dover    KNEE OPEN LATERAL RELEASE Right 1985    LAPAROSCOPIC CHOLECYSTECTOMY  2014    sand, no stones    LIVER BIOPSY N/A 10/28/2022    Procedure: LIVER BIOPSY LAPAROSCOPIC;  Surgeon: Olu Rodriguez MD;  Location: [facility];  Service: Bariatric;  Laterality: N/A;    TONSILLECTOMY AND ADENOIDECTOMY  1986    WISDOM TOOTH EXTRACTION  1997       Allergies   Allergen Reactions    Naloxone Swelling     Throat swelling, itching, rash    Hydrocodone-Acetaminophen Itching and Rash     Percocet OK       Current Outpatient Medications:     Alcohol Swabs pads, Clean area prior to injection, Disp: 100 each, Rfl: 11    amLODIPine (NORVASC) 5 MG tablet, Take 1 tablet by mouth Daily., Disp: , Rfl:     buprenorphine (SUBUTEX) 8 MG sublingual tablet SL tablet, 1 tablet., Disp: , Rfl:     chlorthalidone (HYGROTON) 25 MG tablet, , Disp: , Rfl:     Cyanocobalamin (Vitamin B-12) 1000 MCG sublingual tablet, Place 1 tablet under the tongue Daily., Disp: 90 each, Rfl: 0    fluticasone (FLONASE) 50 MCG/ACT nasal spray, As Needed., Disp: , Rfl:     hydrOXYzine (ATARAX) 25 MG tablet, Take 1 tablet by mouth 3 (Three) Times a Day As Needed for Anxiety., Disp: 90 tablet, Rfl: 0    lidocaine (LIDODERM) 5 %, Place 1 patch on the skin as directed by provider Daily. Remove & Discard patch within 12 hours or as directed by MD, Disp:  , Rfl:     loratadine (CLARITIN) 10 MG tablet, Daily., Disp: , Rfl:     mirtazapine (REMERON) 30 MG tablet, Take 1 tablet by mouth Every Night., Disp: , Rfl:     Multiple Vitamins-Minerals (MULTI COMPLETE PO), Take  by mouth., Disp: , Rfl:     mupirocin (BACTROBAN) 2 % ointment, , Disp: , Rfl:     pantoprazole (PROTONIX) 40 MG EC tablet, Take 1 tablet by mouth Every Morning., Disp:  , Rfl:     propranolol (INDERAL) 60 MG tablet, Take 1 tablet by mouth 2 (Two) Times a Day., Disp: , Rfl:     tamsulosin (FLOMAX) 0.4 MG capsule 24 hr capsule, Take 1 capsule by mouth Daily.,  "Disp: 90 capsule, Rfl: 3    amoxicillin-clavulanate (AUGMENTIN) 875-125 MG per tablet, Take 1 tablet by mouth 2 (Two) Times a Day., Disp: 14 tablet, Rfl: 0    celecoxib (CeleBREX) 200 MG capsule, Take 1 capsule by mouth Daily., Disp: , Rfl:     Cholecalciferol (Vitamin D3) 50 MCG (2000 UT) tablet, , Disp: , Rfl:     silver sulfadiazine (SILVADENE, SSD) 1 % cream, , Disp: , Rfl:     Sod Picosulfate-Mag Ox-Cit Acd (Clenpiq) 10-3.5-12 MG-GM -GM/160ML solution, Take 350 mL by mouth Take As Directed., Disp: 350 mL, Rfl: 0    vilazodone (VIIBRYD) 40 MG tablet tablet, Take 1 tablet by mouth Daily. Take with food, Disp: 30 tablet, Rfl: 0    vitamin D (ERGOCALCIFEROL) 1.25 MG (66239 UT) capsule capsule, Take 1 capsule by mouth Every 7 (Seven) Days for 12 doses., Disp: 12 capsule, Rfl: 0    Social History     Socioeconomic History    Marital status: Single   Tobacco Use    Smoking status: Former     Packs/day: 1.00     Years: 15.00     Additional pack years: 0.00     Total pack years: 15.00     Types: Cigarettes     Quit date: 2022     Years since quittin.1     Passive exposure: Past    Smokeless tobacco: Never    Tobacco comments:     Recently I have quit smoking I do vape but with no nicotine   Vaping Use    Vaping Use: Some days    Substances: Flavoring    Devices: Pre-filled or refillable cartridge   Substance and Sexual Activity    Alcohol use: Not Currently    Drug use: Not Currently     Types: Amphetamines, Barbiturates, Benzodiazepines, \"Crack\" cocaine, Cocaine(coke), Codeine, Fentanyl, GHB, Hashish, Heroin, Hydrocodone, Ketamine, LSD, Marijuana, MDMA (ecstacy), Methamphetamines, Morphine, Nitrous oxide, Opium, Oxycodone     Comment: clean since     Sexual activity: Never     Family History   Problem Relation Age of Onset    Hypertension Mother     Obesity Mother     Arthritis Mother     COPD Mother     Heart disease Mother     Depression Mother     Sleep apnea Mother     Hypertension Father     Alcohol " abuse Father     Throat cancer Father     Stroke Father     Heart disease Father     Sleep apnea Sister     Hypertension Sister     Malig Hyperthermia Sister     Obesity Sister     Diabetes Sister     Depression Sister     Malig Hyperthermia Sister         I have been tested (muscle tissue test) and i dont have it    Cancer Brother     Hypertension Brother     Obesity Maternal Aunt     Hypertension Maternal Aunt     Cancer Maternal Aunt     Depression Maternal Aunt     Obesity Maternal Uncle     Diabetes Maternal Uncle     Cancer Paternal Aunt     Obesity Paternal Uncle     Hypertension Paternal Uncle     Cancer Paternal Uncle     Hearing loss Paternal Uncle     Prostate cancer Paternal Uncle     Hearing loss Paternal Uncle     Prostate cancer Paternal Uncle         Both my dads older brothers had radical prostate removal    Diabetes Maternal Grandmother     Leukemia Maternal Grandmother     Obesity Maternal Grandmother     Hypertension Maternal Grandmother     Sleep apnea Maternal Grandmother     Hypertension Maternal Grandfather     Cancer Maternal Grandfather     Sleep apnea Maternal Grandfather     Heart disease Maternal Grandfather     Arthritis Maternal Grandfather     Heart attack Maternal Grandfather     Obesity Maternal Grandfather     Hypertension Paternal Grandmother     Cancer Paternal Grandmother         blood    Heart disease Paternal Grandmother         Dad's mother    Arthritis Paternal Grandmother     Hearing loss Paternal Grandmother     Heart attack Paternal Grandmother     Prostate cancer Paternal Grandfather     Obesity Paternal Grandfather     Hypertension Paternal Grandfather     Stomach cancer Paternal Grandfather     Colon polyps Half-Brother     Colon cancer Half-Brother     Esophageal cancer Neg Hx            Review of Systems:  Constitutional:  Reports fatigue, weight gain and denies fevers, chills.  HEENT:  denies headache, ear pain or loss of hearing, blurred or double vision, nasal  discharge or sore throat.  Cardiovascular:  Reports HTN, edema and denies HLD, CAD, Atrial Fib, hx heart disease, heart murmur, hx MI, chest pain, palpitations, hx DVT.  Respiratory:  Reports sleep apnea and denies dyspnea on exertion, shortness of breath , cough , wheezing, asthma, COPD, hx PE.  Gastrointestinal:  Reports heartburn, gallbladder issues and denies dysphagia, nausea, vomiting, abdominal pain, IBS, diarrhea, constipation, melena, blood in stool, liver disease, hx pancreatitis.  Genitourinary:  Reports BPH, urinary retention and denies incontinence, hematuria, dysuria, polyuria, polydipsia, renal insufficiency, renal failure.    Musculoskeletal:  Reports back pain, arthritis and denies fibromyalgia and autoimmune disease.  Neurological:  Reports migraines and denies dizziness, confusion, seizure, stroke.  Psychiatric:  Reports hx depression, hx anxiety, bipolar disorder, hx substance abuse and denies suicidal ideation, hx suicide attempt, hx self injury, eating disorder.  Endocrine:  Reports diabetes and denies thyroid disease, gout.  Hematologic:  Reports none and denies bruising, bleeding disorder, hx anemia, hx blood transfusion.  Skin:  Reports hx MRSA and denies rashes.      Physical Exam:  Vital Signs:  Weight: 110 kg (243 lb 8 oz)   Body mass index is 36.49 kg/mý.  Temp: 98.2 øF (36.8 øC)   Heart Rate: 60   BP: 128/70     Physical Exam  Vitals reviewed.   Constitutional:       Appearance: He is well-developed. He is obese.   HENT:      Head: Normocephalic.   Neck:      Thyroid: No thyromegaly.   Cardiovascular:      Rate and Rhythm: Normal rate and regular rhythm.      Heart sounds: Normal heart sounds.   Pulmonary:      Effort: Pulmonary effort is normal. No respiratory distress.      Breath sounds: Normal breath sounds. No wheezing.   Abdominal:      General: Bowel sounds are normal. There is no distension.      Palpations: Abdomen is soft.      Tenderness: There is no abdominal tenderness.       Comments: Lap scars   Musculoskeletal:         General: Normal range of motion.      Cervical back: Normal range of motion and neck supple.   Skin:     General: Skin is warm and dry.      Comments: Many Hypo and hyperpigmented skin lesions diffuse   Neurological:      Mental Status: He is alert and oriented to person, place, and time.   Psychiatric:         Mood and Affect: Mood normal.         Behavior: Behavior normal.         Thought Content: Thought content normal.         Judgment: Judgment normal.         Patient Active Problem List   Diagnosis    Abdominal pain    Anemia    Constipation    Diarrhea    Abnormal liver enzymes    BPH without urinary obstruction    Sepsis    Abscess of paraspinal muscles    Abscess, gluteal, left    Bacteremia due to Gram-positive bacteria    Chronic midline low back pain without sciatica    Essential hypertension    Osteomyelitis of sacrum    Lower urinary tract symptoms (LUTS)    Periodic headache syndrome, not intractable    Tremor, essential    Myoclonus    Hypokalemia    Family history of malignant hyperthermia    PUD (peptic ulcer disease)    Sleep apnea    Hypogonadism in male    Hyperammonemia    GERD (gastroesophageal reflux disease)    Tobacco use    Fatigue    Dyspepsia    Morbid obesity with BMI of 45.0-49.9, adult    Dyspnea on exertion    Lower extremity edema    Insomnia    Impaired mobility    H/O: substance abuse    Osteomyelitis    Mild episode of recurrent major depressive disorder    Generalized anxiety disorder    Encounter for screening for malignant neoplasm of colon    Testosterone deficiency    Class 2 severe obesity with serious comorbidity and body mass index (BMI) of 37.0 to 37.9 in adult    Elevated LFTs    Diabetes mellitus type 2 in obese    Polyphagia    Arthritis    Fatty liver       Assessment:    Gopi Johnson is a 48 y.o. year old male with medically complicated obesity pursuing Revision.    Weight loss surgery is deemed medically necessary  given the following obesity related comorbidities including sleep apnea, hypertension, back pain, gall bladder disease, and edema with current Weight: 110 kg (243 lb 8 oz) and Body mass index is 36.49 kg/mý..    Plan:  The consultation plan and program requirements were reviewed with the patient.  The patient has been advised that a letter of medical support must be obtained from his primary care physician or referring provider. A psychological evaluation will be arranged.  A nutritional evaluation will be performed.  The patient was advised to start a high protein and low carbohydrate diet.  Necessary lifestyle modifications were discussed.  Instructions on how to access STANLEY was given to the patient.  STANLEY is an internet based educational video that explains the surgical procedure chosen and answers basic questions regarding that procedure.          We will proceed with EGD and gastric emptying study for evaluation.  Should patient be a revision candidate, further  Preoperative testing will include: CBC, CMP, Lipids, TSH, HgA1C, H.Pylori UBT, EKG, CXR.     The risks and benefits of the upper endoscopy were discussed with the patient in detail and all questions were answered.  Possibility of perforation, bleeding, aspiration, and anesthesia reaction were reviewed.  Patient agrees to proceed.      Additional preop clearances required prior to surgery: Cardiology and Pulmonary.      The patient has been educated on expected postoperative lifestyle changes, including commitment to high protein diet, vitamin regimen, and exercise program.  They are aware that support groups are encouraged for optimal weight loss results. Patient understands that bariatric surgery is not cosmetic surgery but rather a tool to help make a lifelong commitment to lifestyle changes including diet, exercise, behavior modifications, and healthy habits. The procedure was discussed with the patient and all questions were answered. The importance  of avoiding ASA/ NSAIDS/ steroids/ tobacco/ hormones/ immunomodulators perioperatively was discussed.         Ofelia Leary PA-C    I spent 54 minutes caring for Gopi on this date of service. This time includes time spent by me in the following activities: preparing for the visit, reviewing tests, counseling and educating the patient/family/caregiver, referring and communicating with other health care professionals, and documenting information in the medical record.

## 2023-10-16 ENCOUNTER — LAB REQUISITION (OUTPATIENT)
Dept: LAB | Facility: HOSPITAL | Age: 48
End: 2023-10-16
Payer: MEDICARE

## 2023-10-16 ENCOUNTER — TELEPHONE (OUTPATIENT)
Dept: BARIATRICS/WEIGHT MGMT | Facility: CLINIC | Age: 48
End: 2023-10-16
Payer: MEDICARE

## 2023-10-16 ENCOUNTER — OUTSIDE FACILITY SERVICE (OUTPATIENT)
Dept: BARIATRICS/WEIGHT MGMT | Facility: CLINIC | Age: 48
End: 2023-10-16
Payer: MEDICARE

## 2023-10-16 DIAGNOSIS — K21.9 GASTRO-ESOPHAGEAL REFLUX DISEASE WITHOUT ESOPHAGITIS: ICD-10-CM

## 2023-10-16 PROCEDURE — 88305 TISSUE EXAM BY PATHOLOGIST: CPT | Performed by: SURGERY

## 2023-10-19 DIAGNOSIS — K21.9 GASTROESOPHAGEAL REFLUX DISEASE, UNSPECIFIED WHETHER ESOPHAGITIS PRESENT: ICD-10-CM

## 2023-10-24 ENCOUNTER — CONSULT (OUTPATIENT)
Dept: BARIATRICS/WEIGHT MGMT | Facility: CLINIC | Age: 48
End: 2023-10-24
Payer: MEDICARE

## 2023-10-24 VITALS
DIASTOLIC BLOOD PRESSURE: 78 MMHG | BODY MASS INDEX: 36.51 KG/M2 | HEIGHT: 69 IN | WEIGHT: 246.5 LBS | OXYGEN SATURATION: 96 % | HEART RATE: 75 BPM | SYSTOLIC BLOOD PRESSURE: 118 MMHG | TEMPERATURE: 97.8 F

## 2023-10-24 DIAGNOSIS — I10 ESSENTIAL HYPERTENSION: Primary | ICD-10-CM

## 2023-10-24 DIAGNOSIS — M54.50 CHRONIC MIDLINE LOW BACK PAIN WITHOUT SCIATICA: ICD-10-CM

## 2023-10-24 DIAGNOSIS — E66.9 DIABETES MELLITUS TYPE 2 IN OBESE: ICD-10-CM

## 2023-10-24 DIAGNOSIS — E11.69 DIABETES MELLITUS TYPE 2 IN OBESE: ICD-10-CM

## 2023-10-24 DIAGNOSIS — N40.0 BPH WITHOUT URINARY OBSTRUCTION: ICD-10-CM

## 2023-10-24 DIAGNOSIS — G89.29 CHRONIC MIDLINE LOW BACK PAIN WITHOUT SCIATICA: ICD-10-CM

## 2023-10-24 DIAGNOSIS — E66.01 MORBID EXOGENOUS OBESITY: ICD-10-CM

## 2023-10-24 PROCEDURE — 99214 OFFICE O/P EST MOD 30 MIN: CPT | Performed by: SURGERY

## 2023-10-24 NOTE — LETTER
2023     MARIA DEL CARMEN Viera  [address] [facility]  [address]  [place] [address]    Patient: Gopi Johnson   YOB: 1975   Date of Visit: 10/24/2023     Dear MARIA DEL CARMEN Viera:       Thank you for referring Gopi Johnson to me for evaluation. Below are the relevant portions of my assessment and plan of care.    If you have questions, please do not hesitate to call me. I look forward to following Gopi along with you.         Sincerely,        Olu Rodriguez MD        CC: No Recipients    Olu Rodriguez MD  23 1754  Sign when Signing Visit  [facility] BARIATRIC SURGERY  [address]  [address]  [facility] [address] [phone]      Patient  Name:  Gopi Johnson  :  1975      Date of Visit: 10/24/23    Chief Complaint:  weight gain; unable to maintain weight loss.   Evaluate for possible revisional metabolic and bariatric surgery status post laparoscopic sleeve gastrectomy 2022 (251.5 pounds, BMI 37.7 prior to LSG)    History of Present Illness:  Gopi Johnson is a 48 y.o. male who presents today for evaluation, education and consultation regarding revisional metabolic and bariatric surgery (MBS) status post laparoscopic sleeve gastrectomy 2022.  Since last seen 10/10/2023 he has gained 3 pounds.  Most recent intake evaluation Ofelia AARON PA-C dated 10/10/2023 reviewed.  She notes the patient is almost 1 year status post sleeve gastrectomy last seen at 9 months postop visit previous to that was the 1 month postop visit where stat imaging was ordered at that time for nausea and abdominal pain and noted to be taking daily NSAIDs and staying with his mother who previously was noted to smoke in the house.  On his last visit he complained of constant hunger snacking all day to stay satisfied his weight was 230 pounds and dietitian evaluation was advised and he has since been working with medical weight management  "presurgery start weight of 251 pounds lowest reported weight 198 pounds current weight 243 pounds and that he presents today to discuss revision options for further weight loss specifically interested in gastric bypass as he does not feel the sleeve has worked for him despite doing \"everything right\" and that he was off insulin in the months after sleeve gastrectomy with diabetes in remission but is now back on insulin with his last A1c 5.7 and that his reflux is fairly well controlled with daily Protonix has rare abdominal pain focuses on protein no pop gets around 1700 zulma a day had a recent colonoscopy Dr. Pleitez and had a liver biopsy with Dr. Rodriguez and that he has a history of hypertension lower extremity edema sleep apnea BPH status post TURP procedure and intermittent urinary retention requiring straight caths as needed on Flomax daily chronic back pain arthritis migraines insulin-dependent diabetes history of MRSA with osteomyelitis requiring IV antibiotics and inpatient drainage history of substance abuse with meth last use 2016 treated with buprenorphine followed by therapist and PCP regularly and has a family history of malignant hyperthermia and patient tested negative on muscle biopsy and previous sleeve gastrectomy was ambulating with a cane with poor mobility secondary to spinal osteomyelitis from previous IV drug use.  She noted he had COVID in the past was not hospitalized and is vaccinated.    The patient has had issues with morbid obesity for years and only temporary success with surgical and non-surgical methods of weight loss.  The patient is seeking revisional metabolic and bariatric surgery to help with the morbid obesity related conditions of history of spinal osteomyelitis, history of MRSA osteomyelitis left hip, nephrolithiasis, abnormal pulmonary function tests, anxiety, arthritis, benign prostatic hyperplasia, bipolar affective disorder, chronic pain, reported cirrhosis, colonic polyps, " current everyday non-nicotene vaping, depression, dyspnea exertion, history of elevated liver function test, erectile dysfunction, family history malignant hyperthermia status post negative muscle biopsy, fatigue, fatty liver, history of gastroparesis, GE reflux disease, history of substance abuse clean since 2016, hyperlipidemia, hypertension, impaired mobility, insomnia, type 2 insulin-dependent diabetes, peripheral edema, migraine headaches, rectus diastases, obstructive sleep apnea on CPAP, urinary incontinence.    48-year-old disabled male from [facility] known to me as above.  He smells of tobacco but says that is because he lives with his mother who smokes in the house..  He only vapes non-nicotine.  I did his sleeve by Titan technique, specimen was average size.  He denies any history of surgical complications with his previous surgeries.    Upper GI on 12/6/2022 shows changes of sleeve gastrectomy without hiatal hernia or reflux noted.  On upper endoscopy last week I noted the sleeve to appear unremarkable with mild pyloric spasm no hiatal hernia Z-line roughly 39 cm.  I noted that his recollection is he weighed as much is 300 pounds and lost down to around 200 pounds and feels he is back up to 270 pounds although his current weight at that time was 243.5 pounds.  He noted he had significant reflux symptoms prior to his sleeve gastrectomy which have since resolved and that he had an EGD in January 2020 with Dr. Dover showing no hiatal hernia and some superficial gastric ulcers.  On recent endoscopy pathology of the antrum showed minimal chronic gastritis without activity and mild reactive gastropathic changes negative for H. pylori and distal esophageal biopsies were mild and felt to be nonspecific.  Gastric emptying study on 11/3/2023 within normal limits with a half emptying time of 28 minutes.      Past Medical History:   Diagnosis Date   • Abnormal PFTs (pulmonary function tests)    • Anxiety    •  Arthritis    • Benign prostatic hyperplasia    • Bipolar affective    • Chronic pain    • Cirrhosis    • Colon polyp    • Current every day vaping     Currently nonnicotine   • Depression    • Dyspepsia    • Dyspnea on exertion    • Elevated LFTs     GI eval (P)   • Enlarged prostate     s/p TURP 5/2021   • Epididymitis    • Erectile dysfunction After T.E.R.P.   • Family history of malignant hyperthermia     patient had muscle biopsy at [facility] and was negative, sister has malignant hyperthermia and a cousin   • Fatigue    • Fatty liver    • Fractured pelvis    • Frequent urination    • Gastroparesis    • GERD (gastroesophageal reflux disease)     on PPI, hx erosive gastritis on EGD 1/5/22   • H/O: substance abuse     clean since 2016, on Subutex, managed by PCP   • Headache     following w/ Neurology   • Hx MRSA infection 2020    w/ osteomyelitis L hip, fracture hip, hospitalized with IV abx and drainage, had port with home abx   • Hyperammonemia     following w/ GI   • Hyperlipidemia    • Hypertension    • Hypogonadism in male     on testosterone q2wks, follows w/ Urology   • Hypokalemia    • Impaired mobility     w/ (L)sided weakness (since osteomyelitis), ambulates w/ cane   • Insomnia     on Trazodone   • Insulin dependent type 2 diabetes mellitus     resolved with weightloss, now back on insulin   • Joint pain     w/ recent SI joint injections 2/2022   • Kidney stone 11/30/2016   • Kidney stones    • Lower extremity edema    • Migraine    • Morbid obesity    • Murmur     as a child   • Myoclonus     w/ body jerks, follows w/ Neurology   • Osteomyelitis 2020    H/O spinal osteomyelitis, previous IVDA   • Piercing     ear/body   • PUD (peptic ulcer disease)     non-bleeding gastric ulcers on EGD 1/5/22 w/ Dr. Dover   • Rectus diastasis    • Seasonal allergies    • Sleep apnea     CPAP compliant   • Staph infection 2020   • Substance abuse     Not currently, over 6 years clean and sober!   • Tattoo    •  Tobacco use     trying to quit, currently vaping (0%) - mom does smoke in the home   • Urinary incontinence    • Urinary tract infection      Past Surgical History:   Procedure Laterality Date   • ABDOMINAL HERNIA REPAIR  2020    incisional, Dr. Dover, inferior to umbilicus   • BUNIONECTOMY  2007   • COLONOSCOPY  2023   • CYSTOSCOPY TRANSURETHRAL RESECTION OF PROSTATE N/A 05/26/2021    Procedure: TRANSURETHRAL RESECTION OF PROSTATE;  Surgeon: Markel Centeno MD;  Location:  [facility];  Service: Urology;  Laterality: N/A;   • ENDOSCOPY N/A 01/05/2022    Procedure: ESOPHAGOGASTRODUODENOSCOPY with biopsy;  Surgeon: Royal Dover MD;  Location:  [facility];  Service: Gastroenterology;  Laterality: N/A;   • ENDOSCOPY N/A 10/28/2022    Procedure: ESOPHAGOGASTRODUODENOSCOPY;  Surgeon: Olu Rodriguez MD;  Location:  [facility];  Service: Bariatric;  Laterality: N/A;   • GASTRECTOMY N/A 10/28/2022    Procedure: GASTRECTOMY LAPAROSCOPIC;  Surgeon: Olu Rodriguez MD;  Location:  [facility];  Service: Bariatric;  Laterality: N/A;   • HEMORRHOIDECTOMY      Dr Dover do the removal   • HIATAL HERNIA REPAIR  2019   • INGUINAL HERNIA REPAIR  2019    Dr. Dover   • KNEE OPEN LATERAL RELEASE Right 1985   • LAPAROSCOPIC CHOLECYSTECTOMY  2014    sand, no stones   • LIVER BIOPSY N/A 10/28/2022    Procedure: LIVER BIOPSY LAPAROSCOPIC;  Surgeon: Olu Rodriguez MD;  Location:  [facility];  Service: Bariatric;  Laterality: N/A;   • TONSILLECTOMY AND ADENOIDECTOMY  1986   • WISDOM TOOTH EXTRACTION  1997       Allergies   Allergen Reactions   • Naloxone Swelling     Throat swelling, itching, rash   • Hydrocodone-Acetaminophen Itching and Rash     Percocet OK       Current Outpatient Medications:   •  Alcohol Swabs pads, Clean area prior to injection, Disp: 100 each, Rfl: 11  •  amLODIPine (NORVASC) 5 MG tablet, Take 1 tablet by mouth Daily., Disp: , Rfl:   •  buprenorphine (SUBUTEX) 8 MG sublingual tablet SL  tablet, 1 tablet 2 (Two) Times a Day., Disp: , Rfl:   •  chlorthalidone (HYGROTON) 25 MG tablet, Take 1 tablet by mouth Daily., Disp: , Rfl:   •  Cyanocobalamin (Vitamin B-12) 1000 MCG sublingual tablet, Place 1 tablet under the tongue Daily., Disp: 90 each, Rfl: 0  •  fluticasone (FLONASE) 50 MCG/ACT nasal spray, As Needed., Disp: , Rfl:   •  hydrOXYzine (ATARAX) 25 MG tablet, Take 1 tablet by mouth 3 (Three) Times a Day As Needed for Anxiety., Disp: 90 tablet, Rfl: 0  •  lidocaine (LIDODERM) 5 %, Place 1 patch on the skin as directed by provider Daily. Remove & Discard patch within 12 hours or as directed by MD, Disp:  , Rfl:   •  loratadine (CLARITIN) 10 MG tablet, Daily., Disp: , Rfl:   •  mirtazapine (REMERON) 30 MG tablet, Take 1 tablet by mouth Every Night., Disp: , Rfl:   •  Multiple Vitamins-Minerals (MULTI COMPLETE PO), Take  by mouth., Disp: , Rfl:   •  mupirocin (BACTROBAN) 2 % ointment, , Disp: , Rfl:   •  pantoprazole (PROTONIX) 40 MG EC tablet, Take 1 tablet by mouth Every Morning., Disp:  , Rfl:   •  propranolol (INDERAL) 60 MG tablet, Take 1 tablet by mouth 2 (Two) Times a Day., Disp: , Rfl:   •  tamsulosin (FLOMAX) 0.4 MG capsule 24 hr capsule, Take 1 capsule by mouth Daily., Disp: 90 capsule, Rfl: 3  •  vilazodone (VIIBRYD) 40 MG tablet tablet, Take 1 tablet by mouth Daily. Take with food, Disp: 30 tablet, Rfl: 0    Social History     Socioeconomic History   • Marital status: Single   Tobacco Use   • Smoking status: Former     Packs/day: 1.00     Years: 15.00     Additional pack years: 0.00     Total pack years: 15.00     Types: Cigarettes     Quit date: 2022     Years since quittin.2     Passive exposure: Past   • Smokeless tobacco: Never   • Tobacco comments:     Recently I have quit smoking I do vape but with no nicotine   Vaping Use   • Vaping Use: Some days   • Substances: Flavoring   • Devices: Pre-filled or refillable cartridge   Substance and Sexual Activity   • Alcohol use: Not  "Currently   • Drug use: Not Currently     Types: Amphetamines, Barbiturates, Benzodiazepines, \"Crack\" cocaine, Cocaine(coke), Codeine, Fentanyl, GHB, Hashish, Heroin, Hydrocodone, Ketamine, LSD, Marijuana, MDMA (ecstacy), Methamphetamines, Morphine, Nitrous oxide, Opium, Oxycodone     Comment: clean since 2016   • Sexual activity: Never     Family History   Problem Relation Age of Onset   • Hypertension Mother    • Obesity Mother    • Arthritis Mother    • COPD Mother    • Heart disease Mother    • Depression Mother    • Sleep apnea Mother    • Hypertension Father    • Alcohol abuse Father    • Throat cancer Father    • Stroke Father    • Heart disease Father    • Sleep apnea Sister    • Hypertension Sister    • Malig Hyperthermia Sister    • Obesity Sister    • Diabetes Sister    • Depression Sister    • Malig Hyperthermia Sister         I have been tested (muscle tissue test) and i dont have it   • Cancer Brother    • Hypertension Brother    • Obesity Maternal Aunt    • Hypertension Maternal Aunt    • Cancer Maternal Aunt    • Depression Maternal Aunt    • Obesity Maternal Uncle    • Diabetes Maternal Uncle    • Cancer Paternal Aunt    • Obesity Paternal Uncle    • Hypertension Paternal Uncle    • Cancer Paternal Uncle    • Hearing loss Paternal Uncle    • Prostate cancer Paternal Uncle    • Hearing loss Paternal Uncle    • Prostate cancer Paternal Uncle         Both my dads older brothers had radical prostate removal   • Diabetes Maternal Grandmother    • Leukemia Maternal Grandmother    • Obesity Maternal Grandmother    • Hypertension Maternal Grandmother    • Sleep apnea Maternal Grandmother    • Hypertension Maternal Grandfather    • Cancer Maternal Grandfather    • Sleep apnea Maternal Grandfather    • Heart disease Maternal Grandfather    • Arthritis Maternal Grandfather    • Heart attack Maternal Grandfather    • Obesity Maternal Grandfather    • Hypertension Paternal Grandmother    • Cancer Paternal " Grandmother         blood   • Heart disease Paternal Grandmother         Dad's mother   • Arthritis Paternal Grandmother    • Hearing loss Paternal Grandmother    • Heart attack Paternal Grandmother    • Prostate cancer Paternal Grandfather    • Obesity Paternal Grandfather    • Hypertension Paternal Grandfather    • Stomach cancer Paternal Grandfather    • Colon polyps Half-Brother    • Colon cancer Half-Brother    • Esophageal cancer Neg Hx        Review of Systems   Constitutional:  Positive for fatigue and unexpected weight gain. Negative for chills, diaphoresis, fever and unexpected weight loss.   HENT:  Negative for congestion and facial swelling.    Eyes:  Negative for blurred vision, double vision and discharge.   Respiratory:  Negative for chest tightness, shortness of breath and stridor.    Cardiovascular:  Negative for chest pain, palpitations and leg swelling.   Gastrointestinal:  Negative for blood in stool.   Endocrine: Negative for polydipsia.   Genitourinary:  Positive for difficulty urinating, erectile dysfunction and urinary incontinence. Negative for hematuria.   Musculoskeletal:  Positive for arthralgias, back pain and gait problem.   Skin:  Negative for color change.   Allergic/Immunologic: Negative for immunocompromised state.   Neurological:  Negative for confusion.   Psychiatric/Behavioral:  Positive for sleep disturbance. Negative for self-injury. The patient is nervous/anxious.        I have reviewed the ROS and confirm that it's accurate today.    Physical Exam:  Vital Signs:  Weight: 112 kg (246 lb 8 oz)   Body mass index is 36.94 kg/m².  Temp: 97.8 °F (36.6 °C)   Heart Rate: 75   BP: 118/78     Physical Exam  Vitals reviewed.   Constitutional:       Appearance: He is well-developed.   HENT:      Head: Normocephalic and atraumatic.      Nose: Nose normal.   Eyes:      Conjunctiva/sclera: Conjunctivae normal.      Pupils: Pupils are equal, round, and reactive to light.   Neck:       Thyroid: No thyromegaly.      Vascular: No carotid bruit.      Trachea: No tracheal deviation.   Cardiovascular:      Rate and Rhythm: Normal rate and regular rhythm.      Heart sounds: Normal heart sounds.   Pulmonary:      Effort: Pulmonary effort is normal. No respiratory distress.      Breath sounds: Normal breath sounds.   Abdominal:      General: There is no distension.      Palpations: Abdomen is soft.      Tenderness: There is no abdominal tenderness.      Comments: Small subumbilical vertical scar without evidence of recurrent hernia, open left inguinal hernia repair scar, laparoscopy scars   Musculoskeletal:         General: No deformity. Normal range of motion.      Cervical back: Normal range of motion and neck supple.   Skin:     General: Skin is warm and dry.      Findings: No rash.   Neurological:      Mental Status: He is alert and oriented to person, place, and time.      Cranial Nerves: No cranial nerve deficit.      Coordination: Coordination normal.   Psychiatric:         Behavior: Behavior normal.         Thought Content: Thought content normal.         Judgment: Judgment normal.         Patient Active Problem List   Diagnosis   • Abdominal pain   • Anemia   • Constipation   • Diarrhea   • Abnormal liver enzymes   • BPH without urinary obstruction   • Sepsis   • Abscess of paraspinal muscles   • Abscess, gluteal, left   • Bacteremia due to Gram-positive bacteria   • Chronic midline low back pain without sciatica   • Essential hypertension   • Osteomyelitis of sacrum   • Lower urinary tract symptoms (LUTS)   • Periodic headache syndrome, not intractable   • Tremor, essential   • Myoclonus   • Hypokalemia   • Family history of malignant hyperthermia   • PUD (peptic ulcer disease)   • Sleep apnea   • Hypogonadism in male   • Hyperammonemia   • GERD (gastroesophageal reflux disease)   • Tobacco use   • Fatigue   • Dyspepsia   • Morbid obesity with BMI of 45.0-49.9, adult   • Dyspnea on exertion   •  Lower extremity edema   • Insomnia   • Impaired mobility   • H/O: substance abuse   • Osteomyelitis   • Mild episode of recurrent major depressive disorder   • Generalized anxiety disorder   • Encounter for screening for malignant neoplasm of colon   • Testosterone deficiency   • Class 2 severe obesity with serious comorbidity and body mass index (BMI) of 37.0 to 37.9 in adult   • Elevated LFTs   • Diabetes mellitus type 2 in obese   • Polyphagia   • Arthritis   • Fatty liver       Assessment:    Gopi Johnson is a 48 y.o. year old male with medically complicated obesity status post laparoscopic sleeve gastrectomy 1 year ago in October 2022.    Revisional metabolic and bariatric surgery is deemed medically necessary given the following obesity related comorbidities including history of spinal osteomyelitis, history of MRSA osteomyelitis left hip, nephrolithiasis, abnormal pulmonary function tests, anxiety, arthritis, benign prostatic hyperplasia, bipolar affective disorder, chronic pain, reported cirrhosis, colonic polyps, current everyday non-nicotene vaping, depression, dyspnea exertion, history of elevated liver function test, erectile dysfunction, family history malignant hyperthermia status post negative muscle biopsy, fatigue, fatty liver, history of gastroparesis, GE reflux disease, history of substance abuse clean since 2016, hyperlipidemia, hypertension, impaired mobility, insomnia, type 2 insulin-dependent diabetes, peripheral edema, migraine headaches, rectus diastases, obstructive sleep apnea on CPAP, urinary incontinence with current Weight: 112 kg (246 lb 8 oz) and Body mass index is 36.94 kg/m²..    Patient is aware that surgery is a tool, and that weight loss and improvement in comorbidities is not guaranteed but only seen in the context of appropriate use, follow up and physical activity.    I reviewed his Mikhail report showing Buprenorphine 8 mg#42 every 21 days.  Please see scanned records  that I have reviewed and signed off on today.  All of this in addition to the patient's unique history and exam has been taken into consideration in determining their appropriate candidacy for MBS.    Risks, benefits, alternative therapies were discussed to laparoscopic possible robotic assisted SIPS/loop DS/SAD-I and formal duodenal switch. The most common short term complics in addition to cardiopulm risk, VTE, bleeding, infection, etc is leak or obstruction at the anastomosis, which could have potentially serious and even fatal consequences and require further surgery(s).  The patient understands that they will have more frequent BM's, will be malodorous, and that at increased risk for vitamin deficiencies, gaye the fat soluble vitamins, and that this can be serious and irreversible - still wishes to proceed, says they will be compliant w f/u and vit/supplment recommendations.  Aware that may need an elongation procedure in the future if diarrhea and/or vitamin issues not controlled.      Complications of laparoscopic/possible robotic gastric bypass were discussed including but not limited to bleeding, infection, deep venous thrombosis, pulmonary embolism, pulmonary complications such as pneumonia, cardiac events, hernias, small bowel obstruction, damage to the spleen or other organs, bowel injury, disfiguring scars, failure to lose weight, need for additional surgery, conversion to an open procedure, and death.  Patient is also aware of complications which apply in this particular procedure that can include but are not limited to leaking of gastric contents at the staple or suture lines which could lead to intra-abdominal abscess, or chronic complications of strictures, ulcers, or vitamin/mineral deficiencies.  If long BP limb, we discussed the likelihood of more frequent BM's,possibly malodorous, with increased risk for vitamin deficiencies, gaye the fat soluble vitamins, and that this can be serious and  irreversible and will require lifelong compliance with w f/u and vit/supplment recommendations.  Aware that may need an elongation procedure in the future if diarrhea and/or vitamin issues not controlled.       We went over the risk, benefits, and alternative therapies to various revisional metabolic and bariatric surgical options as above.  We discussed revision to a modified duodenal switch, a form of duodenal switch, or a Crescencio-en-Y gastric bypass with or without a long biliopancreatic limb.      Plan:    After discussion of the options and the risks, benefits, and alternative therapies as above he wishes to proceed with laparoscopic robotic assisted modified duodenal switch/SIPS/SAD-I.  If this preferred safer ASMBS-endorsed procedure is not covered by his [employer] insurance, then proceed with formal duodenal switch.    Other issues include history of spinal osteomyelitis, history of MRSA osteomyelitis left hip, nephrolithiasis, abnormal pulmonary function tests, anxiety, arthritis, benign prostatic hyperplasia, bipolar affective disorder, chronic pain, reported cirrhosis, colonic polyps, current everyday non-nicotene vaping, depression, dyspnea exertion, history of elevated liver function test, erectile dysfunction, family history malignant hyperthermia status post negative muscle biopsy, fatigue, fatty liver, history of gastroparesis, GE reflux disease, history of substance abuse clean since 2016, hyperlipidemia, hypertension, impaired mobility, insomnia, type 2 insulin-dependent diabetes, peripheral edema, migraine headaches, rectus diastases, obstructive sleep apnea on CPAP, urinary incontinence    Thank you Maria Victoria JOYCE for the opportunity to reevaluate Mr. Johnson.      Olu Rodriguez MD

## 2023-10-24 NOTE — PROGRESS NOTES
"[facility] BARIATRIC SURGERY  [address]  [address]  [facility] [id number] [phone]      Patient  Name:  Gopi Johnson  :  1975      Date of Visit: 10/24/23    Chief Complaint:  weight gain; unable to maintain weight loss.   Evaluate for possible revisional metabolic and bariatric surgery status post laparoscopic sleeve gastrectomy 2022 (251.5 pounds, BMI 37.7 prior to LSG)    History of Present Illness:  Gopi Johnson is a 48 y.o. male who presents today for evaluation, education and consultation regarding revisional metabolic and bariatric surgery (MBS) status post laparoscopic sleeve gastrectomy 2022.  Since last seen 10/10/2023 he has gained 3 pounds.  Most recent intake evaluation Ofelia AARON PA-C dated 10/10/2023 reviewed.  She notes the patient is almost 1 year status post sleeve gastrectomy last seen at 9 months postop visit previous to that was the 1 month postop visit where stat imaging was ordered at that time for nausea and abdominal pain and noted to be taking daily NSAIDs and staying with his mother who previously was noted to smoke in the house.  On his last visit he complained of constant hunger snacking all day to stay satisfied his weight was 230 pounds and dietitian evaluation was advised and he has since been working with medical weight management presurgery start weight of 251 pounds lowest reported weight 198 pounds current weight 243 pounds and that he presents today to discuss revision options for further weight loss specifically interested in gastric bypass as he does not feel the sleeve has worked for him despite doing \"everything right\" and that he was off insulin in the months after sleeve gastrectomy with diabetes in remission but is now back on insulin with his last A1c 5.7 and that his reflux is fairly well controlled with daily Protonix has rare abdominal pain focuses on protein no pop gets around 1700 zulma a day had a " recent colonoscopy Dr. Pleitez and had a liver biopsy with Dr. Rodriguez and that he has a history of hypertension lower extremity edema sleep apnea BPH status post TURP procedure and intermittent urinary retention requiring straight caths as needed on Flomax daily chronic back pain arthritis migraines insulin-dependent diabetes history of MRSA with osteomyelitis requiring IV antibiotics and inpatient drainage history of substance abuse with meth last use 2016 treated with buprenorphine followed by therapist and PCP regularly and has a family history of malignant hyperthermia and patient tested negative on muscle biopsy and previous sleeve gastrectomy was ambulating with a cane with poor mobility secondary to spinal osteomyelitis from previous IV drug use.  She noted he had COVID in the past was not hospitalized and is vaccinated.    The patient has had issues with morbid obesity for years and only temporary success with surgical and non-surgical methods of weight loss.  The patient is seeking revisional metabolic and bariatric surgery to help with the morbid obesity related conditions of history of spinal osteomyelitis, history of MRSA osteomyelitis left hip, nephrolithiasis, abnormal pulmonary function tests, anxiety, arthritis, benign prostatic hyperplasia, bipolar affective disorder, chronic pain, reported cirrhosis, colonic polyps, current everyday non-nicotene vaping, depression, dyspnea exertion, history of elevated liver function test, erectile dysfunction, family history malignant hyperthermia status post negative muscle biopsy, fatigue, fatty liver, history of gastroparesis, GE reflux disease, history of substance abuse clean since 2016, hyperlipidemia, hypertension, impaired mobility, insomnia, type 2 insulin-dependent diabetes, peripheral edema, migraine headaches, rectus diastases, obstructive sleep apnea on CPAP, urinary incontinence.    48-year-old disabled male from [facility] known to me as above.   He smells of tobacco but says that is because he lives with his mother who smokes in the house..  He only vapes non-nicotine.  I did his sleeve by Titan technique, specimen was average size.  He denies any history of surgical complications with his previous surgeries.    Upper GI on 12/6/2022 shows changes of sleeve gastrectomy without hiatal hernia or reflux noted.  On upper endoscopy last week I noted the sleeve to appear unremarkable with mild pyloric spasm no hiatal hernia Z-line roughly 39 cm.  I noted that his recollection is he weighed as much is 300 pounds and lost down to around 200 pounds and feels he is back up to 270 pounds although his current weight at that time was 243.5 pounds.  He noted he had significant reflux symptoms prior to his sleeve gastrectomy which have since resolved and that he had an EGD in January 2020 with Dr. Dover showing no hiatal hernia and some superficial gastric ulcers.  On recent endoscopy pathology of the antrum showed minimal chronic gastritis without activity and mild reactive gastropathic changes negative for H. pylori and distal esophageal biopsies were mild and felt to be nonspecific.  Gastric emptying study on 11/3/2023 within normal limits with a half emptying time of 28 minutes.      Past Medical History:   Diagnosis Date    Abnormal PFTs (pulmonary function tests)     Anxiety     Arthritis     Benign prostatic hyperplasia     Bipolar affective     Chronic pain     Cirrhosis     Colon polyp     Current every day vaping     Currently nonnicotine    Depression     Dyspepsia     Dyspnea on exertion     Elevated LFTs     GI eval (P)    Enlarged prostate     s/p TURP 5/2021    Epididymitis     Erectile dysfunction After T.E.R.P.    Family history of malignant hyperthermia     patient had muscle biopsy at [facility] and was negative, sister has malignant hyperthermia and a cousin    Fatigue     Fatty liver     Fractured pelvis     Frequent urination     Gastroparesis      GERD (gastroesophageal reflux disease)     on PPI, hx erosive gastritis on EGD 1/5/22    H/O: substance abuse     clean since 2016, on Subutex, managed by PCP    Headache     following w/ Neurology    Hx MRSA infection 2020    w/ osteomyelitis L hip, fracture hip, hospitalized with IV abx and drainage, had port with home abx    Hyperammonemia     following w/ GI    Hyperlipidemia     Hypertension     Hypogonadism in male     on testosterone q2wks, follows w/ Urology    Hypokalemia     Impaired mobility     w/ (L)sided weakness (since osteomyelitis), ambulates w/ cane    Insomnia     on Trazodone    Insulin dependent type 2 diabetes mellitus     resolved with weightloss, now back on insulin    Joint pain     w/ recent SI joint injections 2/2022    Kidney stone 11/30/2016    Kidney stones     Lower extremity edema     Migraine     Morbid obesity     Murmur     as a child    Myoclonus     w/ body jerks, follows w/ Neurology    Osteomyelitis 2020    H/O spinal osteomyelitis, previous IVDA    Piercing     ear/body    PUD (peptic ulcer disease)     non-bleeding gastric ulcers on EGD 1/5/22 w/ Dr. Dover    Rectus diastasis     Seasonal allergies     Sleep apnea     CPAP compliant    Staph infection 2020    Substance abuse     Not currently, over 6 years clean and sober!    Tattoo     Tobacco use     trying to quit, currently vaping (0%) - mom does smoke in the home    Urinary incontinence     Urinary tract infection      Past Surgical History:   Procedure Laterality Date    ABDOMINAL HERNIA REPAIR  2020    incisional, Dr. Dover, inferior to umbilicus    BUNIONECTOMY  2007    COLONOSCOPY  2023    CYSTOSCOPY TRANSURETHRAL RESECTION OF PROSTATE N/A 05/26/2021    Procedure: TRANSURETHRAL RESECTION OF PROSTATE;  Surgeon: Markel Centeno MD;  Location: [facility];  Service: Urology;  Laterality: N/A;    ENDOSCOPY N/A 01/05/2022    Procedure: ESOPHAGOGASTRODUODENOSCOPY with biopsy;  Surgeon: Royal Dover MD;   Location:  [facility];  Service: Gastroenterology;  Laterality: N/A;    ENDOSCOPY N/A 10/28/2022    Procedure: ESOPHAGOGASTRODUODENOSCOPY;  Surgeon: Olu Rodriguez MD;  Location:  [facility];  Service: Bariatric;  Laterality: N/A;    GASTRECTOMY N/A 10/28/2022    Procedure: GASTRECTOMY LAPAROSCOPIC;  Surgeon: Olu Rodriguez MD;  Location:  [facility];  Service: Bariatric;  Laterality: N/A;    HEMORRHOIDECTOMY      Dr Dover do the removal    HIATAL HERNIA REPAIR  2019    INGUINAL HERNIA REPAIR  2019    Dr. Dover    KNEE OPEN LATERAL RELEASE Right 1985    LAPAROSCOPIC CHOLECYSTECTOMY  2014    sand, no stones    LIVER BIOPSY N/A 10/28/2022    Procedure: LIVER BIOPSY LAPAROSCOPIC;  Surgeon: Olu Rodriguez MD;  Location:  [facility];  Service: Bariatric;  Laterality: N/A;    TONSILLECTOMY AND ADENOIDECTOMY  1986    WISDOM TOOTH EXTRACTION  1997       Allergies   Allergen Reactions    Naloxone Swelling     Throat swelling, itching, rash    Hydrocodone-Acetaminophen Itching and Rash     Percocet OK       Current Outpatient Medications:     Alcohol Swabs pads, Clean area prior to injection, Disp: 100 each, Rfl: 11    amLODIPine (NORVASC) 5 MG tablet, Take 1 tablet by mouth Daily., Disp: , Rfl:     buprenorphine (SUBUTEX) 8 MG sublingual tablet SL tablet, 1 tablet 2 (Two) Times a Day., Disp: , Rfl:     chlorthalidone (HYGROTON) 25 MG tablet, Take 1 tablet by mouth Daily., Disp: , Rfl:     Cyanocobalamin (Vitamin B-12) 1000 MCG sublingual tablet, Place 1 tablet under the tongue Daily., Disp: 90 each, Rfl: 0    fluticasone (FLONASE) 50 MCG/ACT nasal spray, As Needed., Disp: , Rfl:     hydrOXYzine (ATARAX) 25 MG tablet, Take 1 tablet by mouth 3 (Three) Times a Day As Needed for Anxiety., Disp: 90 tablet, Rfl: 0    lidocaine (LIDODERM) 5 %, Place 1 patch on the skin as directed by provider Daily. Remove & Discard patch within 12 hours or as directed by MD, Disp:  , Rfl:     loratadine (CLARITIN) 10 MG  "tablet, Daily., Disp: , Rfl:     mirtazapine (REMERON) 30 MG tablet, Take 1 tablet by mouth Every Night., Disp: , Rfl:     Multiple Vitamins-Minerals (MULTI COMPLETE PO), Take  by mouth., Disp: , Rfl:     mupirocin (BACTROBAN) 2 % ointment, , Disp: , Rfl:     pantoprazole (PROTONIX) 40 MG EC tablet, Take 1 tablet by mouth Every Morning., Disp:  , Rfl:     propranolol (INDERAL) 60 MG tablet, Take 1 tablet by mouth 2 (Two) Times a Day., Disp: , Rfl:     tamsulosin (FLOMAX) 0.4 MG capsule 24 hr capsule, Take 1 capsule by mouth Daily., Disp: 90 capsule, Rfl: 3    vilazodone (VIIBRYD) 40 MG tablet tablet, Take 1 tablet by mouth Daily. Take with food, Disp: 30 tablet, Rfl: 0    Social History     Socioeconomic History    Marital status: Single   Tobacco Use    Smoking status: Former     Packs/day: 1.00     Years: 15.00     Additional pack years: 0.00     Total pack years: 15.00     Types: Cigarettes     Quit date: 2022     Years since quittin.2     Passive exposure: Past    Smokeless tobacco: Never    Tobacco comments:     Recently I have quit smoking I do vape but with no nicotine   Vaping Use    Vaping Use: Some days    Substances: Flavoring    Devices: Pre-filled or refillable cartridge   Substance and Sexual Activity    Alcohol use: Not Currently    Drug use: Not Currently     Types: Amphetamines, Barbiturates, Benzodiazepines, \"Crack\" cocaine, Cocaine(coke), Codeine, Fentanyl, GHB, Hashish, Heroin, Hydrocodone, Ketamine, LSD, Marijuana, MDMA (ecstacy), Methamphetamines, Morphine, Nitrous oxide, Opium, Oxycodone     Comment: clean since     Sexual activity: Never     Family History   Problem Relation Age of Onset    Hypertension Mother     Obesity Mother     Arthritis Mother     COPD Mother     Heart disease Mother     Depression Mother     Sleep apnea Mother     Hypertension Father     Alcohol abuse Father     Throat cancer Father     Stroke Father     Heart disease Father     Sleep apnea Sister     " Hypertension Sister     Malig Hyperthermia Sister     Obesity Sister     Diabetes Sister     Depression Sister     Malig Hyperthermia Sister         I have been tested (muscle tissue test) and i dont have it    Cancer Brother     Hypertension Brother     Obesity Maternal Aunt     Hypertension Maternal Aunt     Cancer Maternal Aunt     Depression Maternal Aunt     Obesity Maternal Uncle     Diabetes Maternal Uncle     Cancer Paternal Aunt     Obesity Paternal Uncle     Hypertension Paternal Uncle     Cancer Paternal Uncle     Hearing loss Paternal Uncle     Prostate cancer Paternal Uncle     Hearing loss Paternal Uncle     Prostate cancer Paternal Uncle         Both my dads older brothers had radical prostate removal    Diabetes Maternal Grandmother     Leukemia Maternal Grandmother     Obesity Maternal Grandmother     Hypertension Maternal Grandmother     Sleep apnea Maternal Grandmother     Hypertension Maternal Grandfather     Cancer Maternal Grandfather     Sleep apnea Maternal Grandfather     Heart disease Maternal Grandfather     Arthritis Maternal Grandfather     Heart attack Maternal Grandfather     Obesity Maternal Grandfather     Hypertension Paternal Grandmother     Cancer Paternal Grandmother         blood    Heart disease Paternal Grandmother         Dad's mother    Arthritis Paternal Grandmother     Hearing loss Paternal Grandmother     Heart attack Paternal Grandmother     Prostate cancer Paternal Grandfather     Obesity Paternal Grandfather     Hypertension Paternal Grandfather     Stomach cancer Paternal Grandfather     Colon polyps Half-Brother     Colon cancer Half-Brother     Esophageal cancer Neg Hx        Review of Systems   Constitutional:  Positive for fatigue and unexpected weight gain. Negative for chills, diaphoresis, fever and unexpected weight loss.   HENT:  Negative for congestion and facial swelling.    Eyes:  Negative for blurred vision, double vision and discharge.   Respiratory:   Negative for chest tightness, shortness of breath and stridor.    Cardiovascular:  Negative for chest pain, palpitations and leg swelling.   Gastrointestinal:  Negative for blood in stool.   Endocrine: Negative for polydipsia.   Genitourinary:  Positive for difficulty urinating, erectile dysfunction and urinary incontinence. Negative for hematuria.   Musculoskeletal:  Positive for arthralgias, back pain and gait problem.   Skin:  Negative for color change.   Allergic/Immunologic: Negative for immunocompromised state.   Neurological:  Negative for confusion.   Psychiatric/Behavioral:  Positive for sleep disturbance. Negative for self-injury. The patient is nervous/anxious.        I have reviewed the ROS and confirm that it's accurate today.    Physical Exam:  Vital Signs:  Weight: 112 kg (246 lb 8 oz)   Body mass index is 36.94 kg/m².  Temp: 97.8 °F (36.6 °C)   Heart Rate: 75   BP: 118/78     Physical Exam  Vitals reviewed.   Constitutional:       Appearance: He is well-developed.   HENT:      Head: Normocephalic and atraumatic.      Nose: Nose normal.   Eyes:      Conjunctiva/sclera: Conjunctivae normal.      Pupils: Pupils are equal, round, and reactive to light.   Neck:      Thyroid: No thyromegaly.      Vascular: No carotid bruit.      Trachea: No tracheal deviation.   Cardiovascular:      Rate and Rhythm: Normal rate and regular rhythm.      Heart sounds: Normal heart sounds.   Pulmonary:      Effort: Pulmonary effort is normal. No respiratory distress.      Breath sounds: Normal breath sounds.   Abdominal:      General: There is no distension.      Palpations: Abdomen is soft.      Tenderness: There is no abdominal tenderness.      Comments: Small subumbilical vertical scar without evidence of recurrent hernia, open left inguinal hernia repair scar, laparoscopy scars   Musculoskeletal:         General: No deformity. Normal range of motion.      Cervical back: Normal range of motion and neck supple.   Skin:      General: Skin is warm and dry.      Findings: No rash.   Neurological:      Mental Status: He is alert and oriented to person, place, and time.      Cranial Nerves: No cranial nerve deficit.      Coordination: Coordination normal.   Psychiatric:         Behavior: Behavior normal.         Thought Content: Thought content normal.         Judgment: Judgment normal.         Patient Active Problem List   Diagnosis    Abdominal pain    Anemia    Constipation    Diarrhea    Abnormal liver enzymes    BPH without urinary obstruction    Sepsis    Abscess of paraspinal muscles    Abscess, gluteal, left    Bacteremia due to Gram-positive bacteria    Chronic midline low back pain without sciatica    Essential hypertension    Osteomyelitis of sacrum    Lower urinary tract symptoms (LUTS)    Periodic headache syndrome, not intractable    Tremor, essential    Myoclonus    Hypokalemia    Family history of malignant hyperthermia    PUD (peptic ulcer disease)    Sleep apnea    Hypogonadism in male    Hyperammonemia    GERD (gastroesophageal reflux disease)    Tobacco use    Fatigue    Dyspepsia    Morbid obesity with BMI of 45.0-49.9, adult    Dyspnea on exertion    Lower extremity edema    Insomnia    Impaired mobility    H/O: substance abuse    Osteomyelitis    Mild episode of recurrent major depressive disorder    Generalized anxiety disorder    Encounter for screening for malignant neoplasm of colon    Testosterone deficiency    Class 2 severe obesity with serious comorbidity and body mass index (BMI) of 37.0 to 37.9 in adult    Elevated LFTs    Diabetes mellitus type 2 in obese    Polyphagia    Arthritis    Fatty liver       Assessment:    Gopi Johnson is a 48 y.o. year old male with medically complicated obesity status post laparoscopic sleeve gastrectomy 1 year ago in October 2022.    Revisional metabolic and bariatric surgery is deemed medically necessary given the following obesity related comorbidities including  history of spinal osteomyelitis, history of MRSA osteomyelitis left hip, nephrolithiasis, abnormal pulmonary function tests, anxiety, arthritis, benign prostatic hyperplasia, bipolar affective disorder, chronic pain, reported cirrhosis, colonic polyps, current everyday non-nicotene vaping, depression, dyspnea exertion, history of elevated liver function test, erectile dysfunction, family history malignant hyperthermia status post negative muscle biopsy, fatigue, fatty liver, history of gastroparesis, GE reflux disease, history of substance abuse clean since 2016, hyperlipidemia, hypertension, impaired mobility, insomnia, type 2 insulin-dependent diabetes, peripheral edema, migraine headaches, rectus diastases, obstructive sleep apnea on CPAP, urinary incontinence with current Weight: 112 kg (246 lb 8 oz) and Body mass index is 36.94 kg/m²..    Patient is aware that surgery is a tool, and that weight loss and improvement in comorbidities is not guaranteed but only seen in the context of appropriate use, follow up and physical activity.    I reviewed his Mikhail report showing Buprenorphine 8 mg#42 every 21 days.  Please see scanned records that I have reviewed and signed off on today.  All of this in addition to the patient's unique history and exam has been taken into consideration in determining their appropriate candidacy for MBS.    Risks, benefits, alternative therapies were discussed to laparoscopic possible robotic assisted SIPS/loop DS/SAD-I and formal duodenal switch. The most common short term complics in addition to cardiopulm risk, VTE, bleeding, infection, etc is leak or obstruction at the anastomosis, which could have potentially serious and even fatal consequences and require further surgery(s).  The patient understands that they will have more frequent BM's, will be malodorous, and that at increased risk for vitamin deficiencies, gaye the fat soluble vitamins, and that this can be serious and  irreversible - still wishes to proceed, says they will be compliant w f/u and vit/supplment recommendations.  Aware that may need an elongation procedure in the future if diarrhea and/or vitamin issues not controlled.      Complications of laparoscopic/possible robotic gastric bypass were discussed including but not limited to bleeding, infection, deep venous thrombosis, pulmonary embolism, pulmonary complications such as pneumonia, cardiac events, hernias, small bowel obstruction, damage to the spleen or other organs, bowel injury, disfiguring scars, failure to lose weight, need for additional surgery, conversion to an open procedure, and death.  Patient is also aware of complications which apply in this particular procedure that can include but are not limited to leaking of gastric contents at the staple or suture lines which could lead to intra-abdominal abscess, or chronic complications of strictures, ulcers, or vitamin/mineral deficiencies.  If long BP limb, we discussed the likelihood of more frequent BM's,possibly malodorous, with increased risk for vitamin deficiencies, gaye the fat soluble vitamins, and that this can be serious and irreversible and will require lifelong compliance with w f/u and vit/supplment recommendations.  Aware that may need an elongation procedure in the future if diarrhea and/or vitamin issues not controlled.       We went over the risk, benefits, and alternative therapies to various revisional metabolic and bariatric surgical options as above.  We discussed revision to a modified duodenal switch, a form of duodenal switch, or a Crescencio-en-Y gastric bypass with or without a long biliopancreatic limb.      Plan:    After discussion of the options and the risks, benefits, and alternative therapies as above he wishes to proceed with laparoscopic robotic assisted modified duodenal switch/SIPS/SAD-I.  If this preferred safer ASMBS-endorsed procedure is not covered by his [employer] Medicare  insurance, then proceed with formal duodenal switch.    Other issues include history of spinal osteomyelitis, history of MRSA osteomyelitis left hip, nephrolithiasis, abnormal pulmonary function tests, anxiety, arthritis, benign prostatic hyperplasia, bipolar affective disorder, chronic pain, reported cirrhosis, colonic polyps, current everyday non-nicotene vaping, depression, dyspnea exertion, history of elevated liver function test, erectile dysfunction, family history malignant hyperthermia status post negative muscle biopsy, fatigue, fatty liver, history of gastroparesis, GE reflux disease, history of substance abuse clean since 2016, hyperlipidemia, hypertension, impaired mobility, insomnia, type 2 insulin-dependent diabetes, peripheral edema, migraine headaches, rectus diastases, obstructive sleep apnea on CPAP, urinary incontinence    Thank you Maria Victoria JOYCE for the opportunity to reevaluate Mr. Johnson.      Olu Rodriguez MD

## 2023-10-27 ENCOUNTER — HOSPITAL ENCOUNTER (OUTPATIENT)
Dept: NUCLEAR MEDICINE | Facility: HOSPITAL | Age: 48
Discharge: HOME | End: 2023-10-27
Payer: MEDICARE

## 2023-10-27 DIAGNOSIS — K21.9 GASTROESOPHAGEAL REFLUX DISEASE, UNSPECIFIED WHETHER ESOPHAGITIS PRESENT: ICD-10-CM

## 2023-10-27 PROCEDURE — 0 TECHNETIUM SULFUR COLLOID: Performed by: PHYSICIAN ASSISTANT

## 2023-10-27 PROCEDURE — 78264 GASTRIC EMPTYING IMG STUDY: CPT

## 2023-10-27 PROCEDURE — A9541 TC99M SULFUR COLLOID: HCPCS | Performed by: PHYSICIAN ASSISTANT

## 2023-10-27 RX ADMIN — TECHNETIUM TC 99M SULFUR COLLOID 1 DOSE: KIT at 07:35

## 2023-10-30 DIAGNOSIS — F33.0 MILD EPISODE OF RECURRENT MAJOR DEPRESSIVE DISORDER: Chronic | ICD-10-CM

## 2023-10-30 DIAGNOSIS — F41.1 GENERALIZED ANXIETY DISORDER: Chronic | ICD-10-CM

## 2023-10-30 RX ORDER — VILAZODONE HYDROCHLORIDE 40 MG/1
TABLET ORAL
Qty: 30 TABLET | Refills: 0 | OUTPATIENT
Start: 2023-10-30

## 2023-11-15 ENCOUNTER — OFFICE VISIT (OUTPATIENT)
Dept: BEHAVIORAL HEALTH | Facility: CLINIC | Age: 48
End: 2023-11-15
Payer: MEDICARE

## 2023-11-15 DIAGNOSIS — F19.11 HISTORY OF SUBSTANCE ABUSE: ICD-10-CM

## 2023-11-15 DIAGNOSIS — Z81.1 FAMILY HISTORY OF ALCOHOL ABUSE: ICD-10-CM

## 2023-11-15 DIAGNOSIS — E66.9 OBESITY (BMI 30-39.9): Primary | ICD-10-CM

## 2023-11-15 DIAGNOSIS — Z71.89 ENCOUNTER FOR PSYCHOLOGICAL ASSESSMENT PRIOR TO BARIATRIC SURGERY: ICD-10-CM

## 2023-11-15 PROCEDURE — 1160F RVW MEDS BY RX/DR IN RCRD: CPT | Performed by: PSYCHOLOGIST

## 2023-11-15 PROCEDURE — 90791 PSYCH DIAGNOSTIC EVALUATION: CPT | Performed by: PSYCHOLOGIST

## 2023-11-15 PROCEDURE — 1159F MED LIST DOCD IN RCRD: CPT | Performed by: PSYCHOLOGIST

## 2023-11-15 NOTE — PROGRESS NOTES
PROGRESS NOTE    Data:    Gopi Johnson is a 48 y.o. male who met with the undersigned for a scheduled psychological evaluation from 9:00 - 9:45 am.      Clinical Maneuvering/Intervention:      Chief complaint and history of presenting illness/Problems: struggling with obesity for several years. Despite trying different weight loss plans and diets, the pt reported being unsuccessful in losing weight. He did have the gastric sleeve about a year ago, then tried medical weight management, but per pt, he still struggles to lose more weight/keep weight off. He is seeking a second weight loss surgery. A psychological evaluation was conducted in order to assess past and current level of functioning. Areas assessed included, but were not limited to: perception of social support, perception of ability to face and deal with challenges in life (positive functioning), anxiety symptoms, depressive symptoms, perspective on beliefs/belief system, coping skills for stress, intelligence level, addiction issues, etc. Therapeutic rapport was established. Interventions conducted today were geared towards assessing the pt's readiness for weight loss surgery and identifying and psychological contraindications for undergoing such a major life change. Social support was deemed strong (specific to weight loss surgery/weight loss in this manner and in a general sense): mother, sister, friends, doctors, friends in recovery from addiction (Narcotics Anonymous; NA). He talked openly about his history with addiction (drug of choice: meth) and how he has been 'clean' going on seven years. Current psychological struggles were described as low, but included: anxiety situational to being overweight. He often feels insecure when it comes to being overweight and running into people who knew him when he was thinner. Coping skills for distress and related to undergoing a major life change such as weight loss surgery/weight loss were deemed strong  and included good sense of humor, follows directions well, intelligent, responsible person, maintains quality relationships with others, and believes in himself that he will be successful with weight loss surgery. The pt endorsed having characteristics of readiness to undergo major life changes inherent in the journey of weight loss surgery. The pt could speak to the detailed process of becoming ready mentally for this major life change, having 'suffered' enough, and how the pt has started incorporating healthier foods into his diet. The pt expressed gratitude for today's visit.     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    Mental Status Exam (MSE):  Hygiene:  good  Dress: normal  Attitude:  cooperative and proactive  Motor Activity: normal  Speech: normal  Mood:   positive, level  Affect:  congruent  Thought Processes: normal  Thought Content:  normal  Suicidal Thoughts:  not endorsed  Homicidal Thoughts:  not endorsed  Crisis Safety Plan: not needed   Hallucinations:  none      Patient's Support Network Includes:  family, friends      Progress toward goal: there is evidence to suggest that he is taking measures to improve the quality of his life including seeking weight loss surgery.       Functional Status: moderate to high      Prognosis: good with weight loss surgery    Evaluation, Diagnoses, and Ability/Capacity to Respond to Treatment:      The pt presented to be struggling with anxiety situational to being overweight (BMI = 36.94, obesity). He has a history of substance abuse; going on seven years clean (drug of choice, meth). Results of MSE demonstrated a functional status of moderate to high. Strengths: belief in self that he will be successful with weight loss surgery, etc (see  detailed list of coping skills above). Needed for growth (CPT code requirement for Weaknesses): weight loss.      From a psychological standpoint, the pt presents as a very good candidate for bariatric surgery. He is motivated for the surgery, has showed readiness for the lifestyle change in terms of starting to adjust his eating habits, and seems to have appropriate expectations of how to prepare and how to live after surgery in order to lose weight successfully.    Treatment Plan:      Short term goals: Continue to work his program of recovery from addiction. Start improving his health by following up with his bariatric surgeon in order to receive weight loss surgery as soon as feasible/appropriate and demonstrate success with compliance to adhering to the recommended diet. Long term goals: reach a healthy weight and alleviation of anxiety/improved confidence via taking control of his health. Continue to work his program of recovery from addiction.     Mireille Small, PhD, LP

## 2023-11-29 ENCOUNTER — TELEPHONE (OUTPATIENT)
Dept: CARDIOLOGY | Facility: CLINIC | Age: 48
End: 2023-11-29

## 2023-11-29 NOTE — TELEPHONE ENCOUNTER
Caller: Gopi Johnson    Relationship to patient: Self    Best call back number: [phone]    Chief complaint: PT IS NEEDING CLEARANCE FOR GASTRIC CLEARANCE    Type of visit: FU    Requested date: ASAP       Additional notes: HAS BEEN SINCE MARCH OF 2022 SINCE LAST VISIT.

## 2023-12-06 ENCOUNTER — OFFICE VISIT (OUTPATIENT)
Dept: CARDIOLOGY | Facility: CLINIC | Age: 48
End: 2023-12-06
Payer: MEDICARE

## 2023-12-06 VITALS
HEIGHT: 68 IN | WEIGHT: 253 LBS | SYSTOLIC BLOOD PRESSURE: 142 MMHG | BODY MASS INDEX: 38.34 KG/M2 | DIASTOLIC BLOOD PRESSURE: 98 MMHG | HEART RATE: 80 BPM | OXYGEN SATURATION: 97 %

## 2023-12-06 DIAGNOSIS — I10 ESSENTIAL HYPERTENSION: ICD-10-CM

## 2023-12-06 DIAGNOSIS — Z01.810 PREOPERATIVE CARDIOVASCULAR EXAMINATION: Primary | ICD-10-CM

## 2023-12-06 DIAGNOSIS — E78.1 HYPERTRIGLYCERIDEMIA: ICD-10-CM

## 2023-12-06 PROCEDURE — 3077F SYST BP >= 140 MM HG: CPT | Performed by: NURSE PRACTITIONER

## 2023-12-06 PROCEDURE — 99214 OFFICE O/P EST MOD 30 MIN: CPT | Performed by: NURSE PRACTITIONER

## 2023-12-06 PROCEDURE — 3080F DIAST BP >= 90 MM HG: CPT | Performed by: NURSE PRACTITIONER

## 2023-12-06 PROCEDURE — 93000 ELECTROCARDIOGRAM COMPLETE: CPT | Performed by: NURSE PRACTITIONER

## 2023-12-06 PROCEDURE — 1160F RVW MEDS BY RX/DR IN RCRD: CPT | Performed by: NURSE PRACTITIONER

## 2023-12-06 PROCEDURE — 1159F MED LIST DOCD IN RCRD: CPT | Performed by: NURSE PRACTITIONER

## 2023-12-06 RX ORDER — ONDANSETRON 4 MG/1
TABLET, FILM COATED ORAL
COMMUNITY

## 2023-12-06 RX ORDER — CYCLOBENZAPRINE HCL 10 MG
TABLET ORAL
COMMUNITY

## 2023-12-06 RX ORDER — INSULIN GLARGINE 100 [IU]/ML
INJECTION, SOLUTION SUBCUTANEOUS
COMMUNITY
Start: 2023-11-01

## 2023-12-06 RX ORDER — ERGOCALCIFEROL 1.25 MG/1
CAPSULE ORAL
COMMUNITY
Start: 2023-11-01

## 2023-12-06 NOTE — PROGRESS NOTES
"             [facility] Cardiology Office Follow Up Note    Gopi Johnson  [id number]  2023    Primary Care Provider: Maria Victoria Paulino APRN   Referring Provider: No ref. provider found    Chief Complaint: Pre-Op Clearance Bariatric Surgery    History of Present Illness:   Mr. Gopi Johnson is a 48 y.o. male who presents to the Cardiology Clinic for preoperative cardiovascular exam.  The patient has a past medical history of hypertension, hypertriglyceridemia, \"prediabetes\", non-nicotine vape use, and history of substance abuse with complete cessation in .  He presents today for evaluation.  The patient reports feeling well from a cardiac standpoint.  He specifically denies chest pain and dyspnea.  He denies palpitations, dizziness, syncope.  He denies orthopnea, PND, and lower extremity edema.  He reports that his blood pressure is usually much better.  He has never undergone a stress test.  He offers no other complaints or concerns at this time.    Past Cardiac Testin. Last Coronary Angio: None  2. Prior Stress Testing: None  3. Last Echo: 2020   1.  Normal left ventricular size and systolic function.  2.  Trace MR and TR.  3.  No echocardiographic evidence of valvular endocarditis.  4. Prior Holter Monitor: None    Review of Systems:   Review of Systems  As Noted in HPI.   I have reviewed and confirmed the accuracy of the ROS as documented by the MA/LPN/RN Ana Riley RN    I have reviewed and/or updated the patient's past medical, past surgical, family, social history, problem list and allergies as appropriate.     Medications:     Current Outpatient Medications:     Alcohol Swabs pads, Clean area prior to injection, Disp: 100 each, Rfl: 11    amLODIPine (NORVASC) 5 MG tablet, Take 1 tablet by mouth Daily., Disp: , Rfl:     buprenorphine (SUBUTEX) 8 MG sublingual tablet SL tablet, 1 tablet 2 (Two) Times a Day., Disp: , Rfl:     chlorthalidone (HYGROTON) 25 MG " "tablet, Take 1 tablet by mouth Daily., Disp: , Rfl:     Cyanocobalamin (Vitamin B-12) 1000 MCG sublingual tablet, Place 1 tablet under the tongue Daily., Disp: 90 each, Rfl: 0    fluticasone (FLONASE) 50 MCG/ACT nasal spray, As Needed., Disp: , Rfl:     hydrOXYzine (ATARAX) 25 MG tablet, Take 1 tablet by mouth 3 (Three) Times a Day As Needed for Anxiety., Disp: 90 tablet, Rfl: 0    lidocaine (LIDODERM) 5 %, Place 1 patch on the skin as directed by provider Daily. Remove & Discard patch within 12 hours or as directed by MD, Disp:  , Rfl:     loratadine (CLARITIN) 10 MG tablet, Daily., Disp: , Rfl:     mirtazapine (REMERON) 30 MG tablet, Take 1 tablet by mouth Every Night., Disp: , Rfl:     Multiple Vitamins-Minerals (MULTI COMPLETE PO), Take  by mouth., Disp: , Rfl:     mupirocin (BACTROBAN) 2 % ointment, , Disp: , Rfl:     pantoprazole (PROTONIX) 40 MG EC tablet, Take 1 tablet by mouth Every Morning., Disp:  , Rfl:     propranolol (INDERAL) 60 MG tablet, Take 1 tablet by mouth 2 (Two) Times a Day., Disp: , Rfl:     tamsulosin (FLOMAX) 0.4 MG capsule 24 hr capsule, Take 1 capsule by mouth Daily., Disp: 90 capsule, Rfl: 3    vilazodone (VIIBRYD) 40 MG tablet tablet, Take 1 tablet by mouth Daily. Take with food, Disp: 30 tablet, Rfl: 0    Physical Exam:  Vital Signs:   Vitals:    12/06/23 1534   BP: 142/98   BP Location: Left arm   Patient Position: Sitting   Cuff Size: Adult   Pulse: 80   SpO2: 97%   Weight: 115 kg (253 lb)   Height: 172.7 cm (68\")     Body mass index is 38.47 kg/m².    Physical Exam  Vitals and nursing note reviewed.   Constitutional:       General: He is not in acute distress.     Appearance: He is obese.   HENT:      Head: Normocephalic and atraumatic.   Neck:      Trachea: Trachea normal.   Cardiovascular:      Rate and Rhythm: Normal rate and regular rhythm.      Pulses: Normal pulses.      Heart sounds: Normal heart sounds. No murmur heard.     No friction rub. No gallop.   Pulmonary:      " Effort: Pulmonary effort is normal.      Breath sounds: Normal breath sounds.   Musculoskeletal:      Cervical back: Neck supple.      Right lower leg: No edema.      Left lower leg: No edema.   Skin:     General: Skin is warm and dry.   Neurological:      Mental Status: He is alert and oriented to person, place, and time.   Psychiatric:         Mood and Affect: Mood normal.         Behavior: Behavior normal. Behavior is cooperative.         Thought Content: Thought content does not include suicidal ideation.         Results Review:   I reviewed the patient's new clinical results.      ECG 12 Lead    Date/Time: 12/6/2023 3:44 PM  Performed by: Lin Katz APRN    Authorized by: Lin Katz APRN  Rhythm: sinus rhythm  Rate: normal  BPM: 72  QRS axis: normal    Clinical impression: normal ECG          Assessment / Plan:   Diagnoses and all orders for this visit:    1. Preoperative cardiovascular examination (Primary)  Plan for 1 day stress test    2. Essential hypertension  Uncontrolled  Patient declines change in medication profile today  Patient encouraged weight loss, low sodium diet, and avoidance of NSAIDs for long-term BP control  Patient prefers ongoing follow-up with PCP for management; this is reasonable    3. Hypertriglyceridemia  9/23, Triglycerides 323, HDL 31, LDL 96  Currently untreated    4. Prediabetes  9/23, 5.9  Component  Ref Range & Units 1 yr ago  (10/26/22) 1 yr ago  (2/24/22) 3 yr ago  (9/11/20)   Hemoglobin A1C  4.80 - 5.60 % 11.80 High  6.6 High  R, CM 5.80 High        Preventative Cardiology:   Tobacco Cessation: N/A   Advance Care Planning: ACP discussion was declined by the patient. Patient does not have an advance directive, declines further assistance.     Follow Up:   AS NEEDED BASED ON OUTPATIENT STRESS TEST      Thank you for allowing me to participate in the care of your patient. Please do not hesitate to contact me with additional questions or concerns.      Lin Katz, APRN

## 2023-12-17 ENCOUNTER — HOSPITAL ENCOUNTER (EMERGENCY)
Facility: HOSPITAL | Age: 48
Discharge: HOME | End: 2023-12-17
Attending: EMERGENCY MEDICINE | Admitting: EMERGENCY MEDICINE
Payer: MEDICARE

## 2023-12-17 VITALS
HEIGHT: 69 IN | RESPIRATION RATE: 14 BRPM | SYSTOLIC BLOOD PRESSURE: 142 MMHG | BODY MASS INDEX: 34.8 KG/M2 | DIASTOLIC BLOOD PRESSURE: 102 MMHG | OXYGEN SATURATION: 98 % | HEART RATE: 81 BPM | WEIGHT: 235 LBS | TEMPERATURE: 98.2 F

## 2023-12-17 DIAGNOSIS — R59.0 POSTERIOR CERVICAL ADENOPATHY: Primary | ICD-10-CM

## 2023-12-17 DIAGNOSIS — B97.89 VIRAL RESPIRATORY ILLNESS: ICD-10-CM

## 2023-12-17 DIAGNOSIS — J98.8 VIRAL RESPIRATORY ILLNESS: ICD-10-CM

## 2023-12-17 PROCEDURE — 99282 EMERGENCY DEPT VISIT SF MDM: CPT

## 2023-12-17 NOTE — ED PROVIDER NOTES
Subjective  History of Present Illness:    Chief Complaint: knot on head  History of Present Illness: This is a 48-year-old male patient comes into the ED today complaining of a knot on the back of his left head.  Patient states this started 2 days ago and is progressively gotten a little bigger each day.  Patient states he has had some cough, congestion denies any shortness of breath nausea or vomiting      Nurses Notes reviewed and agree, including vitals, allergies, social history and prior medical history.       Allergies:    Naloxone and Hydrocodone-acetaminophen      Past Surgical History:   Procedure Laterality Date    ABDOMINAL HERNIA REPAIR  2020    incisional, Dr. Dover, inferior to umbilicus    BUNIONECTOMY  2007    COLONOSCOPY  2023    CYSTOSCOPY TRANSURETHRAL RESECTION OF PROSTATE N/A 05/26/2021    Procedure: TRANSURETHRAL RESECTION OF PROSTATE;  Surgeon: Markel Centeno MD;  Location: [facility] OR;  Service: Urology;  Laterality: N/A;    ENDOSCOPY N/A 01/05/2022    Procedure: ESOPHAGOGASTRODUODENOSCOPY with biopsy;  Surgeon: Royal Dover MD;  Location: [facility] ENDOSCOPY;  Service: Gastroenterology;  Laterality: N/A;    ENDOSCOPY N/A 10/28/2022    Procedure: ESOPHAGOGASTRODUODENOSCOPY;  Surgeon: Olu Rodriguez MD;  Location:  [facility] OR;  Service: Bariatric;  Laterality: N/A;    GASTRECTOMY N/A 10/28/2022    Procedure: GASTRECTOMY LAPAROSCOPIC;  Surgeon: Olu Rodriguez MD;  Location:  [facility] OR;  Service: Bariatric;  Laterality: N/A;    HEMORRHOIDECTOMY      Dr Dover do the removal    HIATAL HERNIA REPAIR  2019    INGUINAL HERNIA REPAIR  2019    Dr. Dover    KNEE OPEN LATERAL RELEASE Right 1985    LAPAROSCOPIC CHOLECYSTECTOMY  2014    sand, no stones    LIVER BIOPSY N/A 10/28/2022    Procedure: LIVER BIOPSY LAPAROSCOPIC;  Surgeon: Olu Rodriguez MD;  Location:  [facility] OR;  Service: Bariatric;  Laterality: N/A;    TONSILLECTOMY AND ADENOIDECTOMY  1986    WISDOM TOOTH EXTRACTION   "         Social History     Socioeconomic History    Marital status: Single   Tobacco Use    Smoking status: Former     Packs/day: 1.00     Years: 15.00     Additional pack years: 0.00     Total pack years: 15.00     Types: Cigarettes     Quit date: 2022     Years since quittin.3     Passive exposure: Past    Smokeless tobacco: Never    Tobacco comments:     Recently I have quit smoking I do vape but with no nicotine   Vaping Use    Vaping Use: Some days    Substances: Flavoring    Devices: Pre-filled or refillable cartridge   Substance and Sexual Activity    Alcohol use: Not Currently    Drug use: Not Currently     Types: Amphetamines, Barbiturates, Benzodiazepines, \"Crack\" cocaine, Cocaine(coke), Codeine, Fentanyl, GHB, Hashish, Heroin, Hydrocodone, Ketamine, LSD, Marijuana, MDMA (ecstacy), Methamphetamines, Morphine, Nitrous oxide, Opium, Oxycodone     Comment: clean since     Sexual activity: Never         Family History   Problem Relation Age of Onset    Hypertension Mother     Obesity Mother     Arthritis Mother     COPD Mother     Heart disease Mother     Depression Mother     Sleep apnea Mother     Hypertension Father     Alcohol abuse Father     Throat cancer Father     Stroke Father     Heart disease Father     Sleep apnea Sister     Hypertension Sister     Malig Hyperthermia Sister     Obesity Sister     Diabetes Sister     Depression Sister     Malig Hyperthermia Sister         I have been tested (muscle tissue test) and i dont have it    Cancer Brother     Hypertension Brother     Obesity Maternal Aunt     Hypertension Maternal Aunt     Cancer Maternal Aunt     Depression Maternal Aunt     Obesity Maternal Uncle     Diabetes Maternal Uncle     Cancer Paternal Aunt     Obesity Paternal Uncle     Hypertension Paternal Uncle     Cancer Paternal Uncle     Hearing loss Paternal Uncle     Prostate cancer Paternal Uncle     Hearing loss Paternal Uncle     Prostate cancer Paternal Uncle         " Both my dads older brothers had radical prostate removal    Diabetes Maternal Grandmother     Leukemia Maternal Grandmother     Obesity Maternal Grandmother     Hypertension Maternal Grandmother     Sleep apnea Maternal Grandmother     Hypertension Maternal Grandfather     Cancer Maternal Grandfather     Sleep apnea Maternal Grandfather     Heart disease Maternal Grandfather     Arthritis Maternal Grandfather     Heart attack Maternal Grandfather     Obesity Maternal Grandfather     Hypertension Paternal Grandmother     Cancer Paternal Grandmother         blood    Heart disease Paternal Grandmother         Dad's mother    Arthritis Paternal Grandmother     Hearing loss Paternal Grandmother     Heart attack Paternal Grandmother     Prostate cancer Paternal Grandfather     Obesity Paternal Grandfather     Hypertension Paternal Grandfather     Stomach cancer Paternal Grandfather     Colon polyps Half-Brother     Colon cancer Half-Brother     Esophageal cancer Neg Hx        REVIEW OF SYSTEMS: All systems reviewed and not pertinent unless noted.    Review of Systems   HENT:  Positive for congestion.    Respiratory:  Positive for cough.    Musculoskeletal:  Positive for neck pain.   All other systems reviewed and are negative.      Objective    Physical Exam  Vitals and nursing note reviewed.   Constitutional:       Appearance: Normal appearance.   HENT:      Head: Normocephalic and atraumatic.      Ears:      Comments: Positive bulging tympanic membrane on left, normal on right     Nose: Congestion and rhinorrhea present.   Eyes:      Extraocular Movements: Extraocular movements intact.      Pupils: Pupils are equal, round, and reactive to light.   Cardiovascular:      Rate and Rhythm: Normal rate and regular rhythm.      Pulses: Normal pulses.      Heart sounds: Normal heart sounds.   Pulmonary:      Effort: Pulmonary effort is normal.      Breath sounds: Normal breath sounds.   Abdominal:      General: Bowel sounds are  normal.      Palpations: Abdomen is soft.   Musculoskeletal:         General: Normal range of motion.      Cervical back: Normal range of motion and neck supple.   Lymphadenopathy:      Cervical: Cervical adenopathy present.   Skin:     General: Skin is warm and dry.      Capillary Refill: Capillary refill takes less than 2 seconds.   Neurological:      Mental Status: He is alert.      GCS: GCS eye subscore is 4. GCS verbal subscore is 5. GCS motor subscore is 6.      Sensory: Sensation is intact.      Motor: Motor function is intact.   Psychiatric:         Attention and Perception: Attention and perception normal.         Mood and Affect: Mood and affect normal.         Speech: Speech normal.           Procedures    ED Course:         Lab Results (last 24 hours)       ** No results found for the last 24 hours. **             No radiology results from the last 24 hrs     Medical Decision Making  This is a 48-year-old male patient comes into the ED today complaining of a knot on the back of his left head.  Patient states this started 2 days ago and is progressively gotten a little bigger each day.  Patient states he has had some cough, congestion denies any shortness of breath nausea or vomiting    DDX: includes but is not limited to: Viral upper respiratory illness, abscess, cervical adenopathy, cyst    Problems Addressed:  Posterior cervical adenopathy: acute illness or injury  Viral respiratory illness: acute illness or injury    Amount and/or Complexity of Data Reviewed  Discussion of management or test interpretation with external provider(s): Discussed assessment, treatment and plan with ER attending    Risk  Risk Details: I have discussed with patient the finding of the test preformed today. Patient has been diagnosed with posterior cervical adenopathy, viral respiratory illness and will be discharged home.  Patient requested to follow-up with primary care provider within the next 7 days for reevaluation.  Strict return precautions have been given and patient verbalizes understanding                  Final diagnoses:   Posterior cervical adenopathy   Viral respiratory illness          Ruslan Gonzalez, APRN  12/17/23 0807

## 2023-12-30 DIAGNOSIS — F41.1 GENERALIZED ANXIETY DISORDER: Chronic | ICD-10-CM

## 2024-01-02 RX ORDER — HYDROXYZINE HYDROCHLORIDE 25 MG/1
25 TABLET, FILM COATED ORAL 3 TIMES DAILY PRN
Qty: 90 TABLET | Refills: 0 | Status: SHIPPED | OUTPATIENT
Start: 2024-01-02

## 2024-01-25 ENCOUNTER — HOSPITAL ENCOUNTER (OUTPATIENT)
Dept: NUCLEAR MEDICINE | Facility: HOSPITAL | Age: 49
Discharge: HOME | End: 2024-01-25
Payer: MEDICARE

## 2024-01-25 DIAGNOSIS — Z01.810 PREOPERATIVE CARDIOVASCULAR EXAMINATION: ICD-10-CM

## 2024-01-25 LAB
BH CV REST NUCLEAR ISOTOPE DOSE: 10 MCI
BH CV STRESS COMMENTS STAGE 1: NORMAL
BH CV STRESS DOSE REGADENOSON STAGE 1: 0.4
BH CV STRESS DURATION MIN STAGE 1: 0
BH CV STRESS DURATION SEC STAGE 1: 10
BH CV STRESS NUCLEAR ISOTOPE DOSE: 34.8 MCI
BH CV STRESS PROTOCOL 1: NORMAL
BH CV STRESS RECOVERY BP: NORMAL MMHG
BH CV STRESS RECOVERY HR: 86 BPM
BH CV STRESS STAGE 1: 1
LV EF NUC BP: 76 %
MAXIMAL PREDICTED HEART RATE: 172 BPM
PERCENT MAX PREDICTED HR: 58.14 %
STRESS BASELINE BP: NORMAL MMHG
STRESS BASELINE HR: 60 BPM
STRESS PERCENT HR: 68 %
STRESS POST PEAK BP: NORMAL MMHG
STRESS POST PEAK HR: 100 BPM
STRESS TARGET HR: 146 BPM

## 2024-01-25 PROCEDURE — 93017 CV STRESS TEST TRACING ONLY: CPT

## 2024-01-25 PROCEDURE — 78452 HT MUSCLE IMAGE SPECT MULT: CPT

## 2024-01-25 PROCEDURE — 25010000002 REGADENOSON 0.4 MG/5ML SOLUTION: Performed by: NURSE PRACTITIONER

## 2024-01-25 PROCEDURE — 0 TECHNETIUM SESTAMIBI: Performed by: NURSE PRACTITIONER

## 2024-01-25 PROCEDURE — A9500 TC99M SESTAMIBI: HCPCS | Performed by: NURSE PRACTITIONER

## 2024-01-25 RX ORDER — REGADENOSON 0.08 MG/ML
0.4 INJECTION, SOLUTION INTRAVENOUS
Status: COMPLETED | OUTPATIENT
Start: 2024-01-25 | End: 2024-01-25

## 2024-01-25 RX ADMIN — TECHNETIUM TC 99M SESTAMIBI 1 DOSE: 1 INJECTION INTRAVENOUS at 06:35

## 2024-01-25 RX ADMIN — TECHNETIUM TC 99M SESTAMIBI 1 DOSE: 1 INJECTION INTRAVENOUS at 08:31

## 2024-01-25 RX ADMIN — REGADENOSON 0.4 MG: 0.08 INJECTION, SOLUTION INTRAVENOUS at 08:31

## 2024-01-26 ENCOUNTER — TELEPHONE (OUTPATIENT)
Dept: CARDIOLOGY | Facility: CLINIC | Age: 49
End: 2024-01-26
Payer: MEDICARE

## 2024-01-26 NOTE — TELEPHONE ENCOUNTER
Is it okay to send a letter to patient's bariatric doctor informing of his cardiac risk status for his surgery? Please advise.

## 2024-01-26 NOTE — TELEPHONE ENCOUNTER
----- Message from Gopi Johnson sent at 1/25/2024  4:35 PM EST -----  Regarding: results  Contact: [phone]  will you all be sending those results to Dr. Rodriguez office for the clearance on my surgery? (I assume those results are good)

## 2024-03-19 DIAGNOSIS — R06.00 DYSPNEA, UNSPECIFIED TYPE: ICD-10-CM

## 2024-03-19 DIAGNOSIS — R53.83 FATIGUE, UNSPECIFIED TYPE: Primary | ICD-10-CM

## 2024-03-20 DIAGNOSIS — R39.9 LOWER URINARY TRACT SYMPTOMS (LUTS): ICD-10-CM

## 2024-03-20 RX ORDER — TAMSULOSIN HYDROCHLORIDE 0.4 MG/1
1 CAPSULE ORAL DAILY
Qty: 90 CAPSULE | Refills: 2 | Status: SHIPPED | OUTPATIENT
Start: 2024-03-20

## 2024-03-20 NOTE — TELEPHONE ENCOUNTER
Rx Refill Note  Requested Prescriptions     Pending Prescriptions Disp Refills    tamsulosin (FLOMAX) 0.4 MG capsule 24 hr capsule [Pharmacy Med Name: TAMSULOSIN 0.4MG] 90 capsule 2     Sig: TAKE 1 CAPSULE BY MOUTH DAILY.      Last office visit with prescribing clinician: 8/14/2023   Last telemedicine visit with prescribing clinician: Visit date not found   Next office visit with prescribing clinician: Visit date not found       Meredith Overton MA  03/20/24, 12:10 EDT

## 2024-04-15 ENCOUNTER — LAB (OUTPATIENT)
Dept: LAB | Facility: HOSPITAL | Age: 49
End: 2024-04-15
Payer: MEDICARE

## 2024-04-15 DIAGNOSIS — R06.00 DYSPNEA, UNSPECIFIED TYPE: ICD-10-CM

## 2024-04-15 DIAGNOSIS — R53.83 FATIGUE, UNSPECIFIED TYPE: ICD-10-CM

## 2024-04-15 LAB
DEPRECATED RDW RBC AUTO: 39 FL (ref 37–54)
ERYTHROCYTE [DISTWIDTH] IN BLOOD BY AUTOMATED COUNT: 13.3 % (ref 12.3–15.4)
HCT VFR BLD AUTO: 45.6 % (ref 37.5–51)
HGB BLD-MCNC: 15.2 G/DL (ref 13–17.7)
MCH RBC QN AUTO: 27.3 PG (ref 26.6–33)
MCHC RBC AUTO-ENTMCNC: 33.3 G/DL (ref 31.5–35.7)
MCV RBC AUTO: 82 FL (ref 79–97)
PLATELET # BLD AUTO: 322 10*3/MM3 (ref 140–450)
PMV BLD AUTO: 9.5 FL (ref 6–12)
RBC # BLD AUTO: 5.56 10*6/MM3 (ref 4.14–5.8)
WBC NRBC COR # BLD AUTO: 10.37 10*3/MM3 (ref 3.4–10.8)

## 2024-04-15 PROCEDURE — 36415 COLL VENOUS BLD VENIPUNCTURE: CPT

## 2024-04-15 PROCEDURE — 80053 COMPREHEN METABOLIC PANEL: CPT

## 2024-04-15 PROCEDURE — 85027 COMPLETE CBC AUTOMATED: CPT

## 2024-04-16 ENCOUNTER — CONSULT (OUTPATIENT)
Dept: BARIATRICS/WEIGHT MGMT | Facility: CLINIC | Age: 49
End: 2024-04-16
Payer: MEDICARE

## 2024-04-16 VITALS
BODY MASS INDEX: 34.96 KG/M2 | TEMPERATURE: 97.5 F | RESPIRATION RATE: 16 BRPM | OXYGEN SATURATION: 99 % | SYSTOLIC BLOOD PRESSURE: 128 MMHG | DIASTOLIC BLOOD PRESSURE: 82 MMHG | HEIGHT: 69 IN | HEART RATE: 70 BPM | WEIGHT: 236 LBS

## 2024-04-16 DIAGNOSIS — E11.69 TYPE 2 DIABETES MELLITUS WITH MORBID OBESITY: ICD-10-CM

## 2024-04-16 DIAGNOSIS — E66.01 TYPE 2 DIABETES MELLITUS WITH MORBID OBESITY: ICD-10-CM

## 2024-04-16 DIAGNOSIS — E66.01 MORBID EXOGENOUS OBESITY: Primary | ICD-10-CM

## 2024-04-16 DIAGNOSIS — Z98.84 S/P LAPAROSCOPIC SLEEVE GASTRECTOMY: ICD-10-CM

## 2024-04-16 LAB
ALBUMIN SERPL-MCNC: 4.7 G/DL (ref 3.5–5.2)
ALBUMIN/GLOB SERPL: 1.7 G/DL
ALP SERPL-CCNC: 126 U/L (ref 39–117)
ALT SERPL W P-5'-P-CCNC: 24 U/L (ref 1–41)
ANION GAP SERPL CALCULATED.3IONS-SCNC: 15 MMOL/L (ref 5–15)
AST SERPL-CCNC: 26 U/L (ref 1–40)
BILIRUB SERPL-MCNC: 0.3 MG/DL (ref 0–1.2)
BUN SERPL-MCNC: 12 MG/DL (ref 6–20)
BUN/CREAT SERPL: 13.2 (ref 7–25)
CALCIUM SPEC-SCNC: 9.4 MG/DL (ref 8.6–10.5)
CHLORIDE SERPL-SCNC: 97 MMOL/L (ref 98–107)
CO2 SERPL-SCNC: 27 MMOL/L (ref 22–29)
CREAT SERPL-MCNC: 0.91 MG/DL (ref 0.76–1.27)
EGFRCR SERPLBLD CKD-EPI 2021: 104 ML/MIN/1.73
GLOBULIN UR ELPH-MCNC: 2.8 GM/DL
GLUCOSE SERPL-MCNC: 86 MG/DL (ref 65–99)
POTASSIUM SERPL-SCNC: 3.4 MMOL/L (ref 3.5–5.2)
PROT SERPL-MCNC: 7.5 G/DL (ref 6–8.5)
SODIUM SERPL-SCNC: 139 MMOL/L (ref 136–145)

## 2024-04-16 RX ORDER — SODIUM CHLORIDE 9 MG/ML
150 INJECTION, SOLUTION INTRAVENOUS CONTINUOUS
OUTPATIENT
Start: 2024-04-16

## 2024-04-16 RX ORDER — CHLORHEXIDINE GLUCONATE ORAL RINSE 1.2 MG/ML
30 SOLUTION DENTAL
OUTPATIENT
Start: 2024-04-16 | End: 2024-04-16

## 2024-04-16 RX ORDER — ACETAMINOPHEN 500 MG
1000 TABLET ORAL ONCE
OUTPATIENT
Start: 2024-04-16 | End: 2024-04-16

## 2024-04-16 RX ORDER — ENOXAPARIN SODIUM 100 MG/ML
40 INJECTION SUBCUTANEOUS ONCE
OUTPATIENT
Start: 2024-04-16 | End: 2024-04-16

## 2024-04-16 RX ORDER — SODIUM CHLORIDE 0.9 % (FLUSH) 0.9 %
3 SYRINGE (ML) INJECTION EVERY 12 HOURS SCHEDULED
OUTPATIENT
Start: 2024-04-16

## 2024-04-16 RX ORDER — SCOLOPAMINE TRANSDERMAL SYSTEM 1 MG/1
1 PATCH, EXTENDED RELEASE TRANSDERMAL ONCE
OUTPATIENT
Start: 2024-04-16 | End: 2024-04-16

## 2024-04-16 RX ORDER — SODIUM CHLORIDE 9 MG/ML
40 INJECTION, SOLUTION INTRAVENOUS AS NEEDED
OUTPATIENT
Start: 2024-04-16

## 2024-04-16 RX ORDER — GABAPENTIN 100 MG/1
600 CAPSULE ORAL ONCE
OUTPATIENT
Start: 2024-04-16 | End: 2024-04-16

## 2024-04-16 RX ORDER — PANTOPRAZOLE SODIUM 40 MG/10ML
40 INJECTION, POWDER, LYOPHILIZED, FOR SOLUTION INTRAVENOUS ONCE
OUTPATIENT
Start: 2024-04-16 | End: 2024-04-16

## 2024-04-16 RX ORDER — SODIUM CHLORIDE 0.9 % (FLUSH) 0.9 %
3-10 SYRINGE (ML) INJECTION AS NEEDED
OUTPATIENT
Start: 2024-04-16

## 2024-04-16 NOTE — PROGRESS NOTES
"[facility] BARIATRIC SURGERY  [address]  [address]  [facility] [id number] [phone]      Patient  Name:  Gopi Johnson  :  1975      Date of Visit: 24    Chief Complaint:    Weight gain; unable to maintain weight loss.  Reevaluate for possible revisional metabolic bariatric surgery status post laparoscopic sleeve gastrectomy 2022    History of Present Illness:  Gopi Johnson is a 48 y.o. male who presents today for reevaluation, education and consultation regarding revisional metabolic and bariatric surgery status post laparoscopic sleeve gastrectomy 2022 specifically revision to a duodenal switch.  Since last seen 2023 he has gained 1 pound.  My most recent evaluation dated 10/24/2023 reviewed:    \"History of Present Illness:  Gopi Johnson is a 48 y.o. male who presents today for evaluation, education and consultation regarding revisional metabolic and bariatric surgery (MBS) status post laparoscopic sleeve gastrectomy 2022.  Since last seen 10/10/2023 he has gained 3 pounds.  Most recent intake evaluation Ofelia AARON PA-C dated 10/10/2023 reviewed.  She notes the patient is almost 1 year status post sleeve gastrectomy last seen at 9 months postop visit previous to that was the 1 month postop visit where stat imaging was ordered at that time for nausea and abdominal pain and noted to be taking daily NSAIDs and staying with his mother who previously was noted to smoke in the house.  On his last visit he complained of constant hunger snacking all day to stay satisfied his weight was 230 pounds and dietitian evaluation was advised and he has since been working with medical weight management presurgery start weight of 251 pounds lowest reported weight 198 pounds current weight 243 pounds and that he presents today to discuss revision options for further weight loss specifically interested in gastric bypass as he does not feel the " "sleeve has worked for him despite doing \"everything right\" and that he was off insulin in the months after sleeve gastrectomy with diabetes in remission but is now back on insulin with his last A1c 5.7 and that his reflux is fairly well controlled with daily Protonix has rare abdominal pain focuses on protein no pop gets around 1700 zulma a day had a recent colonoscopy Dr. Pleitez and had a liver biopsy with Dr. Rodriguez and that he has a history of hypertension lower extremity edema sleep apnea BPH status post TURP procedure and intermittent urinary retention requiring straight caths as needed on Flomax daily chronic back pain arthritis migraines insulin-dependent diabetes history of MRSA with osteomyelitis requiring IV antibiotics and inpatient drainage history of substance abuse with meth last use 2016 treated with buprenorphine followed by therapist and PCP regularly and has a family history of malignant hyperthermia and patient tested negative on muscle biopsy and previous sleeve gastrectomy was ambulating with a cane with poor mobility secondary to spinal osteomyelitis from previous IV drug use.  She noted he had COVID in the past was not hospitalized and is vaccinated.     The patient has had issues with morbid obesity for years and only temporary success with surgical and non-surgical methods of weight loss.  The patient is seeking revisional metabolic and bariatric surgery to help with the morbid obesity related conditions of history of spinal osteomyelitis, history of MRSA osteomyelitis left hip, nephrolithiasis, abnormal pulmonary function tests, anxiety, arthritis, benign prostatic hyperplasia, bipolar affective disorder, chronic pain, reported cirrhosis, colonic polyps, current everyday non-nicotene vaping, depression, dyspnea exertion, history of elevated liver function test, erectile dysfunction, family history malignant hyperthermia status post negative muscle biopsy, fatigue, fatty liver, history of " gastroparesis, GE reflux disease, history of substance abuse clean since 2016, hyperlipidemia, hypertension, impaired mobility, insomnia, type 2 insulin-dependent diabetes, peripheral edema, migraine headaches, rectus diastases, obstructive sleep apnea on CPAP, urinary incontinence.     48-year-old disabled male from [facility] known to me as above.  He smells of tobacco but says that is because he lives with his mother who smokes in the house..  He only vapes non-nicotine.  I did his sleeve by Titan technique, specimen was average size.  He denies any history of surgical complications with his previous surgeries.     Upper GI on 12/6/2022 shows changes of sleeve gastrectomy without hiatal hernia or reflux noted.  On upper endoscopy last week I noted the sleeve to appear unremarkable with mild pyloric spasm no hiatal hernia Z-line roughly 39 cm.  I noted that his recollection is he weighed as much is 300 pounds and lost down to around 200 pounds and feels he is back up to 270 pounds although his current weight at that time was 243.5 pounds.  He noted he had significant reflux symptoms prior to his sleeve gastrectomy which have since resolved and that he had an EGD in January 2020 with Dr. Dover showing no hiatal hernia and some superficial gastric ulcers.  On recent endoscopy pathology of the antrum showed minimal chronic gastritis without activity and mild reactive gastropathic changes negative for H. pylori and distal esophageal biopsies were mild and felt to be nonspecific.  Gastric emptying study on 11/3/2023 within normal limits with a half emptying time of 28 minutes.....    Assessment:     Gopi Johnson is a 48 y.o. year old male with medically complicated obesity status post laparoscopic sleeve gastrectomy 1 year ago in October 2022.     Revisional metabolic and bariatric surgery is deemed medically necessary given the following obesity related comorbidities including history of spinal  osteomyelitis, history of MRSA osteomyelitis left hip, nephrolithiasis, abnormal pulmonary function tests, anxiety, arthritis, benign prostatic hyperplasia, bipolar affective disorder, chronic pain, reported cirrhosis, colonic polyps, current everyday non-nicotene vaping, depression, dyspnea exertion, history of elevated liver function test, erectile dysfunction, family history malignant hyperthermia status post negative muscle biopsy, fatigue, fatty liver, history of gastroparesis, GE reflux disease, history of substance abuse clean since 2016, hyperlipidemia, hypertension, impaired mobility, insomnia, type 2 insulin-dependent diabetes, peripheral edema, migraine headaches, rectus diastases, obstructive sleep apnea on CPAP, urinary incontinence with current Weight: 112 kg (246 lb 8 oz) and Body mass index is 36.94 kg/m²..     Patient is aware that surgery is a tool, and that weight loss and improvement in comorbidities is not guaranteed but only seen in the context of appropriate use, follow up and physical activity.     I reviewed his Mikhail report showing Buprenorphine 8 mg#42 every 21 days.  Please see scanned records that I have reviewed and signed off on today.  All of this in addition to the patient's unique history and exam has been taken into consideration in determining their appropriate candidacy for MBS.     Risks, benefits, alternative therapies were discussed to laparoscopic possible robotic assisted SIPS/loop DS/SAD-I and formal duodenal switch. The most common short term complics in addition to cardiopulm risk, VTE, bleeding, infection, etc is leak or obstruction at the anastomosis, which could have potentially serious and even fatal consequences and require further surgery(s).  The patient understands that they will have more frequent BM's, will be malodorous, and that at increased risk for vitamin deficiencies, gaye the fat soluble vitamins, and that this can be serious and irreversible - still  wishes to proceed, says they will be compliant w f/u and vit/supplment recommendations.  Aware that may need an elongation procedure in the future if diarrhea and/or vitamin issues not controlled.       Complications of laparoscopic/possible robotic gastric bypass were discussed including but not limited to bleeding, infection, deep venous thrombosis, pulmonary embolism, pulmonary complications such as pneumonia, cardiac events, hernias, small bowel obstruction, damage to the spleen or other organs, bowel injury, disfiguring scars, failure to lose weight, need for additional surgery, conversion to an open procedure, and death.  Patient is also aware of complications which apply in this particular procedure that can include but are not limited to leaking of gastric contents at the staple or suture lines which could lead to intra-abdominal abscess, or chronic complications of strictures, ulcers, or vitamin/mineral deficiencies.  If long BP limb, we discussed the likelihood of more frequent BM's,possibly malodorous, with increased risk for vitamin deficiencies, gaye the fat soluble vitamins, and that this can be serious and irreversible and will require lifelong compliance with w f/u and vit/supplment recommendations.  Aware that may need an elongation procedure in the future if diarrhea and/or vitamin issues not controlled.        We went over the risk, benefits, and alternative therapies to various revisional metabolic and bariatric surgical options as above.  We discussed revision to a modified duodenal switch, a form of duodenal switch, or a Crescencio-en-Y gastric bypass with or without a long biliopancreatic limb.        Plan:    After discussion of the options and the risks, benefits, and alternative therapies as above he wishes to proceed with laparoscopic robotic assisted modified duodenal switch/SIPS/SAD-I.  If this preferred safer ASMBS-endorsed procedure is not covered by his [facility] Medicare insurance, then  "proceed with formal duodenal switch.     Other issues include history of spinal osteomyelitis, history of MRSA osteomyelitis left hip, nephrolithiasis, abnormal pulmonary function tests, anxiety, arthritis, benign prostatic hyperplasia, bipolar affective disorder, chronic pain, reported cirrhosis, colonic polyps, current everyday non-nicotene vaping, depression, dyspnea exertion, history of elevated liver function test, erectile dysfunction, family history malignant hyperthermia status post negative muscle biopsy, fatigue, fatty liver, history of gastroparesis, GE reflux disease, history of substance abuse clean since 2016, hyperlipidemia, hypertension, impaired mobility, insomnia, type 2 insulin-dependent diabetes, peripheral edema, migraine headaches, rectus diastases, obstructive sleep apnea on CPAP, urinary incontinence.\"    He returns today for final visit prior to scheduling his duodenal switch.  He attended informed consent last evening, said he found it extremely informative \"with a lot of new information\" despite it was likely the same material discussed prior to his sleeve gastrectomy a couple years ago.  He is currently getting around fairly well and rarely uses his cane, says he tries not to use it.  After examination today he hopped off the table before I could lower it and was ambulating in the office normally and at normal pace without impaired gait.  Once again his maximum lifetime weight is 400 pounds.  He continues to require insulin for his diabetes.  His mother recently became lethargic and was found to have a CO2 of greater than 140 from her COPD and since then he has quit all vaping.  Once again he switched from cigarettes to not nicotine vaping previously.  He does not recall being sent home on anticoagulation after sleeve gastrectomy but on review I did send him home on Eliquis for his previous more significant impaired mobility.  He denies personal or family history of bleeding or clotting " disorders.  His liver biopsy previously showed mild to moderate steatosis only, no fibrosis or cirrhosis.  He continues to have no significant reflux symptoms on daily Protonix.      Past Medical History:   Diagnosis Date    Abnormal PFTs (pulmonary function tests)     Anxiety     Arthritis     Benign prostatic hyperplasia     Bipolar affective     Chronic pain     Colon polyp     Depression     Dyspepsia     Dyspnea on exertion     Elevated LFTs     GI eval (P)    Enlarged prostate     s/p TURP 5/2021    Epididymitis     Erectile dysfunction After T.E.R.P.    Family history of malignant hyperthermia     patient had muscle biopsy at [facility] and was negative, sister has malignant hyperthermia and a cousin    Fatigue     Fatty liver     Former smoker     Fractured pelvis     Frequent urination     GERD (gastroesophageal reflux disease)     on PPI, hx erosive gastritis on EGD 1/5/22, EGD Dr. Rodriguez 10/23    H/O: substance abuse     clean since 2016, on Subutex, managed by PCP    Headache     following w/ Neurology    History of non-nicotine vaping     Hx MRSA infection 2020    w/ osteomyelitis L hip, fracture hip, hospitalized with IV abx and drainage, had port with home abx    Hyperammonemia     following w/ GI    Hyperlipidemia     Hypertension     Hypogonadism in male     on testosterone q2wks, follows w/ Urology    Hypokalemia     Impaired mobility     w/ (L)sided weakness (since osteomyelitis), ambulates w/ cane    Insomnia     on Trazodone    Insulin dependent type 2 diabetes mellitus     resolved with weightloss, now back on insulin    Joint pain     w/ recent SI joint injections 2/2022    Kidney stone 11/30/2016    Kidney stones     Lower extremity edema     Migraine     Morbid obesity     Murmur     as a child    Myoclonus     w/ body jerks, follows w/ Neurology    Osteomyelitis 2020    H/O spinal osteomyelitis, previous IVDA    Piercing     ear/body    PUD (peptic ulcer disease)     non-bleeding gastric  ulcers on EGD 1/5/22 w/ Dr. Dover    Rectus diastasis     Seasonal allergies     Sleep apnea     CPAP compliant    Staph infection 2020    Substance abuse     Not currently, over 6 years clean and sober!    Tattoo     Urinary incontinence     Urinary tract infection      Past Surgical History:   Procedure Laterality Date    ABDOMINAL HERNIA REPAIR  2020    incisional, Dr. Dover, inferior to umbilicus    BUNIONECTOMY  2007    COLONOSCOPY  2023    CYSTOSCOPY TRANSURETHRAL RESECTION OF PROSTATE N/A 05/26/2021    Procedure: TRANSURETHRAL RESECTION OF PROSTATE;  Surgeon: Markel Centeno MD;  Location:  [facility];  Service: Urology;  Laterality: N/A;    ENDOSCOPY N/A 01/05/2022    Procedure: ESOPHAGOGASTRODUODENOSCOPY with biopsy;  Surgeon: Royal Dover MD;  Location: [facility] ENDOSCOPY;  Service: Gastroenterology;  Laterality: N/A;    ENDOSCOPY N/A 10/28/2022    Procedure: ESOPHAGOGASTRODUODENOSCOPY;  Surgeon: Olu Rodriguez MD;  Location:  [facility];  Service: Bariatric;  Laterality: N/A;    GASTRECTOMY N/A 10/28/2022    Procedure: GASTRECTOMY LAPAROSCOPIC;  Surgeon: Olu Rodriguez MD;  Location:  [facility];  Service: Bariatric;  Laterality: N/A;    HEMORRHOIDECTOMY      Dr Dover do the removal    HIATAL HERNIA REPAIR  2019    INGUINAL HERNIA REPAIR  2019    Dr. Dover    KNEE OPEN LATERAL RELEASE Right 1985    LAPAROSCOPIC CHOLECYSTECTOMY  2014    sand, no stones    LIVER BIOPSY N/A 10/28/2022    Procedure: LIVER BIOPSY LAPAROSCOPIC;  Surgeon: Olu Rodriguez MD;  Location:  [facility];  Service: Bariatric;  Laterality: N/A;    TONSILLECTOMY AND ADENOIDECTOMY  1986    WISDOM TOOTH EXTRACTION  1997       Allergies   Allergen Reactions    Naloxone Swelling     Throat swelling, itching, rash    Hydrocodone-Acetaminophen Itching and Rash     Percocet OK       Current Outpatient Medications:     Alcohol Swabs pads, Clean area prior to injection, Disp: 100 each, Rfl: 11    amLODIPine (NORVASC) 5 MG  tablet, Take 1 tablet by mouth Daily., Disp: , Rfl:     buprenorphine (SUBUTEX) 8 MG sublingual tablet SL tablet, 1 tablet 2 (Two) Times a Day., Disp: , Rfl:     chlorthalidone (HYGROTON) 25 MG tablet, Take 1 tablet by mouth Daily., Disp: , Rfl:     Cyanocobalamin (Vitamin B-12) 1000 MCG sublingual tablet, Place 1 tablet under the tongue Daily., Disp: 90 each, Rfl: 0    cyclobenzaprine (FLEXERIL) 10 MG tablet, , Disp: , Rfl:     fluticasone (FLONASE) 50 MCG/ACT nasal spray, As Needed., Disp: , Rfl:     hydrOXYzine (ATARAX) 25 MG tablet, Take 1 tablet by mouth 3 (Three) Times a Day As Needed for Anxiety., Disp: 90 tablet, Rfl: 0    Lantus SoloStar 100 UNIT/ML injection pen, , Disp: , Rfl:     lidocaine (LIDODERM) 5 %, Place 1 patch on the skin as directed by provider Daily. Remove & Discard patch within 12 hours or as directed by MD, Disp:  , Rfl:     mirtazapine (REMERON) 30 MG tablet, Take 1 tablet by mouth Every Night., Disp: , Rfl:     Multiple Vitamins-Minerals (MULTI COMPLETE PO), Take  by mouth., Disp: , Rfl:     mupirocin (BACTROBAN) 2 % ointment, , Disp: , Rfl:     pantoprazole (PROTONIX) 40 MG EC tablet, Take 1 tablet by mouth Every Morning., Disp:  , Rfl:     propranolol (INDERAL) 60 MG tablet, Take 1 tablet by mouth 2 (Two) Times a Day., Disp: , Rfl:     tamsulosin (FLOMAX) 0.4 MG capsule 24 hr capsule, TAKE 1 CAPSULE BY MOUTH DAILY., Disp: 90 capsule, Rfl: 2    loratadine (CLARITIN) 10 MG tablet, Daily. (Patient not taking: Reported on 2024), Disp: , Rfl:     vitamin D (ERGOCALCIFEROL) 1.25 MG (77751 UT) capsule capsule, , Disp: , Rfl:     Social History     Socioeconomic History    Marital status: Single   Tobacco Use    Smoking status: Former     Current packs/day: 0.00     Average packs/day: 1 pack/day for 15.0 years (15.0 ttl pk-yrs)     Types: Cigarettes     Start date: 2007     Quit date: 2022     Years since quittin.7     Passive exposure: Past    Smokeless tobacco: Never    Tobacco  "comments:     Recently I have quit smoking I do vape but with no nicotine   Vaping Use    Vaping status: Some Days    Substances: Flavoring    Devices: Pre-filled or refillable cartridge   Substance and Sexual Activity    Alcohol use: Not Currently    Drug use: Not Currently     Types: Amphetamines, Barbiturates, Benzodiazepines, \"Crack\" cocaine, Cocaine(coke), Codeine, Fentanyl, GHB, Hashish, Heroin, Hydrocodone, Ketamine, LSD, Marijuana, MDMA (ecstacy), Methamphetamines, Morphine, Nitrous oxide, Opium, Oxycodone     Comment: clean since 2016    Sexual activity: Never     Family History   Problem Relation Age of Onset    Hypertension Mother     Obesity Mother     Arthritis Mother     COPD Mother     Heart disease Mother     Depression Mother     Sleep apnea Mother     Hypertension Father     Alcohol abuse Father     Throat cancer Father     Stroke Father     Heart disease Father     Sleep apnea Sister     Hypertension Sister     Malig Hyperthermia Sister     Obesity Sister     Diabetes Sister     Depression Sister     Malig Hyperthermia Sister         I have been tested (muscle tissue test) and i dont have it    Cancer Brother     Hypertension Brother     Obesity Maternal Aunt     Hypertension Maternal Aunt     Cancer Maternal Aunt     Depression Maternal Aunt     Obesity Maternal Uncle     Diabetes Maternal Uncle     Cancer Paternal Aunt     Obesity Paternal Uncle     Hypertension Paternal Uncle     Cancer Paternal Uncle     Hearing loss Paternal Uncle     Prostate cancer Paternal Uncle     Hearing loss Paternal Uncle     Prostate cancer Paternal Uncle         Both my dads older brothers had radical prostate removal    Diabetes Maternal Grandmother     Leukemia Maternal Grandmother     Obesity Maternal Grandmother     Hypertension Maternal Grandmother     Sleep apnea Maternal Grandmother     Hypertension Maternal Grandfather     Cancer Maternal Grandfather     Sleep apnea Maternal Grandfather     Heart disease " Maternal Grandfather     Arthritis Maternal Grandfather     Heart attack Maternal Grandfather     Obesity Maternal Grandfather     Hypertension Paternal Grandmother     Cancer Paternal Grandmother         blood    Heart disease Paternal Grandmother         Dad's mother    Arthritis Paternal Grandmother     Hearing loss Paternal Grandmother     Heart attack Paternal Grandmother     Prostate cancer Paternal Grandfather     Obesity Paternal Grandfather     Hypertension Paternal Grandfather     Stomach cancer Paternal Grandfather     Colon polyps Half-Brother     Colon cancer Half-Brother     Esophageal cancer Neg Hx        Review of Systems   Constitutional:  Positive for fatigue and unexpected weight gain. Negative for chills, diaphoresis, fever and unexpected weight loss.   HENT:  Negative for congestion and facial swelling.    Eyes:  Negative for blurred vision, double vision and discharge.   Respiratory:  Negative for chest tightness, shortness of breath and stridor.    Cardiovascular:  Positive for leg swelling. Negative for chest pain and palpitations.   Gastrointestinal:  Positive for GERD. Negative for blood in stool.   Endocrine: Negative for polydipsia.   Genitourinary:  Positive for urinary incontinence. Negative for hematuria.   Musculoskeletal:  Positive for arthralgias.   Skin:  Negative for color change.   Allergic/Immunologic: Negative for immunocompromised state.   Neurological:  Negative for confusion.   Psychiatric/Behavioral:  Positive for sleep disturbance. Negative for self-injury.        I have reviewed the ROS and confirm that it's accurate today.    Physical Exam:  Vital Signs:  Weight: 107 kg (236 lb)   Body mass index is 35.36 kg/m².  Temp: 97.5 °F (36.4 °C)   Heart Rate: 70   BP: 128/82     Physical Exam  Vitals reviewed.   Constitutional:       Appearance: He is well-developed.   HENT:      Head: Normocephalic and atraumatic.      Nose: Nose normal.   Eyes:      Conjunctiva/sclera:  Conjunctivae normal.      Pupils: Pupils are equal, round, and reactive to light.   Neck:      Thyroid: No thyromegaly.      Vascular: No carotid bruit.      Trachea: No tracheal deviation.   Cardiovascular:      Rate and Rhythm: Normal rate and regular rhythm.      Heart sounds: Normal heart sounds.   Pulmonary:      Effort: Pulmonary effort is normal. No respiratory distress.      Breath sounds: Normal breath sounds.   Abdominal:      General: There is no distension.      Palpations: Abdomen is soft.      Tenderness: There is no abdominal tenderness.      Comments: Laparoscopy scars, subumbilical vertical 12 cm scar without recurrent umbilical hernia appreciated, open left inguinal hernia repair scar   Musculoskeletal:         General: No deformity. Normal range of motion.      Cervical back: Normal range of motion and neck supple.   Skin:     General: Skin is warm and dry.      Findings: No rash.      Comments: Tattoos   Neurological:      Mental Status: He is alert and oriented to person, place, and time.      Cranial Nerves: No cranial nerve deficit.      Coordination: Coordination normal.   Psychiatric:         Behavior: Behavior normal.         Thought Content: Thought content normal.         Judgment: Judgment normal.         Patient Active Problem List   Diagnosis    Abdominal pain    Anemia    Constipation    Diarrhea    Abnormal liver enzymes    BPH without urinary obstruction    Sepsis    Abscess of paraspinal muscles    Abscess, gluteal, left    Bacteremia due to Gram-positive bacteria    Chronic midline low back pain without sciatica    Essential hypertension    Osteomyelitis of sacrum    Lower urinary tract symptoms (LUTS)    Periodic headache syndrome, not intractable    Tremor, essential    Myoclonus    Hypokalemia    Family history of malignant hyperthermia    PUD (peptic ulcer disease)    Sleep apnea    Hypogonadism in male    Hyperammonemia    GERD (gastroesophageal reflux disease)    Fatigue     Dyspepsia    Morbid obesity with BMI of 45.0-49.9, adult    Dyspnea on exertion    Lower extremity edema    Insomnia    Impaired mobility    H/O: substance abuse    Osteomyelitis    Mild episode of recurrent major depressive disorder    Generalized anxiety disorder    Encounter for screening for malignant neoplasm of colon    Testosterone deficiency    Class 2 severe obesity with serious comorbidity and body mass index (BMI) of 37.0 to 37.9 in adult    Elevated LFTs    Diabetes mellitus type 2 in obese    Polyphagia    Arthritis    Fatty liver    Morbid exogenous obesity    S/P laparoscopic sleeve gastrectomy    Type 2 diabetes mellitus with morbid obesity       Assessment:    Gopi Johnson is a 48 y.o. year old male with medically complicated obesity status post laparoscopic sleeve gastrectomy October 2022    Revisional metabolic and bariatric surgery is deemed medically necessary given the following obesity related comorbidities including spinal osteomyelitis, history of MRSA osteomyelitis left hip, nephrolithiasis, abnormal pulmonary function tests, anxiety, arthritis, benign prostatic hyperplasia, bipolar affective disorder, chronic pain, reported cirrhosis, colonic polyps, current everyday non-nicotene vaping, depression, dyspnea exertion, history of elevated liver function test, erectile dysfunction, family history malignant hyperthermia status post negative muscle biopsy, fatigue, fatty liver, history of gastroparesis, GE reflux disease, history of substance abuse clean since 2016, hyperlipidemia, hypertension, impaired mobility, insomnia, type 2 insulin-dependent diabetes, peripheral edema, migraine headaches, rectus diastases, obstructive sleep apnea on CPAP, urinary incontinence with current Weight: 107 kg (236 lb) and Body mass index is 35.36 kg/m²..    Encounter Diagnoses   Name Primary?    Morbid exogenous obesity Yes    S/P laparoscopic sleeve gastrectomy     Type 2 diabetes mellitus with morbid  obesity       Patient is aware that surgery is a tool, and that weight loss and improvement in comorbidities is not guaranteed but only seen in the context of appropriate use, follow up and physical activity.    The patient was present for an approximately a 2.5 hour discussion of the purpose of MBS, how MBS is a tool to assist in achieving weight loss goals, the most common complications and how best to avoid them, and the strategies for short and long term weight loss and improvement in comorbidities.  Ample opportunity to discuss questions was available both in group and during the time of individual examination.    I reviewed his Mikhail report showing Buprenorphine 8 mg #84/mos. Psychosocial evaluation dated 11/15/2023 Mireille OJEDA PhD very good candidate.  Dietitian evaluation dated 10/10/2023 Cheyanne OWENS RD noting protein intake is too low to ensure successful weight loss that intake of processed and simple carbohydrates is moderate that is minimal variety of fruits and vegetables eating out is not a problem nor are sweet beverages.  Labs dated 8/8/2023 unremarkable CBC of note hemoglobin 14.7 unremarkable CMP except for CO2 of 33 glucose of 116.  Cardiology clearance dated 2/1/2024 Shaheenrafael Mahmood MD.  Please see scanned records that I have reviewed and signed off on today.  All of this in addition to the patient's unique history and exam has been taken into consideration in determining their appropriate candidacy for MBS.    Risks, benefits, alternative therapies were discussed to laparoscopic possible robotic assisted revision of his previous sleeve gastrectomy to a formal duodenal switch.   The most common short term complics in addition to cardiopulm risk, VTE, bleeding, infection, etc is leak or obstruction at the anastomosis, which could have potentially serious and even fatal consequences and require further surgery(s).  The patient understands that they will have more frequent BM's, will be malodorous, and that  at increased risk for vitamin deficiencies, gaye the fat soluble vitamins, and that this can be serious and irreversible - still wishes to proceed, says they will be compliant w f/u and vit/supplment recommendations.  Aware that may need an elongation procedure in the future if diarrhea and/or vitamin issues not controlled.      Discussed the risks, benefits and alternative therapies at great length as outlined in our extensive consent forms, consent videos, and educational teaching process under the direction of the center's .    A copy of the patient's signed informed consent is on file.    R/B/A Rx discussed to postop anticoagulation incl but not limited to bleeding, drug reaction, venothromboembolic events, etc. and the patient declined.      Plan:    After reevaluation I think the patient remains a reasonable candidate for laparoscopic possible robotic assisted revision of his sleeve gastrectomy to a formal duodenal switch with biliopancreatic diversion and EGD.  He plans to stop his Subutex a day prior and resume after he is off postoperative narcotics, says he does fine with narcotics other than hydrocodone.  Watch his potassium perioperatively on chronic diuretic therapy.  Will likely have to lyse adhesions from previous open umbilical hernia repair with mesh.    Other issues include spinal osteomyelitis, history of MRSA osteomyelitis left hip, nephrolithiasis, abnormal pulmonary function tests, anxiety, arthritis, benign prostatic hyperplasia, bipolar affective disorder, chronic pain, reported cirrhosis, colonic polyps, current everyday non-nicotene vaping, depression, dyspnea exertion, history of elevated liver function test, erectile dysfunction, family history malignant hyperthermia status post negative muscle biopsy, fatigue, fatty liver, history of gastroparesis, GE reflux disease, history of substance abuse clean since 2016, hyperlipidemia, hypertension, impaired mobility, insomnia,  type 2 insulin-dependent diabetes, peripheral edema, migraine headaches, rectus diastases, obstructive sleep apnea on CPAP, urinary incontinence    Thank you MARIA DEL CARMEN Zuniga for the opportunity to reevaluate Mr. Johnson.      Olu Rodriguez MD              Answers submitted by the patient for this visit:  Primary Reason for Visit (Submitted on 4/9/2024)  What is the primary reason for your visit?: Other  Other (Submitted on 4/9/2024)  Please describe your symptoms.: Revision surgery, some stomach pain after eating  Have you had these symptoms before?: No  How long have you been having these symptoms?: Greater than 2 weeks  Please list any medications you are currently taking for this condition.: Meds all current

## 2024-04-16 NOTE — H&P (VIEW-ONLY)
"[facility] BARIATRIC SURGERY  [address]  [address]  [facility] [id number] [phone]      Patient  Name:  Gopi Johnson  :  1975      Date of Visit: 24    Chief Complaint:    Weight gain; unable to maintain weight loss.  Reevaluate for possible revisional metabolic bariatric surgery status post laparoscopic sleeve gastrectomy 2022    History of Present Illness:  Gopi Johnson is a 48 y.o. male who presents today for reevaluation, education and consultation regarding revisional metabolic and bariatric surgery status post laparoscopic sleeve gastrectomy 2022 specifically revision to a duodenal switch.  Since last seen 2023 he has gained 1 pound.  My most recent evaluation dated 10/24/2023 reviewed:    \"History of Present Illness:  Gopi Johnson is a 48 y.o. male who presents today for evaluation, education and consultation regarding revisional metabolic and bariatric surgery (MBS) status post laparoscopic sleeve gastrectomy 2022.  Since last seen 10/10/2023 he has gained 3 pounds.  Most recent intake evaluation Ofelia AARON PA-C dated 10/10/2023 reviewed.  She notes the patient is almost 1 year status post sleeve gastrectomy last seen at 9 months postop visit previous to that was the 1 month postop visit where stat imaging was ordered at that time for nausea and abdominal pain and noted to be taking daily NSAIDs and staying with his mother who previously was noted to smoke in the house.  On his last visit he complained of constant hunger snacking all day to stay satisfied his weight was 230 pounds and dietitian evaluation was advised and he has since been working with medical weight management presurgery start weight of 251 pounds lowest reported weight 198 pounds current weight 243 pounds and that he presents today to discuss revision options for further weight loss specifically interested in gastric bypass as he does not feel the " "sleeve has worked for him despite doing \"everything right\" and that he was off insulin in the months after sleeve gastrectomy with diabetes in remission but is now back on insulin with his last A1c 5.7 and that his reflux is fairly well controlled with daily Protonix has rare abdominal pain focuses on protein no pop gets around 1700 zulma a day had a recent colonoscopy Dr. Pleitez and had a liver biopsy with Dr. Rodriguez and that he has a history of hypertension lower extremity edema sleep apnea BPH status post TURP procedure and intermittent urinary retention requiring straight caths as needed on Flomax daily chronic back pain arthritis migraines insulin-dependent diabetes history of MRSA with osteomyelitis requiring IV antibiotics and inpatient drainage history of substance abuse with meth last use 2016 treated with buprenorphine followed by therapist and PCP regularly and has a family history of malignant hyperthermia and patient tested negative on muscle biopsy and previous sleeve gastrectomy was ambulating with a cane with poor mobility secondary to spinal osteomyelitis from previous IV drug use.  She noted he had COVID in the past was not hospitalized and is vaccinated.     The patient has had issues with morbid obesity for years and only temporary success with surgical and non-surgical methods of weight loss.  The patient is seeking revisional metabolic and bariatric surgery to help with the morbid obesity related conditions of history of spinal osteomyelitis, history of MRSA osteomyelitis left hip, nephrolithiasis, abnormal pulmonary function tests, anxiety, arthritis, benign prostatic hyperplasia, bipolar affective disorder, chronic pain, reported cirrhosis, colonic polyps, current everyday non-nicotene vaping, depression, dyspnea exertion, history of elevated liver function test, erectile dysfunction, family history malignant hyperthermia status post negative muscle biopsy, fatigue, fatty liver, history of " gastroparesis, GE reflux disease, history of substance abuse clean since 2016, hyperlipidemia, hypertension, impaired mobility, insomnia, type 2 insulin-dependent diabetes, peripheral edema, migraine headaches, rectus diastases, obstructive sleep apnea on CPAP, urinary incontinence.     48-year-old disabled male from [facility] known to me as above.  He smells of tobacco but says that is because he lives with his mother who smokes in the house..  He only vapes non-nicotine.  I did his sleeve by Titan technique, specimen was average size.  He denies any history of surgical complications with his previous surgeries.     Upper GI on 12/6/2022 shows changes of sleeve gastrectomy without hiatal hernia or reflux noted.  On upper endoscopy last week I noted the sleeve to appear unremarkable with mild pyloric spasm no hiatal hernia Z-line roughly 39 cm.  I noted that his recollection is he weighed as much is 300 pounds and lost down to around 200 pounds and feels he is back up to 270 pounds although his current weight at that time was 243.5 pounds.  He noted he had significant reflux symptoms prior to his sleeve gastrectomy which have since resolved and that he had an EGD in January 2020 with Dr. Dover showing no hiatal hernia and some superficial gastric ulcers.  On recent endoscopy pathology of the antrum showed minimal chronic gastritis without activity and mild reactive gastropathic changes negative for H. pylori and distal esophageal biopsies were mild and felt to be nonspecific.  Gastric emptying study on 11/3/2023 within normal limits with a half emptying time of 28 minutes.....    Assessment:     Gopi Johnson is a 48 y.o. year old male with medically complicated obesity status post laparoscopic sleeve gastrectomy 1 year ago in October 2022.     Revisional metabolic and bariatric surgery is deemed medically necessary given the following obesity related comorbidities including history of spinal  osteomyelitis, history of MRSA osteomyelitis left hip, nephrolithiasis, abnormal pulmonary function tests, anxiety, arthritis, benign prostatic hyperplasia, bipolar affective disorder, chronic pain, reported cirrhosis, colonic polyps, current everyday non-nicotene vaping, depression, dyspnea exertion, history of elevated liver function test, erectile dysfunction, family history malignant hyperthermia status post negative muscle biopsy, fatigue, fatty liver, history of gastroparesis, GE reflux disease, history of substance abuse clean since 2016, hyperlipidemia, hypertension, impaired mobility, insomnia, type 2 insulin-dependent diabetes, peripheral edema, migraine headaches, rectus diastases, obstructive sleep apnea on CPAP, urinary incontinence with current Weight: 112 kg (246 lb 8 oz) and Body mass index is 36.94 kg/m²..     Patient is aware that surgery is a tool, and that weight loss and improvement in comorbidities is not guaranteed but only seen in the context of appropriate use, follow up and physical activity.     I reviewed his Mikhail report showing Buprenorphine 8 mg#42 every 21 days.  Please see scanned records that I have reviewed and signed off on today.  All of this in addition to the patient's unique history and exam has been taken into consideration in determining their appropriate candidacy for MBS.     Risks, benefits, alternative therapies were discussed to laparoscopic possible robotic assisted SIPS/loop DS/SAD-I and formal duodenal switch. The most common short term complics in addition to cardiopulm risk, VTE, bleeding, infection, etc is leak or obstruction at the anastomosis, which could have potentially serious and even fatal consequences and require further surgery(s).  The patient understands that they will have more frequent BM's, will be malodorous, and that at increased risk for vitamin deficiencies, gaye the fat soluble vitamins, and that this can be serious and irreversible - still  wishes to proceed, says they will be compliant w f/u and vit/supplment recommendations.  Aware that may need an elongation procedure in the future if diarrhea and/or vitamin issues not controlled.       Complications of laparoscopic/possible robotic gastric bypass were discussed including but not limited to bleeding, infection, deep venous thrombosis, pulmonary embolism, pulmonary complications such as pneumonia, cardiac events, hernias, small bowel obstruction, damage to the spleen or other organs, bowel injury, disfiguring scars, failure to lose weight, need for additional surgery, conversion to an open procedure, and death.  Patient is also aware of complications which apply in this particular procedure that can include but are not limited to leaking of gastric contents at the staple or suture lines which could lead to intra-abdominal abscess, or chronic complications of strictures, ulcers, or vitamin/mineral deficiencies.  If long BP limb, we discussed the likelihood of more frequent BM's,possibly malodorous, with increased risk for vitamin deficiencies, gaye the fat soluble vitamins, and that this can be serious and irreversible and will require lifelong compliance with w f/u and vit/supplment recommendations.  Aware that may need an elongation procedure in the future if diarrhea and/or vitamin issues not controlled.        We went over the risk, benefits, and alternative therapies to various revisional metabolic and bariatric surgical options as above.  We discussed revision to a modified duodenal switch, a form of duodenal switch, or a Crescencio-en-Y gastric bypass with or without a long biliopancreatic limb.        Plan:    After discussion of the options and the risks, benefits, and alternative therapies as above he wishes to proceed with laparoscopic robotic assisted modified duodenal switch/SIPS/SAD-I.  If this preferred safer ASMBS-endorsed procedure is not covered by his [facility] Medicare insurance, then  "proceed with formal duodenal switch.     Other issues include history of spinal osteomyelitis, history of MRSA osteomyelitis left hip, nephrolithiasis, abnormal pulmonary function tests, anxiety, arthritis, benign prostatic hyperplasia, bipolar affective disorder, chronic pain, reported cirrhosis, colonic polyps, current everyday non-nicotene vaping, depression, dyspnea exertion, history of elevated liver function test, erectile dysfunction, family history malignant hyperthermia status post negative muscle biopsy, fatigue, fatty liver, history of gastroparesis, GE reflux disease, history of substance abuse clean since 2016, hyperlipidemia, hypertension, impaired mobility, insomnia, type 2 insulin-dependent diabetes, peripheral edema, migraine headaches, rectus diastases, obstructive sleep apnea on CPAP, urinary incontinence.\"    He returns today for final visit prior to scheduling his duodenal switch.  He attended informed consent last evening, said he found it extremely informative \"with a lot of new information\" despite it was likely the same material discussed prior to his sleeve gastrectomy a couple years ago.  He is currently getting around fairly well and rarely uses his cane, says he tries not to use it.  After examination today he hopped off the table before I could lower it and was ambulating in the office normally and at normal pace without impaired gait.  Once again his maximum lifetime weight is 400 pounds.  He continues to require insulin for his diabetes.  His mother recently became lethargic and was found to have a CO2 of greater than 140 from her COPD and since then he has quit all vaping.  Once again he switched from cigarettes to not nicotine vaping previously.  He does not recall being sent home on anticoagulation after sleeve gastrectomy but on review I did send him home on Eliquis for his previous more significant impaired mobility.  He denies personal or family history of bleeding or clotting " disorders.  His liver biopsy previously showed mild to moderate steatosis only, no fibrosis or cirrhosis.  He continues to have no significant reflux symptoms on daily Protonix.      Past Medical History:   Diagnosis Date    Abnormal PFTs (pulmonary function tests)     Anxiety     Arthritis     Benign prostatic hyperplasia     Bipolar affective     Chronic pain     Colon polyp     Depression     Dyspepsia     Dyspnea on exertion     Elevated LFTs     GI eval (P)    Enlarged prostate     s/p TURP 5/2021    Epididymitis     Erectile dysfunction After T.E.R.P.    Family history of malignant hyperthermia     patient had muscle biopsy at [facility] and was negative, sister has malignant hyperthermia and a cousin    Fatigue     Fatty liver     Former smoker     Fractured pelvis     Frequent urination     GERD (gastroesophageal reflux disease)     on PPI, hx erosive gastritis on EGD 1/5/22, EGD Dr. Rodriguez 10/23    H/O: substance abuse     clean since 2016, on Subutex, managed by PCP    Headache     following w/ Neurology    History of non-nicotine vaping     Hx MRSA infection 2020    w/ osteomyelitis L hip, fracture hip, hospitalized with IV abx and drainage, had port with home abx    Hyperammonemia     following w/ GI    Hyperlipidemia     Hypertension     Hypogonadism in male     on testosterone q2wks, follows w/ Urology    Hypokalemia     Impaired mobility     w/ (L)sided weakness (since osteomyelitis), ambulates w/ cane    Insomnia     on Trazodone    Insulin dependent type 2 diabetes mellitus     resolved with weightloss, now back on insulin    Joint pain     w/ recent SI joint injections 2/2022    Kidney stone 11/30/2016    Kidney stones     Lower extremity edema     Migraine     Morbid obesity     Murmur     as a child    Myoclonus     w/ body jerks, follows w/ Neurology    Osteomyelitis 2020    H/O spinal osteomyelitis, previous IVDA    Piercing     ear/body    PUD (peptic ulcer disease)     non-bleeding gastric  ulcers on EGD 1/5/22 w/ Dr. Dover    Rectus diastasis     Seasonal allergies     Sleep apnea     CPAP compliant    Staph infection 2020    Substance abuse     Not currently, over 6 years clean and sober!    Tattoo     Urinary incontinence     Urinary tract infection      Past Surgical History:   Procedure Laterality Date    ABDOMINAL HERNIA REPAIR  2020    incisional, Dr. Dover, inferior to umbilicus    BUNIONECTOMY  2007    COLONOSCOPY  2023    CYSTOSCOPY TRANSURETHRAL RESECTION OF PROSTATE N/A 05/26/2021    Procedure: TRANSURETHRAL RESECTION OF PROSTATE;  Surgeon: Markel Centeno MD;  Location:  [facility];  Service: Urology;  Laterality: N/A;    ENDOSCOPY N/A 01/05/2022    Procedure: ESOPHAGOGASTRODUODENOSCOPY with biopsy;  Surgeon: Royal Dover MD;  Location: [facility] ENDOSCOPY;  Service: Gastroenterology;  Laterality: N/A;    ENDOSCOPY N/A 10/28/2022    Procedure: ESOPHAGOGASTRODUODENOSCOPY;  Surgeon: Olu Rodriguez MD;  Location:  [facility];  Service: Bariatric;  Laterality: N/A;    GASTRECTOMY N/A 10/28/2022    Procedure: GASTRECTOMY LAPAROSCOPIC;  Surgeon: Olu Rodriguez MD;  Location:  [facility];  Service: Bariatric;  Laterality: N/A;    HEMORRHOIDECTOMY      Dr Dover do the removal    HIATAL HERNIA REPAIR  2019    INGUINAL HERNIA REPAIR  2019    Dr. Dover    KNEE OPEN LATERAL RELEASE Right 1985    LAPAROSCOPIC CHOLECYSTECTOMY  2014    sand, no stones    LIVER BIOPSY N/A 10/28/2022    Procedure: LIVER BIOPSY LAPAROSCOPIC;  Surgeon: Olu Rodriguez MD;  Location:  [facility];  Service: Bariatric;  Laterality: N/A;    TONSILLECTOMY AND ADENOIDECTOMY  1986    WISDOM TOOTH EXTRACTION  1997       Allergies   Allergen Reactions    Naloxone Swelling     Throat swelling, itching, rash    Hydrocodone-Acetaminophen Itching and Rash     Percocet OK       Current Outpatient Medications:     Alcohol Swabs pads, Clean area prior to injection, Disp: 100 each, Rfl: 11    amLODIPine (NORVASC) 5 MG  tablet, Take 1 tablet by mouth Daily., Disp: , Rfl:     buprenorphine (SUBUTEX) 8 MG sublingual tablet SL tablet, 1 tablet 2 (Two) Times a Day., Disp: , Rfl:     chlorthalidone (HYGROTON) 25 MG tablet, Take 1 tablet by mouth Daily., Disp: , Rfl:     Cyanocobalamin (Vitamin B-12) 1000 MCG sublingual tablet, Place 1 tablet under the tongue Daily., Disp: 90 each, Rfl: 0    cyclobenzaprine (FLEXERIL) 10 MG tablet, , Disp: , Rfl:     fluticasone (FLONASE) 50 MCG/ACT nasal spray, As Needed., Disp: , Rfl:     hydrOXYzine (ATARAX) 25 MG tablet, Take 1 tablet by mouth 3 (Three) Times a Day As Needed for Anxiety., Disp: 90 tablet, Rfl: 0    Lantus SoloStar 100 UNIT/ML injection pen, , Disp: , Rfl:     lidocaine (LIDODERM) 5 %, Place 1 patch on the skin as directed by provider Daily. Remove & Discard patch within 12 hours or as directed by MD, Disp:  , Rfl:     mirtazapine (REMERON) 30 MG tablet, Take 1 tablet by mouth Every Night., Disp: , Rfl:     Multiple Vitamins-Minerals (MULTI COMPLETE PO), Take  by mouth., Disp: , Rfl:     mupirocin (BACTROBAN) 2 % ointment, , Disp: , Rfl:     pantoprazole (PROTONIX) 40 MG EC tablet, Take 1 tablet by mouth Every Morning., Disp:  , Rfl:     propranolol (INDERAL) 60 MG tablet, Take 1 tablet by mouth 2 (Two) Times a Day., Disp: , Rfl:     tamsulosin (FLOMAX) 0.4 MG capsule 24 hr capsule, TAKE 1 CAPSULE BY MOUTH DAILY., Disp: 90 capsule, Rfl: 2    loratadine (CLARITIN) 10 MG tablet, Daily. (Patient not taking: Reported on 2024), Disp: , Rfl:     vitamin D (ERGOCALCIFEROL) 1.25 MG (04155 UT) capsule capsule, , Disp: , Rfl:     Social History     Socioeconomic History    Marital status: Single   Tobacco Use    Smoking status: Former     Current packs/day: 0.00     Average packs/day: 1 pack/day for 15.0 years (15.0 ttl pk-yrs)     Types: Cigarettes     Start date: 2007     Quit date: 2022     Years since quittin.7     Passive exposure: Past    Smokeless tobacco: Never    Tobacco  "comments:     Recently I have quit smoking I do vape but with no nicotine   Vaping Use    Vaping status: Some Days    Substances: Flavoring    Devices: Pre-filled or refillable cartridge   Substance and Sexual Activity    Alcohol use: Not Currently    Drug use: Not Currently     Types: Amphetamines, Barbiturates, Benzodiazepines, \"Crack\" cocaine, Cocaine(coke), Codeine, Fentanyl, GHB, Hashish, Heroin, Hydrocodone, Ketamine, LSD, Marijuana, MDMA (ecstacy), Methamphetamines, Morphine, Nitrous oxide, Opium, Oxycodone     Comment: clean since 2016    Sexual activity: Never     Family History   Problem Relation Age of Onset    Hypertension Mother     Obesity Mother     Arthritis Mother     COPD Mother     Heart disease Mother     Depression Mother     Sleep apnea Mother     Hypertension Father     Alcohol abuse Father     Throat cancer Father     Stroke Father     Heart disease Father     Sleep apnea Sister     Hypertension Sister     Malig Hyperthermia Sister     Obesity Sister     Diabetes Sister     Depression Sister     Malig Hyperthermia Sister         I have been tested (muscle tissue test) and i dont have it    Cancer Brother     Hypertension Brother     Obesity Maternal Aunt     Hypertension Maternal Aunt     Cancer Maternal Aunt     Depression Maternal Aunt     Obesity Maternal Uncle     Diabetes Maternal Uncle     Cancer Paternal Aunt     Obesity Paternal Uncle     Hypertension Paternal Uncle     Cancer Paternal Uncle     Hearing loss Paternal Uncle     Prostate cancer Paternal Uncle     Hearing loss Paternal Uncle     Prostate cancer Paternal Uncle         Both my dads older brothers had radical prostate removal    Diabetes Maternal Grandmother     Leukemia Maternal Grandmother     Obesity Maternal Grandmother     Hypertension Maternal Grandmother     Sleep apnea Maternal Grandmother     Hypertension Maternal Grandfather     Cancer Maternal Grandfather     Sleep apnea Maternal Grandfather     Heart disease " Maternal Grandfather     Arthritis Maternal Grandfather     Heart attack Maternal Grandfather     Obesity Maternal Grandfather     Hypertension Paternal Grandmother     Cancer Paternal Grandmother         blood    Heart disease Paternal Grandmother         Dad's mother    Arthritis Paternal Grandmother     Hearing loss Paternal Grandmother     Heart attack Paternal Grandmother     Prostate cancer Paternal Grandfather     Obesity Paternal Grandfather     Hypertension Paternal Grandfather     Stomach cancer Paternal Grandfather     Colon polyps Half-Brother     Colon cancer Half-Brother     Esophageal cancer Neg Hx        Review of Systems   Constitutional:  Positive for fatigue and unexpected weight gain. Negative for chills, diaphoresis, fever and unexpected weight loss.   HENT:  Negative for congestion and facial swelling.    Eyes:  Negative for blurred vision, double vision and discharge.   Respiratory:  Negative for chest tightness, shortness of breath and stridor.    Cardiovascular:  Positive for leg swelling. Negative for chest pain and palpitations.   Gastrointestinal:  Positive for GERD. Negative for blood in stool.   Endocrine: Negative for polydipsia.   Genitourinary:  Positive for urinary incontinence. Negative for hematuria.   Musculoskeletal:  Positive for arthralgias.   Skin:  Negative for color change.   Allergic/Immunologic: Negative for immunocompromised state.   Neurological:  Negative for confusion.   Psychiatric/Behavioral:  Positive for sleep disturbance. Negative for self-injury.        I have reviewed the ROS and confirm that it's accurate today.    Physical Exam:  Vital Signs:  Weight: 107 kg (236 lb)   Body mass index is 35.36 kg/m².  Temp: 97.5 °F (36.4 °C)   Heart Rate: 70   BP: 128/82     Physical Exam  Vitals reviewed.   Constitutional:       Appearance: He is well-developed.   HENT:      Head: Normocephalic and atraumatic.      Nose: Nose normal.   Eyes:      Conjunctiva/sclera:  Conjunctivae normal.      Pupils: Pupils are equal, round, and reactive to light.   Neck:      Thyroid: No thyromegaly.      Vascular: No carotid bruit.      Trachea: No tracheal deviation.   Cardiovascular:      Rate and Rhythm: Normal rate and regular rhythm.      Heart sounds: Normal heart sounds.   Pulmonary:      Effort: Pulmonary effort is normal. No respiratory distress.      Breath sounds: Normal breath sounds.   Abdominal:      General: There is no distension.      Palpations: Abdomen is soft.      Tenderness: There is no abdominal tenderness.      Comments: Laparoscopy scars, subumbilical vertical 12 cm scar without recurrent umbilical hernia appreciated, open left inguinal hernia repair scar   Musculoskeletal:         General: No deformity. Normal range of motion.      Cervical back: Normal range of motion and neck supple.   Skin:     General: Skin is warm and dry.      Findings: No rash.      Comments: Tattoos   Neurological:      Mental Status: He is alert and oriented to person, place, and time.      Cranial Nerves: No cranial nerve deficit.      Coordination: Coordination normal.   Psychiatric:         Behavior: Behavior normal.         Thought Content: Thought content normal.         Judgment: Judgment normal.         Patient Active Problem List   Diagnosis    Abdominal pain    Anemia    Constipation    Diarrhea    Abnormal liver enzymes    BPH without urinary obstruction    Sepsis    Abscess of paraspinal muscles    Abscess, gluteal, left    Bacteremia due to Gram-positive bacteria    Chronic midline low back pain without sciatica    Essential hypertension    Osteomyelitis of sacrum    Lower urinary tract symptoms (LUTS)    Periodic headache syndrome, not intractable    Tremor, essential    Myoclonus    Hypokalemia    Family history of malignant hyperthermia    PUD (peptic ulcer disease)    Sleep apnea    Hypogonadism in male    Hyperammonemia    GERD (gastroesophageal reflux disease)    Fatigue     Dyspepsia    Morbid obesity with BMI of 45.0-49.9, adult    Dyspnea on exertion    Lower extremity edema    Insomnia    Impaired mobility    H/O: substance abuse    Osteomyelitis    Mild episode of recurrent major depressive disorder    Generalized anxiety disorder    Encounter for screening for malignant neoplasm of colon    Testosterone deficiency    Class 2 severe obesity with serious comorbidity and body mass index (BMI) of 37.0 to 37.9 in adult    Elevated LFTs    Diabetes mellitus type 2 in obese    Polyphagia    Arthritis    Fatty liver    Morbid exogenous obesity    S/P laparoscopic sleeve gastrectomy    Type 2 diabetes mellitus with morbid obesity       Assessment:    Gopi Johnson is a 48 y.o. year old male with medically complicated obesity status post laparoscopic sleeve gastrectomy October 2022    Revisional metabolic and bariatric surgery is deemed medically necessary given the following obesity related comorbidities including spinal osteomyelitis, history of MRSA osteomyelitis left hip, nephrolithiasis, abnormal pulmonary function tests, anxiety, arthritis, benign prostatic hyperplasia, bipolar affective disorder, chronic pain, reported cirrhosis, colonic polyps, current everyday non-nicotene vaping, depression, dyspnea exertion, history of elevated liver function test, erectile dysfunction, family history malignant hyperthermia status post negative muscle biopsy, fatigue, fatty liver, history of gastroparesis, GE reflux disease, history of substance abuse clean since 2016, hyperlipidemia, hypertension, impaired mobility, insomnia, type 2 insulin-dependent diabetes, peripheral edema, migraine headaches, rectus diastases, obstructive sleep apnea on CPAP, urinary incontinence with current Weight: 107 kg (236 lb) and Body mass index is 35.36 kg/m²..    Encounter Diagnoses   Name Primary?    Morbid exogenous obesity Yes    S/P laparoscopic sleeve gastrectomy     Type 2 diabetes mellitus with morbid  obesity       Patient is aware that surgery is a tool, and that weight loss and improvement in comorbidities is not guaranteed but only seen in the context of appropriate use, follow up and physical activity.    The patient was present for an approximately a 2.5 hour discussion of the purpose of MBS, how MBS is a tool to assist in achieving weight loss goals, the most common complications and how best to avoid them, and the strategies for short and long term weight loss and improvement in comorbidities.  Ample opportunity to discuss questions was available both in group and during the time of individual examination.    I reviewed his Mikhail report showing Buprenorphine 8 mg #84/mos. Psychosocial evaluation dated 11/15/2023 Mireille OJEDA PhD very good candidate.  Dietitian evaluation dated 10/10/2023 Cheyanne OWENS RD noting protein intake is too low to ensure successful weight loss that intake of processed and simple carbohydrates is moderate that is minimal variety of fruits and vegetables eating out is not a problem nor are sweet beverages.  Labs dated 8/8/2023 unremarkable CBC of note hemoglobin 14.7 unremarkable CMP except for CO2 of 33 glucose of 116.  Cardiology clearance dated 2/1/2024 Shaheenrafael Mahmood MD.  Please see scanned records that I have reviewed and signed off on today.  All of this in addition to the patient's unique history and exam has been taken into consideration in determining their appropriate candidacy for MBS.    Risks, benefits, alternative therapies were discussed to laparoscopic possible robotic assisted revision of his previous sleeve gastrectomy to a formal duodenal switch.   The most common short term complics in addition to cardiopulm risk, VTE, bleeding, infection, etc is leak or obstruction at the anastomosis, which could have potentially serious and even fatal consequences and require further surgery(s).  The patient understands that they will have more frequent BM's, will be malodorous, and that  at increased risk for vitamin deficiencies, gaye the fat soluble vitamins, and that this can be serious and irreversible - still wishes to proceed, says they will be compliant w f/u and vit/supplment recommendations.  Aware that may need an elongation procedure in the future if diarrhea and/or vitamin issues not controlled.      Discussed the risks, benefits and alternative therapies at great length as outlined in our extensive consent forms, consent videos, and educational teaching process under the direction of the center's .    A copy of the patient's signed informed consent is on file.    R/B/A Rx discussed to postop anticoagulation incl but not limited to bleeding, drug reaction, venothromboembolic events, etc. and the patient declined.      Plan:    After reevaluation I think the patient remains a reasonable candidate for laparoscopic possible robotic assisted revision of his sleeve gastrectomy to a formal duodenal switch with biliopancreatic diversion and EGD.  He plans to stop his Subutex a day prior and resume after he is off postoperative narcotics, says he does fine with narcotics other than hydrocodone.  Watch his potassium perioperatively on chronic diuretic therapy.  Will likely have to lyse adhesions from previous open umbilical hernia repair with mesh.    Other issues include spinal osteomyelitis, history of MRSA osteomyelitis left hip, nephrolithiasis, abnormal pulmonary function tests, anxiety, arthritis, benign prostatic hyperplasia, bipolar affective disorder, chronic pain, reported cirrhosis, colonic polyps, current everyday non-nicotene vaping, depression, dyspnea exertion, history of elevated liver function test, erectile dysfunction, family history malignant hyperthermia status post negative muscle biopsy, fatigue, fatty liver, history of gastroparesis, GE reflux disease, history of substance abuse clean since 2016, hyperlipidemia, hypertension, impaired mobility, insomnia,  type 2 insulin-dependent diabetes, peripheral edema, migraine headaches, rectus diastases, obstructive sleep apnea on CPAP, urinary incontinence    Thank you MARIA DEL CARMEN Zuniga for the opportunity to reevaluate Mr. Johnson.      Olu Rodriguez MD              Answers submitted by the patient for this visit:  Primary Reason for Visit (Submitted on 4/9/2024)  What is the primary reason for your visit?: Other  Other (Submitted on 4/9/2024)  Please describe your symptoms.: Revision surgery, some stomach pain after eating  Have you had these symptoms before?: No  How long have you been having these symptoms?: Greater than 2 weeks  Please list any medications you are currently taking for this condition.: Meds all current

## 2024-04-16 NOTE — LETTER
"2024     MARIA DEL CARMEN Viera  [address] [facility]  [address]  [place] [address]    Patient: Gopi Johnson   YOB: 1975   Date of Visit: 2024     Dear MARIA DEL CARMEN Viera:       Thank you for referring Gopi Johnson to me for evaluation. Below are the relevant portions of my assessment and plan of care.    If you have questions, please do not hesitate to call me. I look forward to following Gopi along with you.         Sincerely,        Olu Rodriguez MD        CC: No Recipients    Olu Rodriguez MD  24 1817  Sign when Signing Visit  [facility] BARIATRIC SURGERY  [address]  [address]  [facility] [address] [phone]      Patient  Name:  Gopi Johnson  :  1975      Date of Visit: 24    Chief Complaint:    Weight gain; unable to maintain weight loss.  Reevaluate for possible revisional metabolic bariatric surgery status post laparoscopic sleeve gastrectomy 2022    History of Present Illness:  Gopi Johnson is a 48 y.o. male who presents today for reevaluation, education and consultation regarding revisional metabolic and bariatric surgery status post laparoscopic sleeve gastrectomy 2022 specifically revision to a duodenal switch.  Since last seen 2023 he has gained 1 pound.  My most recent evaluation dated 10/24/2023 reviewed:    \"History of Present Illness:  Gopi Johnson is a 48 y.o. male who presents today for evaluation, education and consultation regarding revisional metabolic and bariatric surgery (MBS) status post laparoscopic sleeve gastrectomy 2022.  Since last seen 10/10/2023 he has gained 3 pounds.  Most recent intake evaluation Ofelia AARON PA-C dated 10/10/2023 reviewed.  She notes the patient is almost 1 year status post sleeve gastrectomy last seen at 9 months postop visit previous to that was the 1 month postop visit where stat imaging was ordered at that time for " "nausea and abdominal pain and noted to be taking daily NSAIDs and staying with his mother who previously was noted to smoke in the house.  On his last visit he complained of constant hunger snacking all day to stay satisfied his weight was 230 pounds and dietitian evaluation was advised and he has since been working with medical weight management presurgery start weight of 251 pounds lowest reported weight 198 pounds current weight 243 pounds and that he presents today to discuss revision options for further weight loss specifically interested in gastric bypass as he does not feel the sleeve has worked for him despite doing \"everything right\" and that he was off insulin in the months after sleeve gastrectomy with diabetes in remission but is now back on insulin with his last A1c 5.7 and that his reflux is fairly well controlled with daily Protonix has rare abdominal pain focuses on protein no pop gets around 1700 zulma a day had a recent colonoscopy Dr. Pleitez and had a liver biopsy with Dr. Rodriguez and that he has a history of hypertension lower extremity edema sleep apnea BPH status post TURP procedure and intermittent urinary retention requiring straight caths as needed on Flomax daily chronic back pain arthritis migraines insulin-dependent diabetes history of MRSA with osteomyelitis requiring IV antibiotics and inpatient drainage history of substance abuse with meth last use 2016 treated with buprenorphine followed by therapist and PCP regularly and has a family history of malignant hyperthermia and patient tested negative on muscle biopsy and previous sleeve gastrectomy was ambulating with a cane with poor mobility secondary to spinal osteomyelitis from previous IV drug use.  She noted he had COVID in the past was not hospitalized and is vaccinated.     The patient has had issues with morbid obesity for years and only temporary success with surgical and non-surgical methods of weight loss.  The patient is seeking " revisional metabolic and bariatric surgery to help with the morbid obesity related conditions of history of spinal osteomyelitis, history of MRSA osteomyelitis left hip, nephrolithiasis, abnormal pulmonary function tests, anxiety, arthritis, benign prostatic hyperplasia, bipolar affective disorder, chronic pain, reported cirrhosis, colonic polyps, current everyday non-nicotene vaping, depression, dyspnea exertion, history of elevated liver function test, erectile dysfunction, family history malignant hyperthermia status post negative muscle biopsy, fatigue, fatty liver, history of gastroparesis, GE reflux disease, history of substance abuse clean since 2016, hyperlipidemia, hypertension, impaired mobility, insomnia, type 2 insulin-dependent diabetes, peripheral edema, migraine headaches, rectus diastases, obstructive sleep apnea on CPAP, urinary incontinence.     48-year-old disabled male from [facility] known to me as above.  He smells of tobacco but says that is because he lives with his mother who smokes in the house..  He only vapes non-nicotine.  I did his sleeve by Titan technique, specimen was average size.  He denies any history of surgical complications with his previous surgeries.     Upper GI on 12/6/2022 shows changes of sleeve gastrectomy without hiatal hernia or reflux noted.  On upper endoscopy last week I noted the sleeve to appear unremarkable with mild pyloric spasm no hiatal hernia Z-line roughly 39 cm.  I noted that his recollection is he weighed as much is 300 pounds and lost down to around 200 pounds and feels he is back up to 270 pounds although his current weight at that time was 243.5 pounds.  He noted he had significant reflux symptoms prior to his sleeve gastrectomy which have since resolved and that he had an EGD in January 2020 with Dr. Dover showing no hiatal hernia and some superficial gastric ulcers.  On recent endoscopy pathology of the antrum showed minimal chronic gastritis  without activity and mild reactive gastropathic changes negative for H. pylori and distal esophageal biopsies were mild and felt to be nonspecific.  Gastric emptying study on 11/3/2023 within normal limits with a half emptying time of 28 minutes.....    Assessment:     Gopi Johnson is a 48 y.o. year old male with medically complicated obesity status post laparoscopic sleeve gastrectomy 1 year ago in October 2022.     Revisional metabolic and bariatric surgery is deemed medically necessary given the following obesity related comorbidities including history of spinal osteomyelitis, history of MRSA osteomyelitis left hip, nephrolithiasis, abnormal pulmonary function tests, anxiety, arthritis, benign prostatic hyperplasia, bipolar affective disorder, chronic pain, reported cirrhosis, colonic polyps, current everyday non-nicotene vaping, depression, dyspnea exertion, history of elevated liver function test, erectile dysfunction, family history malignant hyperthermia status post negative muscle biopsy, fatigue, fatty liver, history of gastroparesis, GE reflux disease, history of substance abuse clean since 2016, hyperlipidemia, hypertension, impaired mobility, insomnia, type 2 insulin-dependent diabetes, peripheral edema, migraine headaches, rectus diastases, obstructive sleep apnea on CPAP, urinary incontinence with current Weight: 112 kg (246 lb 8 oz) and Body mass index is 36.94 kg/m²..     Patient is aware that surgery is a tool, and that weight loss and improvement in comorbidities is not guaranteed but only seen in the context of appropriate use, follow up and physical activity.     I reviewed his Mikhail report showing Buprenorphine 8 mg#42 every 21 days.  Please see scanned records that I have reviewed and signed off on today.  All of this in addition to the patient's unique history and exam has been taken into consideration in determining their appropriate candidacy for MBS.     Risks, benefits, alternative  therapies were discussed to laparoscopic possible robotic assisted SIPS/loop DS/SAD-I and formal duodenal switch. The most common short term complics in addition to cardiopulm risk, VTE, bleeding, infection, etc is leak or obstruction at the anastomosis, which could have potentially serious and even fatal consequences and require further surgery(s).  The patient understands that they will have more frequent BM's, will be malodorous, and that at increased risk for vitamin deficiencies, gaye the fat soluble vitamins, and that this can be serious and irreversible - still wishes to proceed, says they will be compliant w f/u and vit/supplment recommendations.  Aware that may need an elongation procedure in the future if diarrhea and/or vitamin issues not controlled.       Complications of laparoscopic/possible robotic gastric bypass were discussed including but not limited to bleeding, infection, deep venous thrombosis, pulmonary embolism, pulmonary complications such as pneumonia, cardiac events, hernias, small bowel obstruction, damage to the spleen or other organs, bowel injury, disfiguring scars, failure to lose weight, need for additional surgery, conversion to an open procedure, and death.  Patient is also aware of complications which apply in this particular procedure that can include but are not limited to leaking of gastric contents at the staple or suture lines which could lead to intra-abdominal abscess, or chronic complications of strictures, ulcers, or vitamin/mineral deficiencies.  If long BP limb, we discussed the likelihood of more frequent BM's,possibly malodorous, with increased risk for vitamin deficiencies, gaye the fat soluble vitamins, and that this can be serious and irreversible and will require lifelong compliance with w f/u and vit/supplment recommendations.  Aware that may need an elongation procedure in the future if diarrhea and/or vitamin issues not controlled.        We went over the risk,  "benefits, and alternative therapies to various revisional metabolic and bariatric surgical options as above.  We discussed revision to a modified duodenal switch, a form of duodenal switch, or a Crescencio-en-Y gastric bypass with or without a long biliopancreatic limb.        Plan:    After discussion of the options and the risks, benefits, and alternative therapies as above he wishes to proceed with laparoscopic robotic assisted modified duodenal switch/SIPS/SAD-I.  If this preferred safer [facility]-endorsed procedure is not covered by his [facility] Medicare insurance, then proceed with formal duodenal switch.     Other issues include history of spinal osteomyelitis, history of MRSA osteomyelitis left hip, nephrolithiasis, abnormal pulmonary function tests, anxiety, arthritis, benign prostatic hyperplasia, bipolar affective disorder, chronic pain, reported cirrhosis, colonic polyps, current everyday non-nicotene vaping, depression, dyspnea exertion, history of elevated liver function test, erectile dysfunction, family history malignant hyperthermia status post negative muscle biopsy, fatigue, fatty liver, history of gastroparesis, GE reflux disease, history of substance abuse clean since 2016, hyperlipidemia, hypertension, impaired mobility, insomnia, type 2 insulin-dependent diabetes, peripheral edema, migraine headaches, rectus diastases, obstructive sleep apnea on CPAP, urinary incontinence.\"    He returns today for final visit prior to scheduling his duodenal switch.  He attended informed consent last evening, said he found it extremely informative \"with a lot of new information\" despite it was likely the same material discussed prior to his sleeve gastrectomy a couple years ago.  He is currently getting around fairly well and rarely uses his cane, says he tries not to use it.  After examination today he hopped off the table before I could lower it and was ambulating in the office normally and at normal pace without " impaired gait.  Once again his maximum lifetime weight is 400 pounds.  He continues to require insulin for his diabetes.  His mother recently became lethargic and was found to have a CO2 of greater than 140 from her COPD and since then he has quit all vaping.  Once again he switched from cigarettes to not nicotine vaping previously.  He does not recall being sent home on anticoagulation after sleeve gastrectomy but on review I did send him home on Eliquis for his previous more significant impaired mobility.  He denies personal or family history of bleeding or clotting disorders.  His liver biopsy previously showed mild to moderate steatosis only, no fibrosis or cirrhosis.  He continues to have no significant reflux symptoms on daily Protonix.      Past Medical History:   Diagnosis Date   • Abnormal PFTs (pulmonary function tests)    • Anxiety    • Arthritis    • Benign prostatic hyperplasia    • Bipolar affective    • Chronic pain    • Colon polyp    • Depression    • Dyspepsia    • Dyspnea on exertion    • Elevated LFTs     GI eval (P)   • Enlarged prostate     s/p TURP 5/2021   • Epididymitis    • Erectile dysfunction After T.E.R.P.   • Family history of malignant hyperthermia     patient had muscle biopsy at [facility] and was negative, sister has malignant hyperthermia and a cousin   • Fatigue    • Fatty liver    • Former smoker    • Fractured pelvis    • Frequent urination    • GERD (gastroesophageal reflux disease)     on PPI, hx erosive gastritis on EGD 1/5/22, EGD Dr. Rodriguez 10/23   • H/O: substance abuse     clean since 2016, on Subutex, managed by PCP   • Headache     following w/ Neurology   • History of non-nicotine vaping    • Hx MRSA infection 2020    w/ osteomyelitis L hip, fracture hip, hospitalized with IV abx and drainage, had port with home abx   • Hyperammonemia     following w/ GI   • Hyperlipidemia    • Hypertension    • Hypogonadism in male     on testosterone q2wks, follows w/ Urology   •  Hypokalemia    • Impaired mobility     w/ (L)sided weakness (since osteomyelitis), ambulates w/ cane   • Insomnia     on Trazodone   • Insulin dependent type 2 diabetes mellitus     resolved with weightloss, now back on insulin   • Joint pain     w/ recent SI joint injections 2/2022   • Kidney stone 11/30/2016   • Kidney stones    • Lower extremity edema    • Migraine    • Morbid obesity    • Murmur     as a child   • Myoclonus     w/ body jerks, follows w/ Neurology   • Osteomyelitis 2020    H/O spinal osteomyelitis, previous IVDA   • Piercing     ear/body   • PUD (peptic ulcer disease)     non-bleeding gastric ulcers on EGD 1/5/22 w/ Dr. Dover   • Rectus diastasis    • Seasonal allergies    • Sleep apnea     CPAP compliant   • Staph infection 2020   • Substance abuse     Not currently, over 6 years clean and sober!   • Tattoo    • Urinary incontinence    • Urinary tract infection      Past Surgical History:   Procedure Laterality Date   • ABDOMINAL HERNIA REPAIR  2020    incisional, Dr. Dover, inferior to umbilicus   • BUNIONECTOMY  2007   • COLONOSCOPY  2023   • CYSTOSCOPY TRANSURETHRAL RESECTION OF PROSTATE N/A 05/26/2021    Procedure: TRANSURETHRAL RESECTION OF PROSTATE;  Surgeon: Markel Centeno MD;  Location: [facility] OR;  Service: Urology;  Laterality: N/A;   • ENDOSCOPY N/A 01/05/2022    Procedure: ESOPHAGOGASTRODUODENOSCOPY with biopsy;  Surgeon: Royal Dover MD;  Location: [facility] ENDOSCOPY;  Service: Gastroenterology;  Laterality: N/A;   • ENDOSCOPY N/A 10/28/2022    Procedure: ESOPHAGOGASTRODUODENOSCOPY;  Surgeon: Olu Rodriguez MD;  Location:  [facility] OR;  Service: Bariatric;  Laterality: N/A;   • GASTRECTOMY N/A 10/28/2022    Procedure: GASTRECTOMY LAPAROSCOPIC;  Surgeon: Olu Rodriguez MD;  Location:  [facility] OR;  Service: Bariatric;  Laterality: N/A;   • HEMORRHOIDECTOMY      Dr Dover do the removal   • HIATAL HERNIA REPAIR  2019   • INGUINAL HERNIA REPAIR  2019    Dr. Dover    • KNEE OPEN LATERAL RELEASE Right 1985   • LAPAROSCOPIC CHOLECYSTECTOMY  2014    sand, no stones   • LIVER BIOPSY N/A 10/28/2022    Procedure: LIVER BIOPSY LAPAROSCOPIC;  Surgeon: Olu Rodriguez MD;  Location: [facility];  Service: Bariatric;  Laterality: N/A;   • TONSILLECTOMY AND ADENOIDECTOMY  1986   • WISDOM TOOTH EXTRACTION  1997       Allergies   Allergen Reactions   • Naloxone Swelling     Throat swelling, itching, rash   • Hydrocodone-Acetaminophen Itching and Rash     Percocet OK       Current Outpatient Medications:   •  Alcohol Swabs pads, Clean area prior to injection, Disp: 100 each, Rfl: 11  •  amLODIPine (NORVASC) 5 MG tablet, Take 1 tablet by mouth Daily., Disp: , Rfl:   •  buprenorphine (SUBUTEX) 8 MG sublingual tablet SL tablet, 1 tablet 2 (Two) Times a Day., Disp: , Rfl:   •  chlorthalidone (HYGROTON) 25 MG tablet, Take 1 tablet by mouth Daily., Disp: , Rfl:   •  Cyanocobalamin (Vitamin B-12) 1000 MCG sublingual tablet, Place 1 tablet under the tongue Daily., Disp: 90 each, Rfl: 0  •  cyclobenzaprine (FLEXERIL) 10 MG tablet, , Disp: , Rfl:   •  fluticasone (FLONASE) 50 MCG/ACT nasal spray, As Needed., Disp: , Rfl:   •  hydrOXYzine (ATARAX) 25 MG tablet, Take 1 tablet by mouth 3 (Three) Times a Day As Needed for Anxiety., Disp: 90 tablet, Rfl: 0  •  Lantus SoloStar 100 UNIT/ML injection pen, , Disp: , Rfl:   •  lidocaine (LIDODERM) 5 %, Place 1 patch on the skin as directed by provider Daily. Remove & Discard patch within 12 hours or as directed by MD, Disp:  , Rfl:   •  mirtazapine (REMERON) 30 MG tablet, Take 1 tablet by mouth Every Night., Disp: , Rfl:   •  Multiple Vitamins-Minerals (MULTI COMPLETE PO), Take  by mouth., Disp: , Rfl:   •  mupirocin (BACTROBAN) 2 % ointment, , Disp: , Rfl:   •  pantoprazole (PROTONIX) 40 MG EC tablet, Take 1 tablet by mouth Every Morning., Disp:  , Rfl:   •  propranolol (INDERAL) 60 MG tablet, Take 1 tablet by mouth 2 (Two) Times a Day., Disp: , Rfl:   •   "tamsulosin (FLOMAX) 0.4 MG capsule 24 hr capsule, TAKE 1 CAPSULE BY MOUTH DAILY., Disp: 90 capsule, Rfl: 2  •  loratadine (CLARITIN) 10 MG tablet, Daily. (Patient not taking: Reported on 2024), Disp: , Rfl:   •  vitamin D (ERGOCALCIFEROL) 1.25 MG (08713 UT) capsule capsule, , Disp: , Rfl:     Social History     Socioeconomic History   • Marital status: Single   Tobacco Use   • Smoking status: Former     Current packs/day: 0.00     Average packs/day: 1 pack/day for 15.0 years (15.0 ttl pk-yrs)     Types: Cigarettes     Start date: 2007     Quit date: 2022     Years since quittin.7     Passive exposure: Past   • Smokeless tobacco: Never   • Tobacco comments:     Recently I have quit smoking I do vape but with no nicotine   Vaping Use   • Vaping status: Some Days   • Substances: Flavoring   • Devices: Pre-filled or refillable cartridge   Substance and Sexual Activity   • Alcohol use: Not Currently   • Drug use: Not Currently     Types: Amphetamines, Barbiturates, Benzodiazepines, \"Crack\" cocaine, Cocaine(coke), Codeine, Fentanyl, GHB, Hashish, Heroin, Hydrocodone, Ketamine, LSD, Marijuana, MDMA (ecstacy), Methamphetamines, Morphine, Nitrous oxide, Opium, Oxycodone     Comment: clean since    • Sexual activity: Never     Family History   Problem Relation Age of Onset   • Hypertension Mother    • Obesity Mother    • Arthritis Mother    • COPD Mother    • Heart disease Mother    • Depression Mother    • Sleep apnea Mother    • Hypertension Father    • Alcohol abuse Father    • Throat cancer Father    • Stroke Father    • Heart disease Father    • Sleep apnea Sister    • Hypertension Sister    • Malig Hyperthermia Sister    • Obesity Sister    • Diabetes Sister    • Depression Sister    • Malig Hyperthermia Sister         I have been tested (muscle tissue test) and i dont have it   • Cancer Brother    • Hypertension Brother    • Obesity Maternal Aunt    • Hypertension Maternal Aunt    • Cancer Maternal " Aunt    • Depression Maternal Aunt    • Obesity Maternal Uncle    • Diabetes Maternal Uncle    • Cancer Paternal Aunt    • Obesity Paternal Uncle    • Hypertension Paternal Uncle    • Cancer Paternal Uncle    • Hearing loss Paternal Uncle    • Prostate cancer Paternal Uncle    • Hearing loss Paternal Uncle    • Prostate cancer Paternal Uncle         Both my dads older brothers had radical prostate removal   • Diabetes Maternal Grandmother    • Leukemia Maternal Grandmother    • Obesity Maternal Grandmother    • Hypertension Maternal Grandmother    • Sleep apnea Maternal Grandmother    • Hypertension Maternal Grandfather    • Cancer Maternal Grandfather    • Sleep apnea Maternal Grandfather    • Heart disease Maternal Grandfather    • Arthritis Maternal Grandfather    • Heart attack Maternal Grandfather    • Obesity Maternal Grandfather    • Hypertension Paternal Grandmother    • Cancer Paternal Grandmother         blood   • Heart disease Paternal Grandmother         Dad's mother   • Arthritis Paternal Grandmother    • Hearing loss Paternal Grandmother    • Heart attack Paternal Grandmother    • Prostate cancer Paternal Grandfather    • Obesity Paternal Grandfather    • Hypertension Paternal Grandfather    • Stomach cancer Paternal Grandfather    • Colon polyps Half-Brother    • Colon cancer Half-Brother    • Esophageal cancer Neg Hx        Review of Systems   Constitutional:  Positive for fatigue and unexpected weight gain. Negative for chills, diaphoresis, fever and unexpected weight loss.   HENT:  Negative for congestion and facial swelling.    Eyes:  Negative for blurred vision, double vision and discharge.   Respiratory:  Negative for chest tightness, shortness of breath and stridor.    Cardiovascular:  Positive for leg swelling. Negative for chest pain and palpitations.   Gastrointestinal:  Positive for GERD. Negative for blood in stool.   Endocrine: Negative for polydipsia.   Genitourinary:  Positive for  urinary incontinence. Negative for hematuria.   Musculoskeletal:  Positive for arthralgias.   Skin:  Negative for color change.   Allergic/Immunologic: Negative for immunocompromised state.   Neurological:  Negative for confusion.   Psychiatric/Behavioral:  Positive for sleep disturbance. Negative for self-injury.        I have reviewed the ROS and confirm that it's accurate today.    Physical Exam:  Vital Signs:  Weight: 107 kg (236 lb)   Body mass index is 35.36 kg/m².  Temp: 97.5 °F (36.4 °C)   Heart Rate: 70   BP: 128/82     Physical Exam  Vitals reviewed.   Constitutional:       Appearance: He is well-developed.   HENT:      Head: Normocephalic and atraumatic.      Nose: Nose normal.   Eyes:      Conjunctiva/sclera: Conjunctivae normal.      Pupils: Pupils are equal, round, and reactive to light.   Neck:      Thyroid: No thyromegaly.      Vascular: No carotid bruit.      Trachea: No tracheal deviation.   Cardiovascular:      Rate and Rhythm: Normal rate and regular rhythm.      Heart sounds: Normal heart sounds.   Pulmonary:      Effort: Pulmonary effort is normal. No respiratory distress.      Breath sounds: Normal breath sounds.   Abdominal:      General: There is no distension.      Palpations: Abdomen is soft.      Tenderness: There is no abdominal tenderness.      Comments: Laparoscopy scars, subumbilical vertical 12 cm scar without recurrent umbilical hernia appreciated, open left inguinal hernia repair scar   Musculoskeletal:         General: No deformity. Normal range of motion.      Cervical back: Normal range of motion and neck supple.   Skin:     General: Skin is warm and dry.      Findings: No rash.      Comments: Tattoos   Neurological:      Mental Status: He is alert and oriented to person, place, and time.      Cranial Nerves: No cranial nerve deficit.      Coordination: Coordination normal.   Psychiatric:         Behavior: Behavior normal.         Thought Content: Thought content normal.          Judgment: Judgment normal.         Patient Active Problem List   Diagnosis   • Abdominal pain   • Anemia   • Constipation   • Diarrhea   • Abnormal liver enzymes   • BPH without urinary obstruction   • Sepsis   • Abscess of paraspinal muscles   • Abscess, gluteal, left   • Bacteremia due to Gram-positive bacteria   • Chronic midline low back pain without sciatica   • Essential hypertension   • Osteomyelitis of sacrum   • Lower urinary tract symptoms (LUTS)   • Periodic headache syndrome, not intractable   • Tremor, essential   • Myoclonus   • Hypokalemia   • Family history of malignant hyperthermia   • PUD (peptic ulcer disease)   • Sleep apnea   • Hypogonadism in male   • Hyperammonemia   • GERD (gastroesophageal reflux disease)   • Fatigue   • Dyspepsia   • Morbid obesity with BMI of 45.0-49.9, adult   • Dyspnea on exertion   • Lower extremity edema   • Insomnia   • Impaired mobility   • H/O: substance abuse   • Osteomyelitis   • Mild episode of recurrent major depressive disorder   • Generalized anxiety disorder   • Encounter for screening for malignant neoplasm of colon   • Testosterone deficiency   • Class 2 severe obesity with serious comorbidity and body mass index (BMI) of 37.0 to 37.9 in adult   • Elevated LFTs   • Diabetes mellitus type 2 in obese   • Polyphagia   • Arthritis   • Fatty liver   • Morbid exogenous obesity   • S/P laparoscopic sleeve gastrectomy   • Type 2 diabetes mellitus with morbid obesity       Assessment:    Gopi Johnson is a 48 y.o. year old male with medically complicated obesity status post laparoscopic sleeve gastrectomy October 2022    Revisional metabolic and bariatric surgery is deemed medically necessary given the following obesity related comorbidities including spinal osteomyelitis, history of MRSA osteomyelitis left hip, nephrolithiasis, abnormal pulmonary function tests, anxiety, arthritis, benign prostatic hyperplasia, bipolar affective disorder, chronic pain,  reported cirrhosis, colonic polyps, current everyday non-nicotene vaping, depression, dyspnea exertion, history of elevated liver function test, erectile dysfunction, family history malignant hyperthermia status post negative muscle biopsy, fatigue, fatty liver, history of gastroparesis, GE reflux disease, history of substance abuse clean since 2016, hyperlipidemia, hypertension, impaired mobility, insomnia, type 2 insulin-dependent diabetes, peripheral edema, migraine headaches, rectus diastases, obstructive sleep apnea on CPAP, urinary incontinence with current Weight: 107 kg (236 lb) and Body mass index is 35.36 kg/m²..    Encounter Diagnoses   Name Primary?   • Morbid exogenous obesity Yes   • S/P laparoscopic sleeve gastrectomy    • Type 2 diabetes mellitus with morbid obesity       Patient is aware that surgery is a tool, and that weight loss and improvement in comorbidities is not guaranteed but only seen in the context of appropriate use, follow up and physical activity.    The patient was present for an approximately a 2.5 hour discussion of the purpose of MBS, how MBS is a tool to assist in achieving weight loss goals, the most common complications and how best to avoid them, and the strategies for short and long term weight loss and improvement in comorbidities.  Ample opportunity to discuss questions was available both in group and during the time of individual examination.    I reviewed his Mikhail report showing Buprenorphine 8 mg #84/mos. Psychosocial evaluation dated 11/15/2023 Mireille OJEDA PhD very good candidate.  Dietitian evaluation dated 10/10/2023 Cheyanne OWENS RD noting protein intake is too low to ensure successful weight loss that intake of processed and simple carbohydrates is moderate that is minimal variety of fruits and vegetables eating out is not a problem nor are sweet beverages.  Labs dated 8/8/2023 unremarkable CBC of note hemoglobin 14.7 unremarkable CMP except for CO2 of 33 glucose of 116.   Cardiology clearance dated 2/1/2024 Shaheen Mahmood MD.  Please see scanned records that I have reviewed and signed off on today.  All of this in addition to the patient's unique history and exam has been taken into consideration in determining their appropriate candidacy for MBS.    Risks, benefits, alternative therapies were discussed to laparoscopic possible robotic assisted revision of his previous sleeve gastrectomy to a formal duodenal switch.   The most common short term complics in addition to cardiopulm risk, VTE, bleeding, infection, etc is leak or obstruction at the anastomosis, which could have potentially serious and even fatal consequences and require further surgery(s).  The patient understands that they will have more frequent BM's, will be malodorous, and that at increased risk for vitamin deficiencies, gaye the fat soluble vitamins, and that this can be serious and irreversible - still wishes to proceed, says they will be compliant w f/u and vit/supplment recommendations.  Aware that may need an elongation procedure in the future if diarrhea and/or vitamin issues not controlled.      Discussed the risks, benefits and alternative therapies at great length as outlined in our extensive consent forms, consent videos, and educational teaching process under the direction of the center's .    A copy of the patient's signed informed consent is on file.    R/B/A Rx discussed to postop anticoagulation incl but not limited to bleeding, drug reaction, venothromboembolic events, etc. and the patient declined.      Plan:    After reevaluation I think the patient remains a reasonable candidate for laparoscopic possible robotic assisted revision of his sleeve gastrectomy to a formal duodenal switch with biliopancreatic diversion and EGD.  He plans to stop his Subutex a day prior and resume after he is off postoperative narcotics, says he does fine with narcotics other than hydrocodone.  Watch his  potassium perioperatively on chronic diuretic therapy.  Will likely have to lyse adhesions from previous open umbilical hernia repair with mesh.    Other issues include spinal osteomyelitis, history of MRSA osteomyelitis left hip, nephrolithiasis, abnormal pulmonary function tests, anxiety, arthritis, benign prostatic hyperplasia, bipolar affective disorder, chronic pain, reported cirrhosis, colonic polyps, current everyday non-nicotene vaping, depression, dyspnea exertion, history of elevated liver function test, erectile dysfunction, family history malignant hyperthermia status post negative muscle biopsy, fatigue, fatty liver, history of gastroparesis, GE reflux disease, history of substance abuse clean since 2016, hyperlipidemia, hypertension, impaired mobility, insomnia, type 2 insulin-dependent diabetes, peripheral edema, migraine headaches, rectus diastases, obstructive sleep apnea on CPAP, urinary incontinence    Thank you MARIA DEL CARMEN Zuniga for the opportunity to reevaluate Mr. Johnson.      Olu Rodriguez MD              Answers submitted by the patient for this visit:  Primary Reason for Visit (Submitted on 4/9/2024)  What is the primary reason for your visit?: Other  Other (Submitted on 4/9/2024)  Please describe your symptoms.: Revision surgery, some stomach pain after eating  Have you had these symptoms before?: No  How long have you been having these symptoms?: Greater than 2 weeks  Please list any medications you are currently taking for this condition.: Meds all current

## 2024-04-17 PROBLEM — E66.01 TYPE 2 DIABETES MELLITUS WITH MORBID OBESITY: Status: ACTIVE | Noted: 2024-04-16

## 2024-04-17 PROBLEM — Z98.84 S/P LAPAROSCOPIC SLEEVE GASTRECTOMY: Status: ACTIVE | Noted: 2024-04-16

## 2024-04-17 PROBLEM — E11.69 TYPE 2 DIABETES MELLITUS WITH MORBID OBESITY: Status: ACTIVE | Noted: 2024-04-16

## 2024-04-22 ENCOUNTER — PRE-ADMISSION TESTING (OUTPATIENT)
Dept: PREADMISSION TESTING | Facility: HOSPITAL | Age: 49
End: 2024-04-22
Payer: MEDICARE

## 2024-04-22 DIAGNOSIS — Z98.84 S/P LAPAROSCOPIC SLEEVE GASTRECTOMY: ICD-10-CM

## 2024-04-22 DIAGNOSIS — E66.01 MORBID EXOGENOUS OBESITY: ICD-10-CM

## 2024-04-22 LAB
DEPRECATED RDW RBC AUTO: 36.8 FL (ref 37–54)
ERYTHROCYTE [DISTWIDTH] IN BLOOD BY AUTOMATED COUNT: 12.7 % (ref 12.3–15.4)
HBA1C MFR BLD: 5.7 % (ref 4.8–5.6)
HCT VFR BLD AUTO: 41.5 % (ref 37.5–51)
HGB BLD-MCNC: 14.1 G/DL (ref 13–17.7)
MCH RBC QN AUTO: 27.5 PG (ref 26.6–33)
MCHC RBC AUTO-ENTMCNC: 34 G/DL (ref 31.5–35.7)
MCV RBC AUTO: 81.1 FL (ref 79–97)
PLATELET # BLD AUTO: 234 10*3/MM3 (ref 140–450)
PMV BLD AUTO: 9.6 FL (ref 6–12)
POTASSIUM SERPL-SCNC: 3.5 MMOL/L (ref 3.5–5.2)
RBC # BLD AUTO: 5.12 10*6/MM3 (ref 4.14–5.8)
WBC NRBC COR # BLD AUTO: 8.46 10*3/MM3 (ref 3.4–10.8)

## 2024-04-22 PROCEDURE — 87081 CULTURE SCREEN ONLY: CPT

## 2024-04-22 PROCEDURE — 83036 HEMOGLOBIN GLYCOSYLATED A1C: CPT

## 2024-04-22 PROCEDURE — 85027 COMPLETE CBC AUTOMATED: CPT

## 2024-04-22 PROCEDURE — 36415 COLL VENOUS BLD VENIPUNCTURE: CPT

## 2024-04-22 PROCEDURE — 84132 ASSAY OF SERUM POTASSIUM: CPT

## 2024-04-22 NOTE — PAT
Patient to apply Chlorhexadine wipes  to surgical area (as instructed) the night before procedure and the AM of procedure. Wipes provided.    Patient instructed to drink 20 ounces of Gatorade or Gatorlyte (if diabetic) and it needs to be completed 1 hour (for Main OR patients) or 2 hours (scheduled  section & [facility] patients) before given arrival time for procedure (NO RED Gatorade and NO Gatorade Zero).    Patient verbalized understanding.    Too early to draw type and screen in PAT.  Please obtain blood bank specimen in pre-op on the day of surgery.    Patient viewed general PAT education video as instructed in their preoperative information received from their surgeon.  Patient stated the general PAT education video was viewed in its entirety and survey completed.  Copies of [facility] general education handouts (Incentive Spirometry, Meds to Beds Program, Patient Belongings, Pre-op skin preparation instructions, Blood Glucose testing, Visitor policy, Surgery FAQ, Code H) distributed to patient if not printed. Education related to the PAT pass and skin preparation for surgery (if applicable) completed in PAT as a reinforcement to PAT education video. Patient instructed to return PAT pass provided today as well as completed skin preparation sheet (if applicable) on the day of procedure.     Additionally if patient had not viewed video yet but intended to view it at home or in our waiting area, then referred them to the handout with QR code/link provided during PAT visit.  Instructed patient to complete survey after viewing the video in its entirety.  Encouraged patient/family to read PAT general education handouts thoroughly and notify PAT staff with any questions or concerns. Patient verbalized understanding of all information and priority content.    EKG from  in epic   Cardiac clearance per Dr Vilchis in epic from

## 2024-04-23 LAB — MRSA SPEC QL CULT: NORMAL

## 2024-04-25 ENCOUNTER — ANESTHESIA EVENT (OUTPATIENT)
Dept: PERIOP | Facility: HOSPITAL | Age: 49
End: 2024-04-25
Payer: MEDICARE

## 2024-04-25 RX ORDER — SODIUM CHLORIDE 0.9 % (FLUSH) 0.9 %
10 SYRINGE (ML) INJECTION EVERY 12 HOURS SCHEDULED
Status: CANCELLED | OUTPATIENT
Start: 2024-04-25

## 2024-04-25 RX ORDER — SODIUM CHLORIDE 0.9 % (FLUSH) 0.9 %
10 SYRINGE (ML) INJECTION AS NEEDED
Status: CANCELLED | OUTPATIENT
Start: 2024-04-25

## 2024-04-25 RX ORDER — SODIUM CHLORIDE 9 MG/ML
40 INJECTION, SOLUTION INTRAVENOUS AS NEEDED
Status: CANCELLED | OUTPATIENT
Start: 2024-04-25

## 2024-04-25 RX ORDER — FAMOTIDINE 10 MG/ML
20 INJECTION, SOLUTION INTRAVENOUS ONCE
Status: CANCELLED | OUTPATIENT
Start: 2024-04-25 | End: 2024-04-25

## 2024-04-25 RX ORDER — FAMOTIDINE 20 MG/1
20 TABLET, FILM COATED ORAL ONCE
Status: CANCELLED | OUTPATIENT
Start: 2024-04-25 | End: 2024-04-25

## 2024-04-26 ENCOUNTER — HOSPITAL ENCOUNTER (INPATIENT)
Facility: HOSPITAL | Age: 49
LOS: 3 days | Discharge: HOME | DRG: 620 | End: 2024-04-29
Attending: SURGERY | Admitting: SURGERY
Payer: MEDICARE

## 2024-04-26 ENCOUNTER — ANESTHESIA (OUTPATIENT)
Dept: PERIOP | Facility: HOSPITAL | Age: 49
End: 2024-04-26
Payer: MEDICARE

## 2024-04-26 ENCOUNTER — ANESTHESIA EVENT CONVERTED (OUTPATIENT)
Dept: ANESTHESIOLOGY | Facility: HOSPITAL | Age: 49
DRG: 620 | End: 2024-04-26
Payer: MEDICARE

## 2024-04-26 DIAGNOSIS — Z98.84 S/P LAPAROSCOPIC SLEEVE GASTRECTOMY: ICD-10-CM

## 2024-04-26 DIAGNOSIS — E66.01 MORBID EXOGENOUS OBESITY: ICD-10-CM

## 2024-04-26 LAB
ABO GROUP BLD: NORMAL
BLD GP AB SCN SERPL QL: NEGATIVE
GLUCOSE BLDC GLUCOMTR-MCNC: 148 MG/DL (ref 70–130)
GLUCOSE BLDC GLUCOMTR-MCNC: 151 MG/DL (ref 70–130)
GLUCOSE BLDC GLUCOMTR-MCNC: 98 MG/DL (ref 70–130)
RH BLD: POSITIVE
T&S EXPIRATION DATE: NORMAL

## 2024-04-26 PROCEDURE — 25010000002 CEFAZOLIN PER 500 MG: Performed by: SURGERY

## 2024-04-26 PROCEDURE — 44238 UNLISTED LAPS PX INTESTINE: CPT | Performed by: SURGERY

## 2024-04-26 PROCEDURE — 8E0W4CZ ROBOTIC ASSISTED PROCEDURE OF TRUNK REGION, PERCUTANEOUS ENDOSCOPIC APPROACH: ICD-10-PCS | Performed by: SURGERY

## 2024-04-26 PROCEDURE — 25010000002 INDOCYANINE GREEN 25 MG RECONSTITUTED SOLUTION

## 2024-04-26 PROCEDURE — 25810000003 SODIUM CHLORIDE 0.9 % SOLUTION: Performed by: SURGERY

## 2024-04-26 PROCEDURE — 82948 REAGENT STRIP/BLOOD GLUCOSE: CPT

## 2024-04-26 PROCEDURE — 86850 RBC ANTIBODY SCREEN: CPT | Performed by: SURGERY

## 2024-04-26 PROCEDURE — 25010000002 FENTANYL CITRATE (PF) 50 MCG/ML SOLUTION

## 2024-04-26 PROCEDURE — 25010000002 DROPERIDOL PER 5 MG

## 2024-04-26 PROCEDURE — 25010000002 FENTANYL CITRATE (PF) 100 MCG/2ML SOLUTION

## 2024-04-26 PROCEDURE — 25010000002 ONDANSETRON PER 1 MG

## 2024-04-26 PROCEDURE — 25010000002 ENOXAPARIN PER 10 MG: Performed by: SURGERY

## 2024-04-26 PROCEDURE — 25010000002 THIAMINE HCL 200 MG/2ML SOLUTION: Performed by: SURGERY

## 2024-04-26 PROCEDURE — 25010000002 HYDROMORPHONE 1 MG/ML SOLUTION

## 2024-04-26 PROCEDURE — 0D194ZB BYPASS DUODENUM TO ILEUM, PERCUTANEOUS ENDOSCOPIC APPROACH: ICD-10-PCS | Performed by: SURGERY

## 2024-04-26 PROCEDURE — 25010000002 HYDROMORPHONE PER 4 MG

## 2024-04-26 PROCEDURE — 25810000003 SODIUM CHLORIDE PER 500 ML: Performed by: SURGERY

## 2024-04-26 PROCEDURE — 44238 UNLISTED LAPS PX INTESTINE: CPT | Performed by: PHYSICIAN ASSISTANT

## 2024-04-26 PROCEDURE — 25010000002 DROPERIDOL PER 5 MG: Performed by: ANESTHESIOLOGY

## 2024-04-26 PROCEDURE — 86900 BLOOD TYPING SEROLOGIC ABO: CPT | Performed by: SURGERY

## 2024-04-26 PROCEDURE — 25010000002 HYDROMORPHONE 1 MG/ML SOLUTION: Performed by: SURGERY

## 2024-04-26 PROCEDURE — 86901 BLOOD TYPING SEROLOGIC RH(D): CPT | Performed by: SURGERY

## 2024-04-26 PROCEDURE — 25010000002 INDOCYANINE GREEN 25 MG RECONSTITUTED SOLUTION: Performed by: SURGERY

## 2024-04-26 PROCEDURE — 25010000002 DEXAMETHASONE SODIUM PHOSPHATE 10 MG/ML SOLUTION

## 2024-04-26 PROCEDURE — 25010000002 SUGAMMADEX 200 MG/2ML SOLUTION

## 2024-04-26 PROCEDURE — 25010000002 BUPIVACAINE (PF) 0.25 % SOLUTION

## 2024-04-26 PROCEDURE — 0DJ08ZZ INSPECTION OF UPPER INTESTINAL TRACT, VIA NATURAL OR ARTIFICIAL OPENING ENDOSCOPIC: ICD-10-PCS | Performed by: SURGERY

## 2024-04-26 PROCEDURE — 25010000002 PROPOFOL 10 MG/ML EMULSION

## 2024-04-26 PROCEDURE — 25810000003 LACTATED RINGERS PER 1000 ML: Performed by: ANESTHESIOLOGY

## 2024-04-26 PROCEDURE — 25010000002 METOCLOPRAMIDE PER 10 MG: Performed by: SURGERY

## 2024-04-26 DEVICE — DEV CONTRL TISS STRATAFIX SPIRAL PDS PLS 3/0 0 15CM VIL: Type: IMPLANTABLE DEVICE | Site: ABDOMEN | Status: FUNCTIONAL

## 2024-04-26 DEVICE — STAPLER 60 RELOAD WHITE
Type: IMPLANTABLE DEVICE | Site: ABDOMEN | Status: FUNCTIONAL
Brand: SUREFORM

## 2024-04-26 DEVICE — STAPLER 60 RELOAD BLUE
Type: IMPLANTABLE DEVICE | Site: ABDOMEN | Status: FUNCTIONAL
Brand: SUREFORM

## 2024-04-26 DEVICE — PBT NON ABSORBABLE WOUND CLOSURE DEVICE
Type: IMPLANTABLE DEVICE | Site: ABDOMEN | Status: FUNCTIONAL
Brand: V-LOC

## 2024-04-26 RX ORDER — DROPERIDOL 2.5 MG/ML
0.62 INJECTION, SOLUTION INTRAMUSCULAR; INTRAVENOUS ONCE AS NEEDED
Status: COMPLETED | OUTPATIENT
Start: 2024-04-26 | End: 2024-04-26

## 2024-04-26 RX ORDER — AMLODIPINE BESYLATE 5 MG/1
5 TABLET ORAL DAILY
Status: DISCONTINUED | OUTPATIENT
Start: 2024-04-26 | End: 2024-04-29 | Stop reason: HOSPADM

## 2024-04-26 RX ORDER — METOCLOPRAMIDE HYDROCHLORIDE 5 MG/ML
10 INJECTION INTRAMUSCULAR; INTRAVENOUS EVERY 6 HOURS PRN
Status: DISCONTINUED | OUTPATIENT
Start: 2024-04-26 | End: 2024-04-29 | Stop reason: HOSPADM

## 2024-04-26 RX ORDER — HYDROMORPHONE HYDROCHLORIDE 2 MG/ML
INJECTION, SOLUTION INTRAMUSCULAR; INTRAVENOUS; SUBCUTANEOUS AS NEEDED
Status: DISCONTINUED | OUTPATIENT
Start: 2024-04-26 | End: 2024-04-26 | Stop reason: SURG

## 2024-04-26 RX ORDER — SODIUM CHLORIDE 9 MG/ML
INJECTION, SOLUTION INTRAVENOUS AS NEEDED
Status: DISCONTINUED | OUTPATIENT
Start: 2024-04-26 | End: 2024-04-26 | Stop reason: HOSPADM

## 2024-04-26 RX ORDER — DROPERIDOL 2.5 MG/ML
0.62 INJECTION, SOLUTION INTRAMUSCULAR; INTRAVENOUS ONCE
Status: COMPLETED | OUTPATIENT
Start: 2024-04-26 | End: 2024-04-26

## 2024-04-26 RX ORDER — PROPRANOLOL HYDROCHLORIDE 20 MG/1
60 TABLET ORAL 2 TIMES DAILY
Status: DISCONTINUED | OUTPATIENT
Start: 2024-04-26 | End: 2024-04-29 | Stop reason: HOSPADM

## 2024-04-26 RX ORDER — LORAZEPAM 1 MG/1
1 TABLET ORAL EVERY 12 HOURS PRN
Status: DISCONTINUED | OUTPATIENT
Start: 2024-04-26 | End: 2024-04-29 | Stop reason: HOSPADM

## 2024-04-26 RX ORDER — CHLORHEXIDINE GLUCONATE ORAL RINSE 1.2 MG/ML
30 SOLUTION DENTAL
Status: COMPLETED | OUTPATIENT
Start: 2024-04-26 | End: 2024-04-26

## 2024-04-26 RX ORDER — ROCURONIUM BROMIDE 10 MG/ML
INJECTION, SOLUTION INTRAVENOUS AS NEEDED
Status: DISCONTINUED | OUTPATIENT
Start: 2024-04-26 | End: 2024-04-26 | Stop reason: SURG

## 2024-04-26 RX ORDER — LABETALOL HYDROCHLORIDE 5 MG/ML
10 INJECTION, SOLUTION INTRAVENOUS
Status: DISCONTINUED | OUTPATIENT
Start: 2024-04-26 | End: 2024-04-29 | Stop reason: HOSPADM

## 2024-04-26 RX ORDER — PHENYLEPHRINE HCL IN 0.9% NACL 1 MG/10 ML
SYRINGE (ML) INTRAVENOUS AS NEEDED
Status: DISCONTINUED | OUTPATIENT
Start: 2024-04-26 | End: 2024-04-26 | Stop reason: SURG

## 2024-04-26 RX ORDER — PROCHLORPERAZINE MALEATE 10 MG
10 TABLET ORAL EVERY 6 HOURS PRN
Status: DISCONTINUED | OUTPATIENT
Start: 2024-04-26 | End: 2024-04-29 | Stop reason: HOSPADM

## 2024-04-26 RX ORDER — OXYCODONE HYDROCHLORIDE 5 MG/1
5 TABLET ORAL EVERY 6 HOURS PRN
Status: DISCONTINUED | OUTPATIENT
Start: 2024-04-26 | End: 2024-04-29 | Stop reason: HOSPADM

## 2024-04-26 RX ORDER — SODIUM CHLORIDE AND POTASSIUM CHLORIDE 150; 450 MG/100ML; MG/100ML
125 INJECTION, SOLUTION INTRAVENOUS CONTINUOUS
Status: DISCONTINUED | OUTPATIENT
Start: 2024-04-27 | End: 2024-04-29 | Stop reason: HOSPADM

## 2024-04-26 RX ORDER — THIAMINE HYDROCHLORIDE 100 MG/ML
100 INJECTION, SOLUTION INTRAMUSCULAR; INTRAVENOUS ONCE
Status: COMPLETED | OUTPATIENT
Start: 2024-04-26 | End: 2024-04-26

## 2024-04-26 RX ORDER — TAMSULOSIN HYDROCHLORIDE 0.4 MG/1
0.4 CAPSULE ORAL DAILY
Status: DISCONTINUED | OUTPATIENT
Start: 2024-04-26 | End: 2024-04-29 | Stop reason: HOSPADM

## 2024-04-26 RX ORDER — GABAPENTIN 250 MG/5ML
100 SOLUTION ORAL 3 TIMES DAILY
Status: ACTIVE | OUTPATIENT
Start: 2024-04-26 | End: 2024-04-28

## 2024-04-26 RX ORDER — ALBUTEROL SULFATE 2.5 MG/3ML
2.5 SOLUTION RESPIRATORY (INHALATION) EVERY 4 HOURS PRN
Status: DISCONTINUED | OUTPATIENT
Start: 2024-04-26 | End: 2024-04-29 | Stop reason: HOSPADM

## 2024-04-26 RX ORDER — PANTOPRAZOLE SODIUM 40 MG/10ML
40 INJECTION, POWDER, LYOPHILIZED, FOR SOLUTION INTRAVENOUS ONCE
Status: COMPLETED | OUTPATIENT
Start: 2024-04-26 | End: 2024-04-26

## 2024-04-26 RX ORDER — HYDROXYZINE HYDROCHLORIDE 25 MG/1
25 TABLET, FILM COATED ORAL 3 TIMES DAILY PRN
Status: DISCONTINUED | OUTPATIENT
Start: 2024-04-26 | End: 2024-04-29 | Stop reason: HOSPADM

## 2024-04-26 RX ORDER — PANTOPRAZOLE SODIUM 40 MG/10ML
40 INJECTION, POWDER, LYOPHILIZED, FOR SOLUTION INTRAVENOUS
Status: DISCONTINUED | OUTPATIENT
Start: 2024-04-27 | End: 2024-04-29 | Stop reason: HOSPADM

## 2024-04-26 RX ORDER — NALOXONE HCL 0.4 MG/ML
0.1 VIAL (ML) INJECTION
Status: DISCONTINUED | OUTPATIENT
Start: 2024-04-26 | End: 2024-04-29 | Stop reason: HOSPADM

## 2024-04-26 RX ORDER — FENTANYL CITRATE 50 UG/ML
50 INJECTION, SOLUTION INTRAMUSCULAR; INTRAVENOUS
Status: DISCONTINUED | OUTPATIENT
Start: 2024-04-26 | End: 2024-04-26 | Stop reason: HOSPADM

## 2024-04-26 RX ORDER — NALOXONE HCL 0.4 MG/ML
0.4 VIAL (ML) INJECTION
Status: DISCONTINUED | OUTPATIENT
Start: 2024-04-26 | End: 2024-04-29 | Stop reason: HOSPADM

## 2024-04-26 RX ORDER — DEXAMETHASONE SODIUM PHOSPHATE 10 MG/ML
INJECTION, SOLUTION INTRAMUSCULAR; INTRAVENOUS
Status: COMPLETED | OUTPATIENT
Start: 2024-04-26 | End: 2024-04-26

## 2024-04-26 RX ORDER — FENTANYL CITRATE 50 UG/ML
INJECTION, SOLUTION INTRAMUSCULAR; INTRAVENOUS
Status: COMPLETED
Start: 2024-04-26 | End: 2024-04-26

## 2024-04-26 RX ORDER — EPHEDRINE SULFATE 50 MG/ML
INJECTION INTRAVENOUS AS NEEDED
Status: DISCONTINUED | OUTPATIENT
Start: 2024-04-26 | End: 2024-04-26 | Stop reason: SURG

## 2024-04-26 RX ORDER — HYDROMORPHONE HYDROCHLORIDE 2 MG/1
2 TABLET ORAL EVERY 4 HOURS PRN
Status: DISCONTINUED | OUTPATIENT
Start: 2024-04-26 | End: 2024-04-27

## 2024-04-26 RX ORDER — MIDAZOLAM HYDROCHLORIDE 1 MG/ML
1 INJECTION INTRAMUSCULAR; INTRAVENOUS
Status: DISCONTINUED | OUTPATIENT
Start: 2024-04-26 | End: 2024-04-26 | Stop reason: HOSPADM

## 2024-04-26 RX ORDER — CYANOCOBALAMIN 1000 UG/ML
1000 INJECTION, SOLUTION INTRAMUSCULAR; SUBCUTANEOUS ONCE
Status: COMPLETED | OUTPATIENT
Start: 2024-04-27 | End: 2024-04-27

## 2024-04-26 RX ORDER — THIAMINE HYDROCHLORIDE 100 MG/ML
100 INJECTION, SOLUTION INTRAMUSCULAR; INTRAVENOUS ONCE
Status: DISCONTINUED | OUTPATIENT
Start: 2024-04-26 | End: 2024-04-26

## 2024-04-26 RX ORDER — LIDOCAINE HYDROCHLORIDE 10 MG/ML
0.5 INJECTION, SOLUTION EPIDURAL; INFILTRATION; INTRACAUDAL; PERINEURAL ONCE AS NEEDED
Status: COMPLETED | OUTPATIENT
Start: 2024-04-26 | End: 2024-04-26

## 2024-04-26 RX ORDER — FENTANYL CITRATE 50 UG/ML
INJECTION, SOLUTION INTRAMUSCULAR; INTRAVENOUS AS NEEDED
Status: DISCONTINUED | OUTPATIENT
Start: 2024-04-26 | End: 2024-04-26 | Stop reason: SURG

## 2024-04-26 RX ORDER — ALPRAZOLAM 0.25 MG/1
0.25 TABLET ORAL ONCE AS NEEDED
Status: DISCONTINUED | OUTPATIENT
Start: 2024-04-26 | End: 2024-04-29 | Stop reason: HOSPADM

## 2024-04-26 RX ORDER — SODIUM CHLORIDE, SODIUM LACTATE, POTASSIUM CHLORIDE, CALCIUM CHLORIDE 600; 310; 30; 20 MG/100ML; MG/100ML; MG/100ML; MG/100ML
9 INJECTION, SOLUTION INTRAVENOUS CONTINUOUS
Status: DISCONTINUED | OUTPATIENT
Start: 2024-04-26 | End: 2024-04-26

## 2024-04-26 RX ORDER — ENOXAPARIN SODIUM 100 MG/ML
40 INJECTION SUBCUTANEOUS DAILY
Status: DISCONTINUED | OUTPATIENT
Start: 2024-04-27 | End: 2024-04-29 | Stop reason: HOSPADM

## 2024-04-26 RX ORDER — PROMETHAZINE HYDROCHLORIDE 12.5 MG/1
12.5 TABLET ORAL EVERY 6 HOURS PRN
Status: DISCONTINUED | OUTPATIENT
Start: 2024-04-26 | End: 2024-04-29 | Stop reason: HOSPADM

## 2024-04-26 RX ORDER — LIDOCAINE HYDROCHLORIDE 10 MG/ML
INJECTION, SOLUTION EPIDURAL; INFILTRATION; INTRACAUDAL; PERINEURAL AS NEEDED
Status: DISCONTINUED | OUTPATIENT
Start: 2024-04-26 | End: 2024-04-26 | Stop reason: SURG

## 2024-04-26 RX ORDER — INDOCYANINE GREEN AND WATER 25 MG
KIT INJECTION AS NEEDED
Status: DISCONTINUED | OUTPATIENT
Start: 2024-04-26 | End: 2024-04-26 | Stop reason: HOSPADM

## 2024-04-26 RX ORDER — SODIUM CHLORIDE 9 MG/ML
150 INJECTION, SOLUTION INTRAVENOUS CONTINUOUS
Status: DISCONTINUED | OUTPATIENT
Start: 2024-04-26 | End: 2024-04-26

## 2024-04-26 RX ORDER — MORPHINE SULFATE 4 MG/ML
4 INJECTION, SOLUTION INTRAMUSCULAR; INTRAVENOUS
Status: DISCONTINUED | OUTPATIENT
Start: 2024-04-26 | End: 2024-04-29 | Stop reason: HOSPADM

## 2024-04-26 RX ORDER — ACETAMINOPHEN 500 MG
1000 TABLET ORAL EVERY 8 HOURS SCHEDULED
Status: COMPLETED | OUTPATIENT
Start: 2024-04-26 | End: 2024-04-28

## 2024-04-26 RX ORDER — HYDROMORPHONE HYDROCHLORIDE 1 MG/ML
0.5 INJECTION, SOLUTION INTRAMUSCULAR; INTRAVENOUS; SUBCUTANEOUS
Status: DISCONTINUED | OUTPATIENT
Start: 2024-04-26 | End: 2024-04-26 | Stop reason: HOSPADM

## 2024-04-26 RX ORDER — SCOLOPAMINE TRANSDERMAL SYSTEM 1 MG/1
1 PATCH, EXTENDED RELEASE TRANSDERMAL ONCE
Status: DISCONTINUED | OUTPATIENT
Start: 2024-04-26 | End: 2024-04-26

## 2024-04-26 RX ORDER — FLUTICASONE PROPIONATE 50 MCG
2 SPRAY, SUSPENSION (ML) NASAL DAILY
Status: DISCONTINUED | OUTPATIENT
Start: 2024-04-26 | End: 2024-04-29 | Stop reason: HOSPADM

## 2024-04-26 RX ORDER — DROPERIDOL 2.5 MG/ML
INJECTION, SOLUTION INTRAMUSCULAR; INTRAVENOUS
Status: COMPLETED
Start: 2024-04-26 | End: 2024-04-26

## 2024-04-26 RX ORDER — ONDANSETRON 4 MG/1
4 TABLET, ORALLY DISINTEGRATING ORAL EVERY 4 HOURS PRN
Status: DISCONTINUED | OUTPATIENT
Start: 2024-04-26 | End: 2024-04-26 | Stop reason: SDUPTHER

## 2024-04-26 RX ORDER — MIRTAZAPINE 15 MG/1
30 TABLET, FILM COATED ORAL NIGHTLY
Status: DISCONTINUED | OUTPATIENT
Start: 2024-04-26 | End: 2024-04-29 | Stop reason: HOSPADM

## 2024-04-26 RX ORDER — GABAPENTIN 100 MG/1
100 CAPSULE ORAL 3 TIMES DAILY
Status: DISPENSED | OUTPATIENT
Start: 2024-04-26 | End: 2024-04-28

## 2024-04-26 RX ORDER — ACETAMINOPHEN 500 MG
1000 TABLET ORAL ONCE
Status: COMPLETED | OUTPATIENT
Start: 2024-04-26 | End: 2024-04-26

## 2024-04-26 RX ORDER — DIPHENHYDRAMINE HYDROCHLORIDE 50 MG/ML
25 INJECTION INTRAMUSCULAR; INTRAVENOUS EVERY 4 HOURS PRN
Status: DISCONTINUED | OUTPATIENT
Start: 2024-04-26 | End: 2024-04-29 | Stop reason: HOSPADM

## 2024-04-26 RX ORDER — ACETAMINOPHEN 160 MG/5ML
1000 SOLUTION ORAL EVERY 8 HOURS SCHEDULED
Status: COMPLETED | OUTPATIENT
Start: 2024-04-26 | End: 2024-04-28

## 2024-04-26 RX ORDER — ONDANSETRON 4 MG/1
4 TABLET, ORALLY DISINTEGRATING ORAL EVERY 4 HOURS PRN
Status: DISCONTINUED | OUTPATIENT
Start: 2024-04-30 | End: 2024-04-29 | Stop reason: HOSPADM

## 2024-04-26 RX ORDER — PROPOFOL 10 MG/ML
VIAL (ML) INTRAVENOUS AS NEEDED
Status: DISCONTINUED | OUTPATIENT
Start: 2024-04-26 | End: 2024-04-26 | Stop reason: SURG

## 2024-04-26 RX ORDER — ONDANSETRON 2 MG/ML
INJECTION INTRAMUSCULAR; INTRAVENOUS AS NEEDED
Status: DISCONTINUED | OUTPATIENT
Start: 2024-04-26 | End: 2024-04-26 | Stop reason: SURG

## 2024-04-26 RX ORDER — GABAPENTIN 300 MG/1
600 CAPSULE ORAL ONCE
Status: COMPLETED | OUTPATIENT
Start: 2024-04-26 | End: 2024-04-26

## 2024-04-26 RX ORDER — INDOCYANINE GREEN AND WATER 25 MG
KIT INJECTION AS NEEDED
Status: DISCONTINUED | OUTPATIENT
Start: 2024-04-26 | End: 2024-04-26 | Stop reason: SURG

## 2024-04-26 RX ORDER — HYDRALAZINE HYDROCHLORIDE 20 MG/ML
10 INJECTION INTRAMUSCULAR; INTRAVENOUS
Status: DISCONTINUED | OUTPATIENT
Start: 2024-04-26 | End: 2024-04-29 | Stop reason: HOSPADM

## 2024-04-26 RX ORDER — SIMETHICONE 80 MG
80 TABLET,CHEWABLE ORAL 4 TIMES DAILY PRN
Status: DISCONTINUED | OUTPATIENT
Start: 2024-04-26 | End: 2024-04-29 | Stop reason: HOSPADM

## 2024-04-26 RX ORDER — BUPIVACAINE HYDROCHLORIDE AND EPINEPHRINE 5; 5 MG/ML; UG/ML
INJECTION, SOLUTION PERINEURAL AS NEEDED
Status: DISCONTINUED | OUTPATIENT
Start: 2024-04-26 | End: 2024-04-26 | Stop reason: HOSPADM

## 2024-04-26 RX ORDER — INSULIN LISPRO 100 [IU]/ML
0-7 INJECTION, SOLUTION INTRAVENOUS; SUBCUTANEOUS
Status: DISCONTINUED | OUTPATIENT
Start: 2024-04-26 | End: 2024-04-29 | Stop reason: HOSPADM

## 2024-04-26 RX ORDER — CYCLOBENZAPRINE HCL 10 MG
10 TABLET ORAL 3 TIMES DAILY PRN
Status: DISCONTINUED | OUTPATIENT
Start: 2024-04-26 | End: 2024-04-29 | Stop reason: HOSPADM

## 2024-04-26 RX ORDER — SODIUM CHLORIDE 9 MG/ML
150 INJECTION, SOLUTION INTRAVENOUS CONTINUOUS
Status: DISCONTINUED | OUTPATIENT
Start: 2024-04-26 | End: 2024-04-28

## 2024-04-26 RX ORDER — BUPIVACAINE HYDROCHLORIDE 2.5 MG/ML
INJECTION, SOLUTION EPIDURAL; INFILTRATION; INTRACAUDAL
Status: COMPLETED | OUTPATIENT
Start: 2024-04-26 | End: 2024-04-26

## 2024-04-26 RX ORDER — ENOXAPARIN SODIUM 100 MG/ML
INJECTION SUBCUTANEOUS AS NEEDED
Status: DISCONTINUED | OUTPATIENT
Start: 2024-04-26 | End: 2024-04-26 | Stop reason: HOSPADM

## 2024-04-26 RX ORDER — ENOXAPARIN SODIUM 100 MG/ML
40 INJECTION SUBCUTANEOUS ONCE
Status: DISCONTINUED | OUTPATIENT
Start: 2024-04-26 | End: 2024-04-26 | Stop reason: HOSPADM

## 2024-04-26 RX ORDER — ONDANSETRON 2 MG/ML
4 INJECTION INTRAMUSCULAR; INTRAVENOUS EVERY 4 HOURS PRN
Status: DISCONTINUED | OUTPATIENT
Start: 2024-04-26 | End: 2024-04-29 | Stop reason: HOSPADM

## 2024-04-26 RX ADMIN — SODIUM CHLORIDE 150 ML/HR: 9 INJECTION, SOLUTION INTRAVENOUS at 12:02

## 2024-04-26 RX ADMIN — SODIUM CHLORIDE 150 ML/HR: 9 INJECTION, SOLUTION INTRAVENOUS at 07:35

## 2024-04-26 RX ADMIN — HYDROMORPHONE HYDROCHLORIDE 0.5 MG: 1 INJECTION, SOLUTION INTRAMUSCULAR; INTRAVENOUS; SUBCUTANEOUS at 11:35

## 2024-04-26 RX ADMIN — HYDROMORPHONE HYDROCHLORIDE 1 MG: 1 INJECTION, SOLUTION INTRAMUSCULAR; INTRAVENOUS; SUBCUTANEOUS at 13:58

## 2024-04-26 RX ADMIN — INDOCYANINE GREEN AND WATER 12.5 MG: KIT at 09:19

## 2024-04-26 RX ADMIN — GABAPENTIN 100 MG: 100 CAPSULE ORAL at 21:03

## 2024-04-26 RX ADMIN — DEXAMETHASONE SODIUM PHOSPHATE 4 MG: 10 INJECTION, SOLUTION INTRAMUSCULAR; INTRAVENOUS at 08:01

## 2024-04-26 RX ADMIN — SODIUM CHLORIDE, POTASSIUM CHLORIDE, SODIUM LACTATE AND CALCIUM CHLORIDE: 600; 310; 30; 20 INJECTION, SOLUTION INTRAVENOUS at 07:53

## 2024-04-26 RX ADMIN — ACETAMINOPHEN 1000 MG: 500 TABLET ORAL at 21:04

## 2024-04-26 RX ADMIN — ONDANSETRON 4 MG: 2 INJECTION INTRAMUSCULAR; INTRAVENOUS at 08:05

## 2024-04-26 RX ADMIN — FENTANYL CITRATE 100 MCG: 50 INJECTION, SOLUTION INTRAMUSCULAR; INTRAVENOUS at 07:57

## 2024-04-26 RX ADMIN — DROPERIDOL 0.62 MG: 2.5 INJECTION, SOLUTION INTRAMUSCULAR; INTRAVENOUS at 11:13

## 2024-04-26 RX ADMIN — ONDANSETRON 4 MG: 2 INJECTION INTRAMUSCULAR; INTRAVENOUS at 10:27

## 2024-04-26 RX ADMIN — HYDROMORPHONE HYDROCHLORIDE 1 MG: 2 INJECTION, SOLUTION INTRAMUSCULAR; INTRAVENOUS; SUBCUTANEOUS at 08:27

## 2024-04-26 RX ADMIN — THIAMINE HYDROCHLORIDE 100 MG: 100 INJECTION, SOLUTION INTRAMUSCULAR; INTRAVENOUS at 15:25

## 2024-04-26 RX ADMIN — HYDROMORPHONE HYDROCHLORIDE 1 MG: 1 INJECTION, SOLUTION INTRAMUSCULAR; INTRAVENOUS; SUBCUTANEOUS at 21:04

## 2024-04-26 RX ADMIN — PROPRANOLOL HYDROCHLORIDE 60 MG: 20 TABLET ORAL at 21:03

## 2024-04-26 RX ADMIN — HYDROMORPHONE HYDROCHLORIDE 1 MG: 2 INJECTION, SOLUTION INTRAMUSCULAR; INTRAVENOUS; SUBCUTANEOUS at 08:59

## 2024-04-26 RX ADMIN — LIDOCAINE HYDROCHLORIDE 100 MG: 10 INJECTION, SOLUTION EPIDURAL; INFILTRATION; INTRACAUDAL; PERINEURAL at 07:57

## 2024-04-26 RX ADMIN — FENTANYL CITRATE 50 MCG: 50 INJECTION, SOLUTION INTRAMUSCULAR; INTRAVENOUS at 11:13

## 2024-04-26 RX ADMIN — BUPIVACAINE HYDROCHLORIDE 60 ML: 2.5 INJECTION, SOLUTION EPIDURAL; INFILTRATION; INTRACAUDAL; PERINEURAL at 08:01

## 2024-04-26 RX ADMIN — ROCURONIUM BROMIDE 50 MG: 10 INJECTION INTRAVENOUS at 09:20

## 2024-04-26 RX ADMIN — PANTOPRAZOLE SODIUM 40 MG: 40 INJECTION, POWDER, FOR SOLUTION INTRAVENOUS at 07:21

## 2024-04-26 RX ADMIN — PROPRANOLOL HYDROCHLORIDE 60 MG: 20 TABLET ORAL at 16:36

## 2024-04-26 RX ADMIN — METOCLOPRAMIDE 10 MG: 5 INJECTION, SOLUTION INTRAMUSCULAR; INTRAVENOUS at 14:01

## 2024-04-26 RX ADMIN — PROPOFOL 220 MG: 10 INJECTION, EMULSION INTRAVENOUS at 07:57

## 2024-04-26 RX ADMIN — SCOPOLAMINE 1 PATCH: 1.5 PATCH, EXTENDED RELEASE TRANSDERMAL at 07:22

## 2024-04-26 RX ADMIN — PROPOFOL 25 MCG/KG/MIN: 10 INJECTION, EMULSION INTRAVENOUS at 08:03

## 2024-04-26 RX ADMIN — SODIUM CHLORIDE 1000 ML: 9 INJECTION, SOLUTION INTRAVENOUS at 07:22

## 2024-04-26 RX ADMIN — INDOCYANINE GREEN AND WATER 12.5 MG: KIT at 09:04

## 2024-04-26 RX ADMIN — PROPOFOL 30 MG: 10 INJECTION, EMULSION INTRAVENOUS at 10:33

## 2024-04-26 RX ADMIN — GABAPENTIN 600 MG: 300 CAPSULE ORAL at 07:21

## 2024-04-26 RX ADMIN — LIDOCAINE HYDROCHLORIDE 0.5 ML: 10 INJECTION, SOLUTION EPIDURAL; INFILTRATION; INTRACAUDAL; PERINEURAL at 07:21

## 2024-04-26 RX ADMIN — EPHEDRINE SULFATE 10 MG: 50 INJECTION INTRAVENOUS at 08:14

## 2024-04-26 RX ADMIN — SODIUM CHLORIDE 2000 MG: 900 INJECTION INTRAVENOUS at 08:03

## 2024-04-26 RX ADMIN — 0.12% CHLORHEXIDINE GLUCONATE 15 ML: 1.2 RINSE ORAL at 07:24

## 2024-04-26 RX ADMIN — ACETAMINOPHEN 1000 MG: 500 TABLET ORAL at 15:25

## 2024-04-26 RX ADMIN — GABAPENTIN 100 MG: 100 CAPSULE ORAL at 15:25

## 2024-04-26 RX ADMIN — MIRTAZAPINE 30 MG: 15 TABLET, FILM COATED ORAL at 21:03

## 2024-04-26 RX ADMIN — SUGAMMADEX 200 MG: 100 INJECTION, SOLUTION INTRAVENOUS at 10:33

## 2024-04-26 RX ADMIN — DROPERIDOL 0.62 MG: 2.5 INJECTION, SOLUTION INTRAMUSCULAR; INTRAVENOUS at 11:23

## 2024-04-26 RX ADMIN — ROCURONIUM BROMIDE 100 MG: 10 INJECTION INTRAVENOUS at 07:58

## 2024-04-26 RX ADMIN — OXYCODONE 5 MG: 5 TABLET ORAL at 16:35

## 2024-04-26 RX ADMIN — Medication 50 MCG: at 08:14

## 2024-04-26 RX ADMIN — AMLODIPINE BESYLATE 5 MG: 5 TABLET ORAL at 16:36

## 2024-04-26 RX ADMIN — TAMSULOSIN HYDROCHLORIDE 0.4 MG: 0.4 CAPSULE ORAL at 15:25

## 2024-04-26 RX ADMIN — 0.12% CHLORHEXIDINE GLUCONATE 15 ML: 1.2 RINSE ORAL at 07:36

## 2024-04-26 RX ADMIN — ACETAMINOPHEN 1000 MG: 500 TABLET ORAL at 07:20

## 2024-04-26 RX ADMIN — SODIUM CHLORIDE 2000 MG: 900 INJECTION INTRAVENOUS at 15:25

## 2024-04-26 NOTE — BRIEF OP NOTE
BILIOPANCREATIC DIVERSION WITH DUODENAL SWITCH WITH DAVINCI ROBOT  Progress Note    Gopi Johnson  4/26/2024    Pre-op Diagnosis:   Morbid exogenous obesity [E66.01]  S/P laparoscopic sleeve gastrectomy [Z98.84]  Type 2 diabetes mellitus with morbid obesity [E11.69, E66.01]       Post-Op Diagnosis Codes:     * Morbid exogenous obesity [E66.01]     * S/P laparoscopic sleeve gastrectomy [Z98.84]     * Type 2 diabetes mellitus with morbid obesity [E11.69, E66.01]    Procedure/CPT® Codes:  CT UNLISTED LAPAROSCOPY PROCEDURE STOMACH [94179]  CT ESOPHAGOGASTRODUODENOSCOPY TRANSORAL DIAGNOSTIC [02328]      Procedure(s):  BILIOPANCREATIC DIVERSION WITH DUODENAL SWITCH LAPAROSCOPIC WITH DAVINCI ROBOT  ESOPHAGOGASTRODUODENOSCOPY              Surgeon(s):  Olu Rodriguez MD Weiss, George Derek, MD    Anesthesia: General with Block    Staff:   Circulator: King Chahal RN; Keysha Serrato RN  Scrub Person: Isidra Fisher  Nursing Assistant: Emelyn Bazan CNA; Sofia Reardon  Assistant: Walter Ro PA-C  Assistant: Walter Ro PA-C      Estimated Blood Loss: 20 mL    Urine Voided: * No values recorded between 4/26/2024  7:54 AM and 4/26/2024 10:35 AM *    Specimens:                None          Drains:   Closed/Suction Drain 1 RUQ 10 Fr. (Active)       Findings:         Complications: None    Assistant: Walter Ro PA-C  was responsible for performing the following activities: Retraction, Suction, Irrigation, Suturing, Closing, Placing Dressing, and Held/Positioned Camera and their skilled assistance was necessary for the success of this case.    Olu Rodriguez MD     Date: 4/26/2024  Time: 10:40 EDT

## 2024-04-26 NOTE — INTERVAL H&P NOTE
H&P updated. The patient was examined and the following changes are noted:  MRSA negative.  Hb A1C 5.7.

## 2024-04-26 NOTE — OP NOTE
Preoperative diagnosis:    Morbid obesity (236 pounds, 107 kg, BMI 35.36) status post laparoscopic sleeve gastrectomy October 2022    Postoperative diagnosis: Same    Procedure:                                                   Laparoscopic robotic assisted biliopancreatic diversion with duodenal switch                                                                                   EGD                                                                               Surgeon:                                                       ALONDRA Rodriguez MD     Assistant: Walter Ro PA-C  was responsible for performing the following activities: Retraction, Suction, Irrigation, Suturing, Closing, Placing Dressing, and Held/Positioned Camera and their skilled assistance was necessary for the success of this case.     Anesthesia:                                                   GETA     EBL:                                                               100 mL     Fluids:                                                           Crystalloid     Specimens:                                                   None     Drains:                                                           #10 ARAM     Counts:                                                          Correct     Complications:                                               None     Indications:   This is a 48-year-old disabled morbidly obese male known to me status post laparoscopic sleeve gastrectomy in October 2022.  He presents now for elective revisional metabolic and bariatric surgery specifically revision of his sleeve gastrectomy to a duodenal switch.  Please see my extensive office notes.  He has undergone our extensive preoperative education, teaching, and consent process, everything is in order and he wishes to proceed.     Operative technique:      The patient was brought to the operating room, and placed supine upon the operating room table.  SCD hose were placed,  he underwent uneventful general endotracheal anesthesia the anesthesiology staff, the anesthesiology staff performed a TAP block, and his abdomen was prepped and draped with ChloraPrep in a sterile fashion, an Ioban was used as well.  A Rivera catheter was not placed.     The peritoneal cavity was entered and approximately the left midclavicular line at the level of the umbilicus using a 5 mm trocar utilizing an Optiview technique and the abdomen was insufflated to pressure of 15 mmHg with CO2 gas.  Exploratory laparoscopy revealed no evidence of injury from the entrance technique, a normal-appearing liver with good adhesion of the left lateral anterior surface to the diaphragm, extensive omental adhesions around the umbilicus from previous mesh hernia repair, and a small 1 cm incisional hernia without incarcerated contents which appeared to be in the area of the previous 19 mm trocar site incision at the time of his sleeve gastrectomy.  The inferior aspect of the sleeve appeared to be resting nicely.  Moderately dilated transverse colon noted.    Remaining trocars were placed under direct visualization.  8 mm robotic trocars were placed in the left lateral upper quadrant and left lateral lower quadrant.  The omental adhesions were lysed with the Enseal device.  Some excessive oozing noted and his blood pressure was mildly elevated which anesthesia addressed.  1 g TXA IV given.  No evidence of recurrent hernia, the mesh was intact.  A 12 mm robotic trocar was placed below and to the right of the umbilicus through the mesh and an 8 mm robotic trocar placed in the right lateral abdomen.     The cecum was identified.  The appendix was unremarkable and photodocumented.  The small bowel was run from the ileocecal valve to the ligament of Treitz and was noted to be longer than usual measuring roughly 875 cm.  Based on a small bowel length of 670 cm and a normal common channel of 150 cm, I elected to make a common channel  195 cm.   The small bowel was therefore run from the ileocecal valve proximally for 195 cm and marked on the antimesenteric border with 2 different colored sutures (2-0 silk and 2-0 Vicryl plus) for orientation and then run proximally for another 150 cm and marked in a similar fashion.     The Xi robot was docked and I scrubbed out and went to the robotic console.  The omentum was flipped up in the right upper quadrant and this was released by dividing omental adhesions to the gallbladder bed fossa and liver with the vessel sealing device.  The omentum of the proximal transverse colon was then divided in anticipation of future duodeno- ileostomy.  Incidentally the sleeve appeared unremarkable and no visible hiatal hernia from the anterior view and photodocumentation obtained.    Gastrocolic omentum was divided beginning at the distal antrum and working proximally to the first portion of the duodenum.   Peritoneum of the first portion of the duodenum on the superior aspect was scored as well.  Dissection was then carried out underneath the first portion of the duodenum until a window had been created.  The first portion of the duodenum was then divided with the robotic 60 mm stapler with a blue load approximately 3 cm from the pylorus.  There was a few millimeters of residual duodenum which was divided with a robotic 60 mm white load.  Floseal and a Ray-Rocky were placed on the duodenal stump.  Division of the right gastric artery was not necessary.  Intravenous ICG given and used to confirm good blood flow of the proximal duodenum and anastomosis throughout the procedure.   The ligament of Treitz identified and the small bowel run distally to the first encountered markings and this was lined up in appropriate orientation with the divided end of the duodenum.  A single layer handsewn duodenal ileostomy was then carried out in appropriate orientation (efferent limb to the right, afferent to the left) using running  seromuscular absorbable 3-0 Stratafix sutures.  The anastomosis appeared to rest nicely without tension or torsion and had good perfusion.     The afferent limb just proximal to the anastomosis was divided with a 60 mm white load.  The underlying mesentery divided for short distance.  The efferent limb was run distally  the previously marked area of the ileum.  And ileoileostomy was then created in an antiperistaltic fashion by making enterotomies on the antimesenteric border of each limb and a common lumen was created with a 60 mm robotic white load.  The intervening enterotomy was closed in 2 layers using an absorbable 3-0 Stratafix suture.  The end of the staple line was later imbricated with a figure-of-eight seromuscular stitch using the remaining nonabsorbable 2-0 V-Loc used later for mesenteric closure.   Upon completion the ileoileostomy anastomosis rested nicely without tension or torsion.  The potential mesenteric defects at the ileoileostomy and at Baugh's space were individually closed using running nonabsorbable 2-0 V-Loc sutures.  The efferent limb several centimeters from the duodeno-ileostomy was occluded with a noncrushing bowel clamp.  The duodeno-ileostomy anastomosis was submerged under saline.     I performed upper endoscopy.  The endoscope advanced nicely through the sleeve which appeared unremarkable.  No visible hiatal hernia Z-line approximately 39 cm.  Anastomosis widely patent just beyond the pylorus.  No ischemia or bleeding at the anastomosis.  Nice distention of the occluded ileal limb.  No air bubbles or leak noted.  The endoscope was withdrawn.  Endoscopic photodocumentation obtained of the anastomosis and ileum.     At this time I scrubbed back in and the robot was undocked.  Irrigation fluid was suctioned free.  A #10 flat fully perforated ARAM drain was placed under the left lobe of the liver and up over the spleen and brought out through the right sided 8 mm trocar site incision  and anchored to the skin with a 2-0 nylon suture.    Fascia at the 12 mm trocar site incision was closed with a 0 Vicryl suture placed with a suture passer under direct visualization and tying the knot extracorporeally -1 side of the stitch included fascia lateral to the mesh.     Remaining trocars were removed under direct visualization, no bleeding noted from their sites.  Skin in each incision was closed using 3-0 Monocryl plus in an interrupted subcuticular stitch followed by skin glue.  A dry sterile dressing was placed around the ARAM site.      The patient tolerated the procedure well without complication, was taken to the recovery room in stable condition.

## 2024-04-26 NOTE — ANESTHESIA PROCEDURE NOTES
Airway  Urgency: elective    Date/Time: 4/26/2024 7:59 AM  Airway not difficult    General Information and Staff    Patient location during procedure: OR  CRNA/CAA: Lonnie Black CRNA    Indications and Patient Condition  Indications for airway management: airway protection    Preoxygenated: yes  MILS not maintained throughout  Mask difficulty assessment: 1 - vent by mask    Final Airway Details  Final airway type: endotracheal airway      Successful airway: ETT  Cuffed: yes   Successful intubation technique: direct laryngoscopy  Facilitating devices/methods: intubating stylet  Endotracheal tube insertion site: oral  Blade: Nikolay  Blade size: 4  ETT size (mm): 7.5  Cormack-Lehane Classification: grade I - full view of glottis  Placement verified by: chest auscultation and capnometry   Cuff volume (mL): 10  Measured from: lips  ETT/EBT  to lips (cm): 22  Number of attempts at approach: 1  Assessment: lips, teeth, and gum same as pre-op and atraumatic intubation    Additional Comments  Negative epigastric sounds, Breath sound equal bilaterally with symmetric chest rise and fall

## 2024-04-26 NOTE — ANESTHESIA POSTPROCEDURE EVALUATION
Patient: Gopi Johnson    Procedure Summary       Date: 04/26/24 Room / Location:  [facility]    Anesthesia Start: 0753 Anesthesia Stop: 1049    Procedures:       BILIOPANCREATIC DIVERSION WITH DUODENAL SWITCH LAPAROSCOPIC WITH DAVINCI ROBOT (Abdomen)      ESOPHAGOGASTRODUODENOSCOPY (Esophagus) Diagnosis:       Morbid exogenous obesity      S/P laparoscopic sleeve gastrectomy      Type 2 diabetes mellitus with morbid obesity      (Morbid exogenous obesity [E66.01])      (S/P laparoscopic sleeve gastrectomy [Z98.84])      (Type 2 diabetes mellitus with morbid obesity [E11.69, E66.01])    Surgeons: Olu Rodriguez MD Provider: Ciro Chahal MD    Anesthesia Type: general with block ASA Status: 3            Anesthesia Type: general with block    Vitals  Vitals Value Taken Time   /79 04/26/24 1049   Temp 97.4 °F (36.3 °C) 04/26/24 1049   Pulse 62 04/26/24 1049   Resp 12 04/26/24 1049   SpO2 99 % 04/26/24 1049           Post Anesthesia Care and Evaluation    Patient location during evaluation: PACU  Patient participation: waiting for patient participation  Level of consciousness: sleepy but conscious  Pain management: adequate    Airway patency: patent  Anesthetic complications: No anesthetic complications  PONV Status: none  Cardiovascular status: hemodynamically stable and acceptable  Respiratory status: nonlabored ventilation, acceptable, nasal cannula and oral airway  Hydration status: acceptable

## 2024-04-26 NOTE — ANESTHESIA PROCEDURE NOTES
"Peripheral Block      Patient reassessed immediately prior to procedure    Patient location during procedure: OR  Start time: 4/26/2024 8:01 AM  Reason for block: at surgeon's request and post-op pain management  Performed by  Anesthesiologist: Ciro Chahal MD  Preanesthetic Checklist  Completed: patient identified, IV checked, site marked, risks and benefits discussed, surgical consent, monitors and equipment checked, pre-op evaluation and timeout performed  Prep:  Pt Position: supine  Sterile barriers:cap, gloves, mask and washed/disinfected hands  Prep: ChloraPrep  Patient monitoring: blood pressure monitoring, continuous pulse oximetry and EKG  Procedure    Sedation: yes  Performed under: general  Guidance:ultrasound guided  Images:still images obtained, printed/placed on chart    Laterality:Bilateral  Block Type:TAP  Injection Technique:single-shot  Needle Type:short-bevel and echogenic  Needle Gauge:20 G  Resistance on Injection: none    Medications Used: dexamethasone sodium phosphate injection - Injection   4 mg - 4/26/2024 8:01:00 AM  bupivacaine PF (MARCAINE) 0.25 % injection - Injection   60 mL - 4/26/2024 8:01:00 AM      Medications  Comment:Block Injection:  LA dose divided between Right and Left block        Post Assessment  Injection Assessment: negative aspiration for heme, incremental injection and no paresthesia on injection  Patient Tolerance:comfortable throughout block  Complications:no  Additional Notes    Subcostal TAPs    A high-frequency linear transducer, with sterile cover, was placed sub-xiphoid to identify [place], right and left Rectus Abdominus Muscles (SRINIVASAN). The transducer was moved either right or left subcostally to identify the SRINIVASAN and the Transverse Abdominus Muscle (LO). The insertion site was prepped in sterile fashion and then localized with 2-5 ml of 1% Lidocaine. Using ultrasound-guidance, a 20-gauge B-Springer 4\" Ultraplex 360 non-stimulating echogenic needle was " "advanced in plane, from medial to lateral, until the tip of the needle was in the fascial plane between the SRINIVASAN and LO. 1-3ml of preservative free normal saline was used to hydro-dissect the fascial planes. After the fascial plane was verified, the local anesthetic (LA) was injected. The procedure was repeated on the opposite side for bilateral coverage. Aspiration every 5 ml to prevent intravascular injection. Injection was completed with negative aspiration of blood and negative intravascular injection. Injection pressures were normal with minimal resistance. The subcostal approach to the TAP nerve block ideally anesthetizes the intercostal nerves T6-T9.     Mid-Axillary/Lateral TAPs    A high-frequency linear transducer, with sterile cover, was placed in the midaxillary line between the ASIS and costal margin. The External Oblique Muscle (EOM), Internal Oblique Muscle (IOM), Transverse Abdominus Muscle (LO), and Peritoneum were identified. The insertion site was prepped in sterile fashion and then localized with 2-5 ml of 1% Lidocaine. Using ultrasound-guidance, a 20-gauge B-Springer 4\" Ultraplex 360 non-stimulating echogenic needle was advanced in plane, from medial to lateral, until the tip of the needle was in the fascial plane between the IOM and LO. 1-3ml of preservative free normal saline was used to hydro-dissect the fascial planes. After the fascial plane was verified, the local anesthetic (LA) was injected. The procedure was repeated on the opposite side for bilateral coverage. Aspiration every 5 ml to prevent intravascular injection. Injection was completed with negative aspiration of blood and negative intravascular injection. Injection pressures were normal with minimal resistance. Midaxillary TAPs should reach intercostal nerves T10- T11 and the subcostal nerve T12.              "

## 2024-04-27 ENCOUNTER — APPOINTMENT (OUTPATIENT)
Dept: GENERAL RADIOLOGY | Facility: HOSPITAL | Age: 49
DRG: 620 | End: 2024-04-27
Payer: MEDICARE

## 2024-04-27 LAB
ALBUMIN SERPL-MCNC: 4 G/DL (ref 3.5–5.2)
ALBUMIN/GLOB SERPL: 1.8 G/DL
ALP SERPL-CCNC: 112 U/L (ref 39–117)
ALT SERPL W P-5'-P-CCNC: 25 U/L (ref 1–41)
ANION GAP SERPL CALCULATED.3IONS-SCNC: 13 MMOL/L (ref 5–15)
AST SERPL-CCNC: 24 U/L (ref 1–40)
BASOPHILS # BLD AUTO: 0.01 10*3/MM3 (ref 0–0.2)
BASOPHILS NFR BLD AUTO: 0.1 % (ref 0–1.5)
BILIRUB SERPL-MCNC: 0.3 MG/DL (ref 0–1.2)
BUN SERPL-MCNC: 9 MG/DL (ref 6–20)
BUN/CREAT SERPL: 15 (ref 7–25)
CALCIUM SPEC-SCNC: 8.4 MG/DL (ref 8.6–10.5)
CHLORIDE SERPL-SCNC: 100 MMOL/L (ref 98–107)
CO2 SERPL-SCNC: 26 MMOL/L (ref 22–29)
CREAT SERPL-MCNC: 0.6 MG/DL (ref 0.76–1.27)
DEPRECATED RDW RBC AUTO: 39.8 FL (ref 37–54)
EGFRCR SERPLBLD CKD-EPI 2021: 119.1 ML/MIN/1.73
EOSINOPHIL # BLD AUTO: 0 10*3/MM3 (ref 0–0.4)
EOSINOPHIL NFR BLD AUTO: 0 % (ref 0.3–6.2)
ERYTHROCYTE [DISTWIDTH] IN BLOOD BY AUTOMATED COUNT: 13.2 % (ref 12.3–15.4)
GLOBULIN UR ELPH-MCNC: 2.2 GM/DL
GLUCOSE BLDC GLUCOMTR-MCNC: 110 MG/DL (ref 70–130)
GLUCOSE BLDC GLUCOMTR-MCNC: 113 MG/DL (ref 70–130)
GLUCOSE BLDC GLUCOMTR-MCNC: 117 MG/DL (ref 70–130)
GLUCOSE BLDC GLUCOMTR-MCNC: 129 MG/DL (ref 70–130)
GLUCOSE SERPL-MCNC: 133 MG/DL (ref 65–99)
HCT VFR BLD AUTO: 40.5 % (ref 37.5–51)
HGB BLD-MCNC: 13.5 G/DL (ref 13–17.7)
IMM GRANULOCYTES # BLD AUTO: 0.04 10*3/MM3 (ref 0–0.05)
IMM GRANULOCYTES NFR BLD AUTO: 0.4 % (ref 0–0.5)
IRON 24H UR-MRATE: 49 MCG/DL (ref 59–158)
LYMPHOCYTES # BLD AUTO: 1.35 10*3/MM3 (ref 0.7–3.1)
LYMPHOCYTES NFR BLD AUTO: 14.9 % (ref 19.6–45.3)
MCH RBC QN AUTO: 27.6 PG (ref 26.6–33)
MCHC RBC AUTO-ENTMCNC: 33.3 G/DL (ref 31.5–35.7)
MCV RBC AUTO: 82.8 FL (ref 79–97)
MONOCYTES # BLD AUTO: 0.8 10*3/MM3 (ref 0.1–0.9)
MONOCYTES NFR BLD AUTO: 8.8 % (ref 5–12)
NEUTROPHILS NFR BLD AUTO: 6.85 10*3/MM3 (ref 1.7–7)
NEUTROPHILS NFR BLD AUTO: 75.8 % (ref 42.7–76)
NRBC BLD AUTO-RTO: 0 /100 WBC (ref 0–0.2)
PLATELET # BLD AUTO: 224 10*3/MM3 (ref 140–450)
PMV BLD AUTO: 9.6 FL (ref 6–12)
POTASSIUM SERPL-SCNC: 3.6 MMOL/L (ref 3.5–5.2)
PROT SERPL-MCNC: 6.2 G/DL (ref 6–8.5)
RBC # BLD AUTO: 4.89 10*6/MM3 (ref 4.14–5.8)
SODIUM SERPL-SCNC: 139 MMOL/L (ref 136–145)
WBC NRBC COR # BLD AUTO: 9.05 10*3/MM3 (ref 3.4–10.8)

## 2024-04-27 PROCEDURE — 25010000002 NA FERRIC GLUC CPLX PER 12.5 MG: Performed by: SURGERY

## 2024-04-27 PROCEDURE — 25010000002 METOCLOPRAMIDE PER 10 MG: Performed by: SURGERY

## 2024-04-27 PROCEDURE — 85025 COMPLETE CBC W/AUTO DIFF WBC: CPT | Performed by: SURGERY

## 2024-04-27 PROCEDURE — 25010000002 ONDANSETRON PER 1 MG: Performed by: SURGERY

## 2024-04-27 PROCEDURE — 74240 X-RAY XM UPR GI TRC 1CNTRST: CPT

## 2024-04-27 PROCEDURE — 25010000002 HYDRALAZINE PER 20 MG: Performed by: SURGERY

## 2024-04-27 PROCEDURE — 25010000002 CYANOCOBALAMIN PER 1000 MCG: Performed by: SURGERY

## 2024-04-27 PROCEDURE — 0 DIATRIZOATE MEGLUMINE & SODIUM PER 1 ML: Performed by: SURGERY

## 2024-04-27 PROCEDURE — 83540 ASSAY OF IRON: CPT | Performed by: SURGERY

## 2024-04-27 PROCEDURE — 63710000001 PROCHLORPERAZINE MALEATE PER 10 MG: Performed by: SURGERY

## 2024-04-27 PROCEDURE — 99024 POSTOP FOLLOW-UP VISIT: CPT | Performed by: SURGERY

## 2024-04-27 PROCEDURE — 80053 COMPREHEN METABOLIC PANEL: CPT | Performed by: SURGERY

## 2024-04-27 PROCEDURE — 25810000003 SODIUM CHLORIDE 0.9 % SOLUTION 1,000 ML FLEX CONT: Performed by: SURGERY

## 2024-04-27 PROCEDURE — 25010000002 CEFAZOLIN PER 500 MG: Performed by: SURGERY

## 2024-04-27 PROCEDURE — 82948 REAGENT STRIP/BLOOD GLUCOSE: CPT

## 2024-04-27 PROCEDURE — 25010000002 POTASSIUM CHLORIDE PER 2 MEQ: Performed by: SURGERY

## 2024-04-27 PROCEDURE — 25010000002 ENOXAPARIN PER 10 MG: Performed by: SURGERY

## 2024-04-27 PROCEDURE — 25010000002 THIAMINE PER 100 MG: Performed by: SURGERY

## 2024-04-27 RX ORDER — HYDROMORPHONE HYDROCHLORIDE 2 MG/1
2 TABLET ORAL EVERY 4 HOURS PRN
Status: DISCONTINUED | OUTPATIENT
Start: 2024-04-27 | End: 2024-04-29 | Stop reason: HOSPADM

## 2024-04-27 RX ADMIN — ACETAMINOPHEN 1000 MG: 500 TABLET ORAL at 13:23

## 2024-04-27 RX ADMIN — ONDANSETRON 4 MG: 2 INJECTION INTRAMUSCULAR; INTRAVENOUS at 03:47

## 2024-04-27 RX ADMIN — ACETAMINOPHEN 1000 MG: 500 TABLET ORAL at 05:22

## 2024-04-27 RX ADMIN — SODIUM CHLORIDE 125 MG: 9 INJECTION, SOLUTION INTRAVENOUS at 12:03

## 2024-04-27 RX ADMIN — AMLODIPINE BESYLATE 5 MG: 5 TABLET ORAL at 09:41

## 2024-04-27 RX ADMIN — PROPRANOLOL HYDROCHLORIDE 60 MG: 20 TABLET ORAL at 09:42

## 2024-04-27 RX ADMIN — METOCLOPRAMIDE 10 MG: 5 INJECTION, SOLUTION INTRAMUSCULAR; INTRAVENOUS at 12:13

## 2024-04-27 RX ADMIN — PROPRANOLOL HYDROCHLORIDE 60 MG: 20 TABLET ORAL at 21:10

## 2024-04-27 RX ADMIN — POTASSIUM CHLORIDE AND SODIUM CHLORIDE 125 ML/HR: 450; 150 INJECTION, SOLUTION INTRAVENOUS at 09:42

## 2024-04-27 RX ADMIN — ONDANSETRON 4 MG: 2 INJECTION INTRAMUSCULAR; INTRAVENOUS at 09:45

## 2024-04-27 RX ADMIN — HYDRALAZINE HYDROCHLORIDE 10 MG: 20 INJECTION INTRAMUSCULAR; INTRAVENOUS at 03:29

## 2024-04-27 RX ADMIN — SODIUM CHLORIDE 2000 MG: 900 INJECTION INTRAVENOUS at 00:35

## 2024-04-27 RX ADMIN — GABAPENTIN 100 MG: 100 CAPSULE ORAL at 21:10

## 2024-04-27 RX ADMIN — ACETAMINOPHEN 1000 MG: 500 TABLET ORAL at 21:10

## 2024-04-27 RX ADMIN — OXYCODONE 5 MG: 5 TABLET ORAL at 03:33

## 2024-04-27 RX ADMIN — CYANOCOBALAMIN 1000 MCG: 1000 INJECTION, SOLUTION INTRAMUSCULAR; SUBCUTANEOUS at 09:41

## 2024-04-27 RX ADMIN — PROCHLORPERAZINE MALEATE 10 MG: 10 TABLET ORAL at 21:15

## 2024-04-27 RX ADMIN — PANTOPRAZOLE SODIUM 40 MG: 40 INJECTION, POWDER, FOR SOLUTION INTRAVENOUS at 05:22

## 2024-04-27 RX ADMIN — GABAPENTIN 100 MG: 100 CAPSULE ORAL at 16:37

## 2024-04-27 RX ADMIN — MIRTAZAPINE 30 MG: 15 TABLET, FILM COATED ORAL at 21:10

## 2024-04-27 RX ADMIN — TAMSULOSIN HYDROCHLORIDE 0.4 MG: 0.4 CAPSULE ORAL at 09:41

## 2024-04-27 RX ADMIN — ONDANSETRON 4 MG: 2 INJECTION INTRAMUSCULAR; INTRAVENOUS at 16:37

## 2024-04-27 RX ADMIN — OXYCODONE 5 MG: 5 TABLET ORAL at 21:10

## 2024-04-27 RX ADMIN — OXYCODONE 5 MG: 5 TABLET ORAL at 09:41

## 2024-04-27 RX ADMIN — GABAPENTIN 100 MG: 100 CAPSULE ORAL at 09:41

## 2024-04-27 RX ADMIN — HYDROMORPHONE HYDROCHLORIDE 2 MG: 2 TABLET ORAL at 12:13

## 2024-04-27 RX ADMIN — CYCLOBENZAPRINE 10 MG: 10 TABLET, FILM COATED ORAL at 12:13

## 2024-04-27 RX ADMIN — FOLIC ACID 200 ML/HR: 5 INJECTION, SOLUTION INTRAMUSCULAR; INTRAVENOUS; SUBCUTANEOUS at 03:29

## 2024-04-27 RX ADMIN — ENOXAPARIN SODIUM 40 MG: 100 INJECTION SUBCUTANEOUS at 09:41

## 2024-04-27 RX ADMIN — POTASSIUM CHLORIDE AND SODIUM CHLORIDE 125 ML/HR: 450; 150 INJECTION, SOLUTION INTRAVENOUS at 19:08

## 2024-04-27 NOTE — PLAN OF CARE
Goal Outcome Evaluation:  Plan of Care Reviewed With: patient        Progress: no change  Outcome Evaluation: Pt alert and oriented x4. PRN BP medication given for elevated BP. 2L NC in place, RA at baseline. Pt has been drowsy this shift. Ambulating to restroom, refuses walk in chavez. PRN pain medications given. ARAM to RLQ with 40 cc output this shift. Rally bag infusing. IV abx given. No needs voiced at this time. Call light and personal items within reach.

## 2024-04-27 NOTE — PROGRESS NOTES
"Cc: POD#1 DS  \"pain, nausea\"    He was ambulating to the bathroom with the nursing staff on my arrival.  He complains of pain in the right lower quadrant as well as nausea and some vomiting of \"dark liquid\".  Does not feel like trying anything orally because of the nausea.  No bowel movement or flatus.  Says he is ambulating and voiding well.  No pulmonary complaints, spirometer only 1000 observed.    No fever, Tmax in current 99.6 no tachycardia, pulse 64 respirations 18 blood pressure 152/90 saturation 94%.  - NR.  ARAM 90 - NR. SS/bloody.  He looks uncomfortable but nontoxic.  Abdomen soft, nontender/appropriate, nondistended, bowel sounds are active, ARAM dressing saturated and replaced, site looks fine.  Other wounds look okay.    CMP normal except for glucose of 133 creatinine 0.6 calcium 8.4 of note albumin 4.0.  Iron is 49.  White count 9 with an unremarkable differential except for 15% lymphocytes.  H&H 13.5 and 40.5.  Preoperative H&H 14.1 and 41.5.  Hemoglobin A1c elevated 5.70.  MRSA screen negative.  Upper GI images and report reviewed, no leak or obstruction, incorrectly read by Dr. Monsalve as \"gastric bypass\" with slight delay of emptying at the \"jejunojejunostomy\", (indication on the report listed as \"duodenal switch water-soluble\") but contrast is seen passing through the duodeno-ileostomy.  On my review is noted intraoperatively he appears to have a somewhat dilated right and transverse colon, this includes the splenic flexure (beyond the duodeno-ileostomy).    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  Pain, nausea and vomiting, no significant oral intake.  Upper GI shows no evidence of leak or obstruction.  History of chronic pain and narcotic use on preoperative Subutex.  Insulin-dependent diabetes mellitus hemoglobin A1c 5.70.  Iron deficiency without evidence of anemia or bleeding on Lovenox.  Poor I-S, history of obstructive sleep apnea, " increased risk of postoperative pulmonary complications.    Plan: Continue stage I liquid bariatric diet as tolerated, out of bed, pulmonary toilet, VTE prophylaxis, pain and nausea medication as needed.  IV iron.  See orders

## 2024-04-27 NOTE — PLAN OF CARE
Goal Outcome Evaluation:  Plan of Care Reviewed With: patient        Progress: declining  Outcome Evaluation: VSS. RA to 2L when sleeping. pt complaints of continuous pain and nausea. PRN's given for pain and nausea control. Multiple vomiting episodes. Encouraging ambulation and ICS- pt able to get ICS up to 1000. Lap sites clean and dry with no drainage. LLQ ARAM drain dressing changed- minimal output. Encouraging protein intake- minimal thus far. pt resting comfortably. no further complaints at this time. IV iron replacement given for low levels.

## 2024-04-28 ENCOUNTER — APPOINTMENT (OUTPATIENT)
Dept: GENERAL RADIOLOGY | Facility: HOSPITAL | Age: 49
DRG: 620 | End: 2024-04-28
Payer: MEDICARE

## 2024-04-28 LAB
ALBUMIN SERPL-MCNC: 3.8 G/DL (ref 3.5–5.2)
ALBUMIN/GLOB SERPL: 1.8 G/DL
ALP SERPL-CCNC: 137 U/L (ref 39–117)
ALT SERPL W P-5'-P-CCNC: 56 U/L (ref 1–41)
ANION GAP SERPL CALCULATED.3IONS-SCNC: 10 MMOL/L (ref 5–15)
AST SERPL-CCNC: 71 U/L (ref 1–40)
BASOPHILS # BLD AUTO: 0.03 10*3/MM3 (ref 0–0.2)
BASOPHILS NFR BLD AUTO: 0.3 % (ref 0–1.5)
BILIRUB SERPL-MCNC: 0.3 MG/DL (ref 0–1.2)
BUN SERPL-MCNC: 10 MG/DL (ref 6–20)
BUN/CREAT SERPL: 17.9 (ref 7–25)
CALCIUM SPEC-SCNC: 8.5 MG/DL (ref 8.6–10.5)
CHLORIDE SERPL-SCNC: 102 MMOL/L (ref 98–107)
CO2 SERPL-SCNC: 28 MMOL/L (ref 22–29)
CREAT SERPL-MCNC: 0.56 MG/DL (ref 0.76–1.27)
DEPRECATED RDW RBC AUTO: 40.2 FL (ref 37–54)
EGFRCR SERPLBLD CKD-EPI 2021: 121.6 ML/MIN/1.73
EOSINOPHIL # BLD AUTO: 0.15 10*3/MM3 (ref 0–0.4)
EOSINOPHIL NFR BLD AUTO: 1.4 % (ref 0.3–6.2)
ERYTHROCYTE [DISTWIDTH] IN BLOOD BY AUTOMATED COUNT: 13.5 % (ref 12.3–15.4)
GLOBULIN UR ELPH-MCNC: 2.1 GM/DL
GLUCOSE BLDC GLUCOMTR-MCNC: 104 MG/DL (ref 70–130)
GLUCOSE BLDC GLUCOMTR-MCNC: 115 MG/DL (ref 70–130)
GLUCOSE BLDC GLUCOMTR-MCNC: 126 MG/DL (ref 70–130)
GLUCOSE BLDC GLUCOMTR-MCNC: 97 MG/DL (ref 70–130)
GLUCOSE SERPL-MCNC: 116 MG/DL (ref 65–99)
HCT VFR BLD AUTO: 39.8 % (ref 37.5–51)
HGB BLD-MCNC: 13.2 G/DL (ref 13–17.7)
IMM GRANULOCYTES # BLD AUTO: 0.04 10*3/MM3 (ref 0–0.05)
IMM GRANULOCYTES NFR BLD AUTO: 0.4 % (ref 0–0.5)
LYMPHOCYTES # BLD AUTO: 2.12 10*3/MM3 (ref 0.7–3.1)
LYMPHOCYTES NFR BLD AUTO: 20.4 % (ref 19.6–45.3)
MCH RBC QN AUTO: 27.3 PG (ref 26.6–33)
MCHC RBC AUTO-ENTMCNC: 33.2 G/DL (ref 31.5–35.7)
MCV RBC AUTO: 82.4 FL (ref 79–97)
MONOCYTES # BLD AUTO: 0.81 10*3/MM3 (ref 0.1–0.9)
MONOCYTES NFR BLD AUTO: 7.8 % (ref 5–12)
NEUTROPHILS NFR BLD AUTO: 69.7 % (ref 42.7–76)
NEUTROPHILS NFR BLD AUTO: 7.23 10*3/MM3 (ref 1.7–7)
NRBC BLD AUTO-RTO: 0 /100 WBC (ref 0–0.2)
PLATELET # BLD AUTO: 220 10*3/MM3 (ref 140–450)
PMV BLD AUTO: 9.3 FL (ref 6–12)
POTASSIUM SERPL-SCNC: 4 MMOL/L (ref 3.5–5.2)
PROT SERPL-MCNC: 5.9 G/DL (ref 6–8.5)
RBC # BLD AUTO: 4.83 10*6/MM3 (ref 4.14–5.8)
SODIUM SERPL-SCNC: 140 MMOL/L (ref 136–145)
WBC NRBC COR # BLD AUTO: 10.38 10*3/MM3 (ref 3.4–10.8)

## 2024-04-28 PROCEDURE — 25010000002 POTASSIUM CHLORIDE PER 2 MEQ: Performed by: SURGERY

## 2024-04-28 PROCEDURE — 80053 COMPREHEN METABOLIC PANEL: CPT | Performed by: SURGERY

## 2024-04-28 PROCEDURE — 25010000002 ENOXAPARIN PER 10 MG: Performed by: SURGERY

## 2024-04-28 PROCEDURE — 99024 POSTOP FOLLOW-UP VISIT: CPT | Performed by: SURGERY

## 2024-04-28 PROCEDURE — 63710000001 PROCHLORPERAZINE MALEATE PER 10 MG: Performed by: SURGERY

## 2024-04-28 PROCEDURE — 74019 RADEX ABDOMEN 2 VIEWS: CPT

## 2024-04-28 PROCEDURE — 85025 COMPLETE CBC W/AUTO DIFF WBC: CPT | Performed by: SURGERY

## 2024-04-28 PROCEDURE — 71046 X-RAY EXAM CHEST 2 VIEWS: CPT

## 2024-04-28 PROCEDURE — 25010000002 ONDANSETRON PER 1 MG: Performed by: SURGERY

## 2024-04-28 PROCEDURE — 82948 REAGENT STRIP/BLOOD GLUCOSE: CPT

## 2024-04-28 RX ORDER — PYRIDOSTIGMINE BROMIDE 180 MG/1
180 TABLET, EXTENDED RELEASE ORAL DAILY
Status: DISCONTINUED | OUTPATIENT
Start: 2024-04-28 | End: 2024-04-29 | Stop reason: HOSPADM

## 2024-04-28 RX ADMIN — MIRTAZAPINE 30 MG: 15 TABLET, FILM COATED ORAL at 21:09

## 2024-04-28 RX ADMIN — ACETAMINOPHEN 1000 MG: 500 TABLET ORAL at 05:51

## 2024-04-28 RX ADMIN — PANTOPRAZOLE SODIUM 40 MG: 40 INJECTION, POWDER, FOR SOLUTION INTRAVENOUS at 05:51

## 2024-04-28 RX ADMIN — ONDANSETRON 4 MG: 2 INJECTION INTRAMUSCULAR; INTRAVENOUS at 14:33

## 2024-04-28 RX ADMIN — ONDANSETRON 4 MG: 2 INJECTION INTRAMUSCULAR; INTRAVENOUS at 08:35

## 2024-04-28 RX ADMIN — OXYCODONE 5 MG: 5 TABLET ORAL at 21:09

## 2024-04-28 RX ADMIN — POTASSIUM CHLORIDE AND SODIUM CHLORIDE 125 ML/HR: 450; 150 INJECTION, SOLUTION INTRAVENOUS at 21:25

## 2024-04-28 RX ADMIN — POTASSIUM CHLORIDE AND SODIUM CHLORIDE 125 ML/HR: 450; 150 INJECTION, SOLUTION INTRAVENOUS at 10:51

## 2024-04-28 RX ADMIN — HYDROMORPHONE HYDROCHLORIDE 2 MG: 2 TABLET ORAL at 08:47

## 2024-04-28 RX ADMIN — PYRIDOSTIGMINE BROMIDE 180 MG: 180 TABLET ORAL at 23:32

## 2024-04-28 RX ADMIN — AMLODIPINE BESYLATE 5 MG: 5 TABLET ORAL at 08:35

## 2024-04-28 RX ADMIN — OXYCODONE 5 MG: 5 TABLET ORAL at 03:10

## 2024-04-28 RX ADMIN — PROPRANOLOL HYDROCHLORIDE 60 MG: 20 TABLET ORAL at 08:35

## 2024-04-28 RX ADMIN — OXYCODONE 5 MG: 5 TABLET ORAL at 14:33

## 2024-04-28 RX ADMIN — CYCLOBENZAPRINE 10 MG: 10 TABLET, FILM COATED ORAL at 08:35

## 2024-04-28 RX ADMIN — PROPRANOLOL HYDROCHLORIDE 60 MG: 20 TABLET ORAL at 21:09

## 2024-04-28 RX ADMIN — ENOXAPARIN SODIUM 40 MG: 100 INJECTION SUBCUTANEOUS at 08:35

## 2024-04-28 RX ADMIN — TAMSULOSIN HYDROCHLORIDE 0.4 MG: 0.4 CAPSULE ORAL at 08:35

## 2024-04-28 RX ADMIN — PROCHLORPERAZINE MALEATE 10 MG: 10 TABLET ORAL at 17:24

## 2024-04-28 RX ADMIN — HYDROMORPHONE HYDROCHLORIDE 2 MG: 2 TABLET ORAL at 17:24

## 2024-04-28 NOTE — PROGRESS NOTES
"Cc: POD#2 DS  \"crampy lower abdominal pain, N/V\"    He is sitting up in bed.  He says he does not feel well with crampy lower abdominal pain and the sensation that he needs to pass flatus.  He feels like it is going to happen and then it does not.  No bowel movement.  2 episodes of nausea and vomiting of brown material today.  Says he is ambulating and voiding okay.  No pulmonary complaints, spirometer only 1250 observed.  He is wearing oxygen by nasal cannula.    No fever, pulse 60 but range listed from 49-92 respiration 16 blood pressure 144/93 saturation 94%. UO NR  ARAM 30 SS.  He is no apparent distress.  Abdomen soft, nontender/appropriate, mildly distended, bowel sounds present but hypoactive, wounds look okay.  ARAM dressing dry    CMP normal except glucose of 116 creatinine 0.56 calcium 8.5 total protein 5.9 ALT 56 AST 71 alkaline phosphatase 137.  White count normal at 10.4 with a normal differential.  H&H 13 and 39.8.    Impression: Postop day #2 laparoscopic robotic assisted revision of his previous sleeve gastrectomy to a duodenal switch with biliopancreatic diversion.  Crampy lower abdominal pain, nausea and vomiting, elevated LFTs today.  He may have an issue with his BP limb.  History of chronic pain and chronic narcotic use on preoperative Subutex.  Insulin-dependent diabetes mellitus.  Iron deficiency without anemia.  Ongoing poor I-S, requiring oxygen by nasal cannula, obstructive sleep apnea all putting him at increased risk for postoperative pulmonary complications.    Plan: Check plain abdominal films and chest x-ray.  Continue to treat symptoms, liquid stage I bariatric diet as tolerated, out of bed, pulmonary toilet, VTE prophylaxis.  Recheck labs in the morning.  Discontinue ARAM drain.  "

## 2024-04-28 NOTE — PLAN OF CARE
Goal Outcome Evaluation:  Plan of Care Reviewed With: patient        Progress: no change  Outcome Evaluation: VSS. RA to 2L when sleeping. Alert and oriented x4. pt complaints of continuous pain and nausea. multiple dry-heaving and gagging episodes. PRN's given for pain and nausea control. lap sites clean and dry with no drainage. ARAM drain in place with minimal output. Encouraging ambulation- pt ambulated in chavez this AM to end of chavez and said pain was too severe to walk further. Fluids infusing. pt resting comfortably. no further complaints at this time.       ARAM removed per order.

## 2024-04-28 NOTE — PLAN OF CARE
Goal Outcome Evaluation:  Plan of Care Reviewed With: patient        Progress: no change  Outcome Evaluation: Pt alert and oriented x4. 2L NC. Pt has rested well in bed throughout shift. Pain better controlled. Complaints of nausea but no episodes of vomiting. ARAM to LLQ. Lap sites intact. Refuses to walk in chavez, ambulating to bathroom. No needs voiced at this time.

## 2024-04-29 ENCOUNTER — READMISSION MANAGEMENT (OUTPATIENT)
Dept: CALL CENTER | Facility: HOSPITAL | Age: 49
End: 2024-04-29
Payer: MEDICARE

## 2024-04-29 VITALS
DIASTOLIC BLOOD PRESSURE: 67 MMHG | WEIGHT: 236 LBS | HEART RATE: 58 BPM | HEIGHT: 69 IN | TEMPERATURE: 97.8 F | SYSTOLIC BLOOD PRESSURE: 114 MMHG | RESPIRATION RATE: 16 BRPM | OXYGEN SATURATION: 92 % | BODY MASS INDEX: 34.96 KG/M2

## 2024-04-29 LAB
ALBUMIN SERPL-MCNC: 3.3 G/DL (ref 3.5–5.2)
ALBUMIN/GLOB SERPL: 1.1 G/DL
ALP SERPL-CCNC: 109 U/L (ref 39–117)
ALT SERPL W P-5'-P-CCNC: 33 U/L (ref 1–41)
ANION GAP SERPL CALCULATED.3IONS-SCNC: 10 MMOL/L (ref 5–15)
AST SERPL-CCNC: 25 U/L (ref 1–40)
BASOPHILS # BLD AUTO: 0.03 10*3/MM3 (ref 0–0.2)
BASOPHILS NFR BLD AUTO: 0.3 % (ref 0–1.5)
BILIRUB SERPL-MCNC: 0.3 MG/DL (ref 0–1.2)
BUN SERPL-MCNC: 10 MG/DL (ref 6–20)
BUN/CREAT SERPL: 14.9 (ref 7–25)
CALCIUM SPEC-SCNC: 8.6 MG/DL (ref 8.6–10.5)
CHLORIDE SERPL-SCNC: 100 MMOL/L (ref 98–107)
CO2 SERPL-SCNC: 27 MMOL/L (ref 22–29)
CREAT SERPL-MCNC: 0.67 MG/DL (ref 0.76–1.27)
DEPRECATED RDW RBC AUTO: 40.8 FL (ref 37–54)
EGFRCR SERPLBLD CKD-EPI 2021: 115.2 ML/MIN/1.73
EOSINOPHIL # BLD AUTO: 0.46 10*3/MM3 (ref 0–0.4)
EOSINOPHIL NFR BLD AUTO: 4.2 % (ref 0.3–6.2)
ERYTHROCYTE [DISTWIDTH] IN BLOOD BY AUTOMATED COUNT: 13.1 % (ref 12.3–15.4)
GLOBULIN UR ELPH-MCNC: 3.1 GM/DL
GLUCOSE BLDC GLUCOMTR-MCNC: 91 MG/DL (ref 70–130)
GLUCOSE BLDC GLUCOMTR-MCNC: 94 MG/DL (ref 70–130)
GLUCOSE SERPL-MCNC: 85 MG/DL (ref 65–99)
HCT VFR BLD AUTO: 38.6 % (ref 37.5–51)
HGB BLD-MCNC: 12.5 G/DL (ref 13–17.7)
IMM GRANULOCYTES # BLD AUTO: 0.03 10*3/MM3 (ref 0–0.05)
IMM GRANULOCYTES NFR BLD AUTO: 0.3 % (ref 0–0.5)
LYMPHOCYTES # BLD AUTO: 2.08 10*3/MM3 (ref 0.7–3.1)
LYMPHOCYTES NFR BLD AUTO: 18.9 % (ref 19.6–45.3)
MCH RBC QN AUTO: 27.7 PG (ref 26.6–33)
MCHC RBC AUTO-ENTMCNC: 32.4 G/DL (ref 31.5–35.7)
MCV RBC AUTO: 85.6 FL (ref 79–97)
MONOCYTES # BLD AUTO: 0.82 10*3/MM3 (ref 0.1–0.9)
MONOCYTES NFR BLD AUTO: 7.5 % (ref 5–12)
NEUTROPHILS NFR BLD AUTO: 68.8 % (ref 42.7–76)
NEUTROPHILS NFR BLD AUTO: 7.57 10*3/MM3 (ref 1.7–7)
NRBC BLD AUTO-RTO: 0 /100 WBC (ref 0–0.2)
PLATELET # BLD AUTO: 205 10*3/MM3 (ref 140–450)
PMV BLD AUTO: 9.6 FL (ref 6–12)
POTASSIUM SERPL-SCNC: 3.7 MMOL/L (ref 3.5–5.2)
PREALB SERPL-MCNC: 15.6 MG/DL (ref 20–40)
PROT SERPL-MCNC: 6.4 G/DL (ref 6–8.5)
RBC # BLD AUTO: 4.51 10*6/MM3 (ref 4.14–5.8)
SODIUM SERPL-SCNC: 137 MMOL/L (ref 136–145)
WBC NRBC COR # BLD AUTO: 10.99 10*3/MM3 (ref 3.4–10.8)

## 2024-04-29 PROCEDURE — 25010000002 ENOXAPARIN PER 10 MG: Performed by: SURGERY

## 2024-04-29 PROCEDURE — 99024 POSTOP FOLLOW-UP VISIT: CPT | Performed by: SURGERY

## 2024-04-29 PROCEDURE — 84134 ASSAY OF PREALBUMIN: CPT | Performed by: SURGERY

## 2024-04-29 PROCEDURE — 85025 COMPLETE CBC W/AUTO DIFF WBC: CPT | Performed by: SURGERY

## 2024-04-29 PROCEDURE — 25010000002 POTASSIUM CHLORIDE PER 2 MEQ: Performed by: SURGERY

## 2024-04-29 PROCEDURE — 80053 COMPREHEN METABOLIC PANEL: CPT | Performed by: SURGERY

## 2024-04-29 PROCEDURE — 82948 REAGENT STRIP/BLOOD GLUCOSE: CPT

## 2024-04-29 RX ORDER — HYDROMORPHONE HYDROCHLORIDE 2 MG/1
2 TABLET ORAL EVERY 4 HOURS PRN
Qty: 8 TABLET | Refills: 0 | Status: SHIPPED | OUTPATIENT
Start: 2024-04-29 | End: 2024-05-01

## 2024-04-29 RX ORDER — PYRIDOSTIGMINE BROMIDE 180 MG/1
180 TABLET, EXTENDED RELEASE ORAL DAILY
Qty: 8 TABLET | Refills: 0 | Status: SHIPPED | OUTPATIENT
Start: 2024-04-29

## 2024-04-29 RX ORDER — ONDANSETRON 4 MG/1
4 TABLET, ORALLY DISINTEGRATING ORAL EVERY 8 HOURS PRN
Qty: 8 TABLET | Refills: 0 | Status: SHIPPED | OUTPATIENT
Start: 2024-04-29

## 2024-04-29 RX ADMIN — AMLODIPINE BESYLATE 5 MG: 5 TABLET ORAL at 08:28

## 2024-04-29 RX ADMIN — PROPRANOLOL HYDROCHLORIDE 60 MG: 20 TABLET ORAL at 08:28

## 2024-04-29 RX ADMIN — TAMSULOSIN HYDROCHLORIDE 0.4 MG: 0.4 CAPSULE ORAL at 08:28

## 2024-04-29 RX ADMIN — PANTOPRAZOLE SODIUM 40 MG: 40 INJECTION, POWDER, FOR SOLUTION INTRAVENOUS at 06:32

## 2024-04-29 RX ADMIN — ENOXAPARIN SODIUM 40 MG: 100 INJECTION SUBCUTANEOUS at 08:28

## 2024-04-29 RX ADMIN — POTASSIUM CHLORIDE AND SODIUM CHLORIDE 125 ML/HR: 450; 150 INJECTION, SOLUTION INTRAVENOUS at 05:30

## 2024-04-29 NOTE — PAYOR COMM NOTE
"Veena Valinete RN  Utilization Management  P:[phone]  F:[phone]    [facility]# [id number]     Gopi Salomon (48 y.o. Male)       Date of Birth   1975    Social Security Number       Address   [address] [place] [id number]    Home Phone   [phone]    MRN   [id number]       Jew   Sabianist    Marital Status   Single                            Admission Date   4/26/24    Admission Type   Urgent    Admitting Provider   Olu Rodriguez MD    Attending Provider   Olu Rodriguez MD    Department, Room/Bed   [address], S211/1       Discharge Date       Discharge Disposition       Discharge Destination                                 Attending Provider: Olu Rodriguez MD    Allergies: Naloxone, Hydrocodone-acetaminophen    Isolation: None   Infection: MRSA/History Only (05/26/21)   Code Status: CPR    Ht: 174 cm (68.5\")   Wt: 107 kg (236 lb)    Admission Cmt: None   Principal Problem: Morbid exogenous obesity [E66.01]                   Active Insurance as of 4/26/2024       Primary Coverage       Payor Plan Insurance Group Employer/Plan Group    ANTHEM MEDICARE REPLACEMENT ANTHEM MEDICARE ADVANTAGE [id number]       Payor Plan Address Payor Plan Phone Number Payor Plan Fax Number Effective Dates    [id number] [phone]  3/1/2024 - None Entered    [facility] [address]         Subscriber Name Subscriber Birth Date Member ID       GOPI SALOMON 1975 [id number]               Secondary Coverage       Payor Plan Insurance Group Employer/Plan Group    [place]        Payor Plan Address Payor Plan Phone Number Payor Plan Fax Number Effective Dates    [address] [phone]  1/1/2023 - None Entered    [facility] [id number]         Subscriber Name Subscriber Birth Date Member ID       GOPI SALOMON 1975 [id number]                     Emergency Contacts        (Rel.) Home Phone Work Phone Mobile " "Phone    WARREN GARCIA (Sister) [phone] -- [phone]    Sowmya Johnson (Mother) [phone] -- --    LelandBradly (Brother) -- -- [phone]                 History & Physical        Olu Rodriguez MD at 24 6866            H&P updated. The patient was examined and the following changes are noted:  MRSA negative.  Hb A1C 5.7.         Electronically signed by Olu Rodriguez MD at 24 2544   Source Note: H&P (View-Only)          [facility] BARIATRIC SURGERY  [address]  [address]  [facility] [address] [phone]      Patient  Name:  Gopi Johnson  :  1975      Date of Visit: 24    Chief Complaint:    Weight gain; unable to maintain weight loss.  Reevaluate for possible revisional metabolic bariatric surgery status post laparoscopic sleeve gastrectomy 2022    History of Present Illness:  Gopi Johnson is a 48 y.o. male who presents today for reevaluation, education and consultation regarding revisional metabolic and bariatric surgery status post laparoscopic sleeve gastrectomy 2022 specifically revision to a duodenal switch.  Since last seen 2023 he has gained 1 pound.  My most recent evaluation dated 10/24/2023 reviewed:    \"History of Present Illness:  Gopi Johnson is a 48 y.o. male who presents today for evaluation, education and consultation regarding revisional metabolic and bariatric surgery (MBS) status post laparoscopic sleeve gastrectomy 2022.  Since last seen 10/10/2023 he has gained 3 pounds.  Most recent intake evaluation Ofelia AARON PA-C dated 10/10/2023 reviewed.  She notes the patient is almost 1 year status post sleeve gastrectomy last seen at 9 months postop visit previous to that was the 1 month postop visit where stat imaging was ordered at that time for nausea and abdominal pain and noted to be taking daily NSAIDs and staying with his mother who previously was noted to smoke in the " "house.  On his last visit he complained of constant hunger snacking all day to stay satisfied his weight was 230 pounds and dietitian evaluation was advised and he has since been working with medical weight management presurgery start weight of 251 pounds lowest reported weight 198 pounds current weight 243 pounds and that he presents today to discuss revision options for further weight loss specifically interested in gastric bypass as he does not feel the sleeve has worked for him despite doing \"everything right\" and that he was off insulin in the months after sleeve gastrectomy with diabetes in remission but is now back on insulin with his last A1c 5.7 and that his reflux is fairly well controlled with daily Protonix has rare abdominal pain focuses on protein no pop gets around 1700 zulma a day had a recent colonoscopy Dr. Pleitez and had a liver biopsy with Dr. Rodriguez and that he has a history of hypertension lower extremity edema sleep apnea BPH status post TURP procedure and intermittent urinary retention requiring straight caths as needed on Flomax daily chronic back pain arthritis migraines insulin-dependent diabetes history of MRSA with osteomyelitis requiring IV antibiotics and inpatient drainage history of substance abuse with meth last use 2016 treated with buprenorphine followed by therapist and PCP regularly and has a family history of malignant hyperthermia and patient tested negative on muscle biopsy and previous sleeve gastrectomy was ambulating with a cane with poor mobility secondary to spinal osteomyelitis from previous IV drug use.  She noted he had COVID in the past was not hospitalized and is vaccinated.     The patient has had issues with morbid obesity for years and only temporary success with surgical and non-surgical methods of weight loss.  The patient is seeking revisional metabolic and bariatric surgery to help with the morbid obesity related conditions of history of spinal osteomyelitis, " history of MRSA osteomyelitis left hip, nephrolithiasis, abnormal pulmonary function tests, anxiety, arthritis, benign prostatic hyperplasia, bipolar affective disorder, chronic pain, reported cirrhosis, colonic polyps, current everyday non-nicotene vaping, depression, dyspnea exertion, history of elevated liver function test, erectile dysfunction, family history malignant hyperthermia status post negative muscle biopsy, fatigue, fatty liver, history of gastroparesis, GE reflux disease, history of substance abuse clean since 2016, hyperlipidemia, hypertension, impaired mobility, insomnia, type 2 insulin-dependent diabetes, peripheral edema, migraine headaches, rectus diastases, obstructive sleep apnea on CPAP, urinary incontinence.     48-year-old disabled male from [facility] known to me as above.  He smells of tobacco but says that is because he lives with his mother who smokes in the house..  He only vapes non-nicotine.  I did his sleeve by Titan technique, specimen was average size.  He denies any history of surgical complications with his previous surgeries.     Upper GI on 12/6/2022 shows changes of sleeve gastrectomy without hiatal hernia or reflux noted.  On upper endoscopy last week I noted the sleeve to appear unremarkable with mild pyloric spasm no hiatal hernia Z-line roughly 39 cm.  I noted that his recollection is he weighed as much is 300 pounds and lost down to around 200 pounds and feels he is back up to 270 pounds although his current weight at that time was 243.5 pounds.  He noted he had significant reflux symptoms prior to his sleeve gastrectomy which have since resolved and that he had an EGD in January 2020 with Dr. Dover showing no hiatal hernia and some superficial gastric ulcers.  On recent endoscopy pathology of the antrum showed minimal chronic gastritis without activity and mild reactive gastropathic changes negative for H. pylori and distal esophageal biopsies were mild and felt to  be nonspecific.  Gastric emptying study on 11/3/2023 within normal limits with a half emptying time of 28 minutes.....    Assessment:     Gopi Johnson is a 48 y.o. year old male with medically complicated obesity status post laparoscopic sleeve gastrectomy 1 year ago in October 2022.     Revisional metabolic and bariatric surgery is deemed medically necessary given the following obesity related comorbidities including history of spinal osteomyelitis, history of MRSA osteomyelitis left hip, nephrolithiasis, abnormal pulmonary function tests, anxiety, arthritis, benign prostatic hyperplasia, bipolar affective disorder, chronic pain, reported cirrhosis, colonic polyps, current everyday non-nicotene vaping, depression, dyspnea exertion, history of elevated liver function test, erectile dysfunction, family history malignant hyperthermia status post negative muscle biopsy, fatigue, fatty liver, history of gastroparesis, GE reflux disease, history of substance abuse clean since 2016, hyperlipidemia, hypertension, impaired mobility, insomnia, type 2 insulin-dependent diabetes, peripheral edema, migraine headaches, rectus diastases, obstructive sleep apnea on CPAP, urinary incontinence with current Weight: 112 kg (246 lb 8 oz) and Body mass index is 36.94 kg/m²..     Patient is aware that surgery is a tool, and that weight loss and improvement in comorbidities is not guaranteed but only seen in the context of appropriate use, follow up and physical activity.     I reviewed his Mikhail report showing Buprenorphine 8 mg#42 every 21 days.  Please see scanned records that I have reviewed and signed off on today.  All of this in addition to the patient's unique history and exam has been taken into consideration in determining their appropriate candidacy for MBS.     Risks, benefits, alternative therapies were discussed to laparoscopic possible robotic assisted SIPS/loop DS/SAD-I and formal duodenal switch. The most common  short term complics in addition to cardiopulm risk, VTE, bleeding, infection, etc is leak or obstruction at the anastomosis, which could have potentially serious and even fatal consequences and require further surgery(s).  The patient understands that they will have more frequent BM's, will be malodorous, and that at increased risk for vitamin deficiencies, gaye the fat soluble vitamins, and that this can be serious and irreversible - still wishes to proceed, says they will be compliant w f/u and vit/supplment recommendations.  Aware that may need an elongation procedure in the future if diarrhea and/or vitamin issues not controlled.       Complications of laparoscopic/possible robotic gastric bypass were discussed including but not limited to bleeding, infection, deep venous thrombosis, pulmonary embolism, pulmonary complications such as pneumonia, cardiac events, hernias, small bowel obstruction, damage to the spleen or other organs, bowel injury, disfiguring scars, failure to lose weight, need for additional surgery, conversion to an open procedure, and death.  Patient is also aware of complications which apply in this particular procedure that can include but are not limited to leaking of gastric contents at the staple or suture lines which could lead to intra-abdominal abscess, or chronic complications of strictures, ulcers, or vitamin/mineral deficiencies.  If long BP limb, we discussed the likelihood of more frequent BM's,possibly malodorous, with increased risk for vitamin deficiencies, agye the fat soluble vitamins, and that this can be serious and irreversible and will require lifelong compliance with w f/u and vit/supplment recommendations.  Aware that may need an elongation procedure in the future if diarrhea and/or vitamin issues not controlled.        We went over the risk, benefits, and alternative therapies to various revisional metabolic and bariatric surgical options as above.  We discussed revision  "to a modified duodenal switch, a form of duodenal switch, or a Crescencio-en-Y gastric bypass with or without a long biliopancreatic limb.        Plan:    After discussion of the options and the risks, benefits, and alternative therapies as above he wishes to proceed with laparoscopic robotic assisted modified duodenal switch/SIPS/SAD-I.  If this [facility] is not covered by his [employer] Medicare insurance, then proceed with formal duodenal switch.     Other issues include history of spinal osteomyelitis, history of MRSA osteomyelitis left hip, nephrolithiasis, abnormal pulmonary function tests, anxiety, arthritis, benign prostatic hyperplasia, bipolar affective disorder, chronic pain, reported cirrhosis, colonic polyps, current everyday non-nicotene vaping, depression, dyspnea exertion, history of elevated liver function test, erectile dysfunction, family history malignant hyperthermia status post negative muscle biopsy, fatigue, fatty liver, history of gastroparesis, GE reflux disease, history of substance abuse clean since 2016, hyperlipidemia, hypertension, impaired mobility, insomnia, type 2 insulin-dependent diabetes, peripheral edema, migraine headaches, rectus diastases, obstructive sleep apnea on CPAP, urinary incontinence.\"    He returns today for final visit prior to scheduling his duodenal switch.  He attended informed consent last evening, said he found it extremely informative \"with a lot of new information\" despite it was likely the same material discussed prior to his sleeve gastrectomy a couple years ago.  He is currently getting around fairly well and rarely uses his cane, says he tries not to use it.  After examination today he hopped off the table before I could lower it and was ambulating in the office normally and at normal pace without impaired gait.  Once again his maximum lifetime weight is 400 pounds.  He continues to require insulin for his diabetes.  His mother " recently became lethargic and was found to have a CO2 of greater than 140 from her COPD and since then he has quit all vaping.  Once again he switched from cigarettes to not nicotine vaping previously.  He does not recall being sent home on anticoagulation after sleeve gastrectomy but on review I did send him home on Eliquis for his previous more significant impaired mobility.  He denies personal or family history of bleeding or clotting disorders.  His liver biopsy previously showed mild to moderate steatosis only, no fibrosis or cirrhosis.  He continues to have no significant reflux symptoms on daily Protonix.      Past Medical History:   Diagnosis Date    Abnormal PFTs (pulmonary function tests)     Anxiety     Arthritis     Benign prostatic hyperplasia     Bipolar affective     Chronic pain     Colon polyp     Depression     Dyspepsia     Dyspnea on exertion     Elevated LFTs     GI eval (P)    Enlarged prostate     s/p TURP 5/2021    Epididymitis     Erectile dysfunction After T.E.R.P.    Family history of malignant hyperthermia     patient had muscle biopsy at [facility] and was negative, sister has malignant hyperthermia and a cousin    Fatigue     Fatty liver     Former smoker     Fractured pelvis     Frequent urination     GERD (gastroesophageal reflux disease)     on PPI, hx erosive gastritis on EGD 1/5/22, EGD Dr. Rodriguez 10/23    H/O: substance abuse     clean since 2016, on Subutex, managed by PCP    Headache     following w/ Neurology    History of non-nicotine vaping     Hx MRSA infection 2020    w/ osteomyelitis L hip, fracture hip, hospitalized with IV abx and drainage, had port with home abx    Hyperammonemia     following w/ GI    Hyperlipidemia     Hypertension     Hypogonadism in male     on testosterone q2wks, follows w/ Urology    Hypokalemia     Impaired mobility     w/ (L)sided weakness (since osteomyelitis), ambulates w/ cane    Insomnia     on Trazodone    Insulin dependent type 2 diabetes  mellitus     resolved with weightloss, now back on insulin    Joint pain     w/ recent SI joint injections 2/2022    Kidney stone 11/30/2016    Kidney stones     Lower extremity edema     Migraine     Morbid obesity     Murmur     as a child    Myoclonus     w/ body jerks, follows w/ Neurology    Osteomyelitis 2020    H/O spinal osteomyelitis, previous IVDA    Piercing     ear/body    PUD (peptic ulcer disease)     non-bleeding gastric ulcers on EGD 1/5/22 w/ Dr. Dover    Rectus diastasis     Seasonal allergies     Sleep apnea     CPAP compliant    Staph infection 2020    Substance abuse     Not currently, over 6 years clean and sober!    Tattoo     Urinary incontinence     Urinary tract infection      Past Surgical History:   Procedure Laterality Date    ABDOMINAL HERNIA REPAIR  2020    incisional, Dr. Dover, inferior to umbilicus    BUNIONECTOMY  2007    COLONOSCOPY  2023    CYSTOSCOPY TRANSURETHRAL RESECTION OF PROSTATE N/A 05/26/2021    Procedure: TRANSURETHRAL RESECTION OF PROSTATE;  Surgeon: Markel Centeno MD;  Location:  [facility];  Service: Urology;  Laterality: N/A;    ENDOSCOPY N/A 01/05/2022    Procedure: ESOPHAGOGASTRODUODENOSCOPY with biopsy;  Surgeon: Royal Dover MD;  Location: [facility] ENDOSCOPY;  Service: Gastroenterology;  Laterality: N/A;    ENDOSCOPY N/A 10/28/2022    Procedure: ESOPHAGOGASTRODUODENOSCOPY;  Surgeon: Olu Rodriguez MD;  Location:  [facility];  Service: Bariatric;  Laterality: N/A;    GASTRECTOMY N/A 10/28/2022    Procedure: GASTRECTOMY LAPAROSCOPIC;  Surgeon: Olu Rodriguez MD;  Location:  [facility];  Service: Bariatric;  Laterality: N/A;    HEMORRHOIDECTOMY      Dr Dover do the removal    HIATAL HERNIA REPAIR  2019    INGUINAL HERNIA REPAIR  2019    Dr. Dover    KNEE OPEN LATERAL RELEASE Right 1985    LAPAROSCOPIC CHOLECYSTECTOMY  2014    sand, no stones    LIVER BIOPSY N/A 10/28/2022    Procedure: LIVER BIOPSY LAPAROSCOPIC;  Surgeon: Olu Rodriguez  MD;  [facility];  Service: Bariatric;  Laterality: N/A;    TONSILLECTOMY AND ADENOIDECTOMY  1986    WISDOM TOOTH EXTRACTION  1997       Allergies   Allergen Reactions    Naloxone Swelling     Throat swelling, itching, rash    Hydrocodone-Acetaminophen Itching and Rash     Percocet OK       Current Outpatient Medications:     Alcohol Swabs pads, Clean area prior to injection, Disp: 100 each, Rfl: 11    amLODIPine (NORVASC) 5 MG tablet, Take 1 tablet by mouth Daily., Disp: , Rfl:     buprenorphine (SUBUTEX) 8 MG sublingual tablet SL tablet, 1 tablet 2 (Two) Times a Day., Disp: , Rfl:     chlorthalidone (HYGROTON) 25 MG tablet, Take 1 tablet by mouth Daily., Disp: , Rfl:     Cyanocobalamin (Vitamin B-12) 1000 MCG sublingual tablet, Place 1 tablet under the tongue Daily., Disp: 90 each, Rfl: 0    cyclobenzaprine (FLEXERIL) 10 MG tablet, , Disp: , Rfl:     fluticasone (FLONASE) 50 MCG/ACT nasal spray, As Needed., Disp: , Rfl:     hydrOXYzine (ATARAX) 25 MG tablet, Take 1 tablet by mouth 3 (Three) Times a Day As Needed for Anxiety., Disp: 90 tablet, Rfl: 0    Lantus SoloStar 100 UNIT/ML injection pen, , Disp: , Rfl:     lidocaine (LIDODERM) 5 %, Place 1 patch on the skin as directed by provider Daily. Remove & Discard patch within 12 hours or as directed by MD, Disp:  , Rfl:     mirtazapine (REMERON) 30 MG tablet, Take 1 tablet by mouth Every Night., Disp: , Rfl:     Multiple Vitamins-Minerals (MULTI COMPLETE PO), Take  by mouth., Disp: , Rfl:     mupirocin (BACTROBAN) 2 % ointment, , Disp: , Rfl:     pantoprazole (PROTONIX) 40 MG EC tablet, Take 1 tablet by mouth Every Morning., Disp:  , Rfl:     propranolol (INDERAL) 60 MG tablet, Take 1 tablet by mouth 2 (Two) Times a Day., Disp: , Rfl:     tamsulosin (FLOMAX) 0.4 MG capsule 24 hr capsule, TAKE 1 CAPSULE BY MOUTH DAILY., Disp: 90 capsule, Rfl: 2    loratadine (CLARITIN) 10 MG tablet, Daily. (Patient not taking: Reported on 4/16/2024), Disp: , Rfl:     vitamin  "D (ERGOCALCIFEROL) 1.25 MG (65476 UT) capsule capsule, , Disp: , Rfl:     Social History     Socioeconomic History    Marital status: Single   Tobacco Use    Smoking status: Former     Current packs/day: 0.00     Average packs/day: 1 pack/day for 15.0 years (15.0 ttl pk-yrs)     Types: Cigarettes     Start date: 2007     Quit date: 2022     Years since quittin.7     Passive exposure: Past    Smokeless tobacco: Never    Tobacco comments:     Recently I have quit smoking I do vape but with no nicotine   Vaping Use    Vaping status: Some Days    Substances: Flavoring    Devices: Pre-filled or refillable cartridge   Substance and Sexual Activity    Alcohol use: Not Currently    Drug use: Not Currently     Types: Amphetamines, Barbiturates, Benzodiazepines, \"Crack\" cocaine, Cocaine(coke), Codeine, Fentanyl, GHB, Hashish, Heroin, Hydrocodone, Ketamine, LSD, Marijuana, MDMA (ecstacy), Methamphetamines, Morphine, Nitrous oxide, Opium, Oxycodone     Comment: clean since     Sexual activity: Never     Family History   Problem Relation Age of Onset    Hypertension Mother     Obesity Mother     Arthritis Mother     COPD Mother     Heart disease Mother     Depression Mother     Sleep apnea Mother     Hypertension Father     Alcohol abuse Father     Throat cancer Father     Stroke Father     Heart disease Father     Sleep apnea Sister     Hypertension Sister     Malig Hyperthermia Sister     Obesity Sister     Diabetes Sister     Depression Sister     Malig Hyperthermia Sister         I have been tested (muscle tissue test) and i dont have it    Cancer Brother     Hypertension Brother     Obesity Maternal Aunt     Hypertension Maternal Aunt     Cancer Maternal Aunt     Depression Maternal Aunt     Obesity Maternal Uncle     Diabetes Maternal Uncle     Cancer Paternal Aunt     Obesity Paternal Uncle     Hypertension Paternal Uncle     Cancer Paternal Uncle     Hearing loss Paternal Uncle     Prostate cancer Paternal " Uncle     Hearing loss Paternal Uncle     Prostate cancer Paternal Uncle         Both my dads older brothers had radical prostate removal    Diabetes Maternal Grandmother     Leukemia Maternal Grandmother     Obesity Maternal Grandmother     Hypertension Maternal Grandmother     Sleep apnea Maternal Grandmother     Hypertension Maternal Grandfather     Cancer Maternal Grandfather     Sleep apnea Maternal Grandfather     Heart disease Maternal Grandfather     Arthritis Maternal Grandfather     Heart attack Maternal Grandfather     Obesity Maternal Grandfather     Hypertension Paternal Grandmother     Cancer Paternal Grandmother         blood    Heart disease Paternal Grandmother         Dad's mother    Arthritis Paternal Grandmother     Hearing loss Paternal Grandmother     Heart attack Paternal Grandmother     Prostate cancer Paternal Grandfather     Obesity Paternal Grandfather     Hypertension Paternal Grandfather     Stomach cancer Paternal Grandfather     Colon polyps Half-Brother     Colon cancer Half-Brother     Esophageal cancer Neg Hx        Review of Systems   Constitutional:  Positive for fatigue and unexpected weight gain. Negative for chills, diaphoresis, fever and unexpected weight loss.   HENT:  Negative for congestion and facial swelling.    Eyes:  Negative for blurred vision, double vision and discharge.   Respiratory:  Negative for chest tightness, shortness of breath and stridor.    Cardiovascular:  Positive for leg swelling. Negative for chest pain and palpitations.   Gastrointestinal:  Positive for GERD. Negative for blood in stool.   Endocrine: Negative for polydipsia.   Genitourinary:  Positive for urinary incontinence. Negative for hematuria.   Musculoskeletal:  Positive for arthralgias.   Skin:  Negative for color change.   Allergic/Immunologic: Negative for immunocompromised state.   Neurological:  Negative for confusion.   Psychiatric/Behavioral:  Positive for sleep disturbance. Negative  for self-injury.        I have reviewed the ROS and confirm that it's accurate today.    Physical Exam:  Vital Signs:  Weight: 107 kg (236 lb)   Body mass index is 35.36 kg/m².  Temp: 97.5 °F (36.4 °C)   Heart Rate: 70   BP: 128/82     Physical Exam  Vitals reviewed.   Constitutional:       Appearance: He is well-developed.   HENT:      Head: Normocephalic and atraumatic.      Nose: Nose normal.   Eyes:      Conjunctiva/sclera: Conjunctivae normal.      Pupils: Pupils are equal, round, and reactive to light.   Neck:      Thyroid: No thyromegaly.      Vascular: No carotid bruit.      Trachea: No tracheal deviation.   Cardiovascular:      Rate and Rhythm: Normal rate and regular rhythm.      Heart sounds: Normal heart sounds.   Pulmonary:      Effort: Pulmonary effort is normal. No respiratory distress.      Breath sounds: Normal breath sounds.   Abdominal:      General: There is no distension.      Palpations: Abdomen is soft.      Tenderness: There is no abdominal tenderness.      Comments: Laparoscopy scars, subumbilical vertical 12 cm scar without recurrent umbilical hernia appreciated, open left inguinal hernia repair scar   Musculoskeletal:         General: No deformity. Normal range of motion.      Cervical back: Normal range of motion and neck supple.   Skin:     General: Skin is warm and dry.      Findings: No rash.      Comments: Tattoos   Neurological:      Mental Status: He is alert and oriented to person, place, and time.      Cranial Nerves: No cranial nerve deficit.      Coordination: Coordination normal.   Psychiatric:         Behavior: Behavior normal.         Thought Content: Thought content normal.         Judgment: Judgment normal.         Patient Active Problem List   Diagnosis    Abdominal pain    Anemia    Constipation    Diarrhea    Abnormal liver enzymes    BPH without urinary obstruction    Sepsis    Abscess of paraspinal muscles    Abscess, gluteal, left    Bacteremia due to Gram-positive  bacteria    Chronic midline low back pain without sciatica    Essential hypertension    Osteomyelitis of sacrum    Lower urinary tract symptoms (LUTS)    Periodic headache syndrome, not intractable    Tremor, essential    Myoclonus    Hypokalemia    Family history of malignant hyperthermia    PUD (peptic ulcer disease)    Sleep apnea    Hypogonadism in male    Hyperammonemia    GERD (gastroesophageal reflux disease)    Fatigue    Dyspepsia    Morbid obesity with BMI of 45.0-49.9, adult    Dyspnea on exertion    Lower extremity edema    Insomnia    Impaired mobility    H/O: substance abuse    Osteomyelitis    Mild episode of recurrent major depressive disorder    Generalized anxiety disorder    Encounter for screening for malignant neoplasm of colon    Testosterone deficiency    Class 2 severe obesity with serious comorbidity and body mass index (BMI) of 37.0 to 37.9 in adult    Elevated LFTs    Diabetes mellitus type 2 in obese    Polyphagia    Arthritis    Fatty liver    Morbid exogenous obesity    S/P laparoscopic sleeve gastrectomy    Type 2 diabetes mellitus with morbid obesity       Assessment:    Gopi Johnson is a 48 y.o. year old male with medically complicated obesity status post laparoscopic sleeve gastrectomy October 2022    Revisional metabolic and bariatric surgery is deemed medically necessary given the following obesity related comorbidities including spinal osteomyelitis, history of MRSA osteomyelitis left hip, nephrolithiasis, abnormal pulmonary function tests, anxiety, arthritis, benign prostatic hyperplasia, bipolar affective disorder, chronic pain, reported cirrhosis, colonic polyps, current everyday non-nicotene vaping, depression, dyspnea exertion, history of elevated liver function test, erectile dysfunction, family history malignant hyperthermia status post negative muscle biopsy, fatigue, fatty liver, history of gastroparesis, GE reflux disease, history of substance abuse clean since  2016, hyperlipidemia, hypertension, impaired mobility, insomnia, type 2 insulin-dependent diabetes, peripheral edema, migraine headaches, rectus diastases, obstructive sleep apnea on CPAP, urinary incontinence with current Weight: 107 kg (236 lb) and Body mass index is 35.36 kg/m²..    Encounter Diagnoses   Name Primary?    Morbid exogenous obesity Yes    S/P laparoscopic sleeve gastrectomy     Type 2 diabetes mellitus with morbid obesity       Patient is aware that surgery is a tool, and that weight loss and improvement in comorbidities is not guaranteed but only seen in the context of appropriate use, follow up and physical activity.    The patient was present for an approximately a 2.5 hour discussion of the purpose of MBS, how MBS is a tool to assist in achieving weight loss goals, the most common complications and how best to avoid them, and the strategies for short and long term weight loss and improvement in comorbidities.  Ample opportunity to discuss questions was available both in group and during the time of individual examination.    I reviewed his Mikhail report showing Buprenorphine 8 mg #84/mos. Psychosocial evaluation dated 11/15/2023 Mireille OJEDA PhD very good candidate.  Dietitian evaluation dated 10/10/2023 Cheyanne OWENS RD noting protein intake is too low to ensure successful weight loss that intake of processed and simple carbohydrates is moderate that is minimal variety of fruits and vegetables eating out is not a problem nor are sweet beverages.  Labs dated 8/8/2023 unremarkable CBC of note hemoglobin 14.7 unremarkable CMP except for CO2 of 33 glucose of 116.  Cardiology clearance dated 2/1/2024 Shaheenrafael Mahmood MD.  Please see scanned records that I have reviewed and signed off on today.  All of this in addition to the patient's unique history and exam has been taken into consideration in determining their appropriate candidacy for MBS.    Risks, benefits, alternative therapies were discussed to  laparoscopic possible robotic assisted revision of his previous sleeve gastrectomy to a formal duodenal switch.   The most common short term complics in addition to cardiopulm risk, VTE, bleeding, infection, etc is leak or obstruction at the anastomosis, which could have potentially serious and even fatal consequences and require further surgery(s).  The patient understands that they will have more frequent BM's, will be malodorous, and that at increased risk for vitamin deficiencies, gaye the fat soluble vitamins, and that this can be serious and irreversible - still wishes to proceed, says they will be compliant w f/u and vit/supplment recommendations.  Aware that may need an elongation procedure in the future if diarrhea and/or vitamin issues not controlled.      Discussed the risks, benefits and alternative therapies at great length as outlined in our extensive consent forms, consent videos, and educational teaching process under the direction of the center's .    A copy of the patient's signed informed consent is on file.    R/B/A Rx discussed to postop anticoagulation incl but not limited to bleeding, drug reaction, venothromboembolic events, etc. and the patient declined.      Plan:    After reevaluation I think the patient remains a reasonable candidate for laparoscopic possible robotic assisted revision of his sleeve gastrectomy to a formal duodenal switch with biliopancreatic diversion and EGD.  He plans to stop his Subutex a day prior and resume after he is off postoperative narcotics, says he does fine with narcotics other than hydrocodone.  Watch his potassium perioperatively on chronic diuretic therapy.  Will likely have to lyse adhesions from previous open umbilical hernia repair with mesh.    Other issues include spinal osteomyelitis, history of MRSA osteomyelitis left hip, nephrolithiasis, abnormal pulmonary function tests, anxiety, arthritis, benign prostatic hyperplasia, bipolar  affective disorder, chronic pain, reported cirrhosis, colonic polyps, current everyday non-nicotene vaping, depression, dyspnea exertion, history of elevated liver function test, erectile dysfunction, family history malignant hyperthermia status post negative muscle biopsy, fatigue, fatty liver, history of gastroparesis, GE reflux disease, history of substance abuse clean since 2016, hyperlipidemia, hypertension, impaired mobility, insomnia, type 2 insulin-dependent diabetes, peripheral edema, migraine headaches, rectus diastases, obstructive sleep apnea on CPAP, urinary incontinence    Thank you MARIA DEL CARMEN Zuniga for the opportunity to reevaluate Mr. Johnson.      Olu Rodriguez MD              Answers submitted by the patient for this visit:  Primary Reason for Visit (Submitted on 2024)  What is the primary reason for your visit?: Other  Other (Submitted on 2024)  Please describe your symptoms.: Revision surgery, some stomach pain after eating  Have you had these symptoms before?: No  How long have you been having these symptoms?: Greater than 2 weeks  Please list any medications you are currently taking for this condition.: Meds all current      Electronically signed by Olu Rodriguez MD at 24 [id number]                 Olu Rodriguez MD at 24 0830          [facility]  [address] [address] [phone]      Patient  Name:  Gopi Johnson  :  1975      Date of Visit: 24    Chief Complaint:    Weight gain; unable to maintain weight loss.  Reevaluate for possible revisional metabolic bariatric surgery status post laparoscopic sleeve gastrectomy 2022    History of Present Illness:  Gopi Johnson is a 48 y.o. male who presents today for reevaluation, education and consultation regarding revisional metabolic and bariatric surgery status post laparoscopic sleeve gastrectomy 2022  "specifically revision to a duodenal switch.  Since last seen 12/17/2023 he has gained 1 pound.  My most recent evaluation dated 10/24/2023 reviewed:    \"History of Present Illness:  Gopi Johnson is a 48 y.o. male who presents today for evaluation, education and consultation regarding revisional metabolic and bariatric surgery (MBS) status post laparoscopic sleeve gastrectomy October 2022.  Since last seen 10/10/2023 he has gained 3 pounds.  Most recent intake evaluation Ofelia AARON PA-C dated 10/10/2023 reviewed.  She notes the patient is almost 1 year status post sleeve gastrectomy last seen at 9 months postop visit previous to that was the 1 month postop visit where stat imaging was ordered at that time for nausea and abdominal pain and noted to be taking daily NSAIDs and staying with his mother who previously was noted to smoke in the house.  On his last visit he complained of constant hunger snacking all day to stay satisfied his weight was 230 pounds and dietitian evaluation was advised and he has since been working with medical weight management presurgery start weight of 251 pounds lowest reported weight 198 pounds current weight 243 pounds and that he presents today to discuss revision options for further weight loss specifically interested in gastric bypass as he does not feel the sleeve has worked for him despite doing \"everything right\" and that he was off insulin in the months after sleeve gastrectomy with diabetes in remission but is now back on insulin with his last A1c 5.7 and that his reflux is fairly well controlled with daily Protonix has rare abdominal pain focuses on protein no pop gets around 1700 zulma a day had a recent colonoscopy Dr. Pleitez and had a liver biopsy with Dr. Rodriguez and that he has a history of hypertension lower extremity edema sleep apnea BPH status post TURP procedure and intermittent urinary retention requiring straight caths as needed on Flomax daily chronic back pain " arthritis migraines insulin-dependent diabetes history of MRSA with osteomyelitis requiring IV antibiotics and inpatient drainage history of substance abuse with meth last use 2016 treated with buprenorphine followed by therapist and PCP regularly and has a family history of malignant hyperthermia and patient tested negative on muscle biopsy and previous sleeve gastrectomy was ambulating with a cane with poor mobility secondary to spinal osteomyelitis from previous IV drug use.  She noted he had COVID in the past was not hospitalized and is vaccinated.     The patient has had issues with morbid obesity for years and only temporary success with surgical and non-surgical methods of weight loss.  The patient is seeking revisional metabolic and bariatric surgery to help with the morbid obesity related conditions of history of spinal osteomyelitis, history of MRSA osteomyelitis left hip, nephrolithiasis, abnormal pulmonary function tests, anxiety, arthritis, benign prostatic hyperplasia, bipolar affective disorder, chronic pain, reported cirrhosis, colonic polyps, current everyday non-nicotene vaping, depression, dyspnea exertion, history of elevated liver function test, erectile dysfunction, family history malignant hyperthermia status post negative muscle biopsy, fatigue, fatty liver, history of gastroparesis, GE reflux disease, history of substance abuse clean since 2016, hyperlipidemia, hypertension, impaired mobility, insomnia, type 2 insulin-dependent diabetes, peripheral edema, migraine headaches, rectus diastases, obstructive sleep apnea on CPAP, urinary incontinence.     48-year-old disabled male from [facility] known to me as above.  He smells of tobacco but says that is because he lives with his mother who smokes in the house..  He only vapes non-nicotine.  I did his sleeve by Titan technique, specimen was average size.  He denies any history of surgical complications with his previous surgeries.     Upper  GI on 12/6/2022 shows changes of sleeve gastrectomy without hiatal hernia or reflux noted.  On upper endoscopy last week I noted the sleeve to appear unremarkable with mild pyloric spasm no hiatal hernia Z-line roughly 39 cm.  I noted that his recollection is he weighed as much is 300 pounds and lost down to around 200 pounds and feels he is back up to 270 pounds although his current weight at that time was 243.5 pounds.  He noted he had significant reflux symptoms prior to his sleeve gastrectomy which have since resolved and that he had an EGD in January 2020 with Dr. Dover showing no hiatal hernia and some superficial gastric ulcers.  On recent endoscopy pathology of the antrum showed minimal chronic gastritis without activity and mild reactive gastropathic changes negative for H. pylori and distal esophageal biopsies were mild and felt to be nonspecific.  Gastric emptying study on 11/3/2023 within normal limits with a half emptying time of 28 minutes.....    Assessment:     Gopi Johnson is a 48 y.o. year old male with medically complicated obesity status post laparoscopic sleeve gastrectomy 1 year ago in October 2022.     Revisional metabolic and bariatric surgery is deemed medically necessary given the following obesity related comorbidities including history of spinal osteomyelitis, history of MRSA osteomyelitis left hip, nephrolithiasis, abnormal pulmonary function tests, anxiety, arthritis, benign prostatic hyperplasia, bipolar affective disorder, chronic pain, reported cirrhosis, colonic polyps, current everyday non-nicotene vaping, depression, dyspnea exertion, history of elevated liver function test, erectile dysfunction, family history malignant hyperthermia status post negative muscle biopsy, fatigue, fatty liver, history of gastroparesis, GE reflux disease, history of substance abuse clean since 2016, hyperlipidemia, hypertension, impaired mobility, insomnia, type 2 insulin-dependent diabetes,  peripheral edema, migraine headaches, rectus diastases, obstructive sleep apnea on CPAP, urinary incontinence with current Weight: 112 kg (246 lb 8 oz) and Body mass index is 36.94 kg/m²..     Patient is aware that surgery is a tool, and that weight loss and improvement in comorbidities is not guaranteed but only seen in the context of appropriate use, follow up and physical activity.     I reviewed his Mikhail report showing Buprenorphine 8 mg#42 every 21 days.  Please see scanned records that I have reviewed and signed off on today.  All of this in addition to the patient's unique history and exam has been taken into consideration in determining their appropriate candidacy for MBS.     Risks, benefits, alternative therapies were discussed to laparoscopic possible robotic assisted SIPS/loop DS/SAD-I and formal duodenal switch. The most common short term complics in addition to cardiopulm risk, VTE, bleeding, infection, etc is leak or obstruction at the anastomosis, which could have potentially serious and even fatal consequences and require further surgery(s).  The patient understands that they will have more frequent BM's, will be malodorous, and that at increased risk for vitamin deficiencies, gaye the fat soluble vitamins, and that this can be serious and irreversible - still wishes to proceed, says they will be compliant w f/u and vit/supplment recommendations.  Aware that may need an elongation procedure in the future if diarrhea and/or vitamin issues not controlled.       Complications of laparoscopic/possible robotic gastric bypass were discussed including but not limited to bleeding, infection, deep venous thrombosis, pulmonary embolism, pulmonary complications such as pneumonia, cardiac events, hernias, small bowel obstruction, damage to the spleen or other organs, bowel injury, disfiguring scars, failure to lose weight, need for additional surgery, conversion to an open procedure, and death.  Patient is  also aware of complications which apply in this particular procedure that can include but are not limited to leaking of gastric contents at the staple or suture lines which could lead to intra-abdominal abscess, or chronic complications of strictures, ulcers, or vitamin/mineral deficiencies.  If long BP limb, we discussed the likelihood of more frequent BM's,possibly malodorous, with increased risk for vitamin deficiencies, gaye the fat soluble vitamins, and that this can be serious and irreversible and will require lifelong compliance with w f/u and vit/supplment recommendations.  Aware that may need an elongation procedure in the future if diarrhea and/or vitamin issues not controlled.        We went over the risk, benefits, and alternative therapies to various revisional metabolic and bariatric surgical options as above.  We discussed revision to a modified duodenal switch, a form of duodenal switch, or a Crescencio-en-Y gastric bypass with or without a long biliopancreatic limb.        Plan:    After discussion of the options and the risks, benefits, and alternative therapies as above he wishes to proceed with laparoscopic robotic assisted modified duodenal switch/SIPS/SAD-I.  If this [facility] is not [facility] by his [employer] Medicare insurance, then proceed with formal duodenal switch.     Other issues include history of spinal osteomyelitis, history of MRSA osteomyelitis left hip, nephrolithiasis, abnormal pulmonary function tests, anxiety, arthritis, benign prostatic hyperplasia, bipolar affective disorder, chronic pain, reported cirrhosis, colonic polyps, current everyday non-nicotene vaping, depression, dyspnea exertion, history of elevated liver function test, erectile dysfunction, family history malignant hyperthermia status post negative muscle biopsy, fatigue, fatty liver, history of gastroparesis, GE reflux disease, history of substance abuse clean since 2016, hyperlipidemia,  "hypertension, impaired mobility, insomnia, type 2 insulin-dependent diabetes, peripheral edema, migraine headaches, rectus diastases, obstructive sleep apnea on CPAP, urinary incontinence.\"    He returns today for final visit prior to scheduling his duodenal switch.  He attended informed consent last evening, said he found it extremely informative \"with a lot of new information\" despite it was likely the same material discussed prior to his sleeve gastrectomy a couple years ago.  He is currently getting around fairly well and rarely uses his cane, says he tries not to use it.  After examination today he hopped off the table before I could lower it and was ambulating in the office normally and at normal pace without impaired gait.  Once again his maximum lifetime weight is 400 pounds.  He continues to require insulin for his diabetes.  His mother recently became lethargic and was found to have a CO2 of greater than 140 from her COPD and since then he has quit all vaping.  Once again he switched from cigarettes to not nicotine vaping previously.  He does not recall being sent home on anticoagulation after sleeve gastrectomy but on review I did send him home on Eliquis for his previous more significant impaired mobility.  He denies personal or family history of bleeding or clotting disorders.  His liver biopsy previously showed mild to moderate steatosis only, no fibrosis or cirrhosis.  He continues to have no significant reflux symptoms on daily Protonix.      Past Medical History:   Diagnosis Date    Abnormal PFTs (pulmonary function tests)     Anxiety     Arthritis     Benign prostatic hyperplasia     Bipolar affective     Chronic pain     Colon polyp     Depression     Dyspepsia     Dyspnea on exertion     Elevated LFTs     GI eval (P)    Enlarged prostate     s/p TURP 5/2021    Epididymitis     Erectile dysfunction After T.E.R.P.    Family history of malignant hyperthermia     patient had muscle biopsy at [facility] " [place] and was negative, sister has malignant hyperthermia and a cousin    Fatigue     Fatty liver     Former smoker     Fractured pelvis     Frequent urination     GERD (gastroesophageal reflux disease)     on PPI, hx erosive gastritis on EGD 1/5/22, EGD Dr. Rodriguez 10/23    H/O: substance abuse     clean since 2016, on Subutex, managed by PCP    Headache     following w/ Neurology    History of non-nicotine vaping     Hx MRSA infection 2020    w/ osteomyelitis L hip, fracture hip, hospitalized with IV abx and drainage, had port with home abx    Hyperammonemia     following w/ GI    Hyperlipidemia     Hypertension     Hypogonadism in male     on testosterone q2wks, follows w/ Urology    Hypokalemia     Impaired mobility     w/ (L)sided weakness (since osteomyelitis), ambulates w/ cane    Insomnia     on Trazodone    Insulin dependent type 2 diabetes mellitus     resolved with weightloss, now back on insulin    Joint pain     w/ recent SI joint injections 2/2022    Kidney stone 11/30/2016    Kidney stones     Lower extremity edema     Migraine     Morbid obesity     Murmur     as a child    Myoclonus     w/ body jerks, follows w/ Neurology    Osteomyelitis 2020    H/O spinal osteomyelitis, previous IVDA    Piercing     ear/body    PUD (peptic ulcer disease)     non-bleeding gastric ulcers on EGD 1/5/22 w/ Dr. Dover    Rectus diastasis     Seasonal allergies     Sleep apnea     CPAP compliant    Staph infection 2020    Substance abuse     Not currently, over 6 years clean and sober!    Tattoo     Urinary incontinence     Urinary tract infection      Past Surgical History:   Procedure Laterality Date    ABDOMINAL HERNIA REPAIR  2020    incisional, Dr. Dover, inferior to umbilicus    BUNIONECTOMY  2007    COLONOSCOPY  2023    CYSTOSCOPY TRANSURETHRAL RESECTION OF PROSTATE N/A 05/26/2021    Procedure: TRANSURETHRAL RESECTION OF PROSTATE;  Surgeon: Markel Centeno MD;  Location: [facility];  Service: Urology;   Laterality: N/A;    ENDOSCOPY N/A 01/05/2022    Procedure: ESOPHAGOGASTRODUODENOSCOPY with biopsy;  Surgeon: Royal Dover MD;  Location:  [facility];  Service: Gastroenterology;  Laterality: N/A;    ENDOSCOPY N/A 10/28/2022    Procedure: ESOPHAGOGASTRODUODENOSCOPY;  Surgeon: Olu Rodriguez MD;  Location:  [facility];  Service: Bariatric;  Laterality: N/A;    GASTRECTOMY N/A 10/28/2022    Procedure: GASTRECTOMY LAPAROSCOPIC;  Surgeon: Olu Rodriguez MD;  Location:  [facility];  Service: Bariatric;  Laterality: N/A;    HEMORRHOIDECTOMY      Dr Dover do the removal    HIATAL HERNIA REPAIR  2019    INGUINAL HERNIA REPAIR  2019    Dr. Dover    KNEE OPEN LATERAL RELEASE Right 1985    LAPAROSCOPIC CHOLECYSTECTOMY  2014    sand, no stones    LIVER BIOPSY N/A 10/28/2022    Procedure: LIVER BIOPSY LAPAROSCOPIC;  Surgeon: Olu Rodriguez MD;  Location:  [facility];  Service: Bariatric;  Laterality: N/A;    TONSILLECTOMY AND ADENOIDECTOMY  1986    WISDOM TOOTH EXTRACTION  1997       Allergies   Allergen Reactions    Naloxone Swelling     Throat swelling, itching, rash    Hydrocodone-Acetaminophen Itching and Rash     Percocet OK       Current Outpatient Medications:     Alcohol Swabs pads, Clean area prior to injection, Disp: 100 each, Rfl: 11    amLODIPine (NORVASC) 5 MG tablet, Take 1 tablet by mouth Daily., Disp: , Rfl:     buprenorphine (SUBUTEX) 8 MG sublingual tablet SL tablet, 1 tablet 2 (Two) Times a Day., Disp: , Rfl:     chlorthalidone (HYGROTON) 25 MG tablet, Take 1 tablet by mouth Daily., Disp: , Rfl:     Cyanocobalamin (Vitamin B-12) 1000 MCG sublingual tablet, Place 1 tablet under the tongue Daily., Disp: 90 each, Rfl: 0    cyclobenzaprine (FLEXERIL) 10 MG tablet, , Disp: , Rfl:     fluticasone (FLONASE) 50 MCG/ACT nasal spray, As Needed., Disp: , Rfl:     hydrOXYzine (ATARAX) 25 MG tablet, Take 1 tablet by mouth 3 (Three) Times a Day As Needed for Anxiety., Disp: 90 tablet, Rfl: 0     "Lantus SoloStar 100 UNIT/ML injection pen, , Disp: , Rfl:     lidocaine (LIDODERM) 5 %, Place 1 patch on the skin as directed by provider Daily. Remove & Discard patch within 12 hours or as directed by MD, Disp:  , Rfl:     mirtazapine (REMERON) 30 MG tablet, Take 1 tablet by mouth Every Night., Disp: , Rfl:     Multiple Vitamins-Minerals (MULTI COMPLETE PO), Take  by mouth., Disp: , Rfl:     mupirocin (BACTROBAN) 2 % ointment, , Disp: , Rfl:     pantoprazole (PROTONIX) 40 MG EC tablet, Take 1 tablet by mouth Every Morning., Disp:  , Rfl:     propranolol (INDERAL) 60 MG tablet, Take 1 tablet by mouth 2 (Two) Times a Day., Disp: , Rfl:     tamsulosin (FLOMAX) 0.4 MG capsule 24 hr capsule, TAKE 1 CAPSULE BY MOUTH DAILY., Disp: 90 capsule, Rfl: 2    loratadine (CLARITIN) 10 MG tablet, Daily. (Patient not taking: Reported on 2024), Disp: , Rfl:     vitamin D (ERGOCALCIFEROL) 1.25 MG (87661 UT) capsule capsule, , Disp: , Rfl:     Social History     Socioeconomic History    Marital status: Single   Tobacco Use    Smoking status: Former     Current packs/day: 0.00     Average packs/day: 1 pack/day for 15.0 years (15.0 ttl pk-yrs)     Types: Cigarettes     Start date: 2007     Quit date: 2022     Years since quittin.7     Passive exposure: Past    Smokeless tobacco: Never    Tobacco comments:     Recently I have quit smoking I do vape but with no nicotine   Vaping Use    Vaping status: Some Days    Substances: Flavoring    Devices: Pre-filled or refillable cartridge   Substance and Sexual Activity    Alcohol use: Not Currently    Drug use: Not Currently     Types: Amphetamines, Barbiturates, Benzodiazepines, \"Crack\" cocaine, Cocaine(coke), Codeine, Fentanyl, GHB, Hashish, Heroin, Hydrocodone, Ketamine, LSD, Marijuana, MDMA (ecstacy), Methamphetamines, Morphine, Nitrous oxide, Opium, Oxycodone     Comment: clean since     Sexual activity: Never     Family History   Problem Relation Age of Onset    " Hypertension Mother     Obesity Mother     Arthritis Mother     COPD Mother     Heart disease Mother     Depression Mother     Sleep apnea Mother     Hypertension Father     Alcohol abuse Father     Throat cancer Father     Stroke Father     Heart disease Father     Sleep apnea Sister     Hypertension Sister     Malig Hyperthermia Sister     Obesity Sister     Diabetes Sister     Depression Sister     Malig Hyperthermia Sister         I have been tested (muscle tissue test) and i dont have it    Cancer Brother     Hypertension Brother     Obesity Maternal Aunt     Hypertension Maternal Aunt     Cancer Maternal Aunt     Depression Maternal Aunt     Obesity Maternal Uncle     Diabetes Maternal Uncle     Cancer Paternal Aunt     Obesity Paternal Uncle     Hypertension Paternal Uncle     Cancer Paternal Uncle     Hearing loss Paternal Uncle     Prostate cancer Paternal Uncle     Hearing loss Paternal Uncle     Prostate cancer Paternal Uncle         Both my dads older brothers had radical prostate removal    Diabetes Maternal Grandmother     Leukemia Maternal Grandmother     Obesity Maternal Grandmother     Hypertension Maternal Grandmother     Sleep apnea Maternal Grandmother     Hypertension Maternal Grandfather     Cancer Maternal Grandfather     Sleep apnea Maternal Grandfather     Heart disease Maternal Grandfather     Arthritis Maternal Grandfather     Heart attack Maternal Grandfather     Obesity Maternal Grandfather     Hypertension Paternal Grandmother     Cancer Paternal Grandmother         blood    Heart disease Paternal Grandmother         Dad's mother    Arthritis Paternal Grandmother     Hearing loss Paternal Grandmother     Heart attack Paternal Grandmother     Prostate cancer Paternal Grandfather     Obesity Paternal Grandfather     Hypertension Paternal Grandfather     Stomach cancer Paternal Grandfather     Colon polyps Half-Brother     Colon cancer Half-Brother     Esophageal cancer Neg Hx         Review of Systems   Constitutional:  Positive for fatigue and unexpected weight gain. Negative for chills, diaphoresis, fever and unexpected weight loss.   HENT:  Negative for congestion and facial swelling.    Eyes:  Negative for blurred vision, double vision and discharge.   Respiratory:  Negative for chest tightness, shortness of breath and stridor.    Cardiovascular:  Positive for leg swelling. Negative for chest pain and palpitations.   Gastrointestinal:  Positive for GERD. Negative for blood in stool.   Endocrine: Negative for polydipsia.   Genitourinary:  Positive for urinary incontinence. Negative for hematuria.   Musculoskeletal:  Positive for arthralgias.   Skin:  Negative for color change.   Allergic/Immunologic: Negative for immunocompromised state.   Neurological:  Negative for confusion.   Psychiatric/Behavioral:  Positive for sleep disturbance. Negative for self-injury.        I have reviewed the ROS and confirm that it's accurate today.    Physical Exam:  Vital Signs:  Weight: 107 kg (236 lb)   Body mass index is 35.36 kg/m².  Temp: 97.5 °F (36.4 °C)   Heart Rate: 70   BP: 128/82     Physical Exam  Vitals reviewed.   Constitutional:       Appearance: He is well-developed.   HENT:      Head: Normocephalic and atraumatic.      Nose: Nose normal.   Eyes:      Conjunctiva/sclera: Conjunctivae normal.      Pupils: Pupils are equal, round, and reactive to light.   Neck:      Thyroid: No thyromegaly.      Vascular: No carotid bruit.      Trachea: No tracheal deviation.   Cardiovascular:      Rate and Rhythm: Normal rate and regular rhythm.      Heart sounds: Normal heart sounds.   Pulmonary:      Effort: Pulmonary effort is normal. No respiratory distress.      Breath sounds: Normal breath sounds.   Abdominal:      General: There is no distension.      Palpations: Abdomen is soft.      Tenderness: There is no abdominal tenderness.      Comments: Laparoscopy scars, subumbilical vertical 12 cm scar  without recurrent umbilical hernia appreciated, open left inguinal hernia repair scar   Musculoskeletal:         General: No deformity. Normal range of motion.      Cervical back: Normal range of motion and neck supple.   Skin:     General: Skin is warm and dry.      Findings: No rash.      Comments: Tattoos   Neurological:      Mental Status: He is alert and oriented to person, place, and time.      Cranial Nerves: No cranial nerve deficit.      Coordination: Coordination normal.   Psychiatric:         Behavior: Behavior normal.         Thought Content: Thought content normal.         Judgment: Judgment normal.         Patient Active Problem List   Diagnosis    Abdominal pain    Anemia    Constipation    Diarrhea    Abnormal liver enzymes    BPH without urinary obstruction    Sepsis    Abscess of paraspinal muscles    Abscess, gluteal, left    Bacteremia due to Gram-positive bacteria    Chronic midline low back pain without sciatica    Essential hypertension    Osteomyelitis of sacrum    Lower urinary tract symptoms (LUTS)    Periodic headache syndrome, not intractable    Tremor, essential    Myoclonus    Hypokalemia    Family history of malignant hyperthermia    PUD (peptic ulcer disease)    Sleep apnea    Hypogonadism in male    Hyperammonemia    GERD (gastroesophageal reflux disease)    Fatigue    Dyspepsia    Morbid obesity with BMI of 45.0-49.9, adult    Dyspnea on exertion    Lower extremity edema    Insomnia    Impaired mobility    H/O: substance abuse    Osteomyelitis    Mild episode of recurrent major depressive disorder    Generalized anxiety disorder    Encounter for screening for malignant neoplasm of colon    Testosterone deficiency    Class 2 severe obesity with serious comorbidity and body mass index (BMI) of 37.0 to 37.9 in adult    Elevated LFTs    Diabetes mellitus type 2 in obese    Polyphagia    Arthritis    Fatty liver    Morbid exogenous obesity    S/P laparoscopic sleeve gastrectomy    Type 2  diabetes mellitus with morbid obesity       Assessment:    Gopi Johnson is a 48 y.o. year old male with medically complicated obesity status post laparoscopic sleeve gastrectomy October 2022    Revisional metabolic and bariatric surgery is deemed medically necessary given the following obesity related comorbidities including spinal osteomyelitis, history of MRSA osteomyelitis left hip, nephrolithiasis, abnormal pulmonary function tests, anxiety, arthritis, benign prostatic hyperplasia, bipolar affective disorder, chronic pain, reported cirrhosis, colonic polyps, current everyday non-nicotene vaping, depression, dyspnea exertion, history of elevated liver function test, erectile dysfunction, family history malignant hyperthermia status post negative muscle biopsy, fatigue, fatty liver, history of gastroparesis, GE reflux disease, history of substance abuse clean since 2016, hyperlipidemia, hypertension, impaired mobility, insomnia, type 2 insulin-dependent diabetes, peripheral edema, migraine headaches, rectus diastases, obstructive sleep apnea on CPAP, urinary incontinence with current Weight: 107 kg (236 lb) and Body mass index is 35.36 kg/m²..    Encounter Diagnoses   Name Primary?    Morbid exogenous obesity Yes    S/P laparoscopic sleeve gastrectomy     Type 2 diabetes mellitus with morbid obesity       Patient is aware that surgery is a tool, and that weight loss and improvement in comorbidities is not guaranteed but only seen in the context of appropriate use, follow up and physical activity.    The patient was present for an approximately a 2.5 hour discussion of the purpose of MBS, how MBS is a tool to assist in achieving weight loss goals, the most common complications and how best to avoid them, and the strategies for short and long term weight loss and improvement in comorbidities.  Ample opportunity to discuss questions was available both in group and during the time of individual examination.    I  reviewed his Mikhail report showing Buprenorphine 8 mg #84/mos. Psychosocial evaluation dated 11/15/2023 Mireille OJEDA PhD very good candidate.  Dietitian evaluation dated 10/10/2023 Cheyanne OWENS RD noting protein intake is too low to ensure successful weight loss that intake of processed and simple carbohydrates is moderate that is minimal variety of fruits and vegetables eating out is not a problem nor are sweet beverages.  Labs dated 8/8/2023 unremarkable CBC of note hemoglobin 14.7 unremarkable CMP except for CO2 of 33 glucose of 116.  Cardiology clearance dated 2/1/2024 Shaheenrafael Mahmood MD.  Please see scanned records that I have reviewed and signed off on today.  All of this in addition to the patient's unique history and exam has been taken into consideration in determining their appropriate candidacy for MBS.    Risks, benefits, alternative therapies were discussed to laparoscopic possible robotic assisted revision of his previous sleeve gastrectomy to a formal duodenal switch.   The most common short term complics in addition to cardiopulm risk, VTE, bleeding, infection, etc is leak or obstruction at the anastomosis, which could have potentially serious and even fatal consequences and require further surgery(s).  The patient understands that they will have more frequent BM's, will be malodorous, and that at increased risk for vitamin deficiencies, gaye the fat soluble vitamins, and that this can be serious and irreversible - still wishes to proceed, says they will be compliant w f/u and vit/supplment recommendations.  Aware that may need an elongation procedure in the future if diarrhea and/or vitamin issues not controlled.      Discussed the risks, benefits and alternative therapies at great length as outlined in our extensive consent forms, consent videos, and educational teaching process under the direction of the center's .    A copy of the patient's signed informed consent is on file.    R/B/A Rx  discussed to postop anticoagulation incl but not limited to bleeding, drug reaction, venothromboembolic events, etc. and the patient declined.      Plan:    After reevaluation I think the patient remains a reasonable candidate for laparoscopic possible robotic assisted revision of his sleeve gastrectomy to a formal duodenal switch with biliopancreatic diversion and EGD.  He plans to stop his Subutex a day prior and resume after he is off postoperative narcotics, says he does fine with narcotics other than hydrocodone.  Watch his potassium perioperatively on chronic diuretic therapy.  Will likely have to lyse adhesions from previous open umbilical hernia repair with mesh.    Other issues include spinal osteomyelitis, history of MRSA osteomyelitis left hip, nephrolithiasis, abnormal pulmonary function tests, anxiety, arthritis, benign prostatic hyperplasia, bipolar affective disorder, chronic pain, reported cirrhosis, colonic polyps, current everyday non-nicotene vaping, depression, dyspnea exertion, history of elevated liver function test, erectile dysfunction, family history malignant hyperthermia status post negative muscle biopsy, fatigue, fatty liver, history of gastroparesis, GE reflux disease, history of substance abuse clean since 2016, hyperlipidemia, hypertension, impaired mobility, insomnia, type 2 insulin-dependent diabetes, peripheral edema, migraine headaches, rectus diastases, obstructive sleep apnea on CPAP, urinary incontinence    Thank you MARIA DEL CARMEN Zuniga for the opportunity to reevaluate Mr. Johnson.      Olu Rodriguez MD              Answers submitted by the patient for this visit:  Primary Reason for Visit (Submitted on 4/9/2024)  What is the primary reason for your visit?: Other  Other (Submitted on 4/9/2024)  Please describe your symptoms.: Revision surgery, some stomach pain after eating  Have you had these symptoms before?: No  How long have you been having these symptoms?: Greater than 2  weeks  Please list any medications you are currently taking for this condition.: Meds all current      Electronically signed by Olu Rodriguez MD at 04/21/24 1817       Current Facility-Administered Medications   Medication Dose Route Frequency Provider Last Rate Last Admin    albuterol (PROVENTIL) nebulizer solution 0.083% 2.5 mg/3mL  2.5 mg Nebulization Q4H PRN Olu Rodriguez MD        ALPRAZolam (XANAX) tablet 0.25 mg  0.25 mg Oral Once PRN Olu Rodriguez MD        amLODIPine (NORVASC) tablet 5 mg  5 mg Oral Daily Olu Rodriguez MD   5 mg at 04/29/24 0828    cyclobenzaprine (FLEXERIL) tablet 10 mg  10 mg Oral TID PRN Olu Rodriguez MD   10 mg at 04/28/24 0835    diphenhydrAMINE (BENADRYL) injection 25 mg  25 mg Intravenous Q4H PRN Olu Rodriguez MD        Enoxaparin Sodium (LOVENOX) syringe 40 mg  40 mg Subcutaneous Daily Olu Rodriguez MD   40 mg at 04/29/24 0828    fluticasone (FLONASE) 50 MCG/ACT nasal spray 2 spray  2 spray Nasal Daily Olu Rodriguez MD        hydrALAZINE (APRESOLINE) injection 10 mg  10 mg Intravenous Q30 Min PRN Olu Rodriguez MD   10 mg at 04/27/24 0329    HYDROmorphone (DILAUDID) injection 1 mg  1 mg Intravenous Q2H PRN Olu Rodriguez MD   1 mg at 04/26/24 2104    And    naloxone (NARCAN) injection 0.1 mg  0.1 mg Intravenous Q5 Min PRN Olu Rodriguez MD        HYDROmorphone (DILAUDID) tablet 2 mg  2 mg Oral Q4H PRN Olu Rodriguez MD   2 mg at 04/28/24 1724    hydrOXYzine (ATARAX) tablet 25 mg  25 mg Oral TID PRN Olu Rodriguez MD        Insulin Lispro (humaLOG) injection 0-7 Units  0-7 Units Subcutaneous 4x Daily AC & at Bedtime Olu Rodriguez MD        labetalol (NORMODYNE,TRANDATE) injection 10 mg  10 mg Intravenous Q30 Min PRN Olu Rodriguez MD        LORazepam (ATIVAN) tablet 1 mg  1 mg Oral Q12H PRN Olu Rodriguez MD        metoclopramide (REGLAN) injection 10 mg  10 mg Intravenous Q6H PRN  Olu Rodriguez MD   10 mg at 04/27/24 1213    mirtazapine (REMERON) tablet 30 mg  30 mg Oral Nightly Olu Rodriguez MD   30 mg at 04/28/24 2109    Morphine sulfate (PF) injection 4 mg  4 mg Intravenous Q2H PRN Olu Rodriguez MD        And    naloxone (NARCAN) injection 0.4 mg  0.4 mg Intravenous Q5 Min PRN Olu Rodriguez MD        ondansetron (ZOFRAN) injection 4 mg  4 mg Intravenous Q4H PRN Olu Rodriguez MD   4 mg at 04/28/24 1433    Followed by    [START ON 4/30/2024] ondansetron ODT (ZOFRAN-ODT) disintegrating tablet 4 mg  4 mg Oral Q4H PRN Olu Rodriguez MD        oxyCODONE (ROXICODONE) immediate release tablet 5 mg  5 mg Oral Q6H PRN Olu Rodriguez MD   5 mg at 04/28/24 2109    pantoprazole (PROTONIX) injection 40 mg  40 mg Intravenous Q AM Olu Rodriguez MD   40 mg at 04/29/24 0632    phenol (CHLORASEPTIC) 1.4 % liquid 2 spray  2 spray Mouth/Throat Q2H PRN Olu Rodriguez MD        prochlorperazine (COMPAZINE) tablet 10 mg  10 mg Oral Q6H PRN Olu Rodriguez MD   10 mg at 04/28/24 1724    promethazine (PHENERGAN) tablet 12.5 mg  12.5 mg Oral Q6H PRN Olu Rodriguez MD        propranolol (INDERAL) tablet 60 mg  60 mg Oral BID Olu Rodriguez MD   60 mg at 04/29/24 0828    pyridostigmine (MESTINON) CR tablet 180 mg  180 mg Oral Daily Olu Rodriguez MD   180 mg at 04/28/24 2332    simethicone (MYLICON) chewable tablet 80 mg  80 mg Oral 4x Daily PRN Olu Rodriguez MD        sodium chloride 0.45 % with KCl 20 mEq/L infusion  125 mL/hr Intravenous Continuous Olu Rodriguez  mL/hr at 04/29/24 0530 125 mL/hr at 04/29/24 0530    tamsulosin (FLOMAX) 24 hr capsule 0.4 mg  0.4 mg Oral Daily Olu Rodriguez MD   0.4 mg at 04/29/24 0828        Operative/Procedure Notes (all)        Olu Rodriguez MD at 04/26/24 0745  Version 1 of 1         Preoperative diagnosis:    Morbid obesity (236 pounds, 107 kg, BMI 35.36) status  post laparoscopic sleeve gastrectomy October 2022    Postoperative diagnosis: Same    Procedure:                                                   Laparoscopic robotic assisted biliopancreatic diversion with duodenal switch                                                                                   EGD                                                                               Surgeon:                                                       ALONDRA Rodriguez MD     Assistant: Walter Ro PA-C  was responsible for performing the following activities: Retraction, Suction, Irrigation, Suturing, Closing, Placing Dressing, and Held/Positioned Camera and their skilled assistance was necessary for the success of this case.     Anesthesia:                                                   GETA     EBL:                                                               100 mL     Fluids:                                                           Crystalloid     Specimens:                                                   None     Drains:                                                           #10 ARAM     Counts:                                                          Correct     Complications:                                               None     Indications:   This is a 48-year-old disabled morbidly obese male known to me status post laparoscopic sleeve gastrectomy in October 2022.  He presents now for elective revisional metabolic and bariatric surgery specifically revision of his sleeve gastrectomy to a duodenal switch.  Please see my extensive office notes.  He has undergone our extensive preoperative education, teaching, and consent process, everything is in order and he wishes to proceed.     Operative technique:      The patient was brought to the operating room, and placed supine upon the operating room table.  SCD hose were placed, he underwent uneventful general endotracheal anesthesia the anesthesiology staff,  the anesthesiology staff performed a TAP block, and his abdomen was prepped and draped with ChloraPrep in a sterile fashion, an Ioban was used as well.  A Rivera catheter was not placed.     The peritoneal cavity was entered and approximately the left midclavicular line at the level of the umbilicus using a 5 mm trocar utilizing an Optiview technique and the abdomen was insufflated to pressure of 15 mmHg with CO2 gas.  Exploratory laparoscopy revealed no evidence of injury from the entrance technique, a normal-appearing liver with good adhesion of the left lateral anterior surface to the diaphragm, extensive omental adhesions around the umbilicus from previous mesh hernia repair, and a small 1 cm incisional hernia without incarcerated contents which appeared to be in the area of the previous 19 mm trocar site incision at the time of his sleeve gastrectomy.  The inferior aspect of the sleeve appeared to be resting nicely.  Moderately dilated transverse colon noted.    Remaining trocars were placed under direct visualization.  8 mm robotic trocars were placed in the left lateral upper quadrant and left lateral lower quadrant.  The omental adhesions were lysed with the Enseal device.  Some excessive oozing noted and his blood pressure was mildly elevated which anesthesia addressed.  1 g TXA IV given.  No evidence of recurrent hernia, the mesh was intact.  A 12 mm robotic trocar was placed below and to the right of the umbilicus through the mesh and an 8 mm robotic trocar placed in the right lateral abdomen.     The cecum was identified.  The appendix was unremarkable and photodocumented.  The small bowel was run from the ileocecal valve to the ligament of Treitz and was noted to be longer than usual measuring roughly 875 cm.  Based on a small bowel length of 670 cm and a normal common channel of 150 cm, I elected to make a common channel 195 cm.   The small bowel was therefore run from the ileocecal valve proximally for  195 cm and marked on the antimesenteric border with 2 different colored sutures (2-0 silk and 2-0 Vicryl plus) for orientation and then run proximally for another 150 cm and marked in a similar fashion.     The Xi robot was docked and I scrubbed out and went to the robotic console.  The omentum was flipped up in the right upper quadrant and this was released by dividing omental adhesions to the gallbladder bed fossa and liver with the vessel sealing device.  The omentum of the proximal transverse colon was then divided in anticipation of future duodeno- ileostomy.  Incidentally the sleeve appeared unremarkable and no visible hiatal hernia from the anterior view and photodocumentation obtained.    Gastrocolic omentum was divided beginning at the distal antrum and working proximally to the first portion of the duodenum.   Peritoneum of the first portion of the duodenum on the superior aspect was scored as well.  Dissection was then carried out underneath the first portion of the duodenum until a window had been created.  The first portion of the duodenum was then divided with the robotic 60 mm stapler with a blue load approximately 3 cm from the pylorus.  There was a few millimeters of residual duodenum which was divided with a robotic 60 mm white load.  Floseal and a Ray-Rocky were placed on the duodenal stump.  Division of the right gastric artery was not necessary.  Intravenous ICG given and used to confirm good blood flow of the proximal duodenum and anastomosis throughout the procedure.   The ligament of Treitz identified and the small bowel run distally to the first encountered markings and this was lined up in appropriate orientation with the divided end of the duodenum.  A single layer handsewn duodenal ileostomy was then carried out in appropriate orientation (efferent limb to the right, afferent to the left) using running seromuscular absorbable 3-0 Stratafix sutures.  The anastomosis appeared to rest nicely  without tension or torsion and had good perfusion.     The afferent limb just proximal to the anastomosis was divided with a 60 mm white load.  The underlying mesentery divided for short distance.  The efferent limb was run distally  the previously marked area of the ileum.  And ileoileostomy was then created in an antiperistaltic fashion by making enterotomies on the antimesenteric border of each limb and a common lumen was created with a 60 mm robotic white load.  The intervening enterotomy was closed in 2 layers using an absorbable 3-0 Stratafix suture.  The end of the staple line was later imbricated with a figure-of-eight seromuscular stitch using the remaining nonabsorbable 2-0 V-Loc used later for mesenteric closure.   Upon completion the ileoileostomy anastomosis rested nicely without tension or torsion.  The potential mesenteric defects at the ileoileostomy and at Baugh's space were individually closed using running nonabsorbable 2-0 V-Loc sutures.  The efferent limb several centimeters from the duodeno-ileostomy was occluded with a noncrushing bowel clamp.  The duodeno-ileostomy anastomosis was submerged under saline.     I performed upper endoscopy.  The endoscope advanced nicely through the sleeve which appeared unremarkable.  No visible hiatal hernia Z-line approximately 39 cm.  Anastomosis widely patent just beyond the pylorus.  No ischemia or bleeding at the anastomosis.  Nice distention of the occluded ileal limb.  No air bubbles or leak noted.  The endoscope was withdrawn.  Endoscopic photodocumentation obtained of the anastomosis and ileum.     At this time I scrubbed back in and the robot was undocked.  Irrigation fluid was suctioned free.  A #10 flat fully perforated ARAM drain was placed under the left lobe of the liver and up over the spleen and brought out through the right sided 8 mm trocar site incision and anchored to the skin with a 2-0 nylon suture.    Fascia at the 12 mm trocar site  incision was closed with a 0 Vicryl suture placed with a suture passer under direct visualization and tying the knot extracorporeally -1 side of the stitch included fascia lateral to the mesh.     Remaining trocars were removed under direct visualization, no bleeding noted from their sites.  Skin in each incision was closed using 3-0 Monocryl plus in an interrupted subcuticular stitch followed by skin glue.  A dry sterile dressing was placed around the ARAM site.      The patient tolerated the procedure well without complication, was taken to the recovery room in stable condition.      Electronically signed by Olu Rodriguez MD at 04/26/24 1058       Olu Rodriguez MD at 04/26/24 0819  Version 1 of 1         BILIOPANCREATIC DIVERSION WITH DUODENAL SWITCH WITH DAVINCI ROBOT  Progress Note    Gopi Johnson  4/26/2024    Pre-op Diagnosis:   Morbid exogenous obesity [E66.01]  S/P laparoscopic sleeve gastrectomy [Z98.84]  Type 2 diabetes mellitus with morbid obesity [E11.69, E66.01]       Post-Op Diagnosis Codes:     * Morbid exogenous obesity [E66.01]     * S/P laparoscopic sleeve gastrectomy [Z98.84]     * Type 2 diabetes mellitus with morbid obesity [E11.69, E66.01]    Procedure/CPT® Codes:  LA UNLISTED LAPAROSCOPY PROCEDURE STOMACH [48092]  LA ESOPHAGOGASTRODUODENOSCOPY TRANSORAL DIAGNOSTIC [64819]      Procedure(s):  BILIOPANCREATIC DIVERSION WITH DUODENAL SWITCH LAPAROSCOPIC WITH DAVINCI ROBOT  ESOPHAGOGASTRODUODENOSCOPY              Surgeon(s):  Olu Rodriguez MD Weiss, George Derek, MD    Anesthesia: General with Block    Staff:   Circulator: King Chahal RN; Keysha Serrato RN  Scrub Person: Isidra Fisher  Nursing Assistant: Emelyn Bazan CNA; Sofia Reardon  Assistant: Walter Ro PA-C  Assistant: Walter Ro PA-C      Estimated Blood Loss: 20 mL    Urine Voided: * No values recorded between 4/26/2024  7:54 AM and 4/26/2024 10:35 AM *    Specimens:               "  None          Drains:   Closed/Suction Drain 1 RUQ 10 Fr. (Active)       Findings:         Complications: None    Assistant: Walter Ro PA-C  was responsible for performing the following activities: Retraction, Suction, Irrigation, Suturing, Closing, Placing Dressing, and Held/Positioned Camera and their skilled assistance was necessary for the success of this case.    Olu Rodriguez MD     Date: 4/26/2024  Time: 10:40 EDT          Electronically signed by Olu Rodriguez MD at 04/26/24 1040          Physician Progress Notes (all)        Olu Rodriguez MD at 04/28/24 1629          Cc: POD#2 DS  \"crampy lower abdominal pain, N/V\"    He is sitting up in bed.  He says he does not feel well with crampy lower abdominal pain and the sensation that he needs to pass flatus.  He feels like it is going to happen and then it does not.  No bowel movement.  2 episodes of nausea and vomiting of brown material today.  Says he is ambulating and voiding okay.  No pulmonary complaints, spirometer only 1250 observed.  He is wearing oxygen by nasal cannula.    No fever, pulse 60 but range listed from 49-92 respiration 16 blood pressure 144/93 saturation 94%. UO NR  AARM 30 SS.  He is no apparent distress.  Abdomen soft, nontender/appropriate, mildly distended, bowel sounds present but hypoactive, wounds look okay.  ARAM dressing dry    CMP normal except glucose of 116 creatinine 0.56 calcium 8.5 total protein 5.9 ALT 56 AST 71 alkaline phosphatase 137.  White count normal at 10.4 with a normal differential.  H&H 13 and 39.8.    Impression: Postop day #2 laparoscopic robotic assisted revision of his previous sleeve gastrectomy to a duodenal switch with biliopancreatic diversion.  Crampy lower abdominal pain, nausea and vomiting, elevated LFTs today.  He may have an issue with his BP limb.  History of chronic pain and chronic narcotic use on preoperative Subutex.  Insulin-dependent diabetes mellitus.  Iron deficiency " "without anemia.  Ongoing poor I-S, requiring oxygen by nasal cannula, obstructive sleep apnea all putting him at increased risk for postoperative pulmonary complications.    Plan: Check plain abdominal films and chest x-ray.  Continue to treat symptoms, liquid stage I bariatric diet as tolerated, out of bed, pulmonary toilet, VTE prophylaxis.  Recheck labs in the morning.  Discontinue ARAM drain.    Electronically signed by Olu Rodriguez MD at 04/28/24 1634       Olu Rodriguez MD at 04/27/24 1330          Cc: POD#1 DS  \"pain, nausea\"    He was ambulating to the bathroom with the nursing staff on my arrival.  He complains of pain in the right lower quadrant as well as nausea and some vomiting of \"dark liquid\".  Does not feel like trying anything orally because of the nausea.  No bowel movement or flatus.  Says he is ambulating and voiding well.  No pulmonary complaints, spirometer only 1000 observed.    No fever, Tmax in current 99.6 no tachycardia, pulse 64 respirations 18 blood pressure 152/90 saturation 94%.  - NR.  ARAM 90 - NR. SS/bloody.  He looks uncomfortable but nontoxic.  Abdomen soft, nontender/appropriate, nondistended, bowel sounds are active, ARAM dressing saturated and replaced, site looks fine.  Other wounds look okay.    CMP normal except for glucose of 133 creatinine 0.6 calcium 8.4 of note albumin 4.0.  Iron is 49.  White count 9 with an unremarkable differential except for 15% lymphocytes.  H&H 13.5 and 40.5.  Preoperative H&H 14.1 and 41.5.  Hemoglobin A1c elevated 5.70.  MRSA screen negative.  Upper GI images and report reviewed, no leak or obstruction, incorrectly read by Dr. Monsalve as \"gastric bypass\" with slight delay of emptying at the \"jejunojejunostomy\", (indication on the report listed as \"duodenal switch water-soluble\") but contrast is seen passing through the duodeno-ileostomy.  On my review is noted intraoperatively he appears to have a somewhat dilated right and " transverse colon, this includes the splenic flexure (beyond the duodeno-ileostomy).    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  Pain, nausea and vomiting, no significant oral intake.  Upper GI shows no evidence of leak or obstruction.  History of chronic pain and narcotic use on preoperative Subutex.  Insulin-dependent diabetes mellitus hemoglobin A1c 5.70.  Iron deficiency without evidence of anemia or bleeding on Lovenox.  Poor I-S, history of obstructive sleep apnea, increased risk of postoperative pulmonary complications.    Plan: Continue stage I liquid bariatric diet as tolerated, out of bed, pulmonary toilet, VTE prophylaxis, pain and nausea medication as needed.  IV iron.  See orders    Electronically signed by Olu Rodriguez MD at 04/27/24 6874

## 2024-04-29 NOTE — CASE MANAGEMENT/SOCIAL WORK
Continued Stay Note  [facility]     Patient Name: Gopi Johnson  MRN: [id number]  Today's Date: 4/29/2024    Admit Date: 4/26/2024    Plan: home   Discharge Plan       Row Name 04/29/24 1310       Plan    Plan home    Patient/Family in Agreement with Plan yes    Plan Comments I spoke with this patient regarding his discharge home today. He is in agreemnet with this plan. His sister will transport. He denies having any further discharge planning needs at this time.    Final Discharge Disposition Code 01 - home or self-care                   Discharge Codes    No documentation.                 Expected Discharge Date and Time       Expected Discharge Date Expected Discharge Time    Apr 29, 2024               Valeria Lizarraga RN

## 2024-04-29 NOTE — PAYOR COMM NOTE
"Veena Valiente RN  Utilization Management  P:[phone]  F:[phone]    Auth# [id number]   DC date 4/29/24  No DC summary available    Gopi Salomon (48 y.o. Male)       Date of Birth   1975    Social Security Number       Address   [address] [place] [facility] [address]    Home Phone   [phone]    MRN   [id number]       Restorationism   Confucianism    Marital Status   Single                            Admission Date   4/26/24    Admission Type   Urgent    Admitting Provider   Olu Rodriguez MD    Attending Provider       Department, Room/Bed   [address], S[address]/1       Discharge Date   4/29/2024    Discharge Disposition   Home or Self Care    Discharge Destination                                 Attending Provider: (none)   Allergies: Naloxone, Hydrocodone-acetaminophen    Isolation: None   Infection: MRSA/History Only (05/26/21)   Code Status: Prior    Ht: 174 cm (68.5\")   Wt: 107 kg (236 lb)    Admission Cmt: None   Principal Problem: Morbid exogenous obesity [E66.01]                   Active Insurance as of 4/26/2024       Primary Coverage       Payor Plan Insurance Group Employer/Plan Group    ANTHEM MEDICARE REPLACEMENT ANTHEM MEDICARE ADVANTAGE [id number]       Payor Plan Address Payor Plan Phone Number Payor Plan Fax Number Effective Dates    [address] [phone]  3/1/2024 - None Entered    [facility] [address]         Subscriber Name Subscriber Birth Date Member ID       GOPI SALOMON 1975 [id number]               Secondary Coverage       Payor Plan Insurance Group Employer/Plan Group    [place]        Payor Plan Address Payor Plan Phone Number Payor Plan Fax Number Effective Dates    [address] [phone]  1/1/2023 - None Entered    [facility] [id number]         Subscriber Name Subscriber Birth Date Member ID       GOPI SALOMON 1975 [id number]                     Emergency Contacts        (Rel.) " "Home Phone Work Phone Mobile Phone    WARREN GARCIA (Sister) [phone] -- [phone]    Sowmya Johnson (Mother) [phone] -- --    InaBradly dykes (Brother) -- -- [phone]                Olu Rodriguez MD   Physician  Surgery     Progress Notes     Signed     Date of Service: 04/29/24 1120  Creation Time: 04/29/24 1120     Signed         Cc:  POD#3 DS  \"better\"     He is sitting up in bed alone in the room.  Not requiring oxygen by nasal cannula, no pulmonary complaints, spirometer 2000 observed.  Abdominal pain and cramping have resolved, passing flatus, no bowel movement.  He says he has had issues with colon distention dating to prior to his sleeve gastrectomy and we discussed this might be related to chronic narcotic use.  I did give him a dose of Mestinon last night.  He is tolerating his diet without further nausea or vomiting.  Ambulating and voiding well.  He says his pain is controlled with oral Dilaudid and he would like to go home if possible.     No fever or tachycardia pulse 53 but as low as 49 recorded respiration 16 blood pressure 116/71 saturation 91%. UO NR.  He is in no apparent distress.  Abdomen soft, nontender, nondistended, bowel sounds active.  Wounds look okay.  Small old ARAM site dressing dry     CMP normal except creatinine 0.67 abdomen 3.3.  Prealbumin pending white count 11,000 with an unremarkable differential except for 19% lymphocytes.  H&H 12.5 and 38.6.  Chest x-ray and abdominal films images and reports reviewed, mild colonic distention mostly involving the transverse colon with air visible along its length to the splenic flexure.     Impression: Postoperative day #3 laparoscopic robotic assisted revision of sleeve gastrectomy to duodenal switch with BPD.  Doing well clinically, would like to go home and does meet discharge criteria.  Probable chronic narcotic induced colonic distention.  With similar air in the distal transverse colon and splenic flexure doubt the " duodeno-ileostomy is causing any type of partial obstruction.  Mild iron deficiency anemia without evidence of bleeding on Lovenox.  Low albumin, prealbumin pending.  LFTs now normal.  Insulin-dependent diabetes mellitus.  Obstructive sleep apnea.  Improved I-S, normal chest x-ray.  Asymptomatic bradycardia on beta-blocker, typically we use a scopolamine patch as well.     Plan: Discharge home.  Discharge instructions discussed.  Follow-up in the office in 1 to 2 weeks and call in the meantime with any problems questions or concerns.  Discussed avoiding NSAIDs and aspirin for at least the next couple weeks and any type of steroids for about a month.  Prescriptions for Dilaudid 2 mg #8, Zofran ODT 4 mg #8, Mestinon  mg #8.  Continue home PPI for now.  He knows to resume his Subutex once off narcotic.  Work with his PCP, may be able to come off insulin.  See orders.                    Discharge Summary    No notes of this type exist for this encounter.

## 2024-04-29 NOTE — PROGRESS NOTES
"Cc:  POD#3 DS  \"better\"    He is sitting up in bed alone in the room.  Not requiring oxygen by nasal cannula, no pulmonary complaints, spirometer 2000 observed.  Abdominal pain and cramping have resolved, passing flatus, no bowel movement.  He says he has had issues with colon distention dating to prior to his sleeve gastrectomy and we discussed this might be related to chronic narcotic use.  I did give him a dose of Mestinon last night.  He is tolerating his diet without further nausea or vomiting.  Ambulating and voiding well.  He says his pain is controlled with oral Dilaudid and he would like to go home if possible.    No fever or tachycardia pulse 53 but as low as 49 recorded respiration 16 blood pressure 116/71 saturation 91%. UO NR.  He is in no apparent distress.  Abdomen soft, nontender, nondistended, bowel sounds active.  Wounds look okay.  Small old ARAM site dressing dry    CMP normal except creatinine 0.67 abdomen 3.3.  Prealbumin pending white count 11,000 with an unremarkable differential except for 19% lymphocytes.  H&H 12.5 and 38.6.  Chest x-ray and abdominal films images and reports reviewed, mild colonic distention mostly involving the transverse colon with air visible along its length to the splenic flexure.    Impression: Postoperative day #3 laparoscopic robotic assisted revision of sleeve gastrectomy to duodenal switch with BPD.  Doing well clinically, would like to go home and does meet discharge criteria.  Probable chronic narcotic induced colonic distention.  With similar air in the distal transverse colon and splenic flexure doubt the duodeno-ileostomy is causing any type of partial obstruction.  Mild iron deficiency anemia without evidence of bleeding on Lovenox.  Low albumin, prealbumin pending.  LFTs now normal.  Insulin-dependent diabetes mellitus.  Obstructive sleep apnea.  Improved I-S, normal chest x-ray.  Asymptomatic bradycardia on beta-blocker, typically we use a scopolamine patch " as well.    Plan: Discharge home.  Discharge instructions discussed.  Follow-up in the office in 1 to 2 weeks and call in the meantime with any problems questions or concerns.  Discussed avoiding NSAIDs and aspirin for at least the next couple weeks and any type of steroids for about a month.  Prescriptions for Dilaudid 2 mg #8, Zofran ODT 4 mg #8, Mestinon  mg #8.  Continue home PPI for now.  He knows to resume his Subutex once off narcotic.  Work with his PCP, may be able to come off insulin.  See orders.

## 2024-04-29 NOTE — OUTREACH NOTE
Prep Survey      Flowsheet Row Responses   [facility] facility patient discharged from? [facility]   Is LACE score < 7 ? No   Eligibility Readm Mgmt   Discharge diagnosis Morbid exogenous obesity, BILIOPANCREATIC DIVERSION WITH DUODENAL SWITCH LAPAROSCOPIC WITH DAVINCI ROBOT   Does the patient have one of the following disease processes/diagnoses(primary or secondary)? General Surgery   Does the patient have Home health ordered? No   Is there a DME ordered? No   Prep survey completed? Yes            Prachi ESPINOZA - Registered Nurse

## 2024-04-29 NOTE — DISCHARGE SUMMARY
Date of admission:   4/26/2024    Date of discharge:   4/29/2024    Admission Diagnosis:     Morbid obesity (236 pounds, 107 kg, BMI 35.36) status post laparoscopic sleeve gastrectomy October 2022     Discharge Diagnosis:  Same    Other diagnoses:   history of spinal osteomyelitis, history of MRSA osteomyelitis left hip, nephrolithiasis, abnormal pulmonary function tests, anxiety, arthritis, benign prostatic hyperplasia, bipolar affective disorder, chronic pain, reported cirrhosis, colonic polyps, current everyday non-nicotene vaping, former smoker, depression, dyspnea exertion, history of elevated liver function test, erectile dysfunction, family history malignant hyperthermia status post negative muscle biopsy, fatigue, fatty liver, history of gastroparesis, GE reflux disease, history of substance abuse clean since 2016, hyperlipidemia, hypertension, impaired mobility, insomnia, type 2 insulin-dependent diabetes, peripheral edema, migraine headaches, rectus diastases, obstructive sleep apnea on CPAP, urinary incontinence, history of umbilical incisional hernia repair with mesh 2026, status post TURP, status post hemorrhoidectomy, status post inguinal hernia repair, status post laparoscopic cholecystectomy, status post tonsillectomy, status post wisdom teeth extraction, allergies to naloxone and hydrocodone.    Principal Procedure Performed:       Laparoscopic robotic assisted biliopancreatic diversion with duodenal switch                                                                                   EGD 4/26/2024    Other procedures performed:    Upper GI 4/27/2024, flat and upright abdominal films 4/28/2024, chest x-ray PA and lateral 4/28/2024    Complications: None    Consultations: None    History of present illness:  This is a 48-year-old disabled morbidly obese male known to me status post laparoscopic sleeve gastrectomy in October 2022.  He presents now for elective revisional metabolic and bariatric  surgery specifically revision of his sleeve gastrectomy to a duodenal switch.  Please see my extensive office notes.  He has undergone our extensive preoperative education, teaching, and consent process, everything is in order and he wishes to proceed.     Hospital Course:  The patient was admitted and underwent uneventful surgery as described.  He was noted intraoperatively to have a moderately dilated transverse colon.  Postoperatively the patient was transferred to the bariatric telemetry unit. Upper GI on postoperative day #1 was unremarkable although the radiologist did not describe the procedure appropriately.  On my review his transverse colon continued to appear dilated but this included the splenic flexure beyond the duodeno-ileostomy.  The patient however was complaining of pain, and nausea and vomiting of dark liquid with no significant oral intake.  Also poor incentive spirometry.  Iron level was 49 which was slightly low without anemia and IV iron was given.  MRSA screen negative.  Hemoglobin A1c 5.70.  The following day he now complains of crampy lower abdominal pain and ongoing nausea and vomiting.  ARAM drainage was serosanguineous with low volume and was removed.  He continued to have poor incentive spirometry at 1250.  Plain abdominal films and a chest x-ray were obtained.  Chest x-ray showed no active process.  Abdominal films showed ongoing prominent distention of the colon, mostly the transverse colon similar to findings on upper GI and at surgery.  Oral Mestinon initiated.  By postop day #3 he looked and felt well and wanted to go home.  He was passing flatus, no bowel movement.  He says he has had issues with colon distention dating to prior to his sleeve gastrectomy presumably related to his chronic narcotic use.  He was tolerating his diet without nausea or vomiting, ambulating and voiding well, no pulmonary complaints, spirometer 2000.    No fever or tachycardia pulse 53 but as low as 49  recorded respiration 16 blood pressure 116/71 saturation 91%. UO NR.  He is in no apparent distress.  Abdomen soft, nontender, nondistended, bowel sounds active.  Wounds look okay.  Small old ARAM site dressing dry     CMP normal except creatinine 0.67 abdomen 3.3.  Prealbumin pending white count 11,000 with an unremarkable differential except for 19% lymphocytes.  H&H 12.5 and 38.6.     Discharge home.  Discharge instructions discussed.  Follow-up in the office in 1 to 2 weeks and call in the meantime with any problems questions or concerns.  Discussed avoiding NSAIDs and aspirin for at least the next couple weeks and any type of steroids for about a month.  Prescriptions for Dilaudid 2 mg #8 (requested by patient), Zofran ODT 4 mg #8, Mestinon  mg #8.  Continue home PPI for now.  He knows to resume his Subutex once off narcotic.  Work with his PCP, may be able to come off insulin.  See orders.

## 2024-04-29 NOTE — CASE MANAGEMENT/SOCIAL WORK
Discharge Planning Assessment  [facility]     Patient Name: Gopi Johsnon  MRN: [id number]  Today's Date: 4/29/2024    Admit Date: 4/26/2024    Plan: home   Discharge Needs Assessment       Row Name 04/29/24 0928       Living Environment    People in Home child(caren), adult;parent(s)    Name(s) of People in Home mother and sister    Current Living Arrangements home    Primary Care Provided by self       Transition Planning    Patient/Family Anticipates Transition to home with family       Discharge Needs Assessment    Readmission Within the Last 30 Days no previous admission in last 30 days    Equipment Currently Used at Home cane, straight;cpap    Concerns to be Addressed basic needs;discharge planning                   Discharge Plan       Row Name 04/29/24 0929       Plan    Plan home    Patient/Family in Agreement with Plan yes    Plan Comments I met with this patient at the bedside. He lives with his mother and sister in [facility]. He is independent with activities of daily living and mobility. He does use a straight cane and cpap at home. He anticipates returning home after this hospitalization, and his sister can transport. Case management will continue to follow his plan of care and assist with any discharge planning needs.    Final Discharge Disposition Code 01 - home or self-care                  Continued Care and Services - Admitted Since 4/26/2024    No active coordination exists for this encounter.       Expected Discharge Date and Time       Expected Discharge Date Expected Discharge Time    May 2, 2024            Demographic Summary       Row Name 04/29/24 0944       General Information    General Information Comments I confirmed that Dolores Paulino is Mr Johnson's PCP and he has [place] Medicare and [place] Medicaid                   Functional Status       Row Name 04/29/24 0928       Functional Status, IADL    Medications independent    Meal Preparation independent    Housekeeping independent     Laundry independent    Shopping independent                   Psychosocial    No documentation.                  Abuse/Neglect    No documentation.                  Legal    No documentation.                  Substance Abuse    No documentation.                  Patient Forms    No documentation.                     Valeria Lizarraga RN

## 2024-05-01 ENCOUNTER — OFFICE VISIT (OUTPATIENT)
Dept: BARIATRICS/WEIGHT MGMT | Facility: CLINIC | Age: 49
End: 2024-05-01
Payer: MEDICARE

## 2024-05-01 VITALS
TEMPERATURE: 97.3 F | SYSTOLIC BLOOD PRESSURE: 134 MMHG | BODY MASS INDEX: 34.29 KG/M2 | DIASTOLIC BLOOD PRESSURE: 88 MMHG | HEIGHT: 69 IN | HEART RATE: 75 BPM | WEIGHT: 231.5 LBS | OXYGEN SATURATION: 92 %

## 2024-05-01 DIAGNOSIS — Z98.84 S/P BARIATRIC SURGERY: Primary | ICD-10-CM

## 2024-05-01 PROCEDURE — 3079F DIAST BP 80-89 MM HG: CPT | Performed by: PHYSICIAN ASSISTANT

## 2024-05-01 PROCEDURE — 99024 POSTOP FOLLOW-UP VISIT: CPT | Performed by: PHYSICIAN ASSISTANT

## 2024-05-01 PROCEDURE — 1159F MED LIST DOCD IN RCRD: CPT | Performed by: PHYSICIAN ASSISTANT

## 2024-05-01 PROCEDURE — 3075F SYST BP GE 130 - 139MM HG: CPT | Performed by: PHYSICIAN ASSISTANT

## 2024-05-01 PROCEDURE — 1160F RVW MEDS BY RX/DR IN RCRD: CPT | Performed by: PHYSICIAN ASSISTANT

## 2024-05-01 RX ORDER — THIAMINE MONONITRATE (VIT B1) 100 MG
100 TABLET ORAL DAILY
Qty: 90 TABLET | Refills: 0 | Status: SHIPPED | OUTPATIENT
Start: 2024-05-01

## 2024-05-01 RX ORDER — DIPHENOXYLATE HYDROCHLORIDE AND ATROPINE SULFATE 2.5; .025 MG/1; MG/1
3 TABLET ORAL DAILY
Qty: 270 TABLET | Refills: 0 | Status: SHIPPED | OUTPATIENT
Start: 2024-05-01

## 2024-05-01 RX ORDER — PHYTONADIONE (VIT K1) 100 MCG
400 TABLET ORAL DAILY
Qty: 360 TABLET | Refills: 0 | Status: SHIPPED | OUTPATIENT
Start: 2024-05-01

## 2024-05-01 RX ORDER — MAGNESIUM 200 MG
1000 TABLET ORAL DAILY
Qty: 90 EACH | Refills: 0 | Status: SHIPPED | OUTPATIENT
Start: 2024-05-01

## 2024-05-01 RX ORDER — ACETAMINOPHEN 500 MG
1200 TABLET ORAL DAILY
Qty: 90 EACH | Refills: 0 | Status: SHIPPED | OUTPATIENT
Start: 2024-05-01

## 2024-05-01 RX ORDER — MULTIVITAMIN WITH IRON
250 TABLET ORAL DAILY
Qty: 90 TABLET | Refills: 0 | Status: SHIPPED | OUTPATIENT
Start: 2024-05-01

## 2024-05-01 RX ORDER — BUPRENORPHINE 2 MG/1
2 TABLET SUBLINGUAL DAILY
COMMUNITY

## 2024-05-01 RX ORDER — BACILLUS COAGULANS 1B CELL
1 CAPSULE ORAL NIGHTLY
Qty: 90 TABLET | Refills: 0 | Status: SHIPPED | OUTPATIENT
Start: 2024-05-01

## 2024-05-01 NOTE — PROGRESS NOTES
"[facility]  [address]  [address]  [facility] [id number] [phone]      Patient Name:  Gopi Johnson  :  1975      Date of Visit: 2024      Reason for Visit:  POD #5    HPI:  Gopi Johnson is a 48 y.o. male s/p robotic DS/BPD 24 GDW (hx LSG )    Discharged on POD#3 - required Mestinon, #8 given on discharge.  Feeling good today.  Continues on Mestinon daily - having bowel movements, 3 loose stools yesterday, 3 normal stools today.  Feels like he does still have some gas pains w/ abdominal pressure.  Passing flatus w/out issue.  Denies N/V/F/C.  Tolerating stage 1 diet.  Drinking 2 protein shakes/day.  Getting 60-80g prot/day.  Drinking 60 oz water/day.  Voiding well - urine \"yellow\".  Not yet taking vitamins.  On Pantoprazole.  Finished Dilaudid RX, resumed Subutex this AM.  Ambulating frequently.     PreDS/BPD weight: 236 pounds.  Lowest weight:  198 lbs.  Today's weight is 105 kg (231 lb 8 oz) pounds, today's  Body mass index is 34.69 kg/m²., and weight loss since surgery is 5 pounds.       Past Medical History:   Diagnosis Date    Abnormal PFTs (pulmonary function tests)     Anxiety     Arthritis     Benign prostatic hyperplasia     Bipolar affective     Chronic pain     Colon polyp     Depression     Dyspepsia     Dyspnea on exertion     Elevated LFTs     GI eval (P)    Enlarged prostate     s/p TURP 2021    Epididymitis     Erectile dysfunction After T.E.R.P.    Family history of malignant hyperthermia     patient had muscle biopsy at [facility] and was negative, sister has malignant hyperthermia and a cousin    Fatigue     Fatty liver     Former smoker     Fractured pelvis     Frequent urination     GERD (gastroesophageal reflux disease)     on PPI, hx erosive gastritis on EGD 22, EGD Dr. Rodriguez 10/23    H/O: substance abuse     clean since , on Subutex, managed by PCP    Headache     following w/ Neurology    History " of non-nicotine vaping     Hx MRSA infection 2020    w/ osteomyelitis L hip, fracture hip, hospitalized with IV abx and drainage, had port with home abx    Hyperammonemia     following w/ GI    Hyperlipidemia     Hypertension     Hypogonadism in male     on testosterone q2wks, follows w/ Urology    Hypokalemia     Impaired mobility     w/ (L)sided weakness (since osteomyelitis), ambulates w/ cane    Insomnia     on Trazodone    Insulin dependent type 2 diabetes mellitus     resolved with weightloss, now back on insulin    Joint pain     w/ recent SI joint injections 2/2022    Kidney stone 11/30/2016    Kidney stones     Lower extremity edema     Migraine     Morbid obesity     Murmur     as a child    Myoclonus     w/ body jerks, follows w/ Neurology    Osteomyelitis 2020    H/O spinal osteomyelitis, previous IVDA    Piercing     ear/body    PUD (peptic ulcer disease)     non-bleeding gastric ulcers on EGD 1/5/22 w/ Dr. Dover    Rectus diastasis     Seasonal allergies     Sleep apnea     CPAP compliant    Staph infection 2020    Substance abuse     Not currently, clean since 2017    Tattoo     Urinary incontinence     Urinary tract infection      Past Surgical History:   Procedure Laterality Date    ABDOMINAL HERNIA REPAIR  2020    incisional, Dr. Dover, inferior to umbilicus    BILIOPANCREATIC DIVISION W DUODENAL SWITCH N/A 4/26/2024    Procedure: BILIOPANCREATIC DIVERSION WITH DUODENAL SWITCH LAPAROSCOPIC WITH DAVINCI ROBOT;  Surgeon: Olu Rodriguez MD;  Location: [facility] OR;  Service: Robotics - DaVinci;  Laterality: N/A;    BUNIONECTOMY Left 2007    COLONOSCOPY  2023    CYSTOSCOPY TRANSURETHRAL RESECTION OF PROSTATE N/A 05/26/2021    Procedure: TRANSURETHRAL RESECTION OF PROSTATE;  Surgeon: Markel Centeno MD;  Location: [facility] OR;  Service: Urology;  Laterality: N/A;    ENDOSCOPY N/A 01/05/2022    Procedure: ESOPHAGOGASTRODUODENOSCOPY with biopsy;  Surgeon: Royal Dover MD;  Location: [facility]  ENDOSCOPY;  Service: Gastroenterology;  Laterality: N/A;    ENDOSCOPY N/A 10/28/2022    Procedure: ESOPHAGOGASTRODUODENOSCOPY;  Surgeon: Olu Rodriguez MD;  Location: [facility] OR;  Service: Bariatric;  Laterality: N/A;    ENDOSCOPY N/A 4/26/2024    Procedure: ESOPHAGOGASTRODUODENOSCOPY;  Surgeon: Olu Rodriguez MD;  Location: [facility] OR;  Service: General;  Laterality: N/A;  EGD SCOPE # 752 USED    GASTRECTOMY N/A 10/28/2022    Procedure: GASTRECTOMY LAPAROSCOPIC;  Surgeon: Olu Rodriguez MD;  Location: [facility] OR;  Service: Bariatric;  Laterality: N/A;    HEMORRHOIDECTOMY      Dr Dover do the removal    HIATAL HERNIA REPAIR  2019    INGUINAL HERNIA REPAIR  2019    Dr. Dover    KNEE OPEN LATERAL RELEASE Right 1985    LAPAROSCOPIC CHOLECYSTECTOMY  2014    sand, no stones    LIVER BIOPSY N/A 10/28/2022    Procedure: LIVER BIOPSY LAPAROSCOPIC;  Surgeon: Olu Rodriguez MD;  Location: [facility] OR;  Service: Bariatric;  Laterality: N/A;    TONSILLECTOMY AND ADENOIDECTOMY  1986    WISDOM TOOTH EXTRACTION  1997     Outpatient Medications Marked as Taking for the 5/1/24 encounter (Office Visit) with Kavita Callaway PA   Medication Sig Dispense Refill    amLODIPine (NORVASC) 5 MG tablet Take 1 tablet by mouth Daily.      chlorthalidone (HYGROTON) 25 MG tablet Take 1 tablet by mouth Daily.      cyclobenzaprine (FLEXERIL) 10 MG tablet Take 1 tablet by mouth 3 (Three) Times a Day As Needed for Muscle Spasms.      fluticasone (FLONASE) 50 MCG/ACT nasal spray 2 sprays into the nostril(s) as directed by provider Daily.      hydrOXYzine (ATARAX) 25 MG tablet Take 1 tablet by mouth 3 (Three) Times a Day As Needed for Anxiety. 90 tablet 0    lidocaine (LIDODERM) 5 % Place 1 patch on the skin as directed by provider Daily. Remove & Discard patch within 12 hours or as directed by MD (Patient taking differently: Place 1 patch on the skin as directed by provider Daily As Needed for Moderate Pain (back pain).  "Remove & Discard patch within 12 hours or as directed by MD)      mirtazapine (REMERON) 30 MG tablet Take 1 tablet by mouth Every Night.      Multiple Vitamins-Minerals (MULTI COMPLETE PO) Take 1 tablet by mouth Daily.      ondansetron ODT (ZOFRAN-ODT) 4 MG disintegrating tablet Place 1 tablet on the tongue Every 8 (Eight) Hours As Needed for Nausea or Vomiting. 8 tablet 0    pantoprazole (PROTONIX) 40 MG EC tablet Take 1 tablet by mouth Every Morning.      propranolol (INDERAL) 60 MG tablet Take 1 tablet by mouth 2 (Two) Times a Day.      pyridostigmine (Mestinon) 180 MG CR tablet Take 1 tablet by mouth Daily. 8 tablet 0    tamsulosin (FLOMAX) 0.4 MG capsule 24 hr capsule TAKE 1 CAPSULE BY MOUTH DAILY. 90 capsule 2    [DISCONTINUED] Cyanocobalamin (Vitamin B-12) 1000 MCG sublingual tablet Place 1 tablet under the tongue Daily. 90 each 0    [DISCONTINUED] HYDROmorphone (Dilaudid) 2 MG tablet Take 1 tablet by mouth Every 4 (Four) Hours As Needed for Moderate Pain. 8 tablet 0     Allergies   Allergen Reactions    Naloxone Swelling     Throat swelling, itching, rash    Hydrocodone-Acetaminophen Itching and Rash     Percocet OK       Social History     Socioeconomic History    Marital status: Single   Tobacco Use    Smoking status: Former     Current packs/day: 0.00     Average packs/day: 1 pack/day for 15.0 years (15.0 ttl pk-yrs)     Types: Cigarettes     Start date: 2007     Quit date: 2022     Years since quittin.7     Passive exposure: Past    Smokeless tobacco: Never    Tobacco comments:     Recently I have quit smoking I do vape but with no nicotine   Vaping Use    Vaping status: Former    Substances: Flavoring    Devices: Pre-filled or refillable cartridge   Substance and Sexual Activity    Alcohol use: Not Currently    Drug use: Not Currently     Types: Amphetamines, Barbiturates, Benzodiazepines, \"Crack\" cocaine, Cocaine(coke), Codeine, Fentanyl, GHB, Hashish, Heroin, Hydrocodone, Ketamine, LSD, " "Marijuana, MDMA (ecstacy), Methamphetamines, Morphine, Nitrous oxide, Opium, Oxycodone     Comment: clean since 2016    Sexual activity: Never     Social History     Social History Narrative    Lives in [place], [place]. Single with no children.  Disabled since 2020 d/t spinal osteomyelitis, depression, anxiety.        /88   Pulse 75   Temp 97.3 °F (36.3 °C) (Infrared)   Ht 174 cm (68.5\")   Wt 105 kg (231 lb 8 oz)   SpO2 92%   BMI 34.69 kg/m²     Physical Exam  Constitutional:       Appearance: He is well-developed.   Cardiovascular:      Rate and Rhythm: Normal rate and regular rhythm.   Pulmonary:      Effort: Pulmonary effort is normal.   Abdominal:      General: Bowel sounds are normal. There is no distension.      Palpations: Abdomen is soft. There is no mass.      Tenderness: There is no abdominal tenderness. There is no guarding or rebound.      Hernia: No hernia is present.      Comments: incisions healing well, ARAM site still oozing some, covered w/ bandage, contact dermatitis noted around ARAM site (likely from prior bandage)   Musculoskeletal:         General: Normal range of motion.   Skin:     General: Skin is warm and dry.   Neurological:      Mental Status: He is alert.           Assessment:   POD #5 s/p robotic DS/BPD 4/26/24 GDW (hx LSG 2022)           Plan:  Doing well.  Continue to advance diet per manual.  Continue protein 100g/day.  Increase exercise/activity as tolerated.  Reviewed lifting restrictions, nothing >25 lbs x 2 more weeks.  Start vitamins as discussed - escribed.  Continue PPI.  Continue to avoid ASA/NSAIDS x 2 weeks postop, steroids x 4 weeks postop, tobacco/nicotine x 6 weeks postop.  OK for hydrocortisone cream around ARAM site.  Call w/ problems/concerns.    The patient was instructed to follow up in 3 weeks, sooner if needed.   "

## 2024-05-02 ENCOUNTER — READMISSION MANAGEMENT (OUTPATIENT)
Dept: CALL CENTER | Facility: HOSPITAL | Age: 49
End: 2024-05-02
Payer: MEDICARE

## 2024-05-02 NOTE — OUTREACH NOTE
General Surgery Week 1 Survey      Flowsheet Row Responses   [facility] [place] patient discharged from? [facility]   Does the patient have one of the following disease processes/diagnoses(primary or secondary)? General Surgery   Week 1 attempt successful? Yes   Call start time 1623   Call end time 1626   List who call center can speak with pt   Medication alerts for this patient zofran.   Meds reviewed with patient/caregiver? Yes   Is the patient having any side effects they believe may be caused by any medication additions or changes? No   Does the patient have all medications related to this admission filled (includes all antibiotics, pain medications, etc.) Yes   Is the patient taking all medications as directed (includes completed medication regime)? Yes   Does the patient have a follow up appointment scheduled with their surgeon? Yes   Has the patient kept scheduled appointments due by today? Yes   Has home health visited the patient within 72 hours of discharge? N/A   Psychosocial issues? No   Did the patient receive a copy of their discharge instructions? Yes   Nursing interventions Reviewed instructions with patient   What is the patient's perception of their health status since discharge? Improving   Is the patient/caregiver able to teach back the hierarchy of who to call/visit for symptoms/problems? PCP, Specialist, Home health nurse, Urgent Care, ED, 911 Yes   Week 1 call completed? Yes   Graduated Yes   Is the patient interested in additional calls from an ambulatory ? No   Would this patient benefit from a Referral to Amb Social Work? No   Wrap up additional comments Pt reports feeling well. Pt has post op appointment yesterday. Pt is tolerating the diet. Pt has no questions.   Call end time 1626            Vannessa LUND - Registered Nurse

## 2024-05-06 RX ORDER — PYRIDOSTIGMINE BROMIDE 180 MG/1
180 TABLET, EXTENDED RELEASE ORAL DAILY
Qty: 8 TABLET | Refills: 0 | OUTPATIENT
Start: 2024-05-06

## 2024-06-03 PROBLEM — Z13.21 MALNUTRITION SCREEN: Status: ACTIVE | Noted: 2024-06-03

## 2024-06-03 PROBLEM — K91.2 POSTGASTRECTOMY MALABSORPTION: Status: ACTIVE | Noted: 2024-06-03

## 2024-06-03 PROBLEM — Z51.81 THERAPEUTIC DRUG MONITORING: Status: ACTIVE | Noted: 2024-06-03

## 2024-06-03 PROBLEM — E55.9 HYPOVITAMINOSIS D: Status: ACTIVE | Noted: 2024-06-03

## 2024-06-03 PROBLEM — Z13.0 SCREENING, IRON DEFICIENCY ANEMIA: Status: ACTIVE | Noted: 2024-06-03

## 2024-06-03 PROBLEM — Z90.3 POSTGASTRECTOMY MALABSORPTION: Status: ACTIVE | Noted: 2024-06-03

## 2024-09-13 ENCOUNTER — APPOINTMENT (OUTPATIENT)
Dept: CT IMAGING | Facility: HOSPITAL | Age: 49
End: 2024-09-13
Payer: MEDICARE

## 2024-09-13 ENCOUNTER — HOSPITAL ENCOUNTER (EMERGENCY)
Facility: HOSPITAL | Age: 49
Discharge: HOME | End: 2024-09-13
Attending: STUDENT IN AN ORGANIZED HEALTH CARE EDUCATION/TRAINING PROGRAM
Payer: MEDICARE

## 2024-09-13 VITALS
SYSTOLIC BLOOD PRESSURE: 130 MMHG | WEIGHT: 190 LBS | HEIGHT: 70 IN | HEART RATE: 55 BPM | DIASTOLIC BLOOD PRESSURE: 83 MMHG | BODY MASS INDEX: 27.2 KG/M2 | OXYGEN SATURATION: 95 % | TEMPERATURE: 97.7 F | RESPIRATION RATE: 16 BRPM

## 2024-09-13 DIAGNOSIS — L03.119 CELLULITIS OF HAND: Primary | ICD-10-CM

## 2024-09-13 LAB
ANION GAP SERPL CALCULATED.3IONS-SCNC: 10.1 MMOL/L (ref 5–15)
BASOPHILS # BLD AUTO: 0.02 10*3/MM3 (ref 0–0.2)
BASOPHILS NFR BLD AUTO: 0.2 % (ref 0–1.5)
BUN SERPL-MCNC: 6 MG/DL (ref 6–20)
BUN/CREAT SERPL: 7.9 (ref 7–25)
CALCIUM SPEC-SCNC: 9.4 MG/DL (ref 8.6–10.5)
CHLORIDE SERPL-SCNC: 105 MMOL/L (ref 98–107)
CO2 SERPL-SCNC: 24.9 MMOL/L (ref 22–29)
CREAT SERPL-MCNC: 0.76 MG/DL (ref 0.76–1.27)
CRP SERPL-MCNC: 4.85 MG/DL (ref 0–0.5)
D-LACTATE SERPL-SCNC: 1.1 MMOL/L (ref 0.5–2)
DEPRECATED RDW RBC AUTO: 45 FL (ref 37–54)
EGFRCR SERPLBLD CKD-EPI 2021: 110.2 ML/MIN/1.73
EOSINOPHIL # BLD AUTO: 0.12 10*3/MM3 (ref 0–0.4)
EOSINOPHIL NFR BLD AUTO: 1.2 % (ref 0.3–6.2)
ERYTHROCYTE [DISTWIDTH] IN BLOOD BY AUTOMATED COUNT: 14.8 % (ref 12.3–15.4)
ERYTHROCYTE [SEDIMENTATION RATE] IN BLOOD: 36 MM/HR (ref 0–15)
GLUCOSE SERPL-MCNC: 114 MG/DL (ref 65–99)
HCT VFR BLD AUTO: 40.4 % (ref 37.5–51)
HGB BLD-MCNC: 13.6 G/DL (ref 13–17.7)
IMM GRANULOCYTES # BLD AUTO: 0.03 10*3/MM3 (ref 0–0.05)
IMM GRANULOCYTES NFR BLD AUTO: 0.3 % (ref 0–0.5)
LYMPHOCYTES # BLD AUTO: 1.82 10*3/MM3 (ref 0.7–3.1)
LYMPHOCYTES NFR BLD AUTO: 18.6 % (ref 19.6–45.3)
MCH RBC QN AUTO: 28.2 PG (ref 26.6–33)
MCHC RBC AUTO-ENTMCNC: 33.7 G/DL (ref 31.5–35.7)
MCV RBC AUTO: 83.8 FL (ref 79–97)
MONOCYTES # BLD AUTO: 0.61 10*3/MM3 (ref 0.1–0.9)
MONOCYTES NFR BLD AUTO: 6.2 % (ref 5–12)
NEUTROPHILS NFR BLD AUTO: 7.2 10*3/MM3 (ref 1.7–7)
NEUTROPHILS NFR BLD AUTO: 73.5 % (ref 42.7–76)
NRBC BLD AUTO-RTO: 0 /100 WBC (ref 0–0.2)
PLATELET # BLD AUTO: 258 10*3/MM3 (ref 140–450)
PMV BLD AUTO: 9.9 FL (ref 6–12)
POTASSIUM SERPL-SCNC: 3.6 MMOL/L (ref 3.5–5.2)
PROCALCITONIN SERPL-MCNC: 0.04 NG/ML (ref 0–0.25)
RBC # BLD AUTO: 4.82 10*6/MM3 (ref 4.14–5.8)
SODIUM SERPL-SCNC: 140 MMOL/L (ref 136–145)
WBC NRBC COR # BLD AUTO: 9.8 10*3/MM3 (ref 3.4–10.8)

## 2024-09-13 PROCEDURE — 86140 C-REACTIVE PROTEIN: CPT | Performed by: STUDENT IN AN ORGANIZED HEALTH CARE EDUCATION/TRAINING PROGRAM

## 2024-09-13 PROCEDURE — 80048 BASIC METABOLIC PNL TOTAL CA: CPT | Performed by: STUDENT IN AN ORGANIZED HEALTH CARE EDUCATION/TRAINING PROGRAM

## 2024-09-13 PROCEDURE — 84145 PROCALCITONIN (PCT): CPT | Performed by: STUDENT IN AN ORGANIZED HEALTH CARE EDUCATION/TRAINING PROGRAM

## 2024-09-13 PROCEDURE — 87040 BLOOD CULTURE FOR BACTERIA: CPT | Performed by: STUDENT IN AN ORGANIZED HEALTH CARE EDUCATION/TRAINING PROGRAM

## 2024-09-13 PROCEDURE — 73201 CT UPPER EXTREMITY W/DYE: CPT

## 2024-09-13 PROCEDURE — 85025 COMPLETE CBC W/AUTO DIFF WBC: CPT | Performed by: STUDENT IN AN ORGANIZED HEALTH CARE EDUCATION/TRAINING PROGRAM

## 2024-09-13 PROCEDURE — 36415 COLL VENOUS BLD VENIPUNCTURE: CPT

## 2024-09-13 PROCEDURE — 25510000001 IOPAMIDOL 61 % SOLUTION: Performed by: STUDENT IN AN ORGANIZED HEALTH CARE EDUCATION/TRAINING PROGRAM

## 2024-09-13 PROCEDURE — 83605 ASSAY OF LACTIC ACID: CPT | Performed by: STUDENT IN AN ORGANIZED HEALTH CARE EDUCATION/TRAINING PROGRAM

## 2024-09-13 PROCEDURE — 85652 RBC SED RATE AUTOMATED: CPT | Performed by: STUDENT IN AN ORGANIZED HEALTH CARE EDUCATION/TRAINING PROGRAM

## 2024-09-13 PROCEDURE — 99285 EMERGENCY DEPT VISIT HI MDM: CPT

## 2024-09-13 RX ORDER — CEPHALEXIN 500 MG/1
1000 CAPSULE ORAL 2 TIMES DAILY
Qty: 40 CAPSULE | Refills: 0 | Status: SHIPPED | OUTPATIENT
Start: 2024-09-13 | End: 2024-09-23

## 2024-09-13 RX ORDER — DOXYCYCLINE 100 MG/1
100 CAPSULE ORAL 2 TIMES DAILY
Qty: 20 CAPSULE | Refills: 0 | Status: SHIPPED | OUTPATIENT
Start: 2024-09-13 | End: 2024-09-23

## 2024-09-13 RX ORDER — DOXYCYCLINE 100 MG/1
100 CAPSULE ORAL ONCE
Status: COMPLETED | OUTPATIENT
Start: 2024-09-13 | End: 2024-09-13

## 2024-09-13 RX ORDER — IOPAMIDOL 612 MG/ML
100 INJECTION, SOLUTION INTRAVASCULAR
Status: COMPLETED | OUTPATIENT
Start: 2024-09-13 | End: 2024-09-13

## 2024-09-13 RX ADMIN — DOXYCYCLINE 100 MG: 100 CAPSULE ORAL at 15:50

## 2024-09-13 RX ADMIN — CEPHALEXIN 1000 MG: 250 CAPSULE ORAL at 15:50

## 2024-09-13 RX ADMIN — IOPAMIDOL 100 ML: 612 INJECTION, SOLUTION INTRAVENOUS at 14:04

## 2024-09-13 NOTE — DISCHARGE INSTRUCTIONS
Take antibiotics as prescribed  Please return if you develop continued fevers or worsening symptoms despite the use of antibiotics

## 2024-09-18 LAB
BACTERIA SPEC AEROBE CULT: NORMAL
BACTERIA SPEC AEROBE CULT: NORMAL

## 2024-10-21 ENCOUNTER — TELEPHONE (OUTPATIENT)
Dept: GASTROENTEROLOGY | Facility: CLINIC | Age: 49
End: 2024-10-21

## 2024-10-21 NOTE — TELEPHONE ENCOUNTER
Caller: GANGA SALOMON     Relationship to patient: SELF    Best call back number: [phone]    Chief complaint: PT HAD TO R/S APPT. THAT WAS TOMORROW 10.22 DUE TO PT HAVING SHINGLES OUTBREAK. PT WOULD LIKE A SOONER APPT. THAN 12.11 BECAUSE HE'S WORRIED THE HEMORRHOID WILL BE GONE BY THEN    Type of visit: FOLLOW UP OFFICE VISIT    Requested date: sooner than 12.11    If rescheduling, when is the original appointment: 12.11    Additional notes:

## 2024-10-28 ENCOUNTER — TELEPHONE (OUTPATIENT)
Dept: BARIATRICS/WEIGHT MGMT | Facility: CLINIC | Age: 49
End: 2024-10-28
Payer: MEDICARE

## 2024-10-28 NOTE — TELEPHONE ENCOUNTER
Pt sent [employer] message stating he has not had a post op follow up and would like to make an appt. Called pt, advised that we would like for him to make an appt. Pt understood with no further questions or concerns and was transferred to  to make appt.

## 2024-11-11 ENCOUNTER — OFFICE VISIT (OUTPATIENT)
Dept: BARIATRICS/WEIGHT MGMT | Facility: CLINIC | Age: 49
End: 2024-11-11
Payer: MEDICARE

## 2024-11-11 VITALS
OXYGEN SATURATION: 96 % | TEMPERATURE: 97.6 F | SYSTOLIC BLOOD PRESSURE: 116 MMHG | HEART RATE: 77 BPM | BODY MASS INDEX: 25.83 KG/M2 | HEIGHT: 69 IN | WEIGHT: 174.4 LBS | DIASTOLIC BLOOD PRESSURE: 80 MMHG

## 2024-11-11 DIAGNOSIS — E55.9 VITAMIN D DEFICIENCY: ICD-10-CM

## 2024-11-11 DIAGNOSIS — R53.83 FATIGUE, UNSPECIFIED TYPE: ICD-10-CM

## 2024-11-11 DIAGNOSIS — R79.0 ABNORMAL BLOOD LEVEL OF IRON: ICD-10-CM

## 2024-11-11 DIAGNOSIS — K90.9 INTESTINAL MALABSORPTION, UNSPECIFIED TYPE: ICD-10-CM

## 2024-11-11 NOTE — PROGRESS NOTES
[facility]  [address]  [address]  [facility] [id number] [phone]        Patient Name:  Gopi Johnson  :  1975      Date of Visit: 2024      Reason for Visit:   6 months postop      HPI: Gopi Johnson is a 49 y.o. male s/p robotic DS/BPD 24 GDW (hx LSG )    First visit since POD #5. Doing well.  No issues/concerns. Denies dysphagia, reflux, nausea, vomiting, and abdominal pain.  Getting ??g prot/day. Protein collagen powder in orange juice every morning. B - eggs, L - burger skyla/lunch meat, D - meat + veggie, 1 snack - PB crackers, pistachios. Drinking 64 fluid oz/day.   On Pantoprazole.  Physical activity: exercise bike for 30-60 min daily.        No previous bariatric labs. Taking MVI, B12, B1, Calcium, Vit D, iron, Vit C, and magnesium, vit A, vit E, vit K .      PreDS/BPD weight: 236 pounds.  Lowest weight: 198 lbs.Today's weight is 79.1 kg (174 lb 6.4 oz) pounds, today's  Body mass index is 26.13 kg/m²., and weight loss since surgery is 62 pounds.        Review of Systems: As per HPI, additionally denies dizziness, memory loss, vision changes, paresthesias.     Past Medical History:   Diagnosis Date    Abnormal PFTs (pulmonary function tests)     Anxiety     Arthritis     Benign prostatic hyperplasia     Bipolar affective     Chronic pain     Colon polyp     Depression     Dyspepsia     Dyspnea on exertion     Elevated LFTs     GI eval (P)    Enlarged prostate     s/p TURP 2021    Epididymitis     Erectile dysfunction After T.E.R.P.    Family history of malignant hyperthermia     patient had muscle biopsy at [facility] and was negative, sister has malignant hyperthermia and a cousin    Fatigue     Fatty liver     Former smoker     Fractured pelvis     Frequent urination     GERD (gastroesophageal reflux disease)     on PPI, hx erosive gastritis on EGD 22, EGD Dr. Rodriguez 10/23    H/O: substance abuse     clean since ,  on Subutex, managed by PCP    Headache     following w/ Neurology    History of non-nicotine vaping     Hx MRSA infection 2020    w/ osteomyelitis L hip, fracture hip, hospitalized with IV abx and drainage, had port with home abx    Hyperammonemia     following w/ GI    Hyperlipidemia     Hypertension     Hypogonadism in male     on testosterone q2wks, follows w/ Urology    Hypokalemia     Impaired mobility     w/ (L)sided weakness (since osteomyelitis), ambulates w/ cane    Insomnia     on Trazodone    Insulin dependent type 2 diabetes mellitus     resolved with weightloss, now back on insulin    Joint pain     w/ recent SI joint injections 2/2022    Kidney stone 11/30/2016    Kidney stones     Lower extremity edema     Migraine     Morbid obesity     Murmur     as a child    Myoclonus     w/ body jerks, follows w/ Neurology    Osteomyelitis 2020    H/O spinal osteomyelitis, previous IVDA    Piercing     ear/body    PUD (peptic ulcer disease)     non-bleeding gastric ulcers on EGD 1/5/22 w/ Dr. Dover    Rectus diastasis     Seasonal allergies     Sleep apnea     CPAP compliant    Staph infection 2020    Substance abuse     Not currently, clean since 2017    Tattoo     Urinary incontinence     Urinary tract infection      Past Surgical History:   Procedure Laterality Date    ABDOMINAL HERNIA REPAIR  2020    incisional, Dr. Dover, inferior to umbilicus    BILIOPANCREATIC DIVISION W DUODENAL SWITCH N/A 4/26/2024    Procedure: BILIOPANCREATIC DIVERSION WITH DUODENAL SWITCH LAPAROSCOPIC WITH DAVINCI ROBOT;  Surgeon: Olu Rodriguez MD;  Location: [facility];  Service: Robotics - DaVinci;  Laterality: N/A;    BUNIONECTOMY Left 2007    COLONOSCOPY  2023    CYSTOSCOPY TRANSURETHRAL RESECTION OF PROSTATE N/A 05/26/2021    Procedure: TRANSURETHRAL RESECTION OF PROSTATE;  Surgeon: Markel Centeno MD;  Location: [facility];  Service: Urology;  Laterality: N/A;    ENDOSCOPY N/A 01/05/2022    Procedure:  ESOPHAGOGASTRODUODENOSCOPY with biopsy;  Surgeon: Royal Dover MD;  Location:  [facility];  Service: Gastroenterology;  Laterality: N/A;    ENDOSCOPY N/A 10/28/2022    Procedure: ESOPHAGOGASTRODUODENOSCOPY;  Surgeon: Olu Rodriguez MD;  Location:  [facility];  Service: Bariatric;  Laterality: N/A;    ENDOSCOPY N/A 4/26/2024    Procedure: ESOPHAGOGASTRODUODENOSCOPY;  Surgeon: Olu Rodriguez MD;  Location:  [facility];  Service: General;  Laterality: N/A;  EGD SCOPE # 752 USED    GASTRECTOMY N/A 10/28/2022    Procedure: GASTRECTOMY LAPAROSCOPIC;  Surgeon: Olu Rodriguez MD;  Location:  [facility];  Service: Bariatric;  Laterality: N/A;    HEMORRHOIDECTOMY      Dr Dover do the removal    HIATAL HERNIA REPAIR  2019    INGUINAL HERNIA REPAIR  2019    Dr. Dover    KNEE OPEN LATERAL RELEASE Right 1985    LAPAROSCOPIC CHOLECYSTECTOMY  2014    sand, no stones    LIVER BIOPSY N/A 10/28/2022    Procedure: LIVER BIOPSY LAPAROSCOPIC;  Surgeon: Olu Rodriguez MD;  Location:  [facility];  Service: Bariatric;  Laterality: N/A;    TONSILLECTOMY AND ADENOIDECTOMY  1986    WISDOM TOOTH EXTRACTION  1997     Outpatient Medications Marked as Taking for the 11/11/24 encounter (Office Visit) with Ashia Gil APRN   Medication Sig Dispense Refill    amLODIPine (NORVASC) 5 MG tablet Take 1 tablet by mouth Daily.      buprenorphine (SUBUTEX) 2 MG sublingual tablet SL tablet Place 1 tablet under the tongue Daily.      Calcium Carbonate-Vit D-Min (Calcium 1200) 9968-3287 MG-UNIT chewable tablet Chew 1,200 mg Daily. 90 each 0    Cyanocobalamin (Vitamin B-12) 1000 MCG sublingual tablet Place 1 tablet under the tongue Daily. 90 each 0    cyclobenzaprine (FLEXERIL) 10 MG tablet Take 1 tablet by mouth 3 (Three) Times a Day As Needed for Muscle Spasms.      fluticasone (FLONASE) 50 MCG/ACT nasal spray Administer 2 sprays into the nostril(s) as directed by provider Daily.      hydrOXYzine (ATARAX) 25 MG tablet Take 1  tablet by mouth 3 (Three) Times a Day As Needed for Anxiety. 90 tablet 0    Iron-Vitamin C (Vitron-C)  MG tablet Take 1 tablet by mouth Every Night. 90 tablet 0    lidocaine (LIDODERM) 5 % Place 1 patch on the skin as directed by provider Daily. Remove & Discard patch within 12 hours or as directed by MD (Patient taking differently: Place 1 patch on the skin as directed by provider Daily As Needed for Moderate Pain (back pain). Remove & Discard patch within 12 hours or as directed by MD)      Magnesium 250 MG tablet Take 1 tablet by mouth Daily. 90 tablet 0    mirtazapine (REMERON) 30 MG tablet Take 1 tablet by mouth Every Night.      multivitamin tablet tablet Take 3 tablets by mouth Daily. 270 tablet 0    pantoprazole (PROTONIX) 40 MG EC tablet Take 1 tablet by mouth Every Morning.      propranolol (INDERAL) 60 MG tablet Take 1 tablet by mouth 2 (Two) Times a Day.      thiamine (VITAMIN B-1) 100 MG tablet Take 1 tablet by mouth Daily. 90 tablet 0    Vitamin A 2400 MCG (8000 UT) tablet Take 8,000 Units by mouth Daily. 90 tablet 0    vitamin D3 125 MCG (5000 UT) capsule capsule Take 1 capsule by mouth Daily. 90 capsule 0    vitamin E 200 UNIT capsule Take 1 capsule by mouth Daily. 90 capsule 0    Vitamin K, Phytonadione, 100 MCG tablet Take 400 mcg by mouth Daily. 360 tablet 0    [DISCONTINUED] Multiple Vitamins-Minerals (MULTI COMPLETE PO) Take 1 tablet by mouth Daily.         Allergies   Allergen Reactions    Naloxone Swelling     Throat swelling, itching, rash    Hydrocodone-Acetaminophen Itching and Rash     Percocet OK       Social History     Socioeconomic History    Marital status: Single   Tobacco Use    Smoking status: Former     Current packs/day: 0.00     Average packs/day: 1 pack/day for 15.0 years (15.0 ttl pk-yrs)     Types: Cigarettes     Start date: 2007     Quit date: 2022     Years since quittin.2     Passive exposure: Past    Smokeless tobacco: Never    Tobacco comments:      "Recently I have quit smoking I do vape but with no nicotine   Vaping Use    Vaping status: Former    Substances: Flavoring    Devices: Pre-filled or refillable cartridge   Substance and Sexual Activity    Alcohol use: Not Currently    Drug use: Not Currently     Types: Amphetamines, Barbiturates, Benzodiazepines, \"Crack\" cocaine, Cocaine(coke), Codeine, Fentanyl, GHB, Hashish, Heroin, Hydrocodone, Ketamine, LSD, Marijuana, MDMA (ecstacy), Methamphetamines, Morphine, Nitrous oxide, Opium, Oxycodone     Comment: clean since 2016    Sexual activity: Never     Social History     Social History Narrative    Lives in [place], [place]. Single with no children.  Disabled since 2020 d/t spinal osteomyelitis, depression, anxiety.        /80 (BP Location: Left arm, Patient Position: Sitting, Cuff Size: Adult)   Pulse 77   Temp 97.6 °F (36.4 °C) (Temporal)   Ht 174 cm (68.5\")   Wt 79.1 kg (174 lb 6.4 oz)   SpO2 96%   BMI 26.13 kg/m²     Physical Exam  Vitals reviewed.   Constitutional:       General: He is not in acute distress.  Cardiovascular:      Rate and Rhythm: Normal rate.   Pulmonary:      Effort: Pulmonary effort is normal.   Musculoskeletal:         General: Normal range of motion.   Neurological:      Mental Status: He is alert.   Psychiatric:         Mood and Affect: Mood normal.         Assessment:  6 months s/p robotic DS/BPD 4/26/24 GDW ([facility] 2022)    ICD-10-CM ICD-9-CM   1. Body mass index (BMI) 26.0-26.9, adult  Z68.26 V85.22   2. Intestinal malabsorption, unspecified type  K90.9 579.9   3. Fatigue, unspecified type  R53.83 780.79   4. Vitamin D deficiency  E55.9 268.9   5. Abnormal blood level of iron  R79.0 790.6       BMI is >= 25 and <30. (Overweight) The following options were offered after discussion;: see plan.      Plan:  Continue w/ good food choices and healthy habits.  Continue to focus on high protein, low carb.  Encouraged tracking intake to ensure getting adequate protein.  Continue routine " physical activity.  Routine bariatric labs ordered.  Continue vitamins w/ adjustments pending lab results.  Call w/ problems/concerns.     The patient was instructed to follow up in 3 months, sooner if needed.      MARIA DEL CARMEN Rivera

## 2024-11-16 LAB
25(OH)D3+25(OH)D2 SERPL-MCNC: 25.3 NG/ML (ref 30–100)
A-TOCOPHEROL VIT E SERPL-MCNC: 7.9 MG/L (ref 7–25.1)
ALBUMIN SERPL-MCNC: 4.2 G/DL (ref 4.1–5.1)
ALP SERPL-CCNC: 212 IU/L (ref 44–121)
ALT SERPL-CCNC: 33 IU/L (ref 0–44)
AST SERPL-CCNC: 26 IU/L (ref 0–40)
BASOPHILS # BLD AUTO: 0 X10E3/UL (ref 0–0.2)
BASOPHILS NFR BLD AUTO: 0 %
BILIRUB SERPL-MCNC: 0.3 MG/DL (ref 0–1.2)
BUN SERPL-MCNC: 6 MG/DL (ref 6–24)
BUN/CREAT SERPL: 8 (ref 9–20)
CALCIUM SERPL-MCNC: 9.6 MG/DL (ref 8.7–10.2)
CHLORIDE SERPL-SCNC: 105 MMOL/L (ref 96–106)
CO2 SERPL-SCNC: 28 MMOL/L (ref 20–29)
COPPER SERPL-MCNC: 138 UG/DL (ref 69–132)
CREAT SERPL-MCNC: 0.77 MG/DL (ref 0.76–1.27)
EGFRCR SERPLBLD CKD-EPI 2021: 110 ML/MIN/1.73
EOSINOPHIL # BLD AUTO: 0.1 X10E3/UL (ref 0–0.4)
EOSINOPHIL NFR BLD AUTO: 2 %
ERYTHROCYTE [DISTWIDTH] IN BLOOD BY AUTOMATED COUNT: 14.4 % (ref 11.6–15.4)
FERRITIN SERPL-MCNC: 103 NG/ML (ref 30–400)
FOLATE SERPL-MCNC: 8 NG/ML
GAMMA TOCOPHEROL SERPL-MCNC: 0.6 MG/L (ref 0.5–5.5)
GLOBULIN SER CALC-MCNC: 2.5 G/DL (ref 1.5–4.5)
GLUCOSE SERPL-MCNC: 99 MG/DL (ref 70–99)
HCT VFR BLD AUTO: 47.4 % (ref 37.5–51)
HGB BLD-MCNC: 15.1 G/DL (ref 13–17.7)
IMM GRANULOCYTES # BLD AUTO: 0 X10E3/UL (ref 0–0.1)
IMM GRANULOCYTES NFR BLD AUTO: 0 %
INR PPP: 1 (ref 0.9–1.2)
IRON SERPL-MCNC: 68 UG/DL (ref 38–169)
LYMPHOCYTES # BLD AUTO: 1.8 X10E3/UL (ref 0.7–3.1)
LYMPHOCYTES NFR BLD AUTO: 26 %
MAGNESIUM SERPL-MCNC: 2.5 MG/DL (ref 1.6–2.3)
MCH RBC QN AUTO: 29 PG (ref 26.6–33)
MCHC RBC AUTO-ENTMCNC: 31.9 G/DL (ref 31.5–35.7)
MCV RBC AUTO: 91 FL (ref 79–97)
METHYLMALONATE SERPL-SCNC: 132 NMOL/L (ref 0–378)
MONOCYTES # BLD AUTO: 0.3 X10E3/UL (ref 0.1–0.9)
MONOCYTES NFR BLD AUTO: 5 %
NEUTROPHILS # BLD AUTO: 4.6 X10E3/UL (ref 1.4–7)
NEUTROPHILS NFR BLD AUTO: 67 %
PHOSPHATE SERPL-MCNC: 3.7 MG/DL (ref 2.8–4.1)
PHYTONADIONE SERPL-MCNC: 0.25 NG/ML (ref 0.1–2.2)
PLATELET # BLD AUTO: 324 X10E3/UL (ref 150–450)
POTASSIUM SERPL-SCNC: 5 MMOL/L (ref 3.5–5.2)
PREALB SERPL-MCNC: 19 MG/DL (ref 12–34)
PROT SERPL-MCNC: 6.7 G/DL (ref 6–8.5)
PROTHROMBIN TIME: 11.2 SEC (ref 9.1–12)
PTH-INTACT SERPL-MCNC: 33 PG/ML (ref 15–65)
RBC # BLD AUTO: 5.2 X10E6/UL (ref 4.14–5.8)
SODIUM SERPL-SCNC: 143 MMOL/L (ref 134–144)
VIT A SERPL-MCNC: 39.9 UG/DL (ref 20.1–62)
VIT B1 BLD-SCNC: 177 NMOL/L (ref 66.5–200)
WBC # BLD AUTO: 6.9 X10E3/UL (ref 3.4–10.8)
ZINC SERPL-MCNC: 87 UG/DL (ref 44–115)

## 2025-04-15 ENCOUNTER — HOSPITAL ENCOUNTER (OUTPATIENT)
Facility: HOSPITAL | Age: 50
Setting detail: OBSERVATION
Discharge: HOME | End: 2025-04-16
Attending: INTERNAL MEDICINE | Admitting: INTERNAL MEDICINE
Payer: MEDICARE

## 2025-04-15 ENCOUNTER — DOCUMENTATION (OUTPATIENT)
Dept: BARIATRICS/WEIGHT MGMT | Facility: CLINIC | Age: 50
End: 2025-04-15
Payer: MEDICARE

## 2025-04-15 ENCOUNTER — APPOINTMENT (OUTPATIENT)
Dept: GENERAL RADIOLOGY | Facility: HOSPITAL | Age: 50
End: 2025-04-15
Payer: MEDICARE

## 2025-04-15 PROBLEM — K56.609 SMALL BOWEL OBSTRUCTION: Status: ACTIVE | Noted: 2025-04-15

## 2025-04-15 LAB
ALBUMIN SERPL-MCNC: 3.5 G/DL (ref 3.5–5.2)
ALBUMIN/GLOB SERPL: 1.7 G/DL
ALP SERPL-CCNC: 101 U/L (ref 39–117)
ALT SERPL W P-5'-P-CCNC: 19 U/L (ref 1–41)
ANION GAP SERPL CALCULATED.3IONS-SCNC: 10 MMOL/L (ref 5–15)
AST SERPL-CCNC: 19 U/L (ref 1–40)
BASOPHILS # BLD AUTO: 0.04 10*3/MM3 (ref 0–0.2)
BASOPHILS NFR BLD AUTO: 0.4 % (ref 0–1.5)
BILIRUB SERPL-MCNC: 0.3 MG/DL (ref 0–1.2)
BUN SERPL-MCNC: 10 MG/DL (ref 6–20)
BUN/CREAT SERPL: 14.9 (ref 7–25)
CALCIUM SPEC-SCNC: 8.6 MG/DL (ref 8.6–10.5)
CHLORIDE SERPL-SCNC: 104 MMOL/L (ref 98–107)
CO2 SERPL-SCNC: 27 MMOL/L (ref 22–29)
CREAT SERPL-MCNC: 0.67 MG/DL (ref 0.76–1.27)
D-LACTATE SERPL-SCNC: 1.3 MMOL/L (ref 0.5–2)
DEPRECATED RDW RBC AUTO: 43.3 FL (ref 37–54)
EGFRCR SERPLBLD CKD-EPI 2021: 114.5 ML/MIN/1.73
EOSINOPHIL # BLD AUTO: 0.07 10*3/MM3 (ref 0–0.4)
EOSINOPHIL NFR BLD AUTO: 0.7 % (ref 0.3–6.2)
ERYTHROCYTE [DISTWIDTH] IN BLOOD BY AUTOMATED COUNT: 13.4 % (ref 12.3–15.4)
GLOBULIN UR ELPH-MCNC: 2.1 GM/DL
GLUCOSE SERPL-MCNC: 108 MG/DL (ref 65–99)
HCT VFR BLD AUTO: 39.3 % (ref 37.5–51)
HGB BLD-MCNC: 12.9 G/DL (ref 13–17.7)
IMM GRANULOCYTES # BLD AUTO: 0.03 10*3/MM3 (ref 0–0.05)
IMM GRANULOCYTES NFR BLD AUTO: 0.3 % (ref 0–0.5)
LYMPHOCYTES # BLD AUTO: 2.01 10*3/MM3 (ref 0.7–3.1)
LYMPHOCYTES NFR BLD AUTO: 21.1 % (ref 19.6–45.3)
MCH RBC QN AUTO: 29 PG (ref 26.6–33)
MCHC RBC AUTO-ENTMCNC: 32.8 G/DL (ref 31.5–35.7)
MCV RBC AUTO: 88.3 FL (ref 79–97)
MONOCYTES # BLD AUTO: 0.79 10*3/MM3 (ref 0.1–0.9)
MONOCYTES NFR BLD AUTO: 8.3 % (ref 5–12)
NEUTROPHILS NFR BLD AUTO: 6.58 10*3/MM3 (ref 1.7–7)
NEUTROPHILS NFR BLD AUTO: 69.2 % (ref 42.7–76)
NRBC BLD AUTO-RTO: 0 /100 WBC (ref 0–0.2)
PLATELET # BLD AUTO: 271 10*3/MM3 (ref 140–450)
PMV BLD AUTO: 9.5 FL (ref 6–12)
POTASSIUM SERPL-SCNC: 4 MMOL/L (ref 3.5–5.2)
PROT SERPL-MCNC: 5.6 G/DL (ref 6–8.5)
RBC # BLD AUTO: 4.45 10*6/MM3 (ref 4.14–5.8)
SODIUM SERPL-SCNC: 141 MMOL/L (ref 136–145)
WBC NRBC COR # BLD AUTO: 9.52 10*3/MM3 (ref 3.4–10.8)

## 2025-04-15 PROCEDURE — 80053 COMPREHEN METABOLIC PANEL: CPT | Performed by: SURGERY

## 2025-04-15 PROCEDURE — 74018 RADEX ABDOMEN 1 VIEW: CPT

## 2025-04-15 PROCEDURE — 99222 1ST HOSP IP/OBS MODERATE 55: CPT | Performed by: NURSE PRACTITIONER

## 2025-04-15 PROCEDURE — G0378 HOSPITAL OBSERVATION PER HR: HCPCS

## 2025-04-15 PROCEDURE — 25010000002 MORPHINE PER 10 MG: Performed by: INTERNAL MEDICINE

## 2025-04-15 PROCEDURE — 25810000003 SODIUM CHLORIDE 0.9 % SOLUTION: Performed by: NURSE PRACTITIONER

## 2025-04-15 PROCEDURE — 96375 TX/PRO/DX INJ NEW DRUG ADDON: CPT

## 2025-04-15 PROCEDURE — 85025 COMPLETE CBC W/AUTO DIFF WBC: CPT | Performed by: SURGERY

## 2025-04-15 PROCEDURE — 96374 THER/PROPH/DIAG INJ IV PUSH: CPT

## 2025-04-15 PROCEDURE — 25010000002 ONDANSETRON PER 1 MG: Performed by: NURSE PRACTITIONER

## 2025-04-15 PROCEDURE — 99222 1ST HOSP IP/OBS MODERATE 55: CPT | Performed by: SURGERY

## 2025-04-15 PROCEDURE — G0379 DIRECT REFER HOSPITAL OBSERV: HCPCS

## 2025-04-15 PROCEDURE — 83605 ASSAY OF LACTIC ACID: CPT | Performed by: SURGERY

## 2025-04-15 RX ORDER — IBUPROFEN 600 MG/1
1 TABLET ORAL
Status: DISCONTINUED | OUTPATIENT
Start: 2025-04-15 | End: 2025-04-16 | Stop reason: HOSPADM

## 2025-04-15 RX ORDER — ONDANSETRON 2 MG/ML
4 INJECTION INTRAMUSCULAR; INTRAVENOUS EVERY 6 HOURS PRN
Status: DISCONTINUED | OUTPATIENT
Start: 2025-04-15 | End: 2025-04-16 | Stop reason: HOSPADM

## 2025-04-15 RX ORDER — MORPHINE SULFATE 2 MG/ML
2 INJECTION, SOLUTION INTRAMUSCULAR; INTRAVENOUS EVERY 4 HOURS PRN
Status: DISCONTINUED | OUTPATIENT
Start: 2025-04-15 | End: 2025-04-16 | Stop reason: HOSPADM

## 2025-04-15 RX ORDER — SODIUM CHLORIDE 9 MG/ML
40 INJECTION, SOLUTION INTRAVENOUS AS NEEDED
Status: DISCONTINUED | OUTPATIENT
Start: 2025-04-15 | End: 2025-04-16 | Stop reason: HOSPADM

## 2025-04-15 RX ORDER — FLUTICASONE PROPIONATE 50 MCG
2 SPRAY, SUSPENSION (ML) NASAL DAILY
Status: DISCONTINUED | OUTPATIENT
Start: 2025-04-16 | End: 2025-04-16 | Stop reason: HOSPADM

## 2025-04-15 RX ORDER — SODIUM CHLORIDE 0.9 % (FLUSH) 0.9 %
10 SYRINGE (ML) INJECTION AS NEEDED
Status: DISCONTINUED | OUTPATIENT
Start: 2025-04-15 | End: 2025-04-16 | Stop reason: HOSPADM

## 2025-04-15 RX ORDER — ONDANSETRON 2 MG/ML
4 INJECTION INTRAMUSCULAR; INTRAVENOUS EVERY 6 HOURS PRN
Status: DISCONTINUED | OUTPATIENT
Start: 2025-04-15 | End: 2025-04-15 | Stop reason: SDUPTHER

## 2025-04-15 RX ORDER — SODIUM CHLORIDE 0.9 % (FLUSH) 0.9 %
10 SYRINGE (ML) INJECTION EVERY 12 HOURS SCHEDULED
Status: DISCONTINUED | OUTPATIENT
Start: 2025-04-15 | End: 2025-04-16 | Stop reason: HOSPADM

## 2025-04-15 RX ORDER — SODIUM CHLORIDE 9 MG/ML
100 INJECTION, SOLUTION INTRAVENOUS CONTINUOUS
Status: DISCONTINUED | OUTPATIENT
Start: 2025-04-15 | End: 2025-04-16 | Stop reason: HOSPADM

## 2025-04-15 RX ORDER — SODIUM CHLORIDE 9 MG/ML
75 INJECTION, SOLUTION INTRAVENOUS CONTINUOUS
Status: ACTIVE | OUTPATIENT
Start: 2025-04-15 | End: 2025-04-16

## 2025-04-15 RX ORDER — NITROGLYCERIN 0.4 MG/1
0.4 TABLET SUBLINGUAL
Status: DISCONTINUED | OUTPATIENT
Start: 2025-04-15 | End: 2025-04-16 | Stop reason: HOSPADM

## 2025-04-15 RX ORDER — NICOTINE POLACRILEX 4 MG
15 LOZENGE BUCCAL
Status: DISCONTINUED | OUTPATIENT
Start: 2025-04-15 | End: 2025-04-16 | Stop reason: HOSPADM

## 2025-04-15 RX ORDER — DEXTROSE MONOHYDRATE 25 G/50ML
25 INJECTION, SOLUTION INTRAVENOUS
Status: DISCONTINUED | OUTPATIENT
Start: 2025-04-15 | End: 2025-04-16 | Stop reason: HOSPADM

## 2025-04-15 RX ADMIN — ONDANSETRON 4 MG: 2 INJECTION INTRAMUSCULAR; INTRAVENOUS at 20:59

## 2025-04-15 RX ADMIN — MORPHINE SULFATE 2 MG: 2 INJECTION, SOLUTION INTRAMUSCULAR; INTRAVENOUS at 21:51

## 2025-04-15 RX ADMIN — SODIUM CHLORIDE 75 ML/HR: 9 INJECTION, SOLUTION INTRAVENOUS at 20:43

## 2025-04-15 RX ADMIN — Medication 10 ML: at 20:43

## 2025-04-15 NOTE — H&P
[facility]  HISTORY AND PHYSICAL    Patient Name: Gopi Johnson  : 1975  MRN: [id number]  Primary Care Physician: Maria Victoria Paulino APRN  Date of admission: 4/15/2025    Subjective   Subjective     Chief Complaint:  Abdominal pain    HPI:  Gopi Johnson is a 49 y.o. male with a history of anxiety, depression, GERD, HTN, HLD, T2DM, THOMAS, gastric sleeve 2 years ago, duodenal switch last year, was transferred from [facility] with an SBO.  Patient reports that he has been experiencing abdominal pain for the past few months.  He states that his pain usually occurs after eating a meal heavy in carbohydrates.  His pain typically lasts about an hour and then resolves on its own.  He states that last night his pain did not resolve and became unbearable which prompted him to go to the ED for evaluation.  He describes his pain as feeling like his abdomen is on fire, aching, and the sensation of being full.  He has been evaluated by his PCP who thought his symptoms may be due to dumping syndrome.  He does have associated nausea and vomiting.  He has not had any vomiting since last night.  His last bowel movement was on 3/14/2025 at 11 AM.  He is passing gas today.  Currently he is pain-free.  He denies fevers, chills, shortness of air, chest pain, diarrhea, constipation, or any other issues at this time.    CT abdomen pelvis from OSH showed distal small bowel dilation worrisome for bowel obstruction.  Patient had an NG tube at OSH that was removed prior to transfer.  Patient is being admitted to the hospitalist for further evaluation management.    Review of Systems   Constitutional: Negative.    HENT: Negative.     Eyes: Negative.    Respiratory: Negative.     Cardiovascular: Negative.    Gastrointestinal:  Positive for abdominal distention, abdominal pain, nausea and vomiting. Negative for constipation and diarrhea.   Endocrine: Negative.    Genitourinary:  Negative.    Musculoskeletal: Negative.    Skin: Negative.    Allergic/Immunologic: Negative.    Neurological: Negative.    Hematological: Negative.    Psychiatric/Behavioral: Negative.                  Personal History     Past Medical History:   Diagnosis Date    Abnormal PFTs (pulmonary function tests)     Anxiety     Arthritis     Benign prostatic hyperplasia     Bipolar affective     Chronic pain     Colon polyp     Depression     Dyspepsia     Dyspnea on exertion     Elevated LFTs     GI eval (P)    Enlarged prostate     s/p TURP 5/2021    Epididymitis     Erectile dysfunction After T.E.R.P.    Family history of malignant hyperthermia     patient had muscle biopsy at [facility] and was negative, sister has malignant hyperthermia and a cousin    Fatigue     Fatty liver     Former smoker     Fractured pelvis     Frequent urination     GERD (gastroesophageal reflux disease)     on PPI, hx erosive gastritis on EGD 1/5/22, EGD Dr. Rodriguez 10/23    H/O: substance abuse     clean since 2016, on Subutex, managed by PCP    Headache     following w/ Neurology    History of non-nicotine vaping     Hx MRSA infection 2020    w/ osteomyelitis L hip, fracture hip, hospitalized with IV abx and drainage, had port with home abx    Hyperammonemia     following w/ GI    Hyperlipidemia     Hypertension     Hypogonadism in male     on testosterone q2wks, follows w/ Urology    Hypokalemia     Impaired mobility     w/ (L)sided weakness (since osteomyelitis), ambulates w/ cane    Insomnia     on Trazodone    Insulin dependent type 2 diabetes mellitus     resolved with weightloss, now back on insulin    Joint pain     w/ recent SI joint injections 2/2022    Kidney stone 11/30/2016    Kidney stones     Lower extremity edema     Migraine     Morbid obesity     Murmur     as a child    Myoclonus     w/ body jerks, follows w/ Neurology    Osteomyelitis 2020    H/O spinal osteomyelitis, previous IVDA    Piercing     ear/body    PUD (peptic  ulcer disease)     non-bleeding gastric ulcers on EGD 1/5/22 w/ Dr. Dover    Rectus diastasis     Seasonal allergies     Sleep apnea     CPAP compliant    Staph infection 2020    Substance abuse     Not currently, clean since 2017    Tattoo     Urinary incontinence     Urinary tract infection              Past Surgical History:   Procedure Laterality Date    ABDOMINAL HERNIA REPAIR  2020    incisional, Dr. Dover, inferior to umbilicus    BILIOPANCREATIC DIVISION W DUODENAL SWITCH N/A 4/26/2024    Procedure: BILIOPANCREATIC DIVERSION WITH DUODENAL SWITCH LAPAROSCOPIC WITH DAVINCI ROBOT;  Surgeon: Olu Rodriguez MD;  Location:  [facility];  Service: Robotics - DaVinci;  Laterality: N/A;    BUNIONECTOMY Left 2007    COLONOSCOPY  2023    CYSTOSCOPY TRANSURETHRAL RESECTION OF PROSTATE N/A 05/26/2021    Procedure: TRANSURETHRAL RESECTION OF PROSTATE;  Surgeon: Markel Centeno MD;  Location:  [facility];  Service: Urology;  Laterality: N/A;    ENDOSCOPY N/A 01/05/2022    Procedure: ESOPHAGOGASTRODUODENOSCOPY with biopsy;  Surgeon: Royal Dover MD;  Location:  [facility];  Service: Gastroenterology;  Laterality: N/A;    ENDOSCOPY N/A 10/28/2022    Procedure: ESOPHAGOGASTRODUODENOSCOPY;  Surgeon: Olu Rodriguez MD;  Location:  [facility];  Service: Bariatric;  Laterality: N/A;    ENDOSCOPY N/A 4/26/2024    Procedure: ESOPHAGOGASTRODUODENOSCOPY;  Surgeon: Olu Rodriguez MD;  Location:  [facility];  Service: General;  Laterality: N/A;  EGD SCOPE # 752 USED    GASTRECTOMY N/A 10/28/2022    Procedure: GASTRECTOMY LAPAROSCOPIC;  Surgeon: Oul Rodriguez MD;  Location:  [facility];  Service: Bariatric;  Laterality: N/A;    HEMORRHOIDECTOMY      Dr Dover do the removal    HIATAL HERNIA REPAIR  2019    INGUINAL HERNIA REPAIR  2019    Dr. Dover    KNEE OPEN LATERAL RELEASE Right 1985    LAPAROSCOPIC CHOLECYSTECTOMY  2014    sand, no stones    LIVER BIOPSY N/A 10/28/2022    Procedure: LIVER BIOPSY  LAPAROSCOPIC;  Surgeon: Olu Rodriguez MD;  Location: [facility];  Service: Bariatric;  Laterality: N/A;    TONSILLECTOMY AND ADENOIDECTOMY  1986    WISDOM TOOTH EXTRACTION  1997       Family History:  family history includes Alcohol abuse in his father; Arthritis in his maternal grandfather, mother, and paternal grandmother; COPD in his mother; Cancer in his brother, maternal aunt, maternal grandfather, paternal aunt, paternal grandmother, and paternal uncle; Colon cancer in his half-brother; Colon polyps in his half-brother; Depression in his maternal aunt, mother, and sister; Diabetes in his maternal grandmother, maternal uncle, and sister; Hearing loss in his paternal grandmother, paternal uncle, and paternal uncle; Heart attack in his maternal grandfather and paternal grandmother; Heart disease in his father, maternal grandfather, mother, and paternal grandmother; Hypertension in his brother, father, maternal aunt, maternal grandfather, maternal grandmother, mother, paternal grandfather, paternal grandmother, paternal uncle, and sister; Leukemia in his maternal grandmother; Malig Hyperthermia in his sister and sister; Obesity in his maternal aunt, maternal grandfather, maternal grandmother, maternal uncle, mother, paternal grandfather, paternal uncle, and sister; Prostate cancer in his paternal grandfather, paternal uncle, and paternal uncle; Sleep apnea in his maternal grandfather, maternal grandmother, mother, and sister; Stomach cancer in his paternal grandfather; Stroke in his father; Throat cancer in his father.     Social History:  reports that he has been smoking cigarettes. He started smoking about 17 years ago. He has a 15 pack-year smoking history. He has been exposed to tobacco smoke. He has never used smokeless tobacco. He reports that he does not currently use alcohol. He reports that he does not currently use drugs after having used the following drugs: Amphetamines, Barbiturates,  "Benzodiazepines, \"Crack\" cocaine, Cocaine(coke), Codeine, Fentanyl, GHB, Hashish, Heroin, Hydrocodone, Ketamine, LSD, Marijuana, MDMA (ecstacy), Methamphetamines, Morphine, Nitrous oxide, Opium, and Oxycodone.  Social History     Social History Narrative    Lives in [place], [place]. Single with no children.  Disabled since 2020 d/t spinal osteomyelitis, depression, anxiety.        Medications:  Calcium 1200, Cholecalciferol, Iron-Vitamin C, Magnesium, Vitamin A, Vitamin B-12, Vitamin K (Phytonadione), amLODIPine, buprenorphine, cyclobenzaprine, fluticasone, hydrOXYzine, lidocaine, mirtazapine, multivitamin, pantoprazole, propranolol, thiamine, vitamin D3, and vitamin E    Allergies   Allergen Reactions    Naloxone Swelling     Throat swelling, itching, rash    Hydrocodone-Acetaminophen Itching and Rash     Percocet OK       Objective   Objective     Vital Signs:   Temp:  [98.4 °F (36.9 °C)-98.6 °F (37 °C)] 98.4 °F (36.9 °C)  Heart Rate:  [62-68] 62  Resp:  [16-18] 16  BP: (147-151)/(92-93) 147/93    Physical Exam  Constitutional:       General: He is not in acute distress.     Appearance: He is not toxic-appearing.   HENT:      Head: Normocephalic.      Nose: Nose normal.      Mouth/Throat:      Mouth: Mucous membranes are moist.   Eyes:      Pupils: Pupils are equal, round, and reactive to light.   Cardiovascular:      Rate and Rhythm: Normal rate and regular rhythm.      Pulses: Normal pulses.      Heart sounds: Normal heart sounds. No murmur heard.     No friction rub. No gallop.   Pulmonary:      Effort: Pulmonary effort is normal. No respiratory distress.      Breath sounds: Normal breath sounds. No wheezing, rhonchi or rales.   Abdominal:      General: Bowel sounds are normal. There is no distension.      Palpations: Abdomen is soft.      Tenderness: There is no abdominal tenderness.   Musculoskeletal:         General: No swelling or tenderness. Normal range of motion.      Cervical back: Normal range of motion. "   Skin:     General: Skin is warm and dry.      Capillary Refill: Capillary refill takes less than 2 seconds.      Coloration: Skin is not jaundiced or pale.      Findings: No erythema or rash.   Neurological:      Mental Status: He is alert and oriented to person, place, and time.      Cranial Nerves: No cranial nerve deficit.      Sensory: No sensory deficit.   Psychiatric:         Mood and Affect: Mood normal.         Behavior: Behavior normal.         Thought Content: Thought content normal.         Judgment: Judgment normal.            Result Review:  I have personally reviewed the results from the time of this admission to 4/15/2025 20:41 EDT and agree with these findings:  [x]  Laboratory list / accordion  []  Microbiology  [x]  Radiology  []  EKG/Telemetry   []  Cardiology/Vascular   []  Pathology  [x]  Old records  []  Other:  Most notable findings include:     LAB RESULTS:              Lab 04/14/25  2225   LIPASE 16                     Brief Urine Lab Results       None          Microbiology Results (last 10 days)       ** No results found for the last 240 hours. **            CT Outside Films  Result Date: 4/15/2025  This procedure was auto-finalized with no dictation required.    XR Outside Films  Result Date: 4/15/2025  This procedure was auto-finalized with no dictation required.    XR Abdomen KUB  Result Date: 4/15/2025  XR ABDOMEN KUB Date of Exam: 4/15/2025 6:53 PM EDT Indication: SBO, removed NGT Comparison: 4/28/2024 FINDINGS: A nonspecific bowel gas pattern is observed. A small stool burden is seen throughout the colon. No definitive pathologic calcifications are seen. No acute osseous abnormalities are noted.     Impression: 1.A nonspecific bowel gas pattern is observed. A small stool burden is seen throughout the colon. Electronically Signed: Jorge A Shine MD  4/15/2025 7:33 PM EDT  Workstation ID: [id number]    CT Abdomen Pelvis With Contrast  Result Date: 4/14/2025  CT OF THE ABDOMEN AND  PELVIS HISTORY: Right lower quadrant pain with nausea and vomiting for 3 hours. PROCEDURE:  Routine axial images were obtained from the lung bases to the pubic symphysis following IV contrast administration. This study was performed with techniques to keep radiation doses as low as reasonably achievable, (ALARA). Individualized dose reduction techniques using automated exposure control or adjustment of mA and/or kV according to the patient size were employed. COMPARISON: None. FINDINGS: Abdomen: The gallbladder has been removed, accounting for the mild to moderate intra and extrahepatic biliary ductal dilation. Solid abdominal organs and ureters are otherwise unremarkable. The colon is unremarkable and there is no sign of appendicitis. However, there are multiple loops of dilated fluid and air-filled distal small bowel. There is no cold small bowel within an area of rectus diastases, but this does not appear to be related to small bowel dilation. Pelvis: The urinary bladder is normal. There is no pelvic or abdominal ascites, adenopathy, or acute osseous abnormality.    Impression: Distal small bowel dilation worrisome for bowel obstruction. Proximal small bowel is normal. Images reviewed, interpreted and dictated by Dr. Rajendra Linn MD      Results for orders placed during the hospital encounter of 09/10/20    Adult Transesophageal Echo (ROSE) W/ Cont if Necessary Per Protocol (Cardiology Department)    Interpretation Summary  1.  Normal left ventricular size and systolic function.  2.  Trace MR and TR.  3.  No echocardiographic evidence of valvular endocarditis.      Assessment & Plan   Assessment & Plan       Small bowel obstruction    Abdominal pain    PUD (peptic ulcer disease)    Diabetes mellitus type 2 in obese    S/P laparoscopic sleeve gastrectomy    Type 2 diabetes mellitus with morbid obesity    Gopi Johnson is a 49 y.o. male with a history of anxiety, depression, GERD, HTN, HLD, T2DM, THOMAS, gastric  sleeve 2 years ago, duodenal switch last year, was transferred from [facility] with a SBO.      Assessment and Plan:    SBO  -- s/p laparoscopic sleeve gastrectomy October 2022  -- S/p laparoscopic scopic biliopancreatic diversion with duodenal switch 4/26/2024  -- CT abdomen pelvis from OSH showed distal small bowel dilation worrisome for bowel obstruction.    -- KUB shows a nonspecific bowel gas pattern observed.  A small stool burden is seen throughout the colon.  -- Consult Dr. Conklin with surgery  -- N.p.o.  -- Pain control  -- IV fluids  -- am labs    T2DM  -- not on any home meds since weight loss  -- A1c in the a.m.  -- FSBG with SSI    HTN  HLD  -- pt npo for SBO    GERD    DVT prophylaxis:  Mechanical    CODE STATUS:    Code Status (Patient has no pulse and is not breathing): CPR (Attempt to Resuscitate)  Medical Interventions (Patient has pulse or is breathing): Full Support  Level Of Support Discussed With: Patient      Expected Discharge  TBD  Expected discharge date/ time has not been documented.      This note has been completed as part of a split-shared workflow.     Signature: Electronically signed by MARIA DEL CARMEN Montero, 04/15/25, 8:41 PM EDT.

## 2025-04-16 VITALS
BODY MASS INDEX: 21.59 KG/M2 | RESPIRATION RATE: 16 BRPM | SYSTOLIC BLOOD PRESSURE: 140 MMHG | HEART RATE: 67 BPM | OXYGEN SATURATION: 99 % | HEIGHT: 71 IN | TEMPERATURE: 98.4 F | DIASTOLIC BLOOD PRESSURE: 91 MMHG | WEIGHT: 154.2 LBS

## 2025-04-16 PROBLEM — K56.609 SMALL BOWEL OBSTRUCTION: Status: RESOLVED | Noted: 2025-04-15 | Resolved: 2025-04-16

## 2025-04-16 LAB
GLUCOSE BLDC GLUCOMTR-MCNC: 82 MG/DL (ref 70–130)
GLUCOSE BLDC GLUCOMTR-MCNC: 86 MG/DL (ref 70–130)

## 2025-04-16 PROCEDURE — G0378 HOSPITAL OBSERVATION PER HR: HCPCS

## 2025-04-16 PROCEDURE — 99239 HOSP IP/OBS DSCHRG MGMT >30: CPT | Performed by: INTERNAL MEDICINE

## 2025-04-16 PROCEDURE — 25810000003 SODIUM CHLORIDE 0.9 % SOLUTION: Performed by: INTERNAL MEDICINE

## 2025-04-16 PROCEDURE — 82948 REAGENT STRIP/BLOOD GLUCOSE: CPT

## 2025-04-16 RX ADMIN — FLUTICASONE PROPIONATE 2 SPRAY: 50 SPRAY, METERED NASAL at 08:36

## 2025-04-16 RX ADMIN — Medication 10 ML: at 08:36

## 2025-04-16 RX ADMIN — SODIUM CHLORIDE 100 ML/HR: 9 INJECTION, SOLUTION INTRAVENOUS at 08:44

## 2025-04-16 NOTE — CONSULTS
"BARIATRIC SURGERY CONSULTATION      Patient Care Team:  Maria Victoria Paulino APRN as PCP - General (Nurse Practitioner)  Royal Dover MD as Consulting Physician (General Surgery)  Gurwinder Barney MD as Consulting Physician (Urology)      Chief complaint: Abdominal pain    Subjective     History of Present Illness    Gopi Johnson is a 49 y.o. year old male status post robotic duodenal switch/biliopancreatic diversion 4/26/2024 with Dr. Rodriguez (history of sleeve gastrectomy 2022).    His last office visit was on 11/11/2024, which was 6 months postop.  At that time he was doing well.  He reports he is compliant with all of his vitamins and generally feels well.  He is very pleased with his weight loss and his comorbidity resolution.     Since he had the duodenal switch, he has had intermittent abdominal cramping.  Is usually not severe, self-limited, and he felt like this was his \"new normal.\"  He associates the cramping with higher carb meals.  Last night, he had the same pain but it was very severe and unrelenting.  He was accompanied by several bouts of vomiting.  Vomitus was mucoid/foamy, not bilious.  Last bowel movement was yesterday, and last flatus was today.  He proceeded to the emergency room at [facility].  A CT scan was read as small bowel obstruction.  We were called around midnight for transfer, but due to no bed availability [facility], he did not arrive until around 6 PM this evening.    An NG tube was finally placed at [facility], the patient reports the output was brown.  He self discontinued the NG tube before he was transferred to this hospital.  He reports that he feels much better.  His abdomen no longer hurts.  KUB performed when he arrived here was read as a normal bowel gas pattern.    Review of Systems     Past Medical History:   Diagnosis Date    Abnormal PFTs (pulmonary function tests)     Anxiety     Arthritis     Benign prostatic hyperplasia     Bipolar " affective     Chronic pain     Colon polyp     Depression     Dyspepsia     Dyspnea on exertion     Elevated LFTs     GI eval (P)    Enlarged prostate     s/p TURP 5/2021    Epididymitis     Erectile dysfunction After T.E.R.P.    Family history of malignant hyperthermia     patient had muscle biopsy at [facility] and was negative, sister has malignant hyperthermia and a cousin    Fatigue     Fatty liver     Former smoker     Fractured pelvis     Frequent urination     GERD (gastroesophageal reflux disease)     on PPI, hx erosive gastritis on EGD 1/5/22, EGD Dr. Rodriguez 10/23    H/O: substance abuse     clean since 2016, on Subutex, managed by PCP    Headache     following w/ Neurology    History of non-nicotine vaping     Hx MRSA infection 2020    w/ osteomyelitis L hip, fracture hip, hospitalized with IV abx and drainage, had port with home abx    Hyperammonemia     following w/ GI    Hyperlipidemia     Hypertension     Hypogonadism in male     on testosterone q2wks, follows w/ Urology    Hypokalemia     Impaired mobility     w/ (L)sided weakness (since osteomyelitis), ambulates w/ cane    Insomnia     on Trazodone    Insulin dependent type 2 diabetes mellitus     resolved with weightloss, now back on insulin    Joint pain     w/ recent SI joint injections 2/2022    Kidney stone 11/30/2016    Kidney stones     Lower extremity edema     Migraine     Morbid obesity     Murmur     as a child    Myoclonus     w/ body jerks, follows w/ Neurology    Osteomyelitis 2020    H/O spinal osteomyelitis, previous IVDA    Piercing     ear/body    PUD (peptic ulcer disease)     non-bleeding gastric ulcers on EGD 1/5/22 w/ Dr. Dover    Rectus diastasis     Seasonal allergies     Sleep apnea     CPAP compliant    Staph infection 2020    Substance abuse     Not currently, clean since 2017    Tattoo     Urinary incontinence     Urinary tract infection      Past Surgical History:   Procedure Laterality Date    ABDOMINAL HERNIA REPAIR   2020    incisional, Dr. Dover, inferior to umbilicus    BILIOPANCREATIC DIVISION W DUODENAL SWITCH N/A 4/26/2024    Procedure: BILIOPANCREATIC DIVERSION WITH DUODENAL SWITCH LAPAROSCOPIC WITH DAVINCI ROBOT;  Surgeon: Olu Rodriguez MD;  Location:  [facility];  Service: Robotics - DaVinci;  Laterality: N/A;    BUNIONECTOMY Left 2007    COLONOSCOPY  2023    CYSTOSCOPY TRANSURETHRAL RESECTION OF PROSTATE N/A 05/26/2021    Procedure: TRANSURETHRAL RESECTION OF PROSTATE;  Surgeon: Markel Centeno MD;  Location:  [facility];  Service: Urology;  Laterality: N/A;    ENDOSCOPY N/A 01/05/2022    Procedure: ESOPHAGOGASTRODUODENOSCOPY with biopsy;  Surgeon: Royal Dover MD;  Location: [facility] ENDOSCOPY;  Service: Gastroenterology;  Laterality: N/A;    ENDOSCOPY N/A 10/28/2022    Procedure: ESOPHAGOGASTRODUODENOSCOPY;  Surgeon: Olu Rodriguez MD;  Location:  [facility];  Service: Bariatric;  Laterality: N/A;    ENDOSCOPY N/A 4/26/2024    Procedure: ESOPHAGOGASTRODUODENOSCOPY;  Surgeon: Olu Rodriguez MD;  Location:  [facility];  Service: General;  Laterality: N/A;  EGD SCOPE # 752 USED    GASTRECTOMY N/A 10/28/2022    Procedure: GASTRECTOMY LAPAROSCOPIC;  Surgeon: Olu Rodriguez MD;  Location:  [facility];  Service: Bariatric;  Laterality: N/A;    HEMORRHOIDECTOMY      Dr Dover do the removal    HIATAL HERNIA REPAIR  2019    INGUINAL HERNIA REPAIR  2019    Dr. Dover    KNEE OPEN LATERAL RELEASE Right 1985    LAPAROSCOPIC CHOLECYSTECTOMY  2014    sand, no stones    LIVER BIOPSY N/A 10/28/2022    Procedure: LIVER BIOPSY LAPAROSCOPIC;  Surgeon: Olu Rodriguez MD;  Location:  [facility];  Service: Bariatric;  Laterality: N/A;    TONSILLECTOMY AND ADENOIDECTOMY  1986    WISDOM TOOTH EXTRACTION  1997     Family History   Problem Relation Age of Onset    Hypertension Mother     Obesity Mother     Arthritis Mother     COPD Mother     Heart disease Mother     Depression Mother     Sleep apnea Mother      Hypertension Father     Alcohol abuse Father     Throat cancer Father     Stroke Father     Heart disease Father     Sleep apnea Sister     Hypertension Sister     Malig Hyperthermia Sister     Obesity Sister     Diabetes Sister     Depression Sister     Malig Hyperthermia Sister         I have been tested (muscle tissue test) and i dont have it    Cancer Brother     Hypertension Brother     Obesity Maternal Aunt     Hypertension Maternal Aunt     Cancer Maternal Aunt     Depression Maternal Aunt     Obesity Maternal Uncle     Diabetes Maternal Uncle     Cancer Paternal Aunt     Obesity Paternal Uncle     Hypertension Paternal Uncle     Cancer Paternal Uncle     Hearing loss Paternal Uncle     Prostate cancer Paternal Uncle     Hearing loss Paternal Uncle     Prostate cancer Paternal Uncle         Both my dads older brothers had radical prostate removal    Diabetes Maternal Grandmother     Leukemia Maternal Grandmother     Obesity Maternal Grandmother     Hypertension Maternal Grandmother     Sleep apnea Maternal Grandmother     Hypertension Maternal Grandfather     Cancer Maternal Grandfather     Sleep apnea Maternal Grandfather     Heart disease Maternal Grandfather     Arthritis Maternal Grandfather     Heart attack Maternal Grandfather     Obesity Maternal Grandfather     Hypertension Paternal Grandmother     Cancer Paternal Grandmother         blood    Heart disease Paternal Grandmother         Dad's mother    Arthritis Paternal Grandmother     Hearing loss Paternal Grandmother     Heart attack Paternal Grandmother     Prostate cancer Paternal Grandfather     Obesity Paternal Grandfather     Hypertension Paternal Grandfather     Stomach cancer Paternal Grandfather     Colon polyps Half-Brother     Colon cancer Half-Brother     Esophageal cancer Neg Hx      Social History     Tobacco Use    Smoking status: Every Day     Current packs/day: 0.50     Average packs/day: 1 pack/day for 15.0 years (15.0 ttl pk-yrs)     " Types: Cigarettes     Start date: 08/2007     Last attempt to quit: 08/2022     Passive exposure: Past    Smokeless tobacco: Never    Tobacco comments:     Recently I have quit smoking I do vape but with no nicotine   Vaping Use    Vaping status: Former    Substances: Flavoring    Devices: Pre-filled or refillable cartridge   Substance Use Topics    Alcohol use: Not Currently    Drug use: Not Currently     Types: Amphetamines, Barbiturates, Benzodiazepines, \"Crack\" cocaine, Cocaine(coke), Codeine, Fentanyl, GHB, Hashish, Heroin, Hydrocodone, Ketamine, LSD, Marijuana, MDMA (ecstacy), Methamphetamines, Morphine, Nitrous oxide, Opium, Oxycodone     Comment: clean since 2016     E-cigarette/Vaping    E-cigarette/Vaping Use Former User     Passive Exposure No     Counseling Given Yes     Comments quit 4-1-2024      E-cigarette/Vaping Substances    Nicotine No     THC No     CBD No     Flavoring Yes      Medications Prior to Admission   Medication Sig Dispense Refill Last Dose/Taking    amLODIPine (NORVASC) 5 MG tablet Take 1 tablet by mouth Daily.   Past Week    buprenorphine (SUBUTEX) 2 MG sublingual tablet SL tablet Place 1 tablet under the tongue Daily.   Past Week    Calcium Carbonate-Vit D-Min (Calcium 1200) 9609-3689 MG-UNIT chewable tablet Chew 1,200 mg Daily. 90 each 0 Past Week    Cholecalciferol 1.25 MG (00644 UT) tablet Take 1 tablet by mouth 1 (One) Time Per Week. 12 tablet 0 Past Week    Cyanocobalamin (Vitamin B-12) 1000 MCG sublingual tablet Place 1 tablet under the tongue Daily. 90 each 0 Past Week    fluticasone (FLONASE) 50 MCG/ACT nasal spray Administer 2 sprays into the nostril(s) as directed by provider Daily.   Past Week    Iron-Vitamin C (Vitron-C)  MG tablet Take 1 tablet by mouth Every Night. 90 tablet 0 Past Week    lidocaine (LIDODERM) 5 % Place 1 patch on the skin as directed by provider Daily. Remove & Discard patch within 12 hours or as directed by MD (Patient taking differently: " Place 1 patch on the skin as directed by provider Daily As Needed for Moderate Pain (back pain). Remove & Discard patch within 12 hours or as directed by MD)   Past Week    Magnesium 250 MG tablet Take 1 tablet by mouth Daily. 90 tablet 0 Past Week    mirtazapine (REMERON) 30 MG tablet Take 1 tablet by mouth Every Night.   Past Week    multivitamin tablet tablet Take 3 tablets by mouth Daily. 270 tablet 0 Past Week    pantoprazole (PROTONIX) 40 MG EC tablet Take 1 tablet by mouth Every Morning.   Past Week    thiamine (VITAMIN B-1) 100 MG tablet Take 1 tablet by mouth Daily. 90 tablet 0 Past Week    Vitamin A 2400 MCG (8000 UT) tablet Take 8,000 Units by mouth Daily. 90 tablet 0 Past Week    vitamin D3 125 MCG (5000 UT) capsule capsule Take 1 capsule by mouth Daily. 90 capsule 0 Past Week    vitamin E 200 UNIT capsule Take 1 capsule by mouth Daily. 90 capsule 0 Past Week    Vitamin K, Phytonadione, 100 MCG tablet Take 400 mcg by mouth Daily. 360 tablet 0 Past Week    cyclobenzaprine (FLEXERIL) 10 MG tablet Take 1 tablet by mouth 3 (Three) Times a Day As Needed for Muscle Spasms.       hydrOXYzine (ATARAX) 25 MG tablet Take 1 tablet by mouth 3 (Three) Times a Day As Needed for Anxiety. 90 tablet 0     propranolol (INDERAL) 60 MG tablet Take 1 tablet by mouth 2 (Two) Times a Day.        Allergies:  Naloxone and Hydrocodone-acetaminophen    Objective      Vital Signs  Temp:  [98.4 °F (36.9 °C)-98.6 °F (37 °C)] 98.4 °F (36.9 °C)  Heart Rate:  [62-68] 62  Resp:  [16-18] 16  BP: (147-151)/(92-93) 147/93    Physical Exam  Constitutional:       General: He is not in acute distress.     Appearance: He is well-developed. He is not diaphoretic.      Comments: Pleasant, smiling   HENT:      Head: Normocephalic and atraumatic.      Mouth/Throat:      Pharynx: No oropharyngeal exudate.   Eyes:      Conjunctiva/sclera: Conjunctivae normal.      Pupils: Pupils are equal, round, and reactive to light.   Cardiovascular:      Rate and  Rhythm: Normal rate and regular rhythm.   Pulmonary:      Effort: Pulmonary effort is normal. No respiratory distress.      Breath sounds: Normal breath sounds.   Abdominal:      General: Bowel sounds are normal. There is no distension.      Palpations: Abdomen is soft.      Comments: Lap scars.  Nontender.   Skin:     General: Skin is warm and dry.      Coloration: Skin is not pale.   Neurological:      Mental Status: He is alert and oriented to person, place, and time.      Cranial Nerves: No cranial nerve deficit.   Psychiatric:         Behavior: Behavior normal.         Thought Content: Thought content normal.         Results Review:   I reviewed the patient's new clinical results.  I reviewed the patient's new imaging results and agree with the interpretation.    Lab Results (last 24 hours)       ** No results found for the last 24 hours. **            Imaging Results (Last 24 Hours)       Procedure Component Value Units Date/Time    CT Outside Films [[id number]] Resulted: 04/15/25 2038     Updated: 04/15/25 2038    Narrative:      This procedure was auto-finalized with no dictation required.    XR Outside Films [[id number]] Resulted: 04/15/25 2037     Updated: 04/15/25 2037    Narrative:      This procedure was auto-finalized with no dictation required.    XR Abdomen KUB [[id number]] Collected: 04/15/25 1931     Updated: 04/15/25 1936    Narrative:      XR ABDOMEN KUB    Date of Exam: 4/15/2025 6:53 PM EDT    Indication: SBO, removed NGT    Comparison: 4/28/2024    FINDINGS:  A nonspecific bowel gas pattern is observed. A small stool burden is seen throughout the colon. No definitive pathologic calcifications are seen. No acute osseous abnormalities are noted.      Impression:      1.A nonspecific bowel gas pattern is observed. A small stool burden is seen throughout the colon.      Electronically Signed: Jorge A Shine MD    4/15/2025 7:33 PM EDT    Workstation ID: [id number]              Assessment & Plan        Small bowel obstruction    Abdominal pain    PUD (peptic ulcer disease)    Diabetes mellitus type 2 in obese    S/P laparoscopic sleeve gastrectomy    Type 2 diabetes mellitus with morbid obesity      Assessment & Plan    49-year-old male 1 year status post robotic duodenal switch/biliopancreatic diversion by Dr. Rodriguez (previous sleeve gastrectomy 2022).  Transferred from [facility] with severe periumbilical abdominal pain.    His pain has since resolved.  He is has passed gas today, and is no longer vomiting.  KUB performed here shows normal bowel gas pattern.  He reports that he feels much better.  Will obtain CT scan images from [facility] for review.  Reassess in the morning.  Differential diagnosis includes intermittent internal hernia.    I discussed the patient's findings and my recommendations with patient    Renetta Conklin MD  04/15/25  20:40 EDT

## 2025-04-16 NOTE — DISCHARGE SUMMARY
[facility] Medicine Services  DISCHARGE SUMMARY    Patient Name: Gopi Johnson  : 1975  MRN: [id number]    Date of Admission: 4/15/2025  6:25 PM  Date of Discharge:  2025  Primary Care Physician: Maria Victoria Paulino APRN    Consults       Date and Time Order Name Status Description    4/15/2025  6:33 PM Inpatient Bariatric Surgery Consult              Hospital Course     Presenting Problem: Small bowel obstruction    Active Hospital Problems    Diagnosis  POA    Type 2 diabetes mellitus with morbid obesity [E11.69, E66.01]  Yes    S/P laparoscopic sleeve gastrectomy [Z98.84]  Not Applicable    Diabetes mellitus type 2 in obese [E11.69, E66.9]  Yes      Resolved Hospital Problems    Diagnosis Date Resolved POA    **Small bowel obstruction [K56.609] 2025 Yes    PUD (peptic ulcer disease) [K27.9] 2025 Yes    Abdominal pain [R10.9] 2025 Yes          Hospital Course:  Gopi Johnson is a 49 y.o. male with anxiety, depression, GERD, HTN, HLD, T2DM, THOMAS, gastric sleeve 2 years ago, duodenal switch last year, was transferred from [facility] with a small bowel obstruction.  CT abdomen pelvis from OSH showed distal small bowel dilation worrisome for bowel obstruction.  Patient had an NG tube at OSH that was removed prior to transfer.       This patient's problems and plans were partially entered by my partner and updated as appropriate by me 25.      SBO, resolved  -- s/p laparoscopic sleeve gastrectomy 2022  -- S/p laparoscopic scopic biliopancreatic diversion with duodenal switch 2024  -- CT abdomen pelvis from OSH showed distal small bowel dilation worrisome for bowel obstruction.    -- Bariatric surgery evaluated.  SBO clinically resolved with return of bowel function.  Per their discussion with patient, he did not wish to pursue diagnostic laparoscopy at this time to rule out internal hernia.  He will be discharged home and follow up  as usual in bariatric surgery office.         Discharge Follow Up Recommendations for outpatient labs/diagnostics:   F/U bariatric surgery outpatient as scheduled    Day of Discharge     HPI:   He is feeling much better today.  Passing flatus.  No abdominal pain, nausea/vomiting.  He is hungry.      Vital Signs:   Temp:  [98.2 °F (36.8 °C)-98.6 °F (37 °C)] 98.4 °F (36.9 °C)  Heart Rate:  [54-68] 54  Resp:  [14-18] 16  BP: (130-151)/(82-93) 140/91      Physical Exam:  Constitutional: No acute distress, awake, alert, sitting up in bed  HENT: NCAT, mucous membranes moist  Respiratory: Clear to auscultation bilaterally, respiratory effort normal   Cardiovascular: RRR, no murmurs  Gastrointestinal: Positive bowel sounds, soft, nontender, nondistended  Psychiatric: Appropriate affect, cooperative  Neurologic: Speech clear  Skin: No rashes on exposed surfaces    Pertinent  and/or Most Recent Results     LAB RESULTS:      Lab 04/15/25  2229   WBC 9.52   HEMOGLOBIN 12.9*   HEMATOCRIT 39.3   PLATELETS 271   NEUTROS ABS 6.58   IMMATURE GRANS (ABS) 0.03   LYMPHS ABS 2.01   MONOS ABS 0.79   EOS ABS 0.07   MCV 88.3   LACTATE 1.3         Lab 04/15/25  2229   SODIUM 141   POTASSIUM 4.0   CHLORIDE 104   CO2 27.0   ANION GAP 10.0   BUN 10   CREATININE 0.67*   EGFR 114.5   GLUCOSE 108*   CALCIUM 8.6         Lab 04/15/25  2229 04/14/25  2225   TOTAL PROTEIN 5.6*  --    ALBUMIN 3.5  --    GLOBULIN 2.1  --    ALT (SGPT) 19  --    AST (SGOT) 19  --    BILIRUBIN 0.3  --    ALK PHOS 101  --    LIPASE  --  16                     Brief Urine Lab Results       None          Microbiology Results (last 10 days)       ** No results found for the last 240 hours. **            CT Outside Films  Result Date: 4/15/2025  This procedure was auto-finalized with no dictation required.    XR Outside Films  Result Date: 4/15/2025  This procedure was auto-finalized with no dictation required.    XR Abdomen KUB  Result Date: 4/15/2025  XR ABDOMEN KUB Date of  Exam: 4/15/2025 6:53 PM EDT Indication: SBO, removed NGT Comparison: 4/28/2024 FINDINGS: A nonspecific bowel gas pattern is observed. A small stool burden is seen throughout the colon. No definitive pathologic calcifications are seen. No acute osseous abnormalities are noted.     1.A nonspecific bowel gas pattern is observed. A small stool burden is seen throughout the colon. Electronically Signed: Jorge A Shine MD  4/15/2025 7:33 PM EDT  Workstation ID: [id number]    CT Abdomen Pelvis With Contrast  Result Date: 4/14/2025  CT OF THE ABDOMEN AND PELVIS HISTORY: Right lower quadrant pain with nausea and vomiting for 3 hours. PROCEDURE:  Routine axial images were obtained from the lung bases to the pubic symphysis following IV contrast administration. This study was performed with techniques to keep radiation doses as low as reasonably achievable, (ALARA). Individualized dose reduction techniques using automated exposure control or adjustment of mA and/or kV according to the patient size were employed. COMPARISON: None. FINDINGS: Abdomen: The gallbladder has been removed, accounting for the mild to moderate intra and extrahepatic biliary ductal dilation. Solid abdominal organs and ureters are otherwise unremarkable. The colon is unremarkable and there is no sign of appendicitis. However, there are multiple loops of dilated fluid and air-filled distal small bowel. There is no cold small bowel within an area of rectus diastases, but this does not appear to be related to small bowel dilation. Pelvis: The urinary bladder is normal. There is no pelvic or abdominal ascites, adenopathy, or acute osseous abnormality.    Distal small bowel dilation worrisome for bowel obstruction. Proximal small bowel is normal. Images reviewed, interpreted and dictated by Dr. Rajendra Linn MD              Results for orders placed during the hospital encounter of 09/10/20    Adult Transesophageal Echo (ROSE) W/ Cont if Necessary Per Protocol  (Cardiology Department)    Interpretation Summary  1.  Normal left ventricular size and systolic function.  2.  Trace MR and TR.  3.  No echocardiographic evidence of valvular endocarditis.      Plan for Follow-up of Pending Labs/Results: None pending    Discharge Details        Discharge Medications        Continue These Medications        Instructions Start Date   buprenorphine 2 MG sublingual tablet SL tablet  Commonly known as: SUBUTEX   2 mg, Daily      Calcium 1200 2098-1095 MG-UNIT chewable tablet   1,200 mg, Oral, Daily      fluticasone 50 MCG/ACT nasal spray  Commonly known as: FLONASE   2 sprays, Daily      lidocaine 5 %  Commonly known as: LIDODERM   1 patch, Transdermal, Every 24 Hours, Remove & Discard patch within 12 hours or as directed by MD      Magnesium 250 MG tablet   250 mg, Oral, Daily      mirtazapine 30 MG tablet  Commonly known as: REMERON   30 mg, Nightly      multivitamin tablet tablet   3 tablets, Oral, Daily      pantoprazole 40 MG EC tablet  Commonly known as: PROTONIX   40 mg, Oral, Every Morning      thiamine 100 MG tablet  Commonly known as: VITAMIN B-1   100 mg, Oral, Daily      Vitamin A 2400 MCG (8000 UT) tablet   8,000 Units, Oral, Daily      Vitamin B-12 1000 MCG sublingual tablet   1,000 mcg, Sublingual, Daily      vitamin D3 125 MCG (5000 UT) capsule capsule   5,000 Units, Oral, Daily      Cholecalciferol 1.25 MG (48511 UT) tablet   1.25 mg, Oral, Weekly      vitamin E 200 UNIT capsule   200 Units, Oral, Daily      Vitamin K (Phytonadione) 100 MCG tablet   400 mcg, Oral, Daily      Vitron-C  MG tablet  Generic drug: Iron-Vitamin C   1 tablet, Oral, Nightly             Stop These Medications      cyclobenzaprine 10 MG tablet  Commonly known as: FLEXERIL     hydrOXYzine 25 MG tablet  Commonly known as: ATARAX     propranolol 60 MG tablet  Commonly known as: INDERAL            ASK your doctor about these medications        Instructions Start Date   amLODIPine 5 MG  tablet  Commonly known as: NORVASC   5 mg, Daily               Allergies   Allergen Reactions    Naloxone Swelling     Throat swelling, itching, rash    Hydrocodone-Acetaminophen Itching and Rash     Percocet OK         Discharge Disposition:  Home or Self Care    Diet:  Hospital:  Diet Order   Procedures    Diet: Bariatric Stage 2; Fluid Consistency: Thin (IDDSI 0)       Diet Instructions       Diet: Regular/House Diet; Regular (IDDSI 7); Thin (IDDSI 0)      Discharge Diet: Regular/House Diet    Texture: Regular (IDDSI 7)    Fluid Consistency: Thin (IDDSI 0)                CODE STATUS:    Code Status and Medical Interventions: CPR (Attempt to Resuscitate); Full Support   Ordered at: 04/15/25 2041     Code Status (Patient has no pulse and is not breathing):    CPR (Attempt to Resuscitate)     Medical Interventions (Patient has pulse or is breathing):    Full Support     Level Of Support Discussed With:    Patient       No future appointments.    Additional Instructions for the Follow-ups that You Need to Schedule       Discharge Follow-up with Specified Provider:   As directed      Follow Up Details: Discharge home if ok with hospitalist service, usual scheduled follow-up in our office                      Socorro Carroll MD  04/16/25      Time Spent on Discharge:  I spent  31  minutes on this discharge activity which included: face-to-face encounter with the patient, reviewing the data in the system, coordination of the care with the nursing staff as well as consultants, documentation, and entering orders.

## 2025-04-16 NOTE — PROGRESS NOTES
"Bariatric Surgery Progress Note:      Chief Complaint:  HD#1  \"feel better\"    Subjective   He is sitting up in bed alone in the room.  No further abdominal pain.  No nausea or vomiting.  Passing some flatus, bowel movement yesterday.  He would like to go home if possible.    Objective     Vital Signs  Blood pressure 140/91, pulse 54, temperature 98.4 °F (36.9 °C), temperature source Oral, resp. rate 16, height 180.3 cm (71\"), weight 69.9 kg (154 lb 3.2 oz), SpO2 95%.    No intake or output data in the 24 hours ending 04/16/25 0939    Physical Exam:  He is in no apparent distress pleasant and conversant.  Abdomen minimally distended, nontender, bowel sounds normal active.  No hernias appreciated.       Labs:  Lab Results (last 24 hours)       Procedure Component Value Units Date/Time    POC Glucose Once [[id number]]  (Normal) Collected: 04/16/25 0607    Specimen: Blood Updated: 04/16/25 0608     Glucose 86 mg/dL     POC Glucose Once [[id number]]  (Normal) Collected: 04/16/25 0012    Specimen: Blood Updated: 04/16/25 0014     Glucose 82 mg/dL     Comprehensive Metabolic Panel [[id number]]  (Abnormal) Collected: 04/15/25 2229    Specimen: Blood Updated: 04/15/25 2301     Glucose 108 mg/dL      BUN 10 mg/dL      Creatinine 0.67 mg/dL      Sodium 141 mmol/L      Potassium 4.0 mmol/L      Chloride 104 mmol/L      CO2 27.0 mmol/L      Calcium 8.6 mg/dL      Total Protein 5.6 g/dL      Albumin 3.5 g/dL      ALT (SGPT) 19 U/L      AST (SGOT) 19 U/L      Alkaline Phosphatase 101 U/L      Total Bilirubin 0.3 mg/dL      Globulin 2.1 gm/dL      Comment: Calculated Result        A/G Ratio 1.7 g/dL      BUN/Creatinine Ratio 14.9     Anion Gap 10.0 mmol/L      eGFR 114.5 mL/min/1.73     Narrative:      GFR Categories in Chronic Kidney Disease (CKD)      GFR Category          GFR (mL/min/1.73)    Interpretation  G1                     90 or greater         Normal or high (1)  G2                      60-89                Mild decrease " (1)  G3a                   45-59                Mild to moderate decrease  G3b                   30-44                Moderate to severe decrease  G4                    15-29                Severe decrease  G5                    14 or less           Kidney failure          (1)In the absence of evidence of kidney disease, neither GFR category G1 or G2 fulfill the criteria for CKD.    eGFR calculation 2021 CKD-EPI creatinine equation, which does not include race as a factor    Lactic Acid, Plasma [[id number]]  (Normal) Collected: 04/15/25 2229    Specimen: Blood Updated: 04/15/25 2257     Lactate 1.3 mmol/L      Comment: Falsely depressed results may occur on samples drawn from patients receiving N-Acetylcysteine (NAC) or Metamizole.       CBC & Differential [[id number]]  (Abnormal) Collected: 04/15/25 2229    Specimen: Blood Updated: 04/15/25 2254    Narrative:      The following orders were created for panel order CBC & Differential.  Procedure                               Abnormality         Status                     ---------                               -----------         ------                     CBC Auto Differential[[id number]]        Abnormal            Final result                 Please view results for these tests on the individual orders.    CBC Auto Differential [[id number]]  (Abnormal) Collected: 04/15/25 2229    Specimen: Blood Updated: 04/15/25 2254     WBC 9.52 10*3/mm3      RBC 4.45 10*6/mm3      Hemoglobin 12.9 g/dL      Hematocrit 39.3 %      MCV 88.3 fL      MCH 29.0 pg      MCHC 32.8 g/dL      RDW 13.4 %      RDW-SD 43.3 fl      MPV 9.5 fL      Platelets 271 10*3/mm3      Neutrophil % 69.2 %      Lymphocyte % 21.1 %      Monocyte % 8.3 %      Eosinophil % 0.7 %      Basophil % 0.4 %      Immature Grans % 0.3 %      Neutrophils, Absolute 6.58 10*3/mm3      Lymphocytes, Absolute 2.01 10*3/mm3      Monocytes, Absolute 0.79 10*3/mm3      Eosinophils, Absolute 0.07 10*3/mm3      Basophils, Absolute  0.04 10*3/mm3      Immature Grans, Absolute 0.03 10*3/mm3      nRBC 0.0 /100 WBC         Abdominal films reviewed from yesterday, mild dilatation of small and large bowel consistent with a nonspecific bowel gas pattern      Assessment & Plan     HD#1 admitted with abdominal pain and concern for possible bowel obstruction.  Clinically and radiographically no evidence of obstruction.    Recommendations: Risk, benefits, and alternative therapies were discussed with the patient and he does not wish to proceed with any type of diagnostic laparoscopy at this time.  He also does not wish to schedule elective diagnostic laparoscopy in the near future to rule out internal hernia, etc.  Will begin liquid diet advance as tolerated and discharged home at any time from my standpoint.  Usual post duodenal switch follow-up in our office.  Call in the meantime with any problems questions or concerns.  Resume preadmission medications.                04/16/25  09:39 EDT  Olu Rodriguez MD for Dr. Rodriguez

## 2025-04-16 NOTE — PLAN OF CARE
Problem: Adult Inpatient Plan of Care  Goal: Plan of Care Review  Outcome: Progressing  Flowsheets (Taken 4/16/2025 8269)  Progress: improving  Outcome Evaluation: Pt alert and oriented x4. VSS on room air. Pt has rested well in bed throughout shift. PRN pain medication given with good effect. Up ad nery. IVF infusing as ordered. NPO overnight. No needs voiced at this time, call light and personal items within reach.  Plan of Care Reviewed With: patient   Goal Outcome Evaluation:  Plan of Care Reviewed With: patient        Progress: improving  Outcome Evaluation: Pt alert and oriented x4. VSS on room air. Pt has rested well in bed throughout shift. PRN pain medication given with good effect. Up ad nery. IVF infusing as ordered. NPO overnight. No needs voiced at this time, call light and personal items within reach.

## 2025-04-16 NOTE — CASE MANAGEMENT/SOCIAL WORK
Discharge Planning Assessment  [facility]     Patient Name: Gopi Johnson  MRN: [id number]  Today's Date: 4/16/2025    Admit Date: 4/15/2025    Plan: Home with family   Discharge Needs Assessment       Row Name 04/16/25 0811       Living Environment    People in Home child(caren), adult;parent(s)    Name(s) of People in Home Sowmya (mother) [phone]    Current Living Arrangements home    Potentially Unsafe Housing Conditions none    In the past 12 months has the electric, gas, oil, or water company threatened to shut off services in your home? No    Primary Care Provided by self    Provides Primary Care For no one    Family Caregiver if Needed child(caren), adult;parent(s)    Able to Return to Prior Arrangements yes       Resource/Environmental Concerns    Resource/Environmental Concerns none    Transportation Concerns none       Transportation Needs    In the past 12 months, has lack of transportation kept you from medical appointments or from getting medications? no    In the past 12 months, has lack of transportation kept you from meetings, work, or from getting things needed for daily living? No       Food Insecurity    Within the past 12 months, you worried that your food would run out before you got the money to buy more. Never true    Within the past 12 months, the food you bought just didn't last and you didn't have money to get more. Never true       Transition Planning    Patient/Family Anticipates Transition to home with family    Patient/Family Anticipated Services at Transition none    Transportation Anticipated family or friend will provide       Discharge Needs Assessment    Readmission Within the Last 30 Days no previous admission in last 30 days    Equipment Currently Used at Home cpap    Concerns to be Addressed denies needs/concerns at this time    Do you want help finding or keeping work or a job? I do not need or want help    Do you want help with school or training? For example, starting  or completing job training or getting a high school diploma, GED or equivalent No    Anticipated Changes Related to Illness none    Equipment Needed After Discharge none                   Discharge Plan       Row Name 04/16/25 0812       Plan    Plan Home with family    Patient/Family in Agreement with Plan yes    Provided Post Acute Provider List? Refused    Plan Comments Spoke with patient at bedside. Lives with Sowmya (mother) [phone] in [facility]. Is independent with ADL's. No problems affording Human Medicare or medications. Uses [facility]. Uses a CPAP. PCP is [facility]. Plan is home with family. Family will transport. CM will continue to follow.    Final Discharge Disposition Code 01 - home or self-care                       Demographic Summary       Row Name 04/16/25 0810       General Information    Admission Type observation    Arrived From hospital    Referral Source admission list    Reason for Consult discharge planning    Preferred Language English       Contact Information    Permission Granted to Share Info With     Contact Information Obtained for                    Functional Status       Row Name 04/16/25 0810       Functional Status    Usual Activity Tolerance good    Current Activity Tolerance good       Physical Activity    On average, how many days per week do you engage in moderate to strenuous exercise (like a brisk walk)? 0 days    On average, how many minutes do you engage in exercise at this level? 0 min    Number of minutes of exercise per week 0       Functional Status, IADL    Medications independent    Meal Preparation independent    Housekeeping independent    Laundry independent    Shopping independent    If for any reason you need help with day-to-day activities such as bathing, preparing meals, shopping, managing finances, etc., do you get the help you need? I don't need any help       Mental Status    General Appearance WDL WDL        Mental Status Summary    Recent Changes in Mental Status/Cognitive Functioning no changes       Employment/    Employment Status disabled                   Psychosocial    No documentation.                  Abuse/Neglect    No documentation.                  Legal    No documentation.                  Substance Abuse    No documentation.                  Patient Forms    No documentation.                     Manuel Paige RN

## 2025-04-16 NOTE — CASE MANAGEMENT/SOCIAL WORK
Case Management Discharge Note      Final Note: Plan is home with family. Family will transport. Patient denies any discharge needs.    Provided Post Acute Provider List?: Refused    Selected Continued Care - Admitted Since 4/15/2025       Destination    No services have been selected for the patient.                Durable Medical Equipment    No services have been selected for the patient.                Dialysis/Infusion    No services have been selected for the patient.                Home Medical Care    No services have been selected for the patient.                Therapy    No services have been selected for the patient.                Community Resources    No services have been selected for the patient.                Community & DME    No services have been selected for the patient.                         Final Discharge Disposition Code: 01 - home or self-care

## 2025-04-17 ENCOUNTER — READMISSION MANAGEMENT (OUTPATIENT)
Dept: CALL CENTER | Facility: HOSPITAL | Age: 50
End: 2025-04-17
Payer: MEDICARE

## 2025-04-17 NOTE — OUTREACH NOTE
Prep Survey      Flowsheet Row Responses   [facility] patient discharged from? [place]   Is LACE score < 7 ? No   Eligibility Readm Mgmt   Discharge diagnosis Small bowel obstruction   Does the patient have one of the following disease processes/diagnoses(primary or secondary)? Other   Does the patient have Home health ordered? No   Is there a DME ordered? No   Medication alerts for this patient see avs   Prep survey completed? Yes            Teodora AARON - Registered Nurse

## 2025-04-22 ENCOUNTER — READMISSION MANAGEMENT (OUTPATIENT)
Dept: CALL CENTER | Facility: HOSPITAL | Age: 50
End: 2025-04-22
Payer: MEDICARE

## 2025-04-22 NOTE — OUTREACH NOTE
Medical Week 1 Survey      Flowsheet Row Responses   [facility] patient discharged from? [place]   Does the patient have one of the following disease processes/diagnoses(primary or secondary)? Other   Week 1 attempt successful? Yes   Call start time 0927   Call end time 0927   Discharge diagnosis Small bowel obstruction   Person spoke with today (if not patient) and relationship Mother   Has home health visited the patient within 72 hours of discharge? N/A   Psychosocial issues? No   What is the patient's perception of their health status since discharge? Improving   Week 1 call completed? Yes   Wrap up additional comments Pts mother states pt is doing  better and is unsure if has a f/u appt. Pt was sleeping at time of call.   Call end time 0927            Jennifer ESPINOZA - Registered Nurse

## 2025-05-01 ENCOUNTER — READMISSION MANAGEMENT (OUTPATIENT)
Dept: CALL CENTER | Facility: HOSPITAL | Age: 50
End: 2025-05-01
Payer: MEDICARE

## 2025-05-01 NOTE — OUTREACH NOTE
Medical Week 2 Survey      Flowsheet Row Responses   [facility] patient discharged from? [place]   Does the patient have one of the following disease processes/diagnoses(primary or secondary)? Other   Week 2 attempt successful? No   Unsuccessful attempts Attempt 1            NATE SLAUGHTER - Registered Nurse

## 2025-05-06 ENCOUNTER — READMISSION MANAGEMENT (OUTPATIENT)
Dept: CALL CENTER | Facility: HOSPITAL | Age: 50
End: 2025-05-06
Payer: MEDICARE

## 2025-05-06 NOTE — OUTREACH NOTE
Medical Week 2 Survey      Flowsheet Row Responses   [facility] patient discharged from? [place]   Does the patient have one of the following disease processes/diagnoses(primary or secondary)? Other   Week 2 attempt successful? Yes   Call start time 0932   Discharge diagnosis Small bowel obstruction   Call end time 0933   Meds reviewed with patient/caregiver? Yes   Is the patient having any side effects they believe may be caused by any medication additions or changes? No   Does the patient have all medications ordered at discharge? N/A   Is the patient taking all medications as directed (includes completed medication regime)? Yes   Does the patient have a primary care provider?  Yes   Does the patient have an appointment with their PCP within 7 days of discharge? Yes   Comments regarding PCP Has had PCP follow up   Has the patient kept scheduled appointments due by today? Yes   Has home health visited the patient within 72 hours of discharge? N/A   Psychosocial issues? No   Did the patient receive a copy of their discharge instructions? Yes   Nursing interventions Reviewed instructions with patient   What is the patient's perception of their health status since discharge? Improving   Is the patient/caregiver able to teach back the hierarchy of who to call/visit for symptoms/problems? PCP, Specialist, Home health nurse, Urgent Care, ED, 911 Yes   If the patient is a current smoker, are they able to teach back resources for cessation? Smoking cessation medications   Week 2 Call Completed? Yes   Is the patient interested in additional calls from an ambulatory ? No   Would this patient benefit from a Referral to Amb Social Work? No   Call end time 0933            Amanda SLAUGHTER - Registered Nurse

## 2025-05-16 ENCOUNTER — READMISSION MANAGEMENT (OUTPATIENT)
Dept: CALL CENTER | Facility: HOSPITAL | Age: 50
End: 2025-05-16
Payer: MEDICARE

## 2025-05-16 NOTE — OUTREACH NOTE
Medical Week 3 Survey      Flowsheet Row Responses   [facility] patient discharged from? [place]   Does the patient have one of the following disease processes/diagnoses(primary or secondary)? Other   Week 3 attempt successful? No   Unsuccessful attempts Attempt 1            Merna ESPINOZA - Registered Nurse

## 2025-05-20 ENCOUNTER — READMISSION MANAGEMENT (OUTPATIENT)
Dept: CALL CENTER | Facility: HOSPITAL | Age: 50
End: 2025-05-20
Payer: MEDICARE

## 2025-05-20 NOTE — OUTREACH NOTE
Medical Week 3 Survey      Flowsheet Row Responses   [facility] patient discharged from? [place]   Does the patient have one of the following disease processes/diagnoses(primary or secondary)? Other   Week 3 attempt successful? No   Unsuccessful attempts Attempt 2            Lorene LONDONO - Registered Nurse

## (undated) DEVICE — CONMED SCOPE SAVER BITE BLOCK, 20X27 MM: Brand: SCOPE SAVER

## (undated) DEVICE — SYR LUERLOK 30CC

## (undated) DEVICE — ST TBG AIRSEAL FLTR TRI LUM

## (undated) DEVICE — Device: Brand: STANDARD BOUGIE, 38FR

## (undated) DEVICE — JP PERF DRN SIL FLT 10MM FULL: Brand: CARDINAL HEALTH

## (undated) DEVICE — CONTN GRAD MEAS TRIANG 32OZ BLK

## (undated) DEVICE — MINI ENDOCUT SCISSOR TIP, DISPOSABLE: Brand: RENEW

## (undated) DEVICE — LAPAROVUE VISIBILITY SYSTEM LAPAROSCOPIC SOLUTIONS: Brand: LAPAROVUE

## (undated) DEVICE — JACKSON-PRATT 100CC BULB RESERVOIR: Brand: CARDINAL HEALTH

## (undated) DEVICE — ENDOPATH XCEL UNIVERSAL TROCAR STABLILITY SLEEVES: Brand: ENDOPATH XCEL

## (undated) DEVICE — ENSEAL LAPAROSCOPIC TISSUE SEALER G2 ARTICULATING CURVED JAW FOR USE WITH G2 GENERATOR 5MM DIAMETER 45CM SHAFT LENGTH: Brand: ENSEAL

## (undated) DEVICE — UNDRPD COMFRT GLD DRYPAD 36X57IN

## (undated) DEVICE — SPNG GZ WOVN 4X4IN 12PLY 10/BX STRL

## (undated) DEVICE — SLV SCD CALF HEMOFORCE DVT THERP REPROC MD

## (undated) DEVICE — COLUMN DRAPE

## (undated) DEVICE — CONN TBG Y 5 IN 1 LF STRL

## (undated) DEVICE — DRSNG TELFA PAD NONADH STR 1S 3X8IN

## (undated) DEVICE — VESSEL SEALER EXTEND: Brand: ENDOWRIST

## (undated) DEVICE — TROC BLADLES ANCHORPORT/OPTI LP 8X120MM 1P/U

## (undated) DEVICE — TROCAR: Brand: KII FIOS FIRST ENTRY

## (undated) DEVICE — Device

## (undated) DEVICE — MAX-CORE® DISPOSABLE CORE BIOPSY INSTRUMENT, 18G X 25CM: Brand: MAX-CORE

## (undated) DEVICE — PK BARIATRIC 10

## (undated) DEVICE — ENDOPATH 5MM ENDOSCOPIC BLUNT TIP DISSECTORS (12 POUCHES CONTAINING 3 DISSECTORS EACH): Brand: ENDOPATH

## (undated) DEVICE — [HIGH FLOW INSUFFLATOR,  DO NOT USE IF PACKAGE IS DAMAGED,  KEEP DRY,  KEEP AWAY FROM SUNLIGHT,  PROTECT FROM HEAT AND RADIOACTIVE SOURCES.]: Brand: PNEUMOSURE

## (undated) DEVICE — CUTTING LOOP, BIPOLAR, 24/26 FR.: Brand: N.A.

## (undated) DEVICE — APPL CHLORAPREP TINTED 26ML TEAL

## (undated) DEVICE — ST FLD IRR WARM

## (undated) DEVICE — GOWN,NON-REINFORCED,SIRUS,SET IN SLV,XXL: Brand: MEDLINE

## (undated) DEVICE — SHT AIR TRANSFR COMFRT GLIDE LAT 40X80IN

## (undated) DEVICE — ENDOSCOPY PORT CONNECTOR FOR OLYMPUS® SCOPES: Brand: ERBE

## (undated) DEVICE — STAPLER 60: Brand: SUREFORM

## (undated) DEVICE — GLV SURG SENSICARE PI MIC PF SZ8.5 LF STRL

## (undated) DEVICE — DRAINBAG,ANTI-REFLUX TOWER,L/F,2000ML,LL: Brand: MEDLINE

## (undated) DEVICE — BAG,LEG,COMFORT-STRAPS,LARGE,32OZ: Brand: MEDLINE

## (undated) DEVICE — SOL IRR NACL 0.9PCT 3000ML

## (undated) DEVICE — ANTIBACTERIAL VIOLET BRAIDED (POLYGLACTIN 910), SYNTHETIC ABSORBABLE SUTURE: Brand: COATED VICRYL

## (undated) DEVICE — RICH CYSTO: Brand: MEDLINE INDUSTRIES, INC.

## (undated) DEVICE — SUCTION IRRIGATOR: Brand: ENDOWRIST

## (undated) DEVICE — BLANKT WARM UPPR/BDY ARM/OUT 57X196CM

## (undated) DEVICE — SUT SILK 2/0 SH 30IN K833H

## (undated) DEVICE — GLV SURG SENSICARE W/ALOE PF LF 8.5 STRL

## (undated) DEVICE — KT WARM LAP LIQUIDSCOPE/HELPOR W/WARMOR/CLTH 2/TROC/SWAB

## (undated) DEVICE — PAD,ARMBOARD,CONV,FOAM,2X8X20",12PR/CS: Brand: MEDLINE

## (undated) DEVICE — ADHS SKIN PREMIERPRO EXOFIN TOPICAL HI/VISC .5ML

## (undated) DEVICE — GLV SURG SENSICARE LT W/ALOE PF LF 7 STRL

## (undated) DEVICE — TROC STANDARDTROCAR FOR/TITANSGS 19MM DISP STRL

## (undated) DEVICE — IRRIGATOR TOOMEY 70CC

## (undated) DEVICE — SOL ANTISTICK CAUTRY ELECTROLUBE LF

## (undated) DEVICE — SEALANT WND FIBRIN TISSEEL PREFIL/SYR/PRIMAFZ 10ML

## (undated) DEVICE — SUCTION CANISTER, 1500CC, RIGID: Brand: DEROYAL

## (undated) DEVICE — ARM DRAPE

## (undated) DEVICE — Device: Brand: DEFENDO AIR/WATER/SUCTION AND BIOPSY VALVE

## (undated) DEVICE — BLADELESS OBTURATOR: Brand: WECK VISTA

## (undated) DEVICE — INTENDED USE FOR SURGICAL MARKING ON INTACT SKIN, ALSO PROVIDES A PERMANENT METHOD OF IDENTIFYING OBJECTS IN THE OPERATING ROOM: Brand: WRITESITE® REGULAR TIP SKIN MARKER

## (undated) DEVICE — LUBE JELLY PK/2.75GM STRL BX/144

## (undated) DEVICE — SEAL

## (undated) DEVICE — 24FR BIPLR COAG ELECTRDE, STERILE: Brand: N.A.

## (undated) DEVICE — APL DUPLOSPRAYER MIS 40CM

## (undated) DEVICE — SAFESECURE,SECUREMENT,FOLEY CATH,STERILE: Brand: MEDLINE

## (undated) DEVICE — PATIENT RETURN ELECTRODE, SINGLE-USE, CONTACT QUALITY MONITORING, ADULT, WITH 9FT CORD, FOR PATIENTS WEIGING OVER 33LBS. (15KG): Brand: MEGADYNE

## (undated) DEVICE — ENDOGATOR HYBRID TUBING KIT FOR USE WITH ENDOGATOR IRRIGATION PUMP, OLYMPUS PUMP, GI4000 ESU, AND TORRENT IRRIGATION PUMP.: Brand: ENDOGATOR KIT

## (undated) DEVICE — ENDOPATH XCEL BLADELESS TROCARS WITH STABILITY SLEEVES: Brand: ENDOPATH XCEL

## (undated) DEVICE — SUT ETHLN 2/0 PS 18IN 585H

## (undated) DEVICE — REDUCER: Brand: ENDOWRIST

## (undated) DEVICE — STRAP POSTN KN/BDY FM 5X72IN DISP

## (undated) DEVICE — LAPAROSCOPIC SMOKE FILTRATION SYSTEM: Brand: PALL LAPAROSHIELD® PLUS LAPAROSCOPIC SMOKE FILTRATION SYSTEM

## (undated) DEVICE — CATHETER,FOLEY,COUDE,LATEX,18FR,10ML: Brand: MEDLINE

## (undated) DEVICE — TIP COVER ACCESSORY

## (undated) DEVICE — APPL HEMOS FOR DELIVERY FLOSEAL